# Patient Record
Sex: MALE | Race: WHITE | NOT HISPANIC OR LATINO | URBAN - METROPOLITAN AREA
[De-identification: names, ages, dates, MRNs, and addresses within clinical notes are randomized per-mention and may not be internally consistent; named-entity substitution may affect disease eponyms.]

---

## 2018-06-25 ENCOUNTER — INPATIENT (INPATIENT)
Facility: HOSPITAL | Age: 58
LOS: 3 days | Discharge: HOME HEALTH | End: 2018-06-29
Attending: HOSPITALIST | Admitting: HOSPITALIST
Payer: MEDICAID

## 2018-06-25 VITALS
HEART RATE: 110 BPM | TEMPERATURE: 99 F | RESPIRATION RATE: 18 BRPM | SYSTOLIC BLOOD PRESSURE: 144 MMHG | DIASTOLIC BLOOD PRESSURE: 70 MMHG | OXYGEN SATURATION: 97 %

## 2018-06-25 RX ORDER — SODIUM CHLORIDE 9 MG/ML
1000 INJECTION INTRAMUSCULAR; INTRAVENOUS; SUBCUTANEOUS ONCE
Qty: 0 | Refills: 0 | Status: COMPLETED | OUTPATIENT
Start: 2018-06-25 | End: 2018-06-25

## 2018-06-25 RX ORDER — SODIUM CHLORIDE 9 MG/ML
3 INJECTION INTRAMUSCULAR; INTRAVENOUS; SUBCUTANEOUS ONCE
Qty: 0 | Refills: 0 | Status: COMPLETED | OUTPATIENT
Start: 2018-06-25 | End: 2018-06-26

## 2018-06-25 NOTE — ED PROVIDER NOTE - MEDICAL DECISION MAKING DETAILS
labs, ekg, CT done. Pt with hyokalemia, hypocalcemia and likely ileus on CT. Will admit for pain and nausea with these other findings.

## 2018-06-25 NOTE — ED PROVIDER NOTE - CARE PLAN
Principal Discharge DX:	Abdominal pain, unspecified abdominal location  Secondary Diagnosis:	Hypokalemia  Secondary Diagnosis:	Hypocalcemia

## 2018-06-25 NOTE — ED PROVIDER NOTE - ATTENDING CONTRIBUTION TO CARE
58 yo male with pmh of obesity, asthma, dm, hernia repair, presents to ER for abdomen pain and diarrhea x 2 weeks. Pt also had vomiting x 2, and abdomen tenderness "all over". Pt has some SOB with pain. Pt denies fever, chills, cough. Pt however is disheveled, and has not taken care of his self recently (loss of a family member who used to care for him). Agree with PA management. Will check labs, urine, CT scan and reassess.

## 2018-06-25 NOTE — ED PROVIDER NOTE - NS ED ROS FT
Except as documented in HPI, all other ROS negative.   GENERAL: Denies fever/chills, loss of appetite/weight or fatigue.  SKIN: Denies rashes, abrasions, lacerations, ecchymosis, erythema, or edema.  HEAD: Denies headache, dizziness or trauma.  EYES: Denies blurry vision, diplopia, or photophobia.  ENT: Denies earaches, discharge or hearing loss. Denies nasal discharge or epistaxis. Denies sore throat.   CARDIAC: Denies chest pain, palpitations, or SOB.   RESPIRATORY: Denies SOB, cough, hemoptysis or wheezing.   GI: + abdominal pain, + nausea, + vomiting, +diarrhea.   : Denies hematuria, dysuria or frequency.   MSK: Denies myalgias, bony deformity or pain.   NEURO: Denies paresthesias, tingling, weakness.

## 2018-06-25 NOTE — ED PROVIDER NOTE - PHYSICAL EXAMINATION
VITAL SIGNS: I have reviewed the initial vital signs.   CONSTITUTIONAL: Awake, alert. Unkempt, malodorous, in no distress. Non-toxic appearing.   SKIN: No rash, vesicles/lesion, abrasions or lacerations. No ecchymosis or signs of trauma.   HEAD: Normocephalic; atraumatic.   EYES: Symmetrical, no discharge or signs of trauma. Conjunctiva and sclera clear.   ENT: Airway patent. MMM.   NECK: Supple; non-tender.   CARD: No chest wall deformity or tenderness. S1, S2 normal; no murmurs, gallops, or rubs. Regular rate and rhythm.  RESP: Good air movement. Lungs CTAB. No crackles, wheezes, rales or rhonchi.  ABD: Soft; Large, obese abdominal, diffuse tenderness.  No rebound/guarding/rigidity.   EXT: No bony deformity or tenderness. Normal ROM x 4 extremities.   NEURO: A&Ox3. GCS 15. Normal speech   PSYCH: Cooperative, appropriate.

## 2018-06-25 NOTE — ED PROVIDER NOTE - PROGRESS NOTE DETAILS
Results reviewed, will admit pt to medicine. page put out for surgery consult spoke to surgery consult dr pham for eval of patient while in ER

## 2018-06-25 NOTE — ED PROVIDER NOTE - OBJECTIVE STATEMENT
Pt is a 56 y/o Male, PMHX of DM, Asthma, presents to ED w/ abdominal pain Pt is a 56 y/o Male, PMHX of DM, Asthma, presents to ED w/ abdominal pain that began today. He also reports 2 episodes of NB/NB vomiting today and diarrhea for 2 weeks. pt has hx of hernia sx. Denies fever, chills, dysuria, hematuria, melena, bloody stools.

## 2018-06-26 DIAGNOSIS — Z98.890 OTHER SPECIFIED POSTPROCEDURAL STATES: Chronic | ICD-10-CM

## 2018-06-26 LAB
ACETONE SERPL-MCNC: NEGATIVE — SIGNIFICANT CHANGE UP
ALBUMIN SERPL ELPH-MCNC: 3.2 G/DL — LOW (ref 3.5–5.2)
ALBUMIN SERPL ELPH-MCNC: 3.3 G/DL — LOW (ref 3.5–5.2)
ALP SERPL-CCNC: 100 U/L — SIGNIFICANT CHANGE UP (ref 30–115)
ALP SERPL-CCNC: 104 U/L — SIGNIFICANT CHANGE UP (ref 30–115)
ALT FLD-CCNC: 18 U/L — SIGNIFICANT CHANGE UP (ref 0–41)
ALT FLD-CCNC: 20 U/L — SIGNIFICANT CHANGE UP (ref 0–41)
ANION GAP SERPL CALC-SCNC: 15 MMOL/L — HIGH (ref 7–14)
ANION GAP SERPL CALC-SCNC: 16 MMOL/L — HIGH (ref 7–14)
APPEARANCE UR: CLEAR — SIGNIFICANT CHANGE UP
AST SERPL-CCNC: 10 U/L — SIGNIFICANT CHANGE UP (ref 0–41)
AST SERPL-CCNC: 12 U/L — SIGNIFICANT CHANGE UP (ref 0–41)
BASE EXCESS BLDV CALC-SCNC: -1.5 MMOL/L — SIGNIFICANT CHANGE UP (ref -2–2)
BILIRUB SERPL-MCNC: 0.3 MG/DL — SIGNIFICANT CHANGE UP (ref 0.2–1.2)
BILIRUB SERPL-MCNC: 0.3 MG/DL — SIGNIFICANT CHANGE UP (ref 0.2–1.2)
BILIRUB UR-MCNC: NEGATIVE — SIGNIFICANT CHANGE UP
BUN SERPL-MCNC: 13 MG/DL — SIGNIFICANT CHANGE UP (ref 10–20)
BUN SERPL-MCNC: 15 MG/DL — SIGNIFICANT CHANGE UP (ref 10–20)
CA-I SERPL-SCNC: 1.05 MMOL/L — LOW (ref 1.12–1.3)
CALCIUM SERPL-MCNC: 7.5 MG/DL — LOW (ref 8.5–10.1)
CALCIUM SERPL-MCNC: 7.8 MG/DL — LOW (ref 8.5–10.1)
CHLORIDE SERPL-SCNC: 102 MMOL/L — SIGNIFICANT CHANGE UP (ref 98–110)
CHLORIDE SERPL-SCNC: 99 MMOL/L — SIGNIFICANT CHANGE UP (ref 98–110)
CO2 SERPL-SCNC: 25 MMOL/L — SIGNIFICANT CHANGE UP (ref 17–32)
CO2 SERPL-SCNC: 25 MMOL/L — SIGNIFICANT CHANGE UP (ref 17–32)
COLOR SPEC: YELLOW — SIGNIFICANT CHANGE UP
CREAT SERPL-MCNC: 0.7 MG/DL — SIGNIFICANT CHANGE UP (ref 0.7–1.5)
CREAT SERPL-MCNC: 0.8 MG/DL — SIGNIFICANT CHANGE UP (ref 0.7–1.5)
DIFF PNL FLD: NEGATIVE — SIGNIFICANT CHANGE UP
GAS PNL BLDV: 138 MMOL/L — SIGNIFICANT CHANGE UP (ref 136–145)
GAS PNL BLDV: SIGNIFICANT CHANGE UP
GLUCOSE SERPL-MCNC: 174 MG/DL — HIGH (ref 70–99)
GLUCOSE SERPL-MCNC: 185 MG/DL — HIGH (ref 70–99)
GLUCOSE UR QL: NEGATIVE MG/DL — SIGNIFICANT CHANGE UP
HCO3 BLDV-SCNC: 27 MMOL/L — SIGNIFICANT CHANGE UP (ref 22–29)
HCT VFR BLD CALC: 40.3 % — LOW (ref 42–52)
HCT VFR BLD CALC: 41.9 % — LOW (ref 42–52)
HCT VFR BLDA CALC: 47.5 % — HIGH (ref 34–44)
HGB BLD CALC-MCNC: 15.5 G/DL — SIGNIFICANT CHANGE UP (ref 14–18)
HGB BLD-MCNC: 12.5 G/DL — LOW (ref 14–18)
HGB BLD-MCNC: 12.7 G/DL — LOW (ref 14–18)
KETONES UR-MCNC: NEGATIVE — SIGNIFICANT CHANGE UP
LACTATE BLDV-MCNC: 1.6 MMOL/L — SIGNIFICANT CHANGE UP (ref 0.5–1.6)
LACTATE SERPL-SCNC: 1.2 MMOL/L — SIGNIFICANT CHANGE UP (ref 0.5–2.2)
LACTATE SERPL-SCNC: 1.7 MMOL/L — SIGNIFICANT CHANGE UP (ref 0.5–2.2)
LEUKOCYTE ESTERASE UR-ACNC: NEGATIVE — SIGNIFICANT CHANGE UP
LIDOCAIN IGE QN: 21 U/L — SIGNIFICANT CHANGE UP (ref 7–60)
MCHC RBC-ENTMCNC: 25.1 PG — LOW (ref 27–31)
MCHC RBC-ENTMCNC: 25.6 PG — LOW (ref 27–31)
MCHC RBC-ENTMCNC: 30.3 G/DL — LOW (ref 32–37)
MCHC RBC-ENTMCNC: 31 G/DL — LOW (ref 32–37)
MCV RBC AUTO: 82.6 FL — SIGNIFICANT CHANGE UP (ref 80–94)
MCV RBC AUTO: 83 FL — SIGNIFICANT CHANGE UP (ref 80–94)
NITRITE UR-MCNC: NEGATIVE — SIGNIFICANT CHANGE UP
NRBC # BLD: 0 /100 WBCS — SIGNIFICANT CHANGE UP (ref 0–0)
NRBC # BLD: 0 /100 WBCS — SIGNIFICANT CHANGE UP (ref 0–0)
PCO2 BLDV: 61 MMHG — HIGH (ref 41–51)
PH BLDV: 7.26 — SIGNIFICANT CHANGE UP (ref 7.26–7.43)
PH UR: 6.5 — SIGNIFICANT CHANGE UP (ref 5–8)
PLATELET # BLD AUTO: 280 K/UL — SIGNIFICANT CHANGE UP (ref 130–400)
PLATELET # BLD AUTO: 302 K/UL — SIGNIFICANT CHANGE UP (ref 130–400)
PO2 BLDV: 23 MMHG — SIGNIFICANT CHANGE UP (ref 20–40)
POTASSIUM BLDV-SCNC: 2.7 MMOL/L — LOW (ref 3.3–5.6)
POTASSIUM SERPL-MCNC: 2.9 MMOL/L — LOW (ref 3.5–5)
POTASSIUM SERPL-MCNC: 3.1 MMOL/L — LOW (ref 3.5–5)
POTASSIUM SERPL-SCNC: 2.9 MMOL/L — LOW (ref 3.5–5)
POTASSIUM SERPL-SCNC: 3.1 MMOL/L — LOW (ref 3.5–5)
PROT SERPL-MCNC: 5.8 G/DL — LOW (ref 6–8)
PROT SERPL-MCNC: 5.8 G/DL — LOW (ref 6–8)
PROT UR-MCNC: NEGATIVE MG/DL — SIGNIFICANT CHANGE UP
RBC # BLD: 4.88 M/UL — SIGNIFICANT CHANGE UP (ref 4.7–6.1)
RBC # BLD: 5.05 M/UL — SIGNIFICANT CHANGE UP (ref 4.7–6.1)
RBC # FLD: 14.6 % — HIGH (ref 11.5–14.5)
RBC # FLD: 14.8 % — HIGH (ref 11.5–14.5)
SAO2 % BLDV: 33 % — SIGNIFICANT CHANGE UP
SODIUM SERPL-SCNC: 140 MMOL/L — SIGNIFICANT CHANGE UP (ref 135–146)
SODIUM SERPL-SCNC: 142 MMOL/L — SIGNIFICANT CHANGE UP (ref 135–146)
SP GR SPEC: <=1.005 — SIGNIFICANT CHANGE UP (ref 1.01–1.03)
UROBILINOGEN FLD QL: 0.2 MG/DL — SIGNIFICANT CHANGE UP (ref 0.2–0.2)
WBC # BLD: 10.37 K/UL — SIGNIFICANT CHANGE UP (ref 4.8–10.8)
WBC # BLD: 7.82 K/UL — SIGNIFICANT CHANGE UP (ref 4.8–10.8)
WBC # FLD AUTO: 10.37 K/UL — SIGNIFICANT CHANGE UP (ref 4.8–10.8)
WBC # FLD AUTO: 7.82 K/UL — SIGNIFICANT CHANGE UP (ref 4.8–10.8)

## 2018-06-26 RX ORDER — AMPICILLIN SODIUM AND SULBACTAM SODIUM 250; 125 MG/ML; MG/ML
3 INJECTION, POWDER, FOR SUSPENSION INTRAMUSCULAR; INTRAVENOUS EVERY 6 HOURS
Qty: 0 | Refills: 0 | Status: DISCONTINUED | OUTPATIENT
Start: 2018-06-26 | End: 2018-06-27

## 2018-06-26 RX ORDER — HYDROMORPHONE HYDROCHLORIDE 2 MG/ML
2 INJECTION INTRAMUSCULAR; INTRAVENOUS; SUBCUTANEOUS
Qty: 0 | Refills: 0 | Status: DISCONTINUED | OUTPATIENT
Start: 2018-06-26 | End: 2018-06-29

## 2018-06-26 RX ORDER — INSULIN GLARGINE 100 [IU]/ML
12 INJECTION, SOLUTION SUBCUTANEOUS EVERY MORNING
Qty: 0 | Refills: 0 | Status: DISCONTINUED | OUTPATIENT
Start: 2018-06-26 | End: 2018-06-29

## 2018-06-26 RX ORDER — ONDANSETRON 8 MG/1
4 TABLET, FILM COATED ORAL EVERY 6 HOURS
Qty: 0 | Refills: 0 | Status: DISCONTINUED | OUTPATIENT
Start: 2018-06-26 | End: 2018-06-29

## 2018-06-26 RX ORDER — POTASSIUM CHLORIDE 20 MEQ
20 PACKET (EA) ORAL ONCE
Qty: 0 | Refills: 0 | Status: COMPLETED | OUTPATIENT
Start: 2018-06-26 | End: 2018-06-26

## 2018-06-26 RX ORDER — POTASSIUM CHLORIDE 20 MEQ
40 PACKET (EA) ORAL ONCE
Qty: 0 | Refills: 0 | Status: COMPLETED | OUTPATIENT
Start: 2018-06-26 | End: 2018-06-26

## 2018-06-26 RX ORDER — INSULIN LISPRO 100/ML
4 VIAL (ML) SUBCUTANEOUS
Qty: 0 | Refills: 0 | Status: DISCONTINUED | OUTPATIENT
Start: 2018-06-26 | End: 2018-06-29

## 2018-06-26 RX ORDER — IOHEXOL 300 MG/ML
30 INJECTION, SOLUTION INTRAVENOUS ONCE
Qty: 0 | Refills: 0 | Status: COMPLETED | OUTPATIENT
Start: 2018-06-26 | End: 2018-06-26

## 2018-06-26 RX ORDER — DEXTROSE 50 % IN WATER 50 %
12.5 SYRINGE (ML) INTRAVENOUS ONCE
Qty: 0 | Refills: 0 | Status: DISCONTINUED | OUTPATIENT
Start: 2018-06-26 | End: 2018-06-29

## 2018-06-26 RX ORDER — HEPARIN SODIUM 5000 [USP'U]/ML
5000 INJECTION INTRAVENOUS; SUBCUTANEOUS EVERY 8 HOURS
Qty: 0 | Refills: 0 | Status: DISCONTINUED | OUTPATIENT
Start: 2018-06-26 | End: 2018-06-29

## 2018-06-26 RX ORDER — GLUCAGON INJECTION, SOLUTION 0.5 MG/.1ML
1 INJECTION, SOLUTION SUBCUTANEOUS ONCE
Qty: 0 | Refills: 0 | Status: DISCONTINUED | OUTPATIENT
Start: 2018-06-26 | End: 2018-06-29

## 2018-06-26 RX ORDER — DEXTROSE 50 % IN WATER 50 %
15 SYRINGE (ML) INTRAVENOUS ONCE
Qty: 0 | Refills: 0 | Status: DISCONTINUED | OUTPATIENT
Start: 2018-06-26 | End: 2018-06-29

## 2018-06-26 RX ORDER — SODIUM CHLORIDE 9 MG/ML
1000 INJECTION, SOLUTION INTRAVENOUS
Qty: 0 | Refills: 0 | Status: DISCONTINUED | OUTPATIENT
Start: 2018-06-26 | End: 2018-06-29

## 2018-06-26 RX ORDER — DEXTROSE 50 % IN WATER 50 %
25 SYRINGE (ML) INTRAVENOUS ONCE
Qty: 0 | Refills: 0 | Status: DISCONTINUED | OUTPATIENT
Start: 2018-06-26 | End: 2018-06-29

## 2018-06-26 RX ADMIN — Medication 4 UNIT(S): at 17:23

## 2018-06-26 RX ADMIN — AMPICILLIN SODIUM AND SULBACTAM SODIUM 200 GRAM(S): 250; 125 INJECTION, POWDER, FOR SUSPENSION INTRAMUSCULAR; INTRAVENOUS at 17:57

## 2018-06-26 RX ADMIN — IOHEXOL 30 MILLILITER(S): 300 INJECTION, SOLUTION INTRAVENOUS at 20:00

## 2018-06-26 RX ADMIN — HEPARIN SODIUM 5000 UNIT(S): 5000 INJECTION INTRAVENOUS; SUBCUTANEOUS at 21:41

## 2018-06-26 RX ADMIN — SODIUM CHLORIDE 3 MILLILITER(S): 9 INJECTION INTRAMUSCULAR; INTRAVENOUS; SUBCUTANEOUS at 03:36

## 2018-06-26 RX ADMIN — SODIUM CHLORIDE 500 MILLILITER(S): 9 INJECTION INTRAMUSCULAR; INTRAVENOUS; SUBCUTANEOUS at 02:56

## 2018-06-26 RX ADMIN — IOHEXOL 30 MILLILITER(S): 300 INJECTION, SOLUTION INTRAVENOUS at 15:00

## 2018-06-26 RX ADMIN — Medication 40 MILLIEQUIVALENT(S): at 10:27

## 2018-06-26 RX ADMIN — Medication 50 MILLIEQUIVALENT(S): at 07:37

## 2018-06-26 RX ADMIN — Medication 40 MILLIEQUIVALENT(S): at 07:36

## 2018-06-26 NOTE — CONSULT NOTE ADULT - SUBJECTIVE AND OBJECTIVE BOX
SURGERY CONSULT  ==============================================================================================================  HPI: 57y Male   Pt is a 56 y/o Male, PMHX of DM, Asthma, presents to ED w/ abdominal pain that began today. He also reports 2 episodes of NB/NB vomiting today and diarrhea for 2 weeks. Pt state he has pain in left arm , axilla, abd , right leg pt has hx of hernia sx. Denies fever, chills, dysuria, hematuria, melena, bloody stools.	        PAST MEDICAL & SURGICAL HISTORY:  Diabetes  Asthma  No significant past surgical history    Home Meds: Home Medications:    Allergies: Allergies    No Known Allergies    Intolerances      Soc:   Advanced Directives: Presumed Full Code     CURRENT MEDICATIONS:   --------------------------------------------------------------------------------------  Neurologic Medications    Respiratory Medications    Cardiovascular Medications    Gastrointestinal Medications  potassium chloride    Tablet ER 40 milliEquivalent(s) Oral once    Genitourinary Medications    Hematologic/Oncologic Medications    Antimicrobial/Immunologic Medications    Endocrine/Metabolic Medications    Topical/Other Medications    --------------------------------------------------------------------------------------    VITAL SIGNS, INS/OUTS (last 24 hours):  --------------------------------------------------------------------------------------  ICU Vital Signs Last 24 Hrs  T(C): 36.3 (26 Jun 2018 09:10), Max: 37.2 (25 Jun 2018 20:50)  T(F): 97.3 (26 Jun 2018 09:10), Max: 99 (25 Jun 2018 20:50)  HR: 96 (26 Jun 2018 09:10) (96 - 110)  BP: 127/55 (26 Jun 2018 09:10) (122/56 - 144/70)  BP(mean): --  ABP: --  ABP(mean): --  RR: 19 (26 Jun 2018 09:10) (18 - 20)  SpO2: 100% (26 Jun 2018 07:46) (97% - 100%)    I&O's Summary    --------------------------------------------------------------------------------------    EXAM:  General/Neuro  GCS: 15  Exam: Normal, NAD, alert, oriented x 3, no focal deficits. PERRLA     Respiratory  Exam: Lungs clear to auscultation, Normal expansion/effort.      Cardiovascular  Exam: S1, S2.  Regular rate and rhythm.    Cardiac Rhythm: Normal Sinus Rhythm    GI  Exam: Abdomen soft, mild tender diffuse, Non-distended.        Extremities  Exam: Extremities warm, pink, well-perfused.          LABS  --------------------------------------------------------------------------------------  Labs:  CAPILLARY BLOOD GLUCOSE                              12.7   10.37 )-----------( 302      ( 25 Jun 2018 23:21 )             41.9         06-25    142  |  102  |  15  ----------------------------<  185<H>  2.9<L>   |  25  |  0.8      Calcium, Total Serum: 7.8 mg/dL (06-25-18 @ 23:21)      LFTs:             5.8  | 0.3  | 12       ------------------[100     ( 25 Jun 2018 23:21 )  3.2  | x    | 20          Lipase:21     Amylase:x         Blood Gas Venous - Lactate: 1.6 mmoL/L (06-26-18 @ 03:06)  Lactate, Blood: 1.7 mmol/L (06-25-18 @ 23:21)      Coags:        Urinalysis Basic - ( 25 Jun 2018 23:21 )    Color: Yellow / Appearance: Clear / SG: <=1.005 / pH: x  Gluc: x / Ketone: Negative  / Bili: Negative / Urobili: 0.2 mg/dL   Blood: x / Protein: Negative mg/dL / Nitrite: Negative   Leuk Esterase: Negative / RBC: x / WBC x   Sq Epi: x / Non Sq Epi: x / Bacteria: x          --------------------------------------------------------------------------------------    OTHER LABS    IMAGING RESULTS  < from: CT Abdomen and Pelvis w/ IV Cont (06.26.18 @ 04:44) >  MPRESSION:   Limited evaluation due to patient's body habitus and streak artifact.    1.  Dilated fluid, air filled small and large bowel loops without   evidence of obstruction, compatible with provided history of diarrhea.    2.  Incompletely characterized 4.4 cm right lower pole renal lesion,   likely a cyst. This can be followed up with an outpatient renal   ultrasound.    < end of copied text >        ASSESSMENT/ PLAN:  57y Male c/o of abd pain, left arm pain/ axilla  - pt appears to be disheveled and un kept   -pt is passing gas, having bm  - exam unremarkable except for mild diffuse abd pain  - ct scan shows dilated loops of small bowel and large no transition point or obvious pt of obstrution no free fluid  - oral contrast was not given therefore limited scan  - pt needs electrolyte replacement  - no surgical intervention at this time        Attending aware and agrees with plan

## 2018-06-26 NOTE — CONSULT NOTE ADULT - ASSESSMENT
venous stasis ulcers lower legs with left leg maggots--> removed    rec: soap and water qd, dakins solution  wet to dry dressing bid    no surgery needed
IMPRESSION:  Cellulitis LEs bilaterally   Maggots but see no necrotic tissue.  No abscess.    RECOMMENDATIONS:  Diuresis.  Unasyn 3 gm iv q6h  F/U with BURN

## 2018-06-26 NOTE — H&P ADULT - NSHPLABSRESULTS_GEN_ALL_CORE
12.7   10.37 )-----------( 302      ( 25 Jun 2018 23:21 )             41.9       06-25    142  |  102  |  15  ----------------------------<  185<H>  2.9<L>   |  25  |  0.8    Ca    7.8<L>      25 Jun 2018 23:21    TPro  5.8<L>  /  Alb  3.2<L>  /  TBili  0.3  /  DBili  x   /  AST  12  /  ALT  20  /  AlkPhos  100  06-25              Urinalysis Basic - ( 25 Jun 2018 23:21 )    Color: Yellow / Appearance: Clear / SG: <=1.005 / pH: x  Gluc: x / Ketone: Negative  / Bili: Negative / Urobili: 0.2 mg/dL   Blood: x / Protein: Negative mg/dL / Nitrite: Negative   Leuk Esterase: Negative / RBC: x / WBC x   Sq Epi: x / Non Sq Epi: x / Bacteria: x            Lactate Trend  06-25 @ 23:21 Lactate:1.7             CAPILLARY BLOOD GLUCOSE  132 (26 Jun 2018 11:25)

## 2018-06-26 NOTE — H&P ADULT - FAMILY HISTORY
Father  Still living? Unknown  Family history of obesity, Age at diagnosis: Age Unknown     Sibling  Still living? Unknown  Family history of obesity, Age at diagnosis: Age Unknown

## 2018-06-26 NOTE — H&P ADULT - ASSESSMENT
57 y o m with pmh of D.M not on any medications at home , asthma. Osteoarthritis and morbid obesity presented to ER with c/o nausea , vomiting , abdominal pain and diarrhea for 1 day.    # LEFT LOWER EXTREMITY WOUND WITH MAGGOTS / SURROUNDING ERYTHEMA / NO DISCHARGE     - admit to medicine.  - IV unasyn as recommended by ID.  - Burn recommendations to follow , no interventions as per now.   - pain control.  - iv diuresis for lower extremity edema ;will start on lasix 40 mg iv q day , check BNP and order 2 d echo if needed. .    #NAUSEA / VOMITING AND DIARRHEA / HYPOKALEMIA :    -likely viral ; no fever or white count.  - symptomatic management for now.  - replete electrolytes as needed.  - supportive care.      # DIABETES MELLITIS :    -check Hb A1c , monitor finger stick.  - insulin regimen.    # ASTHMA:    -not on any inhalers at home.    # OA:    -pain control.    # DISPO:    - from home ; lives by himself ambulates using a walker.  - full code.

## 2018-06-26 NOTE — H&P ADULT - ATTENDING COMMENTS
Patient seen and examined independently. Agree with resident note . Case discussed with housestaff, nursing and patient/pt decision maker.   # bilateral edema  with oozing feet-Burn consult and iv antibiotics for 1-2 days.  # Morbid obesity  # Hypokalemia- repleted  # Hypomagnesemia- repleted  Dc plan as per -- may go to group home directly from hospital as had maggots in his leg at home and is not a safe discharge

## 2018-06-26 NOTE — PROVIDER CONTACT NOTE (OTHER) - SITUATION
Pt dressing removed to the Left lower extremity wound. Upon removing dressing, visualized maggets coming from the wound.

## 2018-06-26 NOTE — CHART NOTE - NSCHARTNOTEFT_GEN_A_CORE
Called by nursing that while changing the bandage a slew of maggots poured out of the wound. Patient placed on contact isolation, room mate moved out, ID consult placed. Nurse will flush wound with NS.

## 2018-06-26 NOTE — H&P ADULT - NSHPPHYSICALEXAM_GEN_ALL_CORE
T(F): 97.2  HR: 93  BP: 129/58  RR: 20  SpO2: 100%      PHYSICAL EXAM:  GENERAL: disheveled , unkempt , obese male lying on bed   HEAD:  Atraumatic, Normocephalic  CHEST/LUNG: dec air entry b/l lung fields  HEART: Regular rate and rhythm; No murmurs, rubs, or gallops  ABDOMEN: soft ; obese , bs +  EXTREMITIES:  2+ edema b/l lower extremities ; left shin wound with surrounding erythema and multiple maggots attached , right shin small wound with no discharge or maggots.  PSYCH: AAOx3  NEUROLOGY: non-focal

## 2018-06-26 NOTE — H&P ADULT - HISTORY OF PRESENT ILLNESS
57 y o m with pmh of D.M not on any medications at home , asthma. Osteoarthritis and morbid obesity presented to ER with c/o nausea , vomiting , abdominal pain and diarrhea for 1 day. As per pt he was in his usual state of health until yesterday when he developed generalized abdominal pain , 8/10 , non radiating associated with nausea and 2 episodes of non bilious , non bloody vomiting , pt denies any fever chills but has diarrhea . Pt reports a few episodes when he had loose watery stools and this morning he saw some blood with stools , bright red not mixed with stools. Pt denies any black colored stools or hematemesis.    Pt went to [facility] ER last wk for evaluation of his lower extremities pain , was found to have b/l lower extremity wounds and was d/c home with  services for wound care. Pt was not sent home on any ABX .  Pt reports that he has chronic lower extremity pain as a result of his arthritis . Pt was diagnosed with D.M but was never started on any medications.    Pt was found to have maggots on his left lower extremity wound ; pt c/o pain and reports that the dressing was changed by home nurse and she did not report any maggots or discharge.    Pt lives by himself at home ; is moving to assisted living facility in near future.

## 2018-06-26 NOTE — CONSULT NOTE ADULT - SUBJECTIVE AND OBJECTIVE BOX
EVIN, FABIAN  57y, Male  Allergy: No Known Allergies      HPI:    FAMILY HISTORY:    PAST MEDICAL & SURGICAL HISTORY:  Diabetes  Asthma  No significant past surgical history        VITALS:  T(F): 97.2, Max: 99 (06-25-18 @ 20:50)  HR: 93  BP: 129/58  RR: 20Vital Signs Last 24 Hrs  T(C): 36.2 (26 Jun 2018 13:59), Max: 37.2 (25 Jun 2018 20:50)  T(F): 97.2 (26 Jun 2018 13:59), Max: 99 (25 Jun 2018 20:50)  HR: 93 (26 Jun 2018 13:59) (93 - 110)  BP: 129/58 (26 Jun 2018 13:59) (122/56 - 144/70)  BP(mean): --  RR: 20 (26 Jun 2018 13:59) (18 - 20)  SpO2: 100% (26 Jun 2018 07:46) (97% - 100%)    TESTS & MEASUREMENTS:                        12.7   10.37 )-----------( 302      ( 25 Jun 2018 23:21 )             41.9     06-25    142  |  102  |  15  ----------------------------<  185<H>  2.9<L>   |  25  |  0.8    Ca    7.8<L>      25 Jun 2018 23:21    TPro  5.8<L>  /  Alb  3.2<L>  /  TBili  0.3  /  DBili  x   /  AST  12  /  ALT  20  /  AlkPhos  100  06-25    LIVER FUNCTIONS - ( 25 Jun 2018 23:21 )  Alb: 3.2 g/dL / Pro: 5.8 g/dL / ALK PHOS: 100 U/L / ALT: 20 U/L / AST: 12 U/L / GGT: x             Urinalysis Basic - ( 25 Jun 2018 23:21 )    Color: Yellow / Appearance: Clear / SG: <=1.005 / pH: x  Gluc: x / Ketone: Negative  / Bili: Negative / Urobili: 0.2 mg/dL   Blood: x / Protein: Negative mg/dL / Nitrite: Negative   Leuk Esterase: Negative / RBC: x / WBC x   Sq Epi: x / Non Sq Epi: x / Bacteria: x          RADIOLOGY & ADDITIONAL TESTS:    ANTIBIOTICS:

## 2018-06-26 NOTE — CONSULT NOTE ADULT - SUBJECTIVE AND OBJECTIVE BOX
h/o venous stasis ulcers lower legs--> now with maggots left leg    PE: legs with granulating ulcers, no necrotic tissue, approx 10 live maggots left lower leg--> removed

## 2018-06-27 ENCOUNTER — TRANSCRIPTION ENCOUNTER (OUTPATIENT)
Age: 58
End: 2018-06-27

## 2018-06-27 LAB
ALBUMIN SERPL ELPH-MCNC: 3.3 G/DL — LOW (ref 3.5–5.2)
ALP SERPL-CCNC: 106 U/L — SIGNIFICANT CHANGE UP (ref 30–115)
ALT FLD-CCNC: 16 U/L — SIGNIFICANT CHANGE UP (ref 0–41)
ANION GAP SERPL CALC-SCNC: 16 MMOL/L — HIGH (ref 7–14)
ANION GAP SERPL CALC-SCNC: 16 MMOL/L — HIGH (ref 7–14)
ANION GAP SERPL CALC-SCNC: 17 MMOL/L — HIGH (ref 7–14)
AST SERPL-CCNC: 11 U/L — SIGNIFICANT CHANGE UP (ref 0–41)
BILIRUB SERPL-MCNC: 0.2 MG/DL — SIGNIFICANT CHANGE UP (ref 0.2–1.2)
BUN SERPL-MCNC: 12 MG/DL — SIGNIFICANT CHANGE UP (ref 10–20)
BUN SERPL-MCNC: 13 MG/DL — SIGNIFICANT CHANGE UP (ref 10–20)
BUN SERPL-MCNC: 14 MG/DL — SIGNIFICANT CHANGE UP (ref 10–20)
CALCIUM SERPL-MCNC: 7.5 MG/DL — LOW (ref 8.5–10.1)
CALCIUM SERPL-MCNC: 7.6 MG/DL — LOW (ref 8.5–10.1)
CALCIUM SERPL-MCNC: 7.6 MG/DL — LOW (ref 8.5–10.1)
CHLORIDE SERPL-SCNC: 100 MMOL/L — SIGNIFICANT CHANGE UP (ref 98–110)
CHLORIDE SERPL-SCNC: 100 MMOL/L — SIGNIFICANT CHANGE UP (ref 98–110)
CHLORIDE SERPL-SCNC: 102 MMOL/L — SIGNIFICANT CHANGE UP (ref 98–110)
CHOLEST SERPL-MCNC: 140 MG/DL — SIGNIFICANT CHANGE UP (ref 100–200)
CO2 SERPL-SCNC: 23 MMOL/L — SIGNIFICANT CHANGE UP (ref 17–32)
CO2 SERPL-SCNC: 25 MMOL/L — SIGNIFICANT CHANGE UP (ref 17–32)
CO2 SERPL-SCNC: 25 MMOL/L — SIGNIFICANT CHANGE UP (ref 17–32)
CREAT SERPL-MCNC: 0.8 MG/DL — SIGNIFICANT CHANGE UP (ref 0.7–1.5)
CREAT SERPL-MCNC: 0.9 MG/DL — SIGNIFICANT CHANGE UP (ref 0.7–1.5)
CREAT SERPL-MCNC: 1.1 MG/DL — SIGNIFICANT CHANGE UP (ref 0.7–1.5)
CULTURE RESULTS: SIGNIFICANT CHANGE UP
ESTIMATED AVERAGE GLUCOSE: 177 MG/DL — HIGH (ref 68–114)
GLUCOSE SERPL-MCNC: 129 MG/DL — HIGH (ref 70–99)
GLUCOSE SERPL-MCNC: 148 MG/DL — HIGH (ref 70–99)
GLUCOSE SERPL-MCNC: 170 MG/DL — HIGH (ref 70–99)
HBA1C BLD-MCNC: 7.8 % — HIGH (ref 4–5.6)
HCT VFR BLD CALC: 39.4 % — LOW (ref 42–52)
HDLC SERPL-MCNC: 32 MG/DL — LOW (ref 40–125)
HGB BLD-MCNC: 12.4 G/DL — LOW (ref 14–18)
LIPID PNL WITH DIRECT LDL SERPL: 83 MG/DL — SIGNIFICANT CHANGE UP (ref 4–129)
MAGNESIUM SERPL-MCNC: 1.4 MG/DL — LOW (ref 1.8–2.4)
MCHC RBC-ENTMCNC: 25.9 PG — LOW (ref 27–31)
MCHC RBC-ENTMCNC: 31.5 G/DL — LOW (ref 32–37)
MCV RBC AUTO: 82.3 FL — SIGNIFICANT CHANGE UP (ref 80–94)
NRBC # BLD: 0 /100 WBCS — SIGNIFICANT CHANGE UP (ref 0–0)
PLATELET # BLD AUTO: 289 K/UL — SIGNIFICANT CHANGE UP (ref 130–400)
POTASSIUM SERPL-MCNC: 3.3 MMOL/L — LOW (ref 3.5–5)
POTASSIUM SERPL-MCNC: 3.4 MMOL/L — LOW (ref 3.5–5)
POTASSIUM SERPL-MCNC: 3.7 MMOL/L — SIGNIFICANT CHANGE UP (ref 3.5–5)
POTASSIUM SERPL-SCNC: 3.3 MMOL/L — LOW (ref 3.5–5)
POTASSIUM SERPL-SCNC: 3.4 MMOL/L — LOW (ref 3.5–5)
POTASSIUM SERPL-SCNC: 3.7 MMOL/L — SIGNIFICANT CHANGE UP (ref 3.5–5)
PROT SERPL-MCNC: 6.2 G/DL — SIGNIFICANT CHANGE UP (ref 6–8)
RBC # BLD: 4.79 M/UL — SIGNIFICANT CHANGE UP (ref 4.7–6.1)
RBC # FLD: 14.6 % — HIGH (ref 11.5–14.5)
SODIUM SERPL-SCNC: 140 MMOL/L — SIGNIFICANT CHANGE UP (ref 135–146)
SODIUM SERPL-SCNC: 141 MMOL/L — SIGNIFICANT CHANGE UP (ref 135–146)
SODIUM SERPL-SCNC: 143 MMOL/L — SIGNIFICANT CHANGE UP (ref 135–146)
SPECIMEN SOURCE: SIGNIFICANT CHANGE UP
TOTAL CHOLESTEROL/HDL RATIO MEASUREMENT: 4.4 RATIO — SIGNIFICANT CHANGE UP (ref 4–5.5)
TRIGL SERPL-MCNC: 258 MG/DL — HIGH (ref 10–149)
WBC # BLD: 7.4 K/UL — SIGNIFICANT CHANGE UP (ref 4.8–10.8)
WBC # FLD AUTO: 7.4 K/UL — SIGNIFICANT CHANGE UP (ref 4.8–10.8)

## 2018-06-27 PROCEDURE — 99223 1ST HOSP IP/OBS HIGH 75: CPT

## 2018-06-27 RX ORDER — POTASSIUM CHLORIDE 20 MEQ
40 PACKET (EA) ORAL EVERY 4 HOURS
Qty: 0 | Refills: 0 | Status: COMPLETED | OUTPATIENT
Start: 2018-06-27 | End: 2018-06-27

## 2018-06-27 RX ORDER — POTASSIUM CHLORIDE 20 MEQ
40 PACKET (EA) ORAL EVERY 4 HOURS
Qty: 0 | Refills: 0 | Status: DISCONTINUED | OUTPATIENT
Start: 2018-06-27 | End: 2018-06-29

## 2018-06-27 RX ORDER — FUROSEMIDE 40 MG
40 TABLET ORAL DAILY
Qty: 0 | Refills: 0 | Status: DISCONTINUED | OUTPATIENT
Start: 2018-06-27 | End: 2018-06-29

## 2018-06-27 RX ORDER — MAGNESIUM SULFATE 500 MG/ML
2 VIAL (ML) INJECTION
Qty: 0 | Refills: 0 | Status: DISCONTINUED | OUTPATIENT
Start: 2018-06-27 | End: 2018-06-29

## 2018-06-27 RX ADMIN — AMPICILLIN SODIUM AND SULBACTAM SODIUM 200 GRAM(S): 250; 125 INJECTION, POWDER, FOR SUSPENSION INTRAMUSCULAR; INTRAVENOUS at 01:00

## 2018-06-27 RX ADMIN — Medication 40 MILLIEQUIVALENT(S): at 11:54

## 2018-06-27 RX ADMIN — AMPICILLIN SODIUM AND SULBACTAM SODIUM 200 GRAM(S): 250; 125 INJECTION, POWDER, FOR SUSPENSION INTRAMUSCULAR; INTRAVENOUS at 05:10

## 2018-06-27 RX ADMIN — HEPARIN SODIUM 5000 UNIT(S): 5000 INJECTION INTRAVENOUS; SUBCUTANEOUS at 05:07

## 2018-06-27 RX ADMIN — AMPICILLIN SODIUM AND SULBACTAM SODIUM 200 GRAM(S): 250; 125 INJECTION, POWDER, FOR SUSPENSION INTRAMUSCULAR; INTRAVENOUS at 11:52

## 2018-06-27 RX ADMIN — Medication 4 UNIT(S): at 11:56

## 2018-06-27 RX ADMIN — Medication 4 UNIT(S): at 08:13

## 2018-06-27 RX ADMIN — Medication 40 MILLIGRAM(S): at 11:52

## 2018-06-27 RX ADMIN — Medication 40 MILLIEQUIVALENT(S): at 21:18

## 2018-06-27 RX ADMIN — HEPARIN SODIUM 5000 UNIT(S): 5000 INJECTION INTRAVENOUS; SUBCUTANEOUS at 15:03

## 2018-06-27 RX ADMIN — INSULIN GLARGINE 12 UNIT(S): 100 INJECTION, SOLUTION SUBCUTANEOUS at 11:53

## 2018-06-27 RX ADMIN — HEPARIN SODIUM 5000 UNIT(S): 5000 INJECTION INTRAVENOUS; SUBCUTANEOUS at 21:18

## 2018-06-27 RX ADMIN — Medication 40 MILLIEQUIVALENT(S): at 05:07

## 2018-06-27 NOTE — PROGRESS NOTE ADULT - SUBJECTIVE AND OBJECTIVE BOX
FABIAN CLEARY  57y, Male      OVERNIGHT EVENTS:    none    VITALS:  T(F): 97, Max: 97.2 (06-26-18 @ 13:59)  HR: 82  BP: 118/56  RR: 20Vital Signs Last 24 Hrs  T(C): 36.1 (27 Jun 2018 06:19), Max: 36.2 (26 Jun 2018 13:59)  T(F): 97 (27 Jun 2018 06:19), Max: 97.2 (26 Jun 2018 13:59)  HR: 82 (27 Jun 2018 06:19) (82 - 93)  BP: 118/56 (27 Jun 2018 06:19) (118/56 - 129/58)  BP(mean): --  RR: 20 (27 Jun 2018 06:19) (20 - 20)  SpO2: --    TESTS & MEASUREMENTS:                        12.4   7.40  )-----------( 289      ( 27 Jun 2018 06:09 )             39.4     06-27    143  |  102  |  13  ----------------------------<  148<H>  3.7   |  25  |  0.9    Ca    7.5<L>      27 Jun 2018 06:09    TPro  6.2  /  Alb  3.3<L>  /  TBili  0.2  /  DBili  x   /  AST  11  /  ALT  16  /  AlkPhos  106  06-27    LIVER FUNCTIONS - ( 27 Jun 2018 06:09 )  Alb: 3.3 g/dL / Pro: 6.2 g/dL / ALK PHOS: 106 U/L / ALT: 16 U/L / AST: 11 U/L / GGT: x             Urinalysis Basic - ( 25 Jun 2018 23:21 )    Color: Yellow / Appearance: Clear / SG: <=1.005 / pH: x  Gluc: x / Ketone: Negative  / Bili: Negative / Urobili: 0.2 mg/dL   Blood: x / Protein: Negative mg/dL / Nitrite: Negative   Leuk Esterase: Negative / RBC: x / WBC x   Sq Epi: x / Non Sq Epi: x / Bacteria: x          RADIOLOGY & ADDITIONAL TESTS:    ANTIBIOTICS:  ampicillin/sulbactam  IVPB 3 Gram(s) IV Intermittent every 6 hours

## 2018-06-27 NOTE — PROGRESS NOTE ADULT - SUBJECTIVE AND OBJECTIVE BOX
GENERAL SURGERY PROGRESS NOTE    Patient: FABIAN CLEARY , 57y ([birth year])Male   MRN: [id number]  Location: [address]  Visit: 06-26-18 Inpatient  Date: 06-27-18 @ 05:15    Hospital Day #: 2    Procedure/Dx/Injuries: nausea , vomiting , abdominal pain and diarrhea for 1 day    Events of past 24 hours:    PAST MEDICAL & SURGICAL HISTORY:  Morbid obesity  Osteoarthritis  Diabetes  Asthma  H/O umbilical hernia repair    Vitals: T(F): 96.3 (06-26-18 @ 20:59), Max: 97.9 (06-26-18 @ 07:46)  HR: 87 (06-26-18 @ 20:59)  BP: 118/58 (06-26-18 @ 20:59)  RR: 20 (06-26-18 @ 20:59)  SpO2: 100% (06-26-18 @ 07:46)    Diet, Consistent Carbohydrate/No Snacks    Fluids: IVL    I & O's:    Bowel Movement: : [x] YES [] NO  Flatus: : [x] YES [] NO    PHYSICAL EXAM  GEN: NAD  CV/LUNG: CTAB  ABD: soft, non tender, non distended, no rebound, no guarding    MEDICATIONS  (STANDING):  ampicillin/sulbactam  IVPB 3 Gram(s) IV Intermittent every 6 hours  dextrose 5%. 1000 milliLiter(s) (50 mL/Hr) IV Continuous <Continuous>  dextrose 50% Injectable 12.5 Gram(s) IV Push once  dextrose 50% Injectable 25 Gram(s) IV Push once  dextrose 50% Injectable 25 Gram(s) IV Push once  heparin  Injectable 5000 Unit(s) SubCutaneous every 8 hours  insulin glargine Injectable (LANTUS) 12 Unit(s) SubCutaneous every morning  insulin lispro Injectable (HumaLOG) 4 Unit(s) SubCutaneous three times a day before meals  potassium chloride    Tablet ER 40 milliEquivalent(s) Oral every 4 hours    MEDICATIONS  (PRN):  aluminum hydroxide/magnesium hydroxide/simethicone Suspension 30 milliLiter(s) Oral every 4 hours PRN Dyspepsia  dextrose 40% Gel 15 Gram(s) Oral once PRN Blood Glucose LESS THAN 70 milliGRAM(s)/deciliter  glucagon  Injectable 1 milliGRAM(s) IntraMuscular once PRN Glucose LESS THAN 70 milligrams/deciliter  HYDROmorphone   Tablet 2 milliGRAM(s) Oral four times a day PRN Severe Pain (7 - 10)  ondansetron Injectable 4 milliGRAM(s) IV Push every 6 hours PRN Nausea    DVT PROPHYLAXIS: [x] YES [] NO   GI PROPHYLAXIS: [] YES [x] NO   ANTICOAGULATION: [] YES [x] NO   ANTIBIOTICS: [x] YES [] NO ampicillin/sulbactam  IVPB 3 Gram(s)    LAB/STUDIES:  CAPILLARY BLOOD GLUCOSE  152 (26 Jun 2018 21:36)  153 (26 Jun 2018 17:45)  132 (26 Jun 2018 11:25)               12.5   7.82  )-----------( 280      ( 26 Jun 2018 17:53 )             40.3     06-26    140  |  99  |  13  ----------------------------<  174<H>  3.1<L>   |  25  |  0.7    Ca    7.5<L>      26 Jun 2018 17:53    TPro  5.8<L>  /  Alb  3.3<L>  /  TBili  0.3  /  DBili  x   /  AST  10  /  ALT  18  /  AlkPhos  104  06-26               5.8  | 0.3  | 10       ------------------[104     ( 26 Jun 2018 17:53 )  3.3  | x    | 18            Urinalysis Basic - ( 25 Jun 2018 23:21 )    Color: Yellow / Appearance: Clear / SG: <=1.005 / pH: x  Gluc: x / Ketone: Negative  / Bili: Negative / Urobili: 0.2 mg/dL   Blood: x / Protein: Negative mg/dL / Nitrite: Negative   Leuk Esterase: Negative / RBC: x / WBC x   Sq Epi: x / Non Sq Epi: x / Bacteria: x    IMAGING:  < from: CT Abdomen and Pelvis w/ IV Cont (06.26.18 @ 04:44) >  IMPRESSION:   Limited evaluation due to patient's body habitus and streak artifact.    1.  Dilated fluid, air filled small and large bowel loops without   evidence of obstruction, compatible with provided history of diarrhea.    2.  Incompletely characterized 4.4 cm right lower pole renal lesion,   likely a cyst. This can be followed up with an outpatient renal   ultrasound. GENERAL SURGERY PROGRESS NOTE    Patient: FABIAN CLEARY , 57y ([birth year])Male   MRN: [id number]  Location: [address]  Visit: 06-26-18 Inpatient  Date: 06-27-18 @ 05:15    Hospital Day #: 2    Procedure/Dx/Injuries: nausea , vomiting , abdominal pain and diarrhea for 1 day    Events of past 24 hours:    PAST MEDICAL & SURGICAL HISTORY:  Morbid obesity  Osteoarthritis  Diabetes  Asthma  H/O umbilical hernia repair    Vitals: T(F): 96.3 (06-26-18 @ 20:59), Max: 97.9 (06-26-18 @ 07:46)  HR: 87 (06-26-18 @ 20:59)  BP: 118/58 (06-26-18 @ 20:59)  RR: 20 (06-26-18 @ 20:59)  SpO2: 100% (06-26-18 @ 07:46)    Diet, Consistent Carbohydrate/No Snacks    Fluids: IVL    I & O's:    Bowel Movement: : [x] YES [] NO  Flatus: : [x] YES [] NO    PHYSICAL EXAM  GEN: NAD  CV/LUNG: CTAB  ABD: protuberant , non tender,  distended (per pt normal), no rebound, no guarding, +bs    MEDICATIONS  (STANDING):  ampicillin/sulbactam  IVPB 3 Gram(s) IV Intermittent every 6 hours  dextrose 5%. 1000 milliLiter(s) (50 mL/Hr) IV Continuous <Continuous>  dextrose 50% Injectable 12.5 Gram(s) IV Push once  dextrose 50% Injectable 25 Gram(s) IV Push once  dextrose 50% Injectable 25 Gram(s) IV Push once  heparin  Injectable 5000 Unit(s) SubCutaneous every 8 hours  insulin glargine Injectable (LANTUS) 12 Unit(s) SubCutaneous every morning  insulin lispro Injectable (HumaLOG) 4 Unit(s) SubCutaneous three times a day before meals  potassium chloride    Tablet ER 40 milliEquivalent(s) Oral every 4 hours    MEDICATIONS  (PRN):  aluminum hydroxide/magnesium hydroxide/simethicone Suspension 30 milliLiter(s) Oral every 4 hours PRN Dyspepsia  dextrose 40% Gel 15 Gram(s) Oral once PRN Blood Glucose LESS THAN 70 milliGRAM(s)/deciliter  glucagon  Injectable 1 milliGRAM(s) IntraMuscular once PRN Glucose LESS THAN 70 milligrams/deciliter  HYDROmorphone   Tablet 2 milliGRAM(s) Oral four times a day PRN Severe Pain (7 - 10)  ondansetron Injectable 4 milliGRAM(s) IV Push every 6 hours PRN Nausea    DVT PROPHYLAXIS: [x] YES [] NO   GI PROPHYLAXIS: [] YES [x] NO   ANTICOAGULATION: [] YES [x] NO   ANTIBIOTICS: [x] YES [] NO ampicillin/sulbactam  IVPB 3 Gram(s)    LAB/STUDIES:  CAPILLARY BLOOD GLUCOSE  152 (26 Jun 2018 21:36)  153 (26 Jun 2018 17:45)  132 (26 Jun 2018 11:25)               12.5   7.82  )-----------( 280      ( 26 Jun 2018 17:53 )             40.3     06-26    140  |  99  |  13  ----------------------------<  174<H>  3.1<L>   |  25  |  0.7    Ca    7.5<L>      26 Jun 2018 17:53    TPro  5.8<L>  /  Alb  3.3<L>  /  TBili  0.3  /  DBili  x   /  AST  10  /  ALT  18  /  AlkPhos  104  06-26               5.8  | 0.3  | 10       ------------------[104     ( 26 Jun 2018 17:53 )  3.3  | x    | 18            Urinalysis Basic - ( 25 Jun 2018 23:21 )    Color: Yellow / Appearance: Clear / SG: <=1.005 / pH: x  Gluc: x / Ketone: Negative  / Bili: Negative / Urobili: 0.2 mg/dL   Blood: x / Protein: Negative mg/dL / Nitrite: Negative   Leuk Esterase: Negative / RBC: x / WBC x   Sq Epi: x / Non Sq Epi: x / Bacteria: x    IMAGING:  < from: CT Abdomen and Pelvis w/ IV Cont (06.26.18 @ 04:44) >  IMPRESSION:   Limited evaluation due to patient's body habitus and streak artifact.    1.  Dilated fluid, air filled small and large bowel loops without   evidence of obstruction, compatible with provided history of diarrhea.    2.  Incompletely characterized 4.4 cm right lower pole renal lesion,   likely a cyst. This can be followed up with an outpatient renal   ultrasound.

## 2018-06-27 NOTE — DISCHARGE NOTE ADULT - CARE PROVIDER_API CALL
Dilan Villagran (MD), Plastic Surgery  [address] [place] [address]  Phone: [phone]  Fax: [phone] Dilan Villagran), Plastic Surgery  [address] [place] [address]  Phone: [phone]  Fax: [phone]    Preet Burnett), Gastroenterology; Internal Medicine  [address] [place] [address]  Phone: [phone]  Fax: [phone]

## 2018-06-27 NOTE — PROGRESS NOTE ADULT - ASSESSMENT
57M admitted for nausea , vomiting , abdominal pain and diarrhea for 1 day, CT done demonstrating Dilated fluid, air filled small and large bowel loops without  evidence of obstruction, compatible with provided history of diarrhea. patient having BMs, passing gas.    PLAN:  - replete labs, IV hydration.   - no surgical intervention 57M admitted for nausea , vomiting , abdominal pain and diarrhea for 1 day, CT done demonstrating Dilated fluid, air filled small and large bowel loops without  evidence of obstruction, compatible with provided history of diarrhea. patient having BMs, passing gas.    PLAN:  - replete labs, IV hydration.   - f/u c.diff  - no surgical intervention

## 2018-06-27 NOTE — DISCHARGE NOTE ADULT - PLAN OF CARE
resolution advance diet as tolerated, return to ER if symptoms worsen wound care finish abx course, continue wound care, keep wound dry and clean follow up with PMD regarding this incidental finding follow up with PMD for chronic diarrhea, return to ER if symptoms worsen weight loss lifestyle changes, follow up with PMD regarding possibility of bariatric surgery follow up take medications as directed, avoid lactose containing food, follow up with PMD and gastroenterologist for chronic diarrhea finish abx course, continue wound care, keep wound dry and clean, follow up with Burn surgeon

## 2018-06-27 NOTE — DISCHARGE NOTE ADULT - PATIENT PORTAL LINK FT
You can access the [email or web address] Patient Portal, offered by [facility], by registering with the following website: [email or web address]

## 2018-06-27 NOTE — DISCHARGE NOTE ADULT - CARE PROVIDERS DIRECT ADDRESSES
[email or web address]

## 2018-06-27 NOTE — DISCHARGE NOTE ADULT - MEDICATION SUMMARY - MEDICATIONS TO TAKE
I will START or STAY ON the medications listed below when I get home from the hospital:    loperamide 2 mg oral tablet  -- 1 tab(s) by mouth 2 times a day x 14 days, As Needed   -- It is very important that you take or use this exactly as directed.  Do not skip doses or discontinue unless directed by your doctor.  May cause drowsiness.  Alcohol may intensify this effect.  Use care when operating dangerous machinery.  Obtain medical advice before taking any non-prescription drugs as some may affect the action of this medication.    -- Indication: For Diarrhea    amoxicillin-clavulanate 875 mg-125 mg oral tablet  -- 1 tab(s) by mouth every 12 hours  -- Indication: For Leg wound, left    Daily Multiple Vitamins oral tablet  -- 1 tab(s) by mouth once a day   -- Indication: For nutrition supplement I will START or STAY ON the medications listed below when I get home from the hospital:    loperamide 2 mg oral tablet  -- 1 tab(s) by mouth 2 times a day x 14 days, As Needed   -- It is very important that you take or use this exactly as directed.  Do not skip doses or discontinue unless directed by your doctor.  May cause drowsiness.  Alcohol may intensify this effect.  Use care when operating dangerous machinery.  Obtain medical advice before taking any non-prescription drugs as some may affect the action of this medication.    -- Indication: For Diarrhea    sodium hypochlorite 0.25% topical solution  -- 1 application on skin 2 times a day  -- Indication: For Leg wound, left    amoxicillin-clavulanate 875 mg-125 mg oral tablet  -- 1 tab(s) by mouth every 12 hours  -- Indication: For Leg wound, left    Daily Multiple Vitamins oral tablet  -- 1 tab(s) by mouth once a day   -- Indication: For nutrition supplement

## 2018-06-27 NOTE — PROGRESS NOTE ADULT - ASSESSMENT
IMPRESSION:  Cellulitis LEs bilaterally   No abscess.    RECOMMENDATIONS:  Diuresis.  Unasyn 3 gm iv q6h

## 2018-06-27 NOTE — DISCHARGE NOTE ADULT - ADDITIONAL INSTRUCTIONS
finish abx course, continue wound care, follow up with PMD and wound care finish abx course, continue wound care, avoid lactose containing food, follow up with PMD and Burn surgeon, as well as gastroenterologist

## 2018-06-27 NOTE — PROGRESS NOTE ADULT - ASSESSMENT
58 yo M with PMH of DM not on any medications at home, asthma, osteoarthritis and morbid obesity presented to ER with c/o nausea , vomiting , abdominal pain and diarrhea for 1 day.    #LLE ulcer, w/ maggots  - IV unasyn as recommended by ID.  - Burn following, would care  - pain control.  - Lasix 40 IV qDay to reduce edema  - will get 2D echo    #Viral gastroenteritis  -tolerating diet, 3 loose BM in past 24 hrs  -supportive care  -monitor lytes and replete if needed    #DM  -ISS + fingerstick monitoring  -a1c 7.8    #Asthma stable  -albuterol prn    #OA  -pain control prn    #DVT ppx - heparin subQ  #Code status - full code  #Dispo - from home, lives by himself ambulates using a walker, moving to assisted living in near future 58 yo M with PMH of DM not on any medications at home, asthma, osteoarthritis and morbid obesity presented to ER with c/o nausea , vomiting , abdominal pain and diarrhea for 1 day.    #LLE ulcer, w/ maggots  - IV unasyn as recommended by ID.  - Burn following, would care  - pain control.  - Lasix 40 IV qDay to reduce edema  - will get 2D echo    #Viral gastroenteritis  -tolerating diet, 3 loose BM in past 24 hrs  -supportive care  -monitor lytes and replete if needed    #R kidney lesion  -incidental finding on CT  -follow up with urology outpatient    #DM  -ISS + fingerstick monitoring  -a1c 7.8    #Asthma stable  -albuterol prn    #OA  -pain control prn    #DVT ppx - heparin subQ  #Code status - full code  #Dispo - from home, lives by himself ambulates using a walker, moving to assisted living in near future

## 2018-06-27 NOTE — DISCHARGE NOTE ADULT - CARE PLAN
Principal Discharge DX:	Abdominal pain, unspecified abdominal location  Goal:	resolution  Assessment and plan of treatment:	advance diet as tolerated, return to ER if symptoms worsen  Secondary Diagnosis:	Leg wound, left  Goal:	wound care  Assessment and plan of treatment:	finish abx course, continue wound care, keep wound dry and clean Principal Discharge DX:	Abdominal pain, unspecified abdominal location  Goal:	resolution  Assessment and plan of treatment:	follow up with PMD for chronic diarrhea, return to ER if symptoms worsen  Secondary Diagnosis:	Leg wound, left  Goal:	wound care  Assessment and plan of treatment:	finish abx course, continue wound care, keep wound dry and clean  Secondary Diagnosis:	Morbid obesity  Goal:	weight loss  Assessment and plan of treatment:	lifestyle changes, follow up with PMD regarding possibility of bariatric surgery  Secondary Diagnosis:	Renal cyst, right  Goal:	follow up  Assessment and plan of treatment:	follow up with PMD regarding this incidental finding Principal Discharge DX:	Abdominal pain, unspecified abdominal location  Goal:	resolution  Assessment and plan of treatment:	take medications as directed, avoid lactose containing food, follow up with PMD and gastroenterologist for chronic diarrhea  Secondary Diagnosis:	Leg wound, left  Goal:	wound care  Assessment and plan of treatment:	finish abx course, continue wound care, keep wound dry and clean, follow up with Burn surgeon  Secondary Diagnosis:	Morbid obesity  Goal:	weight loss  Assessment and plan of treatment:	lifestyle changes, follow up with PMD regarding possibility of bariatric surgery  Secondary Diagnosis:	Renal cyst, right  Goal:	follow up  Assessment and plan of treatment:	follow up with PMD regarding this incidental finding

## 2018-06-27 NOTE — PROGRESS NOTE ADULT - SUBJECTIVE AND OBJECTIVE BOX
PGY I NOTE    LENGTH OF HOSPITAL STAY:  1d    CHIEF COMPLAINT:Patient is a 57y old  Male who presents with a chief complaint of Nausea / vomiting abdominal pain and diarrhea for 1 day (26 Jun 2018 14:41)    HPI:HPI:  57 y o m with pmh of D.M not on any medications at home , asthma. Osteoarthritis and morbid obesity presented to ER with c/o nausea , vomiting , abdominal pain and diarrhea for 1 day. As per pt he was in his usual state of health until yesterday when he developed generalized abdominal pain , 8/10 , non radiating associated with nausea and 2 episodes of non bilious , non bloody vomiting , pt denies any fever chills but has diarrhea . Pt reports a few episodes when he had loose watery stools and this morning he saw some blood with stools , bright red not mixed with stools. Pt denies any black colored stools or hematemesis.    Pt went to [facility] ER last wk for evaluation of his lower extremities pain , was found to have b/l lower extremity wounds and was d/c home with  services for wound care. Pt was not sent home on any ABX .  Pt reports that he has chronic lower extremity pain as a result of his arthritis . Pt was diagnosed with D.M but was never started on any medications.    Pt was found to have maggots on his left lower extremity wound ; pt c/o pain and reports that the dressing was changed by home nurse and she did not report any maggots or discharge.    Pt lives by himself at home ; is moving to assisted living facility in near future. (26 Jun 2018 14:41)    OVERNIGHT EVENTS/UPDATES: no acute event overnight    PMH & PSH  PAST MEDICAL & SURGICAL HISTORY:  Morbid obesity  Osteoarthritis  Diabetes  Asthma  H/O umbilical hernia repair    SOCIAL HISTORY: Negative    ALLERGIES: No Known Allergies    HOME MEDICATIONS  Home Medications:    PHYSICAL EXAM:  T(F): 97 (06-27-18 @ 06:19), Max: 97.2 (06-26-18 @ 13:59)  HR: 82 (06-27-18 @ 06:19)  BP: 118/56 (06-27-18 @ 06:19)  RR: 20 (06-27-18 @ 06:19)  SpO2: --  CAPILLARY BLOOD GLUCOSE  133 (27 Jun 2018 06:19)  152 (26 Jun 2018 21:36)  153 (26 Jun 2018 17:45)  132 (26 Jun 2018 11:25)        I&O's Summary    General: NAD  HEENT: NCAT, no JVD  CV: RRR  RESP: CTAB  Abdominal: Soft, NTTP, non-distended  Extremity: no c/c/e  Neuro: A&O x3, non-focal    MEDICATIONS  STANDING MEDICATIONS  ampicillin/sulbactam  IVPB 3 Gram(s) IV Intermittent every 6 hours  dextrose 5%. 1000 milliLiter(s) IV Continuous <Continuous>  dextrose 50% Injectable 12.5 Gram(s) IV Push once  dextrose 50% Injectable 25 Gram(s) IV Push once  dextrose 50% Injectable 25 Gram(s) IV Push once  heparin  Injectable 5000 Unit(s) SubCutaneous every 8 hours  insulin glargine Injectable (LANTUS) 12 Unit(s) SubCutaneous every morning  insulin lispro Injectable (HumaLOG) 4 Unit(s) SubCutaneous three times a day before meals  potassium chloride    Tablet ER 40 milliEquivalent(s) Oral every 4 hours    PRN MEDICATIONS  aluminum hydroxide/magnesium hydroxide/simethicone Suspension 30 milliLiter(s) Oral every 4 hours PRN  dextrose 40% Gel 15 Gram(s) Oral once PRN  glucagon  Injectable 1 milliGRAM(s) IntraMuscular once PRN  HYDROmorphone   Tablet 2 milliGRAM(s) Oral four times a day PRN  ondansetron Injectable 4 milliGRAM(s) IV Push every 6 hours PRN    LABS:                        12.4   7.40  )-----------( 289      ( 27 Jun 2018 06:09 )             39.4              06-27    143  |  102  |  13  ----------------------------<  148<H>  3.7   |  25  |  0.9    Ca    7.5<L>      27 Jun 2018 06:09    TPro  6.2  /  Alb  3.3<L>  /  TBili  0.2  /  DBili  x   /  AST  11  /  ALT  16  /  AlkPhos  106  06-27    LIVER FUNCTIONS - ( 27 Jun 2018 06:09 )  Alb: 3.3 g/dL / Pro: 6.2 g/dL / ALK PHOS: 106 U/L / ALT: 16 U/L / AST: 11 U/L / GGT: x                                          Urinalysis Basic - ( 25 Jun 2018 23:21 )    Color: Yellow / Appearance: Clear / SG: <=1.005 / pH: x  Gluc: x / Ketone: Negative  / Bili: Negative / Urobili: 0.2 mg/dL   Blood: x / Protein: Negative mg/dL / Nitrite: Negative   Leuk Esterase: Negative / RBC: x / WBC x   Sq Epi: x / Non Sq Epi: x / Bacteria: x PGY I NOTE    LENGTH OF HOSPITAL STAY:  1d    CHIEF COMPLAINT:Patient is a 57y old  Male who presents with a chief complaint of Nausea / vomiting abdominal pain and diarrhea for 1 day (26 Jun 2018 14:41)    HPI:HPI:  57 y o m with pmh of D.M not on any medications at home , asthma. Osteoarthritis and morbid obesity presented to ER with c/o nausea , vomiting , abdominal pain and diarrhea for 1 day. As per pt he was in his usual state of health until yesterday when he developed generalized abdominal pain , 8/10 , non radiating associated with nausea and 2 episodes of non bilious , non bloody vomiting , pt denies any fever chills but has diarrhea . Pt reports a few episodes when he had loose watery stools and this morning he saw some blood with stools , bright red not mixed with stools. Pt denies any black colored stools or hematemesis.    Pt went to [facility] ER last wk for evaluation of his lower extremities pain , was found to have b/l lower extremity wounds and was d/c home with  services for wound care. Pt was not sent home on any ABX .  Pt reports that he has chronic lower extremity pain as a result of his arthritis . Pt was diagnosed with D.M but was never started on any medications.    Pt was found to have maggots on his left lower extremity wound ; pt c/o pain and reports that the dressing was changed by home nurse and she did not report any maggots or discharge.    Pt lives by himself at home ; is moving to assisted living facility in near future. (26 Jun 2018 14:41)    OVERNIGHT EVENTS/UPDATES: no acute event overnight    PMH & PSH  PAST MEDICAL & SURGICAL HISTORY:  Morbid obesity  Osteoarthritis  Diabetes  Asthma  H/O umbilical hernia repair    SOCIAL HISTORY: Negative    ALLERGIES: No Known Allergies    HOME MEDICATIONS  Home Medications:    PHYSICAL EXAM:  T(F): 97 (06-27-18 @ 06:19), Max: 97.2 (06-26-18 @ 13:59)  HR: 82 (06-27-18 @ 06:19)  BP: 118/56 (06-27-18 @ 06:19)  RR: 20 (06-27-18 @ 06:19)  SpO2: --  CAPILLARY BLOOD GLUCOSE  133 (27 Jun 2018 06:19)  152 (26 Jun 2018 21:36)  153 (26 Jun 2018 17:45)  132 (26 Jun 2018 11:25)        I&O's Summary    General: NAD  HEENT: NCAT, no JVD  CV: RRR  RESP: CTAB  Abdominal: Soft, NTTP, non-distended  Extremity: b/l LE edema, wrapped in ace bandage  Neuro: A&O x3, non-focal    MEDICATIONS  STANDING MEDICATIONS  ampicillin/sulbactam  IVPB 3 Gram(s) IV Intermittent every 6 hours  dextrose 5%. 1000 milliLiter(s) IV Continuous <Continuous>  dextrose 50% Injectable 12.5 Gram(s) IV Push once  dextrose 50% Injectable 25 Gram(s) IV Push once  dextrose 50% Injectable 25 Gram(s) IV Push once  heparin  Injectable 5000 Unit(s) SubCutaneous every 8 hours  insulin glargine Injectable (LANTUS) 12 Unit(s) SubCutaneous every morning  insulin lispro Injectable (HumaLOG) 4 Unit(s) SubCutaneous three times a day before meals  potassium chloride    Tablet ER 40 milliEquivalent(s) Oral every 4 hours    PRN MEDICATIONS  aluminum hydroxide/magnesium hydroxide/simethicone Suspension 30 milliLiter(s) Oral every 4 hours PRN  dextrose 40% Gel 15 Gram(s) Oral once PRN  glucagon  Injectable 1 milliGRAM(s) IntraMuscular once PRN  HYDROmorphone   Tablet 2 milliGRAM(s) Oral four times a day PRN  ondansetron Injectable 4 milliGRAM(s) IV Push every 6 hours PRN    LABS:                        12.4   7.40  )-----------( 289      ( 27 Jun 2018 06:09 )             39.4              06-27    143  |  102  |  13  ----------------------------<  148<H>  3.7   |  25  |  0.9    Ca    7.5<L>      27 Jun 2018 06:09    TPro  6.2  /  Alb  3.3<L>  /  TBili  0.2  /  DBili  x   /  AST  11  /  ALT  16  /  AlkPhos  106  06-27    LIVER FUNCTIONS - ( 27 Jun 2018 06:09 )  Alb: 3.3 g/dL / Pro: 6.2 g/dL / ALK PHOS: 106 U/L / ALT: 16 U/L / AST: 11 U/L / GGT: x                                          Urinalysis Basic - ( 25 Jun 2018 23:21 )    Color: Yellow / Appearance: Clear / SG: <=1.005 / pH: x  Gluc: x / Ketone: Negative  / Bili: Negative / Urobili: 0.2 mg/dL   Blood: x / Protein: Negative mg/dL / Nitrite: Negative   Leuk Esterase: Negative / RBC: x / WBC x   Sq Epi: x / Non Sq Epi: x / Bacteria: x

## 2018-06-27 NOTE — DISCHARGE NOTE ADULT - HOSPITAL COURSE
56 yo M with PMH of DM not on any medications at home, asthma, osteoarthritis and morbid obesity presented to ER with c/o nausea , vomiting , abdominal pain and diarrhea for 1 day. Patient is able to tolerate PO diet and frequency of loose BM was drastically reduced. Patient denies any n/v during admission. Patient was found to have live maggots in his leg ulcer, maggots were removed, wounds were cleaned and dressed and evaluated by Burn services. 58 yo M with PMH of DM not on any medications at home, asthma, osteoarthritis and morbid obesity presented to ER with c/o nausea , vomiting , abdominal pain and diarrhea for 1 day. Patient is able to tolerate PO diet and frequency of loose BM was drastically reduced and back to baseline. Patient denies any n/v during admission. Patient was found to have live maggots in his leg ulcer, maggots were removed, wounds were cleaned and dressed and evaluated by Burn services. Abx was started for b/l LE wounds, will complete course at home. 58 yo M with PMH of DM not on any medications at home, asthma, osteoarthritis and morbid obesity presented to ER with c/o nausea , vomiting , abdominal pain and diarrhea for 1 day. Patient is able to tolerate PO diet and frequency of loose BM was drastically reduced and back to baseline, patient has been directed to avoid lactose in food intake and will follow up with gastroenterology outpatient. Patient denies any n/v during admission. Patient was found to have live maggots in his leg ulcer, maggots were removed, wounds were cleaned and dressed and evaluated by Burn services. Abx was started for b/l LE wounds, will complete course at home.

## 2018-06-28 LAB
ANION GAP SERPL CALC-SCNC: 17 MMOL/L — HIGH (ref 7–14)
BUN SERPL-MCNC: 13 MG/DL — SIGNIFICANT CHANGE UP (ref 10–20)
C DIFF BY PCR RESULT: NEGATIVE — SIGNIFICANT CHANGE UP
C DIFF TOX GENS STL QL NAA+PROBE: SIGNIFICANT CHANGE UP
CALCIUM SERPL-MCNC: 7.1 MG/DL — LOW (ref 8.5–10.1)
CHLORIDE SERPL-SCNC: 105 MMOL/L — SIGNIFICANT CHANGE UP (ref 98–110)
CO2 SERPL-SCNC: 18 MMOL/L — SIGNIFICANT CHANGE UP (ref 17–32)
CREAT SERPL-MCNC: 0.8 MG/DL — SIGNIFICANT CHANGE UP (ref 0.7–1.5)
GLUCOSE SERPL-MCNC: 157 MG/DL — HIGH (ref 70–99)
HCT VFR BLD CALC: 37.8 % — LOW (ref 42–52)
HGB BLD-MCNC: 11.5 G/DL — LOW (ref 14–18)
MAGNESIUM SERPL-MCNC: 2 MG/DL — SIGNIFICANT CHANGE UP (ref 1.8–2.4)
MCHC RBC-ENTMCNC: 25.1 PG — LOW (ref 27–31)
MCHC RBC-ENTMCNC: 30.4 G/DL — LOW (ref 32–37)
MCV RBC AUTO: 82.5 FL — SIGNIFICANT CHANGE UP (ref 80–94)
NRBC # BLD: 0 /100 WBCS — SIGNIFICANT CHANGE UP (ref 0–0)
PLATELET # BLD AUTO: 202 K/UL — SIGNIFICANT CHANGE UP (ref 130–400)
POTASSIUM SERPL-MCNC: 3.6 MMOL/L — SIGNIFICANT CHANGE UP (ref 3.5–5)
POTASSIUM SERPL-SCNC: 3.6 MMOL/L — SIGNIFICANT CHANGE UP (ref 3.5–5)
RBC # BLD: 4.58 M/UL — LOW (ref 4.7–6.1)
RBC # FLD: 14.9 % — HIGH (ref 11.5–14.5)
SODIUM SERPL-SCNC: 140 MMOL/L — SIGNIFICANT CHANGE UP (ref 135–146)
WBC # BLD: 6.18 K/UL — SIGNIFICANT CHANGE UP (ref 4.8–10.8)
WBC # BLD: 6.2 K/UL — SIGNIFICANT CHANGE UP (ref 4.8–10.8)
WBC # FLD AUTO: 6.18 K/UL — SIGNIFICANT CHANGE UP (ref 4.8–10.8)
WBC # FLD AUTO: 6.2 K/UL — SIGNIFICANT CHANGE UP (ref 4.8–10.8)

## 2018-06-28 RX ADMIN — Medication 1 TABLET(S): at 18:05

## 2018-06-28 RX ADMIN — Medication 4 UNIT(S): at 11:56

## 2018-06-28 RX ADMIN — HEPARIN SODIUM 5000 UNIT(S): 5000 INJECTION INTRAVENOUS; SUBCUTANEOUS at 05:31

## 2018-06-28 RX ADMIN — Medication 4 UNIT(S): at 18:05

## 2018-06-28 RX ADMIN — INSULIN GLARGINE 12 UNIT(S): 100 INJECTION, SOLUTION SUBCUTANEOUS at 11:56

## 2018-06-28 RX ADMIN — HEPARIN SODIUM 5000 UNIT(S): 5000 INJECTION INTRAVENOUS; SUBCUTANEOUS at 21:34

## 2018-06-28 RX ADMIN — Medication 1 TABLET(S): at 05:31

## 2018-06-28 RX ADMIN — Medication 40 MILLIGRAM(S): at 08:31

## 2018-06-28 RX ADMIN — HEPARIN SODIUM 5000 UNIT(S): 5000 INJECTION INTRAVENOUS; SUBCUTANEOUS at 14:38

## 2018-06-28 RX ADMIN — Medication 4 UNIT(S): at 08:28

## 2018-06-28 NOTE — PROGRESS NOTE ADULT - ATTENDING COMMENTS
Patient was evaluated and examined by bedside, c/o loose bm's, tolerating diet well.  no abdominal pain  All labs, radiology studies, VS was reviewed  I agree with medical plan outlined by Medical resident as stated above.  1) Lower extremities wounds with acute infection and maggots, s/p debridement - continue daily wound care, complete abx. course  2) Diarrhea chronic- f/up stool studies, CT abd/pelvis- no SBO  3) Severe morbid obesity- outpatient weight loss tx., continue nocturnal BIPAP tx.  -for chronic medical conditions patient was resumed on home regimen tx.

## 2018-06-28 NOTE — PROGRESS NOTE ADULT - ASSESSMENT
IMPRESSION:  Cellulitis LEs bilaterally   No abscess.    RECOMMENDATIONS:  Po Augmentin 875 mg q12h for 8 more days.  Recall prn please.

## 2018-06-28 NOTE — PROGRESS NOTE ADULT - SUBJECTIVE AND OBJECTIVE BOX
PGY I NOTE    LENGTH OF HOSPITAL STAY:  2d    CHIEF COMPLAINT:Patient is a 57y old  Male who presents with a chief complaint of Nausea / vomiting abdominal pain and diarrhea for 1 day (27 Jun 2018 14:50)    HPI:HPI:  57 y o m with pmh of D.M not on any medications at home , asthma. Osteoarthritis and morbid obesity presented to ER with c/o nausea , vomiting , abdominal pain and diarrhea for 1 day. As per pt he was in his usual state of health until yesterday when he developed generalized abdominal pain , 8/10 , non radiating associated with nausea and 2 episodes of non bilious , non bloody vomiting , pt denies any fever chills but has diarrhea . Pt reports a few episodes when he had loose watery stools and this morning he saw some blood with stools , bright red not mixed with stools. Pt denies any black colored stools or hematemesis.    Pt went to [facility] ER last wk for evaluation of his lower extremities pain , was found to have b/l lower extremity wounds and was d/c home with  services for wound care. Pt was not sent home on any ABX .  Pt reports that he has chronic lower extremity pain as a result of his arthritis . Pt was diagnosed with D.M but was never started on any medications.    Pt was found to have maggots on his left lower extremity wound ; pt c/o pain and reports that the dressing was changed by home nurse and she did not report any maggots or discharge.    Pt lives by himself at home ; is moving to assisted living facility in near future. (26 Jun 2018 14:41)    OVERNIGHT EVENTS/UPDATES: reports 3x episodes of loose BM this AM    PMH & PSH  PAST MEDICAL & SURGICAL HISTORY:  Morbid obesity  Osteoarthritis  Diabetes  Asthma  H/O umbilical hernia repair    SOCIAL HISTORY: Negative    ALLERGIES: No Known Allergies    HOME MEDICATIONS  Home Medications:    PHYSICAL EXAM:  T(F): 98.2 (06-28-18 @ 05:05), Max: 98.6 (06-27-18 @ 13:57)  HR: 91 (06-28-18 @ 05:05)  BP: 109/54 (06-28-18 @ 05:05)  RR: 18 (06-28-18 @ 05:05)  SpO2: --  CAPILLARY BLOOD GLUCOSE  157 (28 Jun 2018 06:00)  151 (27 Jun 2018 21:20)  132 (27 Jun 2018 16:35)  146 (27 Jun 2018 11:16)        I&O's Summary    27 Jun 2018 07:01  -  28 Jun 2018 07:00  --------------------------------------------------------  IN: 360 mL / OUT: 0 mL / NET: 360 mL      General: NAD, obese  HEENT: NCAT, no JVD  CV: RRR  RESP: CTAB  Abdominal: Soft, NTTP, non-distended  Extremity: b/l LE edema  Neuro: A&O x3, non-focal    MEDICATIONS  STANDING MEDICATIONS  amoxicillin  875 milliGRAM(s)/clavulanate 1 Tablet(s) Oral every 12 hours  dextrose 5%. 1000 milliLiter(s) IV Continuous <Continuous>  dextrose 50% Injectable 12.5 Gram(s) IV Push once  dextrose 50% Injectable 25 Gram(s) IV Push once  dextrose 50% Injectable 25 Gram(s) IV Push once  furosemide   Injectable 40 milliGRAM(s) IV Push daily  heparin  Injectable 5000 Unit(s) SubCutaneous every 8 hours  insulin glargine Injectable (LANTUS) 12 Unit(s) SubCutaneous every morning  insulin lispro Injectable (HumaLOG) 4 Unit(s) SubCutaneous three times a day before meals  magnesium sulfate  IVPB 2 Gram(s) IV Intermittent every 1 hour  potassium chloride    Tablet ER 40 milliEquivalent(s) Oral every 4 hours    PRN MEDICATIONS  aluminum hydroxide/magnesium hydroxide/simethicone Suspension 30 milliLiter(s) Oral every 4 hours PRN  dextrose 40% Gel 15 Gram(s) Oral once PRN  glucagon  Injectable 1 milliGRAM(s) IntraMuscular once PRN  HYDROmorphone   Tablet 2 milliGRAM(s) Oral four times a day PRN  ondansetron Injectable 4 milliGRAM(s) IV Push every 6 hours PRN    LABS:                        11.5   6.18  )-----------( 202      ( 28 Jun 2018 05:28 )             37.8              06-28    140  |  105  |  13  ----------------------------<  157<H>  3.6   |  18  |  0.8    Ca    7.1<L>      28 Jun 2018 05:28  Mg     2.0     06-28    TPro  6.2  /  Alb  3.3<L>  /  TBili  0.2  /  DBili  x   /  AST  11  /  ALT  16  /  AlkPhos  106  06-27    LIVER FUNCTIONS - ( 27 Jun 2018 06:09 )  Alb: 3.3 g/dL / Pro: 6.2 g/dL / ALK PHOS: 106 U/L / ALT: 16 U/L / AST: 11 U/L / GGT: x           Culture - Urine (collected 25 Jun 2018 23:21)  Source: .Urine Clean Catch (Midstream)  Final Report (27 Jun 2018 12:48):    <10,000 CFU/ml    Normal Urogenital dashawn present

## 2018-06-28 NOTE — PROGRESS NOTE ADULT - SUBJECTIVE AND OBJECTIVE BOX
FABIAN CLEARY  57y, Male      OVERNIGHT EVENTS:    none    VITALS:  T(F): 98.2, Max: 98.6 (06-27-18 @ 13:57)  HR: 91  BP: 109/54  RR: 18Vital Signs Last 24 Hrs  T(C): 36.8 (28 Jun 2018 05:05), Max: 37 (27 Jun 2018 13:57)  T(F): 98.2 (28 Jun 2018 05:05), Max: 98.6 (27 Jun 2018 13:57)  HR: 91 (28 Jun 2018 05:05) (86 - 91)  BP: 109/54 (28 Jun 2018 05:05) (109/54 - 134/61)  BP(mean): --  RR: 18 (28 Jun 2018 05:05) (18 - 20)  SpO2: --    TESTS & MEASUREMENTS:                        11.5   6.18  )-----------( 202      ( 28 Jun 2018 05:28 )             37.8     06-28    140  |  105  |  13  ----------------------------<  157<H>  3.6   |  18  |  0.8    Ca    7.1<L>      28 Jun 2018 05:28  Mg     2.0     06-28    TPro  6.2  /  Alb  3.3<L>  /  TBili  0.2  /  DBili  x   /  AST  11  /  ALT  16  /  AlkPhos  106  06-27    LIVER FUNCTIONS - ( 27 Jun 2018 06:09 )  Alb: 3.3 g/dL / Pro: 6.2 g/dL / ALK PHOS: 106 U/L / ALT: 16 U/L / AST: 11 U/L / GGT: x             Culture - Urine (collected 06-25-18 @ 23:21)  Source: .Urine Clean Catch (Midstream)  Final Report (06-27-18 @ 12:48):    <10,000 CFU/ml    Normal Urogenital dashawn present            RADIOLOGY & ADDITIONAL TESTS:    ANTIBIOTICS:  amoxicillin  875 milliGRAM(s)/clavulanate 1 Tablet(s) Oral every 12 hours

## 2018-06-28 NOTE — PROGRESS NOTE ADULT - ASSESSMENT
58 yo M with PMH of DM not on any medications at home, asthma, osteoarthritis and morbid obesity presented to ER with c/o nausea , vomiting , abdominal pain and diarrhea for 1 day.    #LLE ulcer, w/ maggots  - Augmentin for 8 mor days  - Burn following, would care  - pain control  - Lasix 40 IV qDay to reduce edema  - pending 2D echo    #Chronic diarrhea  -tolerating diet  -will get stool ova&parasite, c. diff, stool cx, stool wbc  -supportive care  -monitor lytes and replete if needed    #R kidney lesion  -incidental finding on CT  -follow up with urology outpatient    #DM  -ISS + fingerstick monitoring  -a1c 7.8    #Asthma stable  -albuterol prn    #OA  -pain control prn    #DVT ppx - heparin subQ  #Code status - full code  #Dispo - from home, lives by himself ambulates using a walker, moving to assisted living in near future

## 2018-06-29 VITALS
RESPIRATION RATE: 20 BRPM | SYSTOLIC BLOOD PRESSURE: 114 MMHG | DIASTOLIC BLOOD PRESSURE: 55 MMHG | HEART RATE: 86 BPM | TEMPERATURE: 98 F

## 2018-06-29 LAB
ANION GAP SERPL CALC-SCNC: 16 MMOL/L — HIGH (ref 7–14)
BUN SERPL-MCNC: 10 MG/DL — SIGNIFICANT CHANGE UP (ref 10–20)
CALCIUM SERPL-MCNC: 7.9 MG/DL — LOW (ref 8.5–10.1)
CHLORIDE SERPL-SCNC: 102 MMOL/L — SIGNIFICANT CHANGE UP (ref 98–110)
CO2 SERPL-SCNC: 26 MMOL/L — SIGNIFICANT CHANGE UP (ref 17–32)
CREAT SERPL-MCNC: 0.9 MG/DL — SIGNIFICANT CHANGE UP (ref 0.7–1.5)
GLUCOSE SERPL-MCNC: 131 MG/DL — HIGH (ref 70–99)
HCT VFR BLD CALC: 41.6 % — LOW (ref 42–52)
HGB BLD-MCNC: 12.7 G/DL — LOW (ref 14–18)
MCHC RBC-ENTMCNC: 25.3 PG — LOW (ref 27–31)
MCHC RBC-ENTMCNC: 30.5 G/DL — LOW (ref 32–37)
MCV RBC AUTO: 83 FL — SIGNIFICANT CHANGE UP (ref 80–94)
NRBC # BLD: 0 /100 WBCS — SIGNIFICANT CHANGE UP (ref 0–0)
PLATELET # BLD AUTO: 325 K/UL — SIGNIFICANT CHANGE UP (ref 130–400)
POTASSIUM SERPL-MCNC: 3.9 MMOL/L — SIGNIFICANT CHANGE UP (ref 3.5–5)
POTASSIUM SERPL-SCNC: 3.9 MMOL/L — SIGNIFICANT CHANGE UP (ref 3.5–5)
RBC # BLD: 5.01 M/UL — SIGNIFICANT CHANGE UP (ref 4.7–6.1)
RBC # FLD: 15 % — HIGH (ref 11.5–14.5)
SODIUM SERPL-SCNC: 144 MMOL/L — SIGNIFICANT CHANGE UP (ref 135–146)
WBC # BLD: 5.81 K/UL — SIGNIFICANT CHANGE UP (ref 4.8–10.8)
WBC # FLD AUTO: 5.81 K/UL — SIGNIFICANT CHANGE UP (ref 4.8–10.8)

## 2018-06-29 RX ORDER — SODIUM HYPOCHLORITE 0.125 %
1 SOLUTION, NON-ORAL MISCELLANEOUS
Qty: 0 | Refills: 0 | Status: DISCONTINUED | OUTPATIENT
Start: 2018-06-29 | End: 2018-06-29

## 2018-06-29 RX ORDER — LOPERAMIDE HCL 2 MG
1 TABLET ORAL
Qty: 28 | Refills: 0 | OUTPATIENT
Start: 2018-06-29 | End: 2018-07-12

## 2018-06-29 RX ORDER — SODIUM HYPOCHLORITE 0.125 %
1 SOLUTION, NON-ORAL MISCELLANEOUS
Qty: 0 | Refills: 0 | COMMUNITY
Start: 2018-06-29

## 2018-06-29 RX ADMIN — Medication 4 UNIT(S): at 08:19

## 2018-06-29 RX ADMIN — Medication 40 MILLIGRAM(S): at 06:07

## 2018-06-29 RX ADMIN — Medication 4 UNIT(S): at 17:15

## 2018-06-29 RX ADMIN — Medication 4 UNIT(S): at 11:57

## 2018-06-29 RX ADMIN — INSULIN GLARGINE 12 UNIT(S): 100 INJECTION, SOLUTION SUBCUTANEOUS at 08:20

## 2018-06-29 RX ADMIN — Medication 1 TABLET(S): at 17:16

## 2018-06-29 RX ADMIN — HEPARIN SODIUM 5000 UNIT(S): 5000 INJECTION INTRAVENOUS; SUBCUTANEOUS at 06:09

## 2018-06-29 RX ADMIN — Medication 1 TABLET(S): at 06:07

## 2018-06-29 NOTE — PROGRESS NOTE ADULT - ATTENDING COMMENTS
Patient was evaluated and examined by bedside, c/o 2 loose bm today, tolerating diet well.    All labs, radiology studies, VS was reviewed  I agree with medical plan outlined by Medical resident as stated above.  a/p  1) Lower extremities wounds with acute infection and maggots, s/p debridement - continue daily wound care, complete abx. course  2) Diarrhea chronic-  stool studies- cdiff negative, CT abd/pelvis- no SBO, immodium prn  3) Severe morbid obesity- outpatient weight loss tx., continue nocturnal BIPAP tx.  4) Anemia Normocytic- with stable h/h , daily MVI supplement    d/c plan : patient anticipated for d/c home with home care today

## 2018-06-29 NOTE — PROGRESS NOTE ADULT - SUBJECTIVE AND OBJECTIVE BOX
PGY I NOTE    LENGTH OF HOSPITAL STAY:  3d    CHIEF COMPLAINT:Patient is a 57y old  Male who presents with a chief complaint of Nausea / vomiting abdominal pain and diarrhea for 1 day (27 Jun 2018 14:50)    HPI:HPI:  57 y o m with pmh of D.M not on any medications at home , asthma. Osteoarthritis and morbid obesity presented to ER with c/o nausea , vomiting , abdominal pain and diarrhea for 1 day. As per pt he was in his usual state of health until yesterday when he developed generalized abdominal pain , 8/10 , non radiating associated with nausea and 2 episodes of non bilious , non bloody vomiting , pt denies any fever chills but has diarrhea . Pt reports a few episodes when he had loose watery stools and this morning he saw some blood with stools , bright red not mixed with stools. Pt denies any black colored stools or hematemesis.    [facility] wk for evaluation of his lower extremities pain , was found to have b/l lower extremity wounds and was d/c home with  services for wound care. Pt was not sent home on any ABX .  Pt reports that he has chronic lower extremity pain as a result of his arthritis . Pt was diagnosed with D.M but was never started on any medications.    Pt was found to have maggots on his left lower extremity wound ; pt c/o pain and reports that the dressing was changed by home nurse and she did not report any maggots or discharge.    Pt lives by himself at home ; is moving to assisted living facility in near future. (26 Jun 2018 14:41)    OVERNIGHT EVENTS/UPDATES: no acute event overnight    PMH & PSH  PAST MEDICAL & SURGICAL HISTORY:  Morbid obesity  Osteoarthritis  Diabetes  Asthma  H/O umbilical hernia repair    SOCIAL HISTORY: Negative    ALLERGIES: No Known Allergies    HOME MEDICATIONS  Home Medications:    PHYSICAL EXAM:  T(F): 97.4 (06-29-18 @ 06:44), Max: 97.7 (06-28-18 @ 21:06)  HR: 85 (06-29-18 @ 06:44)  BP: 122/65 (06-29-18 @ 06:44)  RR: 18 (06-29-18 @ 06:44)  SpO2: 95% (06-28-18 @ 20:00)  CAPILLARY BLOOD GLUCOSE  138 (29 Jun 2018 06:44)  157 (28 Jun 2018 21:06)  126 (28 Jun 2018 16:34)  117 (28 Jun 2018 11:23)        I&O's Summary    28 Jun 2018 07:01  -  29 Jun 2018 07:00  --------------------------------------------------------  IN: 660 mL / OUT: 0 mL / NET: 660 mL      General: NAD  HEENT: NCAT, no JVD  CV: RRR  RESP: CTAB  Abdominal: Soft, NTTP, non-distended  Extremity: no c/c/e  Neuro: A&O x3, non-focal    MEDICATIONS  STANDING MEDICATIONS  amoxicillin  875 milliGRAM(s)/clavulanate 1 Tablet(s) Oral every 12 hours  dextrose 5%. 1000 milliLiter(s) IV Continuous <Continuous>  dextrose 50% Injectable 12.5 Gram(s) IV Push once  dextrose 50% Injectable 25 Gram(s) IV Push once  dextrose 50% Injectable 25 Gram(s) IV Push once  furosemide   Injectable 40 milliGRAM(s) IV Push daily  heparin  Injectable 5000 Unit(s) SubCutaneous every 8 hours  insulin glargine Injectable (LANTUS) 12 Unit(s) SubCutaneous every morning  insulin lispro Injectable (HumaLOG) 4 Unit(s) SubCutaneous three times a day before meals  magnesium sulfate  IVPB 2 Gram(s) IV Intermittent every 1 hour  potassium chloride    Tablet ER 40 milliEquivalent(s) Oral every 4 hours    PRN MEDICATIONS  aluminum hydroxide/magnesium hydroxide/simethicone Suspension 30 milliLiter(s) Oral every 4 hours PRN  dextrose 40% Gel 15 Gram(s) Oral once PRN  glucagon  Injectable 1 milliGRAM(s) IntraMuscular once PRN  HYDROmorphone   Tablet 2 milliGRAM(s) Oral four times a day PRN  ondansetron Injectable 4 milliGRAM(s) IV Push every 6 hours PRN    LABS:                        12.7   5.81  )-----------( 325      ( 29 Jun 2018 06:44 )             41.6              06-29    144  |  102  |  10  ----------------------------<  131<H>  3.9   |  26  |  0.9    Ca    7.9<L>      29 Jun 2018 06:44  Mg     2.0     06-28

## 2018-06-29 NOTE — PROGRESS NOTE ADULT - ASSESSMENT
58 yo M with PMH of DM not on any medications at home, asthma, osteoarthritis and morbid obesity presented to ER with c/o nausea , vomiting , abdominal pain and diarrhea for 1 day.    #LLE ulcer, w/ maggots  - Augmentin for 7 mor days  - Burn following, wound care  - pain control  - will provide home wound care upon d/c    #Chronic diarrhea, stable  -tolerating diet  -will get stool ova&parasite, stool cx, stool wbc  -c. diff negative  -supportive care  -monitor lytes and replete if needed    #R kidney lesion  -incidental finding on CT  -follow up with urology outpatient    #DM  -ISS + fingerstick monitoring  -a1c 7.8    #Asthma stable  -albuterol prn    #OA  -pain control prn    #Obesity  -BMI 64  -educated on weight loss, follow up with PMD for possibility of bariatric surgery    #DVT ppx - heparin subQ  #Code status - full code  #Dispo - from home, lives by himself ambulates using a walker, moving to assisted living in near future

## 2018-06-30 LAB
CULTURE RESULTS: SIGNIFICANT CHANGE UP
SPECIMEN SOURCE: SIGNIFICANT CHANGE UP

## 2018-07-01 LAB
CULTURE RESULTS: SIGNIFICANT CHANGE UP
SPECIMEN SOURCE: SIGNIFICANT CHANGE UP

## 2018-07-04 ENCOUNTER — INPATIENT (INPATIENT)
Facility: HOSPITAL | Age: 58
LOS: 15 days | Discharge: NURSING, REHAB OR LONG-TERM CARE | End: 2018-07-20
Attending: HOSPITALIST | Admitting: HOSPITALIST
Payer: MEDICAID

## 2018-07-04 VITALS
TEMPERATURE: 99 F | HEART RATE: 102 BPM | SYSTOLIC BLOOD PRESSURE: 116 MMHG | OXYGEN SATURATION: 98 % | DIASTOLIC BLOOD PRESSURE: 53 MMHG | RESPIRATION RATE: 20 BRPM

## 2018-07-04 DIAGNOSIS — Z98.890 OTHER SPECIFIED POSTPROCEDURAL STATES: Chronic | ICD-10-CM

## 2018-07-04 PROBLEM — J45.909 UNSPECIFIED ASTHMA, UNCOMPLICATED: Chronic | Status: ACTIVE | Noted: 2018-06-26

## 2018-07-04 PROBLEM — E11.9 TYPE 2 DIABETES MELLITUS WITHOUT COMPLICATIONS: Chronic | Status: ACTIVE | Noted: 2018-06-26

## 2018-07-04 LAB
ALBUMIN SERPL ELPH-MCNC: 3.2 G/DL — LOW (ref 3.5–5.2)
ALP SERPL-CCNC: 89 U/L — SIGNIFICANT CHANGE UP (ref 30–115)
ALT FLD-CCNC: 23 U/L — SIGNIFICANT CHANGE UP (ref 0–41)
ANION GAP SERPL CALC-SCNC: 15 MMOL/L — HIGH (ref 7–14)
AST SERPL-CCNC: 18 U/L — SIGNIFICANT CHANGE UP (ref 0–41)
BASOPHILS # BLD AUTO: 0.02 K/UL — SIGNIFICANT CHANGE UP (ref 0–0.2)
BASOPHILS NFR BLD AUTO: 0.3 % — SIGNIFICANT CHANGE UP (ref 0–1)
BILIRUB SERPL-MCNC: 0.2 MG/DL — SIGNIFICANT CHANGE UP (ref 0.2–1.2)
BUN SERPL-MCNC: 12 MG/DL — SIGNIFICANT CHANGE UP (ref 10–20)
CALCIUM SERPL-MCNC: 8.4 MG/DL — LOW (ref 8.5–10.1)
CHLORIDE SERPL-SCNC: 104 MMOL/L — SIGNIFICANT CHANGE UP (ref 98–110)
CO2 SERPL-SCNC: 22 MMOL/L — SIGNIFICANT CHANGE UP (ref 17–32)
CREAT SERPL-MCNC: 0.9 MG/DL — SIGNIFICANT CHANGE UP (ref 0.7–1.5)
EOSINOPHIL # BLD AUTO: 0.14 K/UL — SIGNIFICANT CHANGE UP (ref 0–0.7)
EOSINOPHIL NFR BLD AUTO: 1.8 % — SIGNIFICANT CHANGE UP (ref 0–8)
ESTIMATED AVERAGE GLUCOSE: 183 MG/DL — HIGH (ref 68–114)
GLUCOSE SERPL-MCNC: 157 MG/DL — HIGH (ref 70–99)
HBA1C BLD-MCNC: 8 % — HIGH (ref 4–5.6)
HCT VFR BLD CALC: 36.5 % — LOW (ref 42–52)
HGB BLD-MCNC: 11.4 G/DL — LOW (ref 14–18)
IMM GRANULOCYTES NFR BLD AUTO: 0.3 % — SIGNIFICANT CHANGE UP (ref 0.1–0.3)
LACTATE SERPL-SCNC: 1.8 MMOL/L — SIGNIFICANT CHANGE UP (ref 0.5–2.2)
LYMPHOCYTES # BLD AUTO: 1.49 K/UL — SIGNIFICANT CHANGE UP (ref 1.2–3.4)
LYMPHOCYTES # BLD AUTO: 19.3 % — LOW (ref 20.5–51.1)
MCHC RBC-ENTMCNC: 25.6 PG — LOW (ref 27–31)
MCHC RBC-ENTMCNC: 31.2 G/DL — LOW (ref 32–37)
MCV RBC AUTO: 82 FL — SIGNIFICANT CHANGE UP (ref 80–94)
MONOCYTES # BLD AUTO: 0.38 K/UL — SIGNIFICANT CHANGE UP (ref 0.1–0.6)
MONOCYTES NFR BLD AUTO: 4.9 % — SIGNIFICANT CHANGE UP (ref 1.7–9.3)
NEUTROPHILS # BLD AUTO: 5.67 K/UL — SIGNIFICANT CHANGE UP (ref 1.4–6.5)
NEUTROPHILS NFR BLD AUTO: 73.4 % — SIGNIFICANT CHANGE UP (ref 42.2–75.2)
NRBC # BLD: 0 /100 WBCS — SIGNIFICANT CHANGE UP (ref 0–0)
NT-PROBNP SERPL-SCNC: 351 PG/ML — HIGH (ref 0–300)
PLATELET # BLD AUTO: 264 K/UL — SIGNIFICANT CHANGE UP (ref 130–400)
POTASSIUM SERPL-MCNC: 3.7 MMOL/L — SIGNIFICANT CHANGE UP (ref 3.5–5)
POTASSIUM SERPL-SCNC: 3.7 MMOL/L — SIGNIFICANT CHANGE UP (ref 3.5–5)
PROT SERPL-MCNC: 5.8 G/DL — LOW (ref 6–8)
RBC # BLD: 4.45 M/UL — LOW (ref 4.7–6.1)
RBC # FLD: 15.1 % — HIGH (ref 11.5–14.5)
SODIUM SERPL-SCNC: 141 MMOL/L — SIGNIFICANT CHANGE UP (ref 135–146)
WBC # BLD: 7.72 K/UL — SIGNIFICANT CHANGE UP (ref 4.8–10.8)
WBC # FLD AUTO: 7.72 K/UL — SIGNIFICANT CHANGE UP (ref 4.8–10.8)

## 2018-07-04 RX ORDER — OXYCODONE AND ACETAMINOPHEN 5; 325 MG/1; MG/1
1 TABLET ORAL EVERY 6 HOURS
Qty: 0 | Refills: 0 | Status: DISCONTINUED | OUTPATIENT
Start: 2018-07-04 | End: 2018-07-04

## 2018-07-04 RX ORDER — DEXTROSE 50 % IN WATER 50 %
25 SYRINGE (ML) INTRAVENOUS ONCE
Qty: 0 | Refills: 0 | Status: DISCONTINUED | OUTPATIENT
Start: 2018-07-04 | End: 2018-07-20

## 2018-07-04 RX ORDER — DEXTROSE 50 % IN WATER 50 %
15 SYRINGE (ML) INTRAVENOUS ONCE
Qty: 0 | Refills: 0 | Status: DISCONTINUED | OUTPATIENT
Start: 2018-07-04 | End: 2018-07-20

## 2018-07-04 RX ORDER — AMPICILLIN SODIUM AND SULBACTAM SODIUM 250; 125 MG/ML; MG/ML
3 INJECTION, POWDER, FOR SUSPENSION INTRAMUSCULAR; INTRAVENOUS ONCE
Qty: 0 | Refills: 0 | Status: COMPLETED | OUTPATIENT
Start: 2018-07-04 | End: 2018-07-04

## 2018-07-04 RX ORDER — SODIUM CHLORIDE 9 MG/ML
1000 INJECTION, SOLUTION INTRAVENOUS
Qty: 0 | Refills: 0 | Status: DISCONTINUED | OUTPATIENT
Start: 2018-07-04 | End: 2018-07-20

## 2018-07-04 RX ORDER — INSULIN LISPRO 100/ML
10 VIAL (ML) SUBCUTANEOUS
Qty: 0 | Refills: 0 | Status: DISCONTINUED | OUTPATIENT
Start: 2018-07-04 | End: 2018-07-20

## 2018-07-04 RX ORDER — FUROSEMIDE 40 MG
40 TABLET ORAL DAILY
Qty: 0 | Refills: 0 | Status: DISCONTINUED | OUTPATIENT
Start: 2018-07-04 | End: 2018-07-20

## 2018-07-04 RX ORDER — AMPICILLIN SODIUM AND SULBACTAM SODIUM 250; 125 MG/ML; MG/ML
3 INJECTION, POWDER, FOR SUSPENSION INTRAMUSCULAR; INTRAVENOUS EVERY 6 HOURS
Qty: 0 | Refills: 0 | Status: DISCONTINUED | OUTPATIENT
Start: 2018-07-04 | End: 2018-07-05

## 2018-07-04 RX ORDER — SODIUM HYPOCHLORITE 0.125 %
1 SOLUTION, NON-ORAL MISCELLANEOUS
Qty: 0 | Refills: 0 | Status: DISCONTINUED | OUTPATIENT
Start: 2018-07-04 | End: 2018-07-06

## 2018-07-04 RX ORDER — INSULIN GLARGINE 100 [IU]/ML
20 INJECTION, SOLUTION SUBCUTANEOUS AT BEDTIME
Qty: 0 | Refills: 0 | Status: DISCONTINUED | OUTPATIENT
Start: 2018-07-04 | End: 2018-07-20

## 2018-07-04 RX ORDER — GLUCAGON INJECTION, SOLUTION 0.5 MG/.1ML
1 INJECTION, SOLUTION SUBCUTANEOUS ONCE
Qty: 0 | Refills: 0 | Status: DISCONTINUED | OUTPATIENT
Start: 2018-07-04 | End: 2018-07-20

## 2018-07-04 RX ORDER — ENOXAPARIN SODIUM 100 MG/ML
40 INJECTION SUBCUTANEOUS EVERY 24 HOURS
Qty: 0 | Refills: 0 | Status: DISCONTINUED | OUTPATIENT
Start: 2018-07-04 | End: 2018-07-20

## 2018-07-04 RX ORDER — DEXTROSE 50 % IN WATER 50 %
12.5 SYRINGE (ML) INTRAVENOUS ONCE
Qty: 0 | Refills: 0 | Status: DISCONTINUED | OUTPATIENT
Start: 2018-07-04 | End: 2018-07-20

## 2018-07-04 RX ADMIN — Medication 1 APPLICATION(S): at 18:25

## 2018-07-04 RX ADMIN — Medication 10 UNIT(S): at 18:01

## 2018-07-04 RX ADMIN — INSULIN GLARGINE 20 UNIT(S): 100 INJECTION, SOLUTION SUBCUTANEOUS at 21:55

## 2018-07-04 RX ADMIN — AMPICILLIN SODIUM AND SULBACTAM SODIUM 200 GRAM(S): 250; 125 INJECTION, POWDER, FOR SUSPENSION INTRAMUSCULAR; INTRAVENOUS at 23:40

## 2018-07-04 RX ADMIN — AMPICILLIN SODIUM AND SULBACTAM SODIUM 200 GRAM(S): 250; 125 INJECTION, POWDER, FOR SUSPENSION INTRAMUSCULAR; INTRAVENOUS at 15:38

## 2018-07-04 NOTE — H&P ADULT - ATTENDING COMMENTS
patient seen and examined independently on morning rounds, chart reviewed and discussed with medicine resident and agree with the above H/P and assessment and plan with the following addendum:     58 yo man with h/o morbid obesity, chronic Leg venous stasis ulcer with h/o poor wound care and prior infections (recently discharged from [facility] on 6/25 on oral augmentin to complete course which patient states he was not taking) who p/w weakness, worsening leg pain and inability to care for himself at home.  In the ED admitted to medicine service for further w/u, management and consider placement in STR (patient lives alone and says he cannot care for himself)--recently mother passed away.    pmhx- as per above    ros- as per above otherwise unremarkable ros    PE:  GEN-NAD, AAOx3  HEENT- NCAT, PERRLA, EOMI  NECK- supple no jvd, no lymphadenopathy  PULM- Clear to auscultation bilaterally, fair air entry  CVS- +s1/s2 RRR no murmurs  GI- soft obese, NT ND +bs  EXT- 2+ edema bilateral legbilateral foot dressings c/d/i  NEURO- nonfocal but unable to assess gait      labs/radiology reviewed--wbc 7.7, lactate 1.8      a/p:  #bilateral chronic venous stasis ulcers (h/o prior infection- noncompliant with ouatpeitn oral abx)  -local wound care--f/u with burn team  -started on iv unasyn in ED---transition to oral augmentin  -monitow wbc/fever curve  -PT eval---dispo planning---SW involvement for possible placement in [facility] vs [place] [facility]    #continue current mangemtn chronic med issues    DVT/GI ppx    full code

## 2018-07-04 NOTE — H&P ADULT - HISTORY OF PRESENT ILLNESS
Patient is a pleasant 56 yo M with h/o DM, Morbid Obesity, Chronic LE venous stasis ulcers, Asthma, who presents with worsening erythema and drainage of his B/L LE Venous stasis Ulcers.. Pt was  recently admitted to the [facility]  on 6/25/18 for similar presentation but he  was noted to have had maggots on his wounds in the LLE that was removed by burn surgery.  He was seen by ID and managed inpatient with Unasyn and was d/c'ed home with VNS services on 6/26/18 and Augmentin. Patient was not aware he was supposed to take Agmentin as an outpatient and initially felt better but two days ago he noticed that the erythema was getting worse on both legs and he noticed a watery discharge from his wounds which was not present on discharge. He also states that yesterday he was in the shower when he lost his balanced and fell landing on his bottom. He denied LOC and was able to get himself back up by using the wall for support. The LE watery drainage and erythema    worsened this morning to the point where he called an ambulance. He lives at home alone and states he is unable to care for himself but VNS does come daily for wound care. He was interviewed by [facility] for possible housing but they have not gotten back to him.   Vital Signs Last 24 Hrs  T(C): 36.3 (04 Jul 2018 15:53), Max: 37.3 (04 Jul 2018 12:54)  T(F): 97.3 (04 Jul 2018 15:53), Max: 99.2 (04 Jul 2018 12:54)  HR: 96 (04 Jul 2018 15:53) (96 - 102)  BP: 137/62 (04 Jul 2018 15:53) (116/53 - 137/62)  BP(mean): --  RR: 18 (04 Jul 2018 15:53) (18 - 20)  SpO2: 99% (04 Jul 2018 15:53) (98% - 99%)  In ED: Unasyn 3gm IV x 1

## 2018-07-04 NOTE — ED PROVIDER NOTE - NS ED ROS FT
· Constitutional [-]: no chills, no fever, no night sweats,   · CARDIOVASCULAR: normal rate and rhythm, no chest pain and no edema.  · RESPIRATORY: no chest pain, no cough, and no shortness of breath.  · GASTROINTESTINAL: no abdominal pain, no bloating, no constipation, no diarrhea, no nausea and no vomiting.  · GENITOURINARY: no dysuria, no frequency, and no hematuria.  Skin: +b/l LE swelling, erythema, pain, +watery drainage from blisters  · ROS STATEMENT: all other ROS negative except as per HPI

## 2018-07-04 NOTE — H&P ADULT - NSHPSOCIALHISTORY_GEN_ALL_CORE
Marital Status:  (   )    (  x ) Single    (   )    (  )   Occupation:   Lives with: (  x) alone  (  ) children   (  ) spouse   (  ) parents  (  ) other  Recent Travel: no    Substance Use (street drugs): ( x ) never used  (  ) other:  Tobacco Usage:  (  x ) never smoked   (   ) former smoker   (   ) current smoker  (     ) pack year  Alcohol Usage: no   Sexual History: no

## 2018-07-04 NOTE — ED PROVIDER NOTE - CARE PLAN
Principal Discharge DX:	Cellulitis of left lower extremity  Secondary Diagnosis:	Venous stasis dermatitis of both lower extremities

## 2018-07-04 NOTE — ED PROVIDER NOTE - OBJECTIVE STATEMENT
58 yo M with h/o DM, morbid obesity, chronic LE venous stasis ulcers, asthma, OA p/w b/l leg erythema. Pt was admitted to the hospital on 6/25/18 and was d/c'ed home with VNS services on 6/26/18. Pt initially admitted for n/v/d and b/l LE cellulitis. Pt was noted to have had maggots in wounds on LLE that was removed by burn surgery, . Pt was d/c'ed home with Rx for augmentin but states that he did not know he was was prescribed abx and has not been taking it. Pt was initially given unasyn in the hospital. Pt states that when he went home, the erythema and swelling had improved on b/l LE but again started two days ago. Pt states that he unwrapped his legs yesterday and noted increased redness, pain and swelling to b/l LE, L>R, with watery drainage. Pt ambulates with walker. Pt denies any fever/chills. Pt states that he is supposed to go into an assisted living facility but has not yet heard back from them. Pt currently lives alone and is having difficulty getting around.

## 2018-07-04 NOTE — H&P ADULT - NSHPLABSRESULTS_GEN_ALL_CORE
11.4   7.72  )-----------( 264      ( 04 Jul 2018 14:23 )             36.5       07-04    141  |  104  |  12  ----------------------------<  157<H>  3.7   |  22  |  0.9    Ca    8.4<L>      04 Jul 2018 14:23    TPro  5.8<L>  /  Alb  3.2<L>  /  TBili  0.2  /  DBili  x   /  AST  18  /  ALT  23  /  AlkPhos  89  07-04                    < end of copied text >          Lactate Trend  07-04 @ 14:23 Lactate:1.8             CAPILLARY BLOOD GLUCOSE  < from: Xray Chest 1 View AP/PA (07.04.18 @ 15:59) >    Impression:      Bilateral small pleural effusions/opacities.

## 2018-07-04 NOTE — H&P ADULT - ASSESSMENT
57 y o m with pmh of D.M not on any medications at home , asthma, Osteoarthritis and morbid obesity presented to ER worsening erythema and drainage from B/L LE Venous Stasis Ulcers. Pt recently admitted to [facility] on 6/25/18 for similar presentation but he  was noted to have had maggots on his wounds in the LLE that was removed by burn surgery.  He was seen by ID and managed inpatient with Unasyn and was d/c'ed home with VNS services on 6/26/18 and Augmentin. Patient was not aware he was supposed to take Agmentin and returns w/ worsening wounds.     1)Worsening B/L Venous Stasis Ulcers w/ no purulent discharge   - admit to medicine.  - IV unasyn as recommended by ID on previous admission   -ID consult   - Burn consult placed,will use previous recs for now:  soap and water qd, dakins solution  wet to dry dressing bid  - pain control w/ Percocet   - PO lasix       2) DIABETES MELLITIS :  -Last Hb A1c 7.8 , monitor finger stick.  - started on Basal Bolus Insulin   -CC diet       3)ASTHMA:  -not on any inhalers at home  -Albuterol PRN     4)OA:  -pain control.    5)DVT ppx  -Lovenox     6)DISPO:  - from home ; lives by himself ambulates using a walker, Unable to care for himself at home. He would like to go to [facility] on [address]. They already have seen him  - full code.

## 2018-07-04 NOTE — ED PROVIDER NOTE - PROGRESS NOTE DETAILS
Spoke with  who is aware of pt admission. Pt noted with otherwise stable labs. Will need admission for IV abx and SW consult re: possible SNF Spoke with Dr.Mandal ITALO, who is aware of admission

## 2018-07-04 NOTE — ED ADULT NURSE NOTE - OBJECTIVE STATEMENT
Pt presents to ED with b/l leg swelling/redness/weeping, as well as requesting assisted living placement. Lives home alone, has cane but does not use.

## 2018-07-04 NOTE — ED PROVIDER NOTE - PHYSICAL EXAMINATION
· CONSTITUTIONAL: Well appearing, well nourished, awake, alert, oriented to person, place, time/situation, morbidly obese, and in no apparent distress.  · CARDIAC: Normal rate, regular rhythm.  Heart sounds S1, S2.  No murmurs, rubs or gallops.  · RESPIRATORY: Breath sounds clear and equal bilaterally.  · GASTROINTESTINAL: Abdomen soft, non-tender, no guarding.  · SKIN: +B/L LE erythema and warmth with extension to b/l groins, no scrotal swelling or tenderness, +pitting edema b/l LE extending to mid-abdomen, +multiple blisters on LLE with watery drainage, +dirt crusting on b/l feet, no fluctuant masses noted

## 2018-07-04 NOTE — H&P ADULT - NSHPPHYSICALEXAM_GEN_ALL_CORE
PHYSICAL EXAM:  GENERAL: NAD, speaks in full sentences, no signs of respiratory distress  HEAD:  Atraumatic, Normocephalic  EYES: EOMI, PERRLA, conjunctiva and sclera clear  NECK: Supple, No JVD  CHEST/LUNG: Clear to auscultation bilaterally; No wheeze; No crackles; No accessory muscles used  HEART: Regular rate and rhythm; No murmurs;   ABDOMEN: Soft, Nontender, Nondistended; Bowel sounds present; No guarding  EXTREMITIES:  2+ edema b/l lower extremities ; left shin wound and large blister with surrounding erythema and non purulent discharge , no maggots  , right shin small wound with no discharge or maggots.  PSYCH: AAOx3  NEUROLOGY: non-focal  SKIN: No rashes or lesions

## 2018-07-04 NOTE — ED PROVIDER NOTE - MEDICAL DECISION MAKING DETAILS
58 yo M with h/o DM, morbid obesity, chronic LE venous stasis ulcers, asthma, OA p/w b/l leg erythema, L>R. Pt noted with otherwise stable labs. Will need admission for IV abx and SW consult re: possible SNF.

## 2018-07-05 LAB
-  COAGULASE NEGATIVE STAPHYLOCOCCUS: SIGNIFICANT CHANGE UP
ANION GAP SERPL CALC-SCNC: 14 MMOL/L — SIGNIFICANT CHANGE UP (ref 7–14)
ANION GAP SERPL CALC-SCNC: 14 MMOL/L — SIGNIFICANT CHANGE UP (ref 7–14)
BASOPHILS # BLD AUTO: 0.02 K/UL — SIGNIFICANT CHANGE UP (ref 0–0.2)
BASOPHILS NFR BLD AUTO: 0.3 % — SIGNIFICANT CHANGE UP (ref 0–1)
BUN SERPL-MCNC: 11 MG/DL — SIGNIFICANT CHANGE UP (ref 10–20)
BUN SERPL-MCNC: 11 MG/DL — SIGNIFICANT CHANGE UP (ref 10–20)
CALCIUM SERPL-MCNC: 8.3 MG/DL — LOW (ref 8.5–10.1)
CALCIUM SERPL-MCNC: 8.4 MG/DL — LOW (ref 8.5–10.1)
CHLORIDE SERPL-SCNC: 101 MMOL/L — SIGNIFICANT CHANGE UP (ref 98–110)
CHLORIDE SERPL-SCNC: 106 MMOL/L — SIGNIFICANT CHANGE UP (ref 98–110)
CO2 SERPL-SCNC: 22 MMOL/L — SIGNIFICANT CHANGE UP (ref 17–32)
CO2 SERPL-SCNC: 25 MMOL/L — SIGNIFICANT CHANGE UP (ref 17–32)
CREAT SERPL-MCNC: 0.7 MG/DL — SIGNIFICANT CHANGE UP (ref 0.7–1.5)
CREAT SERPL-MCNC: 0.7 MG/DL — SIGNIFICANT CHANGE UP (ref 0.7–1.5)
EOSINOPHIL # BLD AUTO: 0.13 K/UL — SIGNIFICANT CHANGE UP (ref 0–0.7)
EOSINOPHIL NFR BLD AUTO: 2 % — SIGNIFICANT CHANGE UP (ref 0–8)
GLUCOSE SERPL-MCNC: 171 MG/DL — HIGH (ref 70–99)
GLUCOSE SERPL-MCNC: 176 MG/DL — HIGH (ref 70–99)
GRAM STN FLD: SIGNIFICANT CHANGE UP
HCT VFR BLD CALC: 35.6 % — LOW (ref 42–52)
HGB BLD-MCNC: 11.2 G/DL — LOW (ref 14–18)
IMM GRANULOCYTES NFR BLD AUTO: 0.3 % — SIGNIFICANT CHANGE UP (ref 0.1–0.3)
LYMPHOCYTES # BLD AUTO: 1.3 K/UL — SIGNIFICANT CHANGE UP (ref 1.2–3.4)
LYMPHOCYTES # BLD AUTO: 20.4 % — LOW (ref 20.5–51.1)
MAGNESIUM SERPL-MCNC: 1.7 MG/DL — LOW (ref 1.8–2.4)
MAGNESIUM SERPL-MCNC: 1.8 MG/DL — SIGNIFICANT CHANGE UP (ref 1.8–2.4)
MCHC RBC-ENTMCNC: 25.6 PG — LOW (ref 27–31)
MCHC RBC-ENTMCNC: 31.5 G/DL — LOW (ref 32–37)
MCV RBC AUTO: 81.5 FL — SIGNIFICANT CHANGE UP (ref 80–94)
METHOD TYPE: SIGNIFICANT CHANGE UP
MONOCYTES # BLD AUTO: 0.35 K/UL — SIGNIFICANT CHANGE UP (ref 0.1–0.6)
MONOCYTES NFR BLD AUTO: 5.5 % — SIGNIFICANT CHANGE UP (ref 1.7–9.3)
NEUTROPHILS # BLD AUTO: 4.54 K/UL — SIGNIFICANT CHANGE UP (ref 1.4–6.5)
NEUTROPHILS NFR BLD AUTO: 71.5 % — SIGNIFICANT CHANGE UP (ref 42.2–75.2)
PLATELET # BLD AUTO: 276 K/UL — SIGNIFICANT CHANGE UP (ref 130–400)
POTASSIUM SERPL-MCNC: 3.1 MMOL/L — LOW (ref 3.5–5)
POTASSIUM SERPL-MCNC: 3.6 MMOL/L — SIGNIFICANT CHANGE UP (ref 3.5–5)
POTASSIUM SERPL-SCNC: 3.1 MMOL/L — LOW (ref 3.5–5)
POTASSIUM SERPL-SCNC: 3.6 MMOL/L — SIGNIFICANT CHANGE UP (ref 3.5–5)
RBC # BLD: 4.37 M/UL — LOW (ref 4.7–6.1)
RBC # FLD: 15.1 % — HIGH (ref 11.5–14.5)
SODIUM SERPL-SCNC: 140 MMOL/L — SIGNIFICANT CHANGE UP (ref 135–146)
SODIUM SERPL-SCNC: 142 MMOL/L — SIGNIFICANT CHANGE UP (ref 135–146)
SPECIMEN SOURCE: SIGNIFICANT CHANGE UP
WBC # BLD: 6.36 K/UL — SIGNIFICANT CHANGE UP (ref 4.8–10.8)
WBC # FLD AUTO: 6.36 K/UL — SIGNIFICANT CHANGE UP (ref 4.8–10.8)

## 2018-07-05 RX ORDER — MAGNESIUM OXIDE 400 MG ORAL TABLET 241.3 MG
400 TABLET ORAL ONCE
Qty: 0 | Refills: 0 | Status: COMPLETED | OUTPATIENT
Start: 2018-07-05 | End: 2018-07-05

## 2018-07-05 RX ORDER — POTASSIUM CHLORIDE 20 MEQ
40 PACKET (EA) ORAL EVERY 4 HOURS
Qty: 0 | Refills: 0 | Status: COMPLETED | OUTPATIENT
Start: 2018-07-05 | End: 2018-07-05

## 2018-07-05 RX ADMIN — Medication 10 UNIT(S): at 17:11

## 2018-07-05 RX ADMIN — MAGNESIUM OXIDE 400 MG ORAL TABLET 400 MILLIGRAM(S): 241.3 TABLET ORAL at 11:19

## 2018-07-05 RX ADMIN — Medication 40 MILLIEQUIVALENT(S): at 13:21

## 2018-07-05 RX ADMIN — AMPICILLIN SODIUM AND SULBACTAM SODIUM 200 GRAM(S): 250; 125 INJECTION, POWDER, FOR SUSPENSION INTRAMUSCULAR; INTRAVENOUS at 11:51

## 2018-07-05 RX ADMIN — ENOXAPARIN SODIUM 40 MILLIGRAM(S): 100 INJECTION SUBCUTANEOUS at 05:16

## 2018-07-05 RX ADMIN — Medication 10 UNIT(S): at 11:20

## 2018-07-05 RX ADMIN — Medication 10 UNIT(S): at 08:03

## 2018-07-05 RX ADMIN — Medication 1 TABLET(S): at 17:11

## 2018-07-05 RX ADMIN — Medication 40 MILLIEQUIVALENT(S): at 11:19

## 2018-07-05 RX ADMIN — Medication 40 MILLIGRAM(S): at 05:16

## 2018-07-05 RX ADMIN — AMPICILLIN SODIUM AND SULBACTAM SODIUM 200 GRAM(S): 250; 125 INJECTION, POWDER, FOR SUSPENSION INTRAMUSCULAR; INTRAVENOUS at 05:16

## 2018-07-05 RX ADMIN — Medication 1 APPLICATION(S): at 05:51

## 2018-07-05 NOTE — CONSULT NOTE ADULT - SUBJECTIVE AND OBJECTIVE BOX
SUBJECTIVE:    Patient is a pleasant 58 yo M with h/o DM, Morbid Obesity, Chronic LE venous stasis ulcers, Asthma, who presents with worsening erythema and drainage of his B/L LE Venous stasis Ulcers.. Pt was  recently admitted to the [facility]  on 6/25/18 for similar presentation but he  was noted to have had maggots on his wounds in the LLE that was removed by burn surgery.  He was seen by ID and managed inpatient with Unasyn and was d/c'ed home with VNS services on 6/26/18 and Augmentin. Patient was not aware he was supposed to take Agmentin as an outpatient and initially felt better but two days ago he noticed that the erythema was getting worse on both legs and he noticed a watery discharge from his wounds which was not present on discharge. He also states that yesterday he was in the shower when he lost his balanced and fell landing on his bottom. He denied LOC and was able to get himself back up by using the wall for support. The LE watery drainage and erythema    worsened this morning to the point where he called an ambulance. He lives at home alone and states he is unable to care for himself but VNS does come daily for wound care. He was interviewed by [facility] for possible housing but they have not gotten back to him. spital day 1d. This morning he is resting comfortably in bed and reports no new issues or overnight events.     ID was consulted for antibiotic recommendation for the cellulitis on his b/l LE. He was previously give IV unasyn during prior admission (06/25/18).     Today, he reports feeling drowsy 'as if he was given a medicine that made him feel sleepy' and describes one episode of diarrhea this morning. He denies any other complaints.       PAST MEDICAL & SURGICAL HISTORY  Morbid obesity  Osteoarthritis  Diabetes  Asthma  H/O umbilical hernia repair    SOCIAL HISTORY:  Negative for smoking/alcohol/drug use. Describes some difficulty caring for himself at home.     ALLERGIES:  No Known Allergies    MEDICATIONS:  STANDING MEDICATIONS  ampicillin/sulbactam  IVPB 3 Gram(s) IV Intermittent every 6 hours  Dakins Solution - 1/2 Strength 1 Application(s) Topical two times a day  dextrose 5%. 1000 milliLiter(s) IV Continuous <Continuous>  dextrose 50% Injectable 12.5 Gram(s) IV Push once  dextrose 50% Injectable 25 Gram(s) IV Push once  dextrose 50% Injectable 25 Gram(s) IV Push once  enoxaparin Injectable 40 milliGRAM(s) SubCutaneous every 24 hours  furosemide   Injectable 40 milliGRAM(s) IV Push daily  insulin glargine Injectable (LANTUS) 20 Unit(s) SubCutaneous at bedtime  insulin lispro Injectable (HumaLOG) 10 Unit(s) SubCutaneous three times a day before meals  magnesium oxide 400 milliGRAM(s) Oral once  potassium chloride    Tablet ER 40 milliEquivalent(s) Oral every 4 hours    PRN MEDICATIONS  dextrose 40% Gel 15 Gram(s) Oral once PRN  glucagon  Injectable 1 milliGRAM(s) IntraMuscular once PRN  oxyCODONE    5 mG/acetaminophen 325 mG 1 Tablet(s) Oral every 6 hours PRN    VITALS:   T(F): 96.7  HR: 88  BP: 135/63  RR: 20  SpO2: 100%    LABS:                        11.2   6.36  )-----------( 276      ( 05 Jul 2018 05:09 )             35.6     07-05    142  |  106  |  11  ----------------------------<  176<H>  3.1<L>   |  22  |  0.7    Ca    8.4<L>      05 Jul 2018 05:09  Mg     1.7     07-05    TPro  5.8<L>  /  Alb  3.2<L>  /  TBili  0.2  /  DBili  x   /  AST  18  /  ALT  23  /  AlkPhos  89  07-04      Lactate, Blood: 1.8 mmol/L (07-04-18 @ 14:23)      RADIOLOGY:    PHYSICAL EXAM:    GEN: No acute distress. Sleepy when first entered the room but became more alert with conversation,   LUNGS: Clear to auscultation bilaterally, no wheezing, rhonchi, rales.   HEART: Regular rate and rhythm. +S1, +S2.   ABD: Soft, non-distended. +bowel sounds in all quadrants. Some tenderness to palpation noted on his LLQ.   EXT: Erythema and edema on LE bilaterally. More significant erythema noted on the left LE. Erythema begins on the dorsum of his foot and extends nearly to the knee. Blistering lesions (more so on the left LE) that appear open but no obvious signs of drainage. No evidence of maggots.     NEURO: AAOX3    Intravenous access:   NG tube:   Lala Catheter: SUBJECTIVE:    Patient is a pleasant 56 yo M with h/o DM, Morbid Obesity, Chronic LE venous stasis ulcers, Asthma, who presents with worsening erythema and drainage of his B/L LE Venous stasis Ulcers. Pt was  recently admitted to the [facility] on 6/25/18 for similar presentation but he was noted to have had maggots on his wounds in the LLE that was removed by burn surgery.  He was seen by ID and managed inpatient with Unasyn and was d/c'd home with VNS services on 6/26/18 and Augmentin. Patient was not aware he was supposed to take Agmentin as an outpatient and initially felt better but two days ago he noticed that the erythema was getting worse on both legs and he noticed a watery discharge from his wounds which was not present on discharge. He also states that yesterday he was in the shower when he lost his balanced and fell landing on his bottom. He denied LOC and was able to get himself back up by using the wall for support. The LE watery drainage and erythema    worsened this morning to the point where he called an ambulance. He lives at home alone and states he is unable to care for himself but VNS does come daily for wound care. He was interviewed by [facility] for possible housing but they have not gotten back to him. spital day 1d. This morning he is resting comfortably in bed and reports no new issues or overnight events.     ID was consulted for antibiotic recommendation for the cellulitis on his left LE. He was previously give IV unasyn during prior admission (06/25/18).     Today, he reports feeling drowsy 'as if he was given a medicine that made him feel sleepy' and describes one episode of diarrhea this morning. He denies any other complaints.       PAST MEDICAL & SURGICAL HISTORY  Morbid obesity  Osteoarthritis  Diabetes  Asthma  H/O umbilical hernia repair    SOCIAL HISTORY:  Negative for smoking/alcohol/drug use. Describes some difficulty caring for himself at home.     ALLERGIES:  No Known Allergies    MEDICATIONS:  STANDING MEDICATIONS  ampicillin/sulbactam  IVPB 3 Gram(s) IV Intermittent every 6 hours  Dakins Solution - 1/2 Strength 1 Application(s) Topical two times a day  dextrose 5%. 1000 milliLiter(s) IV Continuous <Continuous>  dextrose 50% Injectable 12.5 Gram(s) IV Push once  dextrose 50% Injectable 25 Gram(s) IV Push once  dextrose 50% Injectable 25 Gram(s) IV Push once  enoxaparin Injectable 40 milliGRAM(s) SubCutaneous every 24 hours  furosemide   Injectable 40 milliGRAM(s) IV Push daily  insulin glargine Injectable (LANTUS) 20 Unit(s) SubCutaneous at bedtime  insulin lispro Injectable (HumaLOG) 10 Unit(s) SubCutaneous three times a day before meals  magnesium oxide 400 milliGRAM(s) Oral once  potassium chloride    Tablet ER 40 milliEquivalent(s) Oral every 4 hours    PRN MEDICATIONS  dextrose 40% Gel 15 Gram(s) Oral once PRN  glucagon  Injectable 1 milliGRAM(s) IntraMuscular once PRN  oxyCODONE    5 mG/acetaminophen 325 mG 1 Tablet(s) Oral every 6 hours PRN    VITALS:   T(F): 96.7  HR: 88  BP: 135/63  RR: 20  SpO2: 100%    LABS:                        11.2   6.36  )-----------( 276      ( 05 Jul 2018 05:09 )             35.6     07-05    142  |  106  |  11  ----------------------------<  176<H>  3.1<L>   |  22  |  0.7    Ca    8.4<L>      05 Jul 2018 05:09  Mg     1.7     07-05    TPro  5.8<L>  /  Alb  3.2<L>  /  TBili  0.2  /  DBili  x   /  AST  18  /  ALT  23  /  AlkPhos  89  07-04      Lactate, Blood: 1.8 mmol/L (07-04-18 @ 14:23)  HbA1c 8.0     RADIOLOGY:    PHYSICAL EXAM:    GEN: No acute distress.  LUNGS: Clear to auscultation bilaterally, no wheezing, rhonchi, rales.   HEART: Regular rate and rhythm. +S1, +S2.   ABD: Soft, non-distended. +BS. Some tenderness to palpation noted in his LLQ near the site of the mesh from the hernia repair.   EXT: Erythema and edema on LE bilaterally. More significant erythema and ulcerations noted on the LLE. Erythema begins on the dorsum of his foot and extends nearly to the knee. Blistering lesions (more so on the left LE) that appear open with some signs of purulent yellow drainage. No evidence of maggots.     NEURO: AAOX3    Intravenous access:   NG tube:   Lala Catheter:

## 2018-07-05 NOTE — PROGRESS NOTE ADULT - SUBJECTIVE AND OBJECTIVE BOX
SUBJECTIVE:    Patient is a 57y old Male who presents with a chief complaint of worsening erythema and draining for B/L LE Venous stasis (04 Jul 2018 16:50)    Currently admitted to medicine with the primary diagnosis of cellulitis, bilateral venous stasis      Today is hospital day 1d. The patient reports he was recently hospitalized on 6/25/18 for bilateral lower extremity venous stasis ulcers with maggot infestation (seen by burn, discharged with Augmentin and visiting nurse for wound care). Patient reports that his mother passed away in the last week and he is having a very time taking care of himself and his health conditions and thinks he would benefit living in [facility] at this time. It is difficult and painful for him to walk and he has had episodes of incontinence due to his inability to walk to the bathroom. He reported worsening of the erythema and drainage of his bilateral lower extremities and returned to the ED. The patient would like to speak to burn and ID about his venous stasis ulcers. At this time, he denies any headache, chest pain, dyspnea, abdominal pain, N/V/D/C.     PAST MEDICAL & SURGICAL HISTORY  Morbid obesity  Osteoarthritis  Diabetes  Asthma  H/O umbilical hernia repair    SOCIAL HISTORY:  Negative for smoking/alcohol/drug use.     ALLERGIES:  No Known Allergies    MEDICATIONS:  STANDING MEDICATIONS  amoxicillin  875 milliGRAM(s)/clavulanate 1 Tablet(s) Oral two times a day  Dakins Solution - 1/2 Strength 1 Application(s) Topical two times a day  dextrose 5%. 1000 milliLiter(s) IV Continuous <Continuous>  dextrose 50% Injectable 12.5 Gram(s) IV Push once  dextrose 50% Injectable 25 Gram(s) IV Push once  dextrose 50% Injectable 25 Gram(s) IV Push once  enoxaparin Injectable 40 milliGRAM(s) SubCutaneous every 24 hours  furosemide   Injectable 40 milliGRAM(s) IV Push daily  insulin glargine Injectable (LANTUS) 20 Unit(s) SubCutaneous at bedtime  insulin lispro Injectable (HumaLOG) 10 Unit(s) SubCutaneous three times a day before meals    PRN MEDICATIONS  dextrose 40% Gel 15 Gram(s) Oral once PRN  glucagon  Injectable 1 milliGRAM(s) IntraMuscular once PRN  oxyCODONE    5 mG/acetaminophen 325 mG 1 Tablet(s) Oral every 6 hours PRN    VITALS:   T(F): 96.7  HR: 88  BP: 135/63  RR: 20  SpO2: 100%    LABS:                        11.2   6.36  )-----------( 276      ( 05 Jul 2018 05:09 )             35.6     07-05    142  |  106  |  11  ----------------------------<  176<H>  3.1<L>   |  22  |  0.7    Ca    8.4<L>      05 Jul 2018 05:09  Mg     1.7     07-05    TPro  5.8<L>  /  Alb  3.2<L>  /  TBili  0.2  /  DBili  x   /  AST  18  /  ALT  23  /  AlkPhos  89  07-04    RADIOLOGY:  No new imaging studies     PHYSICAL EXAM:  GEN: No acute distress, sitting in chair next to bed   LUNGS: Clear to auscultation bilaterally   HEART: S1/S2 present. RRR.   ABD: Soft, non-tender, non-distended. Bowel sounds present  EXT: NC/NC/NE/2+PP/VILLEGAS/Skin Intact.   NEURO: AAOX3

## 2018-07-05 NOTE — CONSULT NOTE ADULT - ATTENDING COMMENTS
Briefly, 58 yo M with DM, morbid obesity, chronic venous stasis, abd hernia repair with mesh, recently admitted for LLE cellulitis found to have maggots, transitioned from unasyn to augmentin to complete the course, presenting with worsening LLE erythema and drainage in the setting on not taking augmentin at home. Pt reports initially did improve while on unasyn. Afebrile and no leukocytosis. Also reports a week of b/l lower quadrant abd pain and 1 episode of diarrhea today.    Exam with stable vitals. b/l LE edema LLE>RLE, LLE with increased warmth, erythema, and open wounds with yellowish purulence on the dressings. Abd exam obese, soft, mildly tender on the epigastric area, no rebound or guarding.    Problems:  LLE purulent cellulitis  IDDM  Morbid Obesity  Hypokalemia    Plan:   - Continue unasyn 3g q6h and monitor LLE  - Leg elevation and local wound care  - CHG 4% washes standing and PRN    ID to follow

## 2018-07-05 NOTE — CONSULT NOTE ADULT - SUBJECTIVE AND OBJECTIVE BOX
pt with venous stasis ulcers legs--> recent admit for maggots to legs--> now readmitted with cellulitis    PE:  bilateral leg --> no maggots--> edema and erythema and clear drainage

## 2018-07-05 NOTE — PROGRESS NOTE ADULT - ASSESSMENT
57M with PMH of DM, morbid obesity, OA, and asthma who presented to ER with worsening erythema and drainage from B/L LE Venous Stasis Ulcers.      1) Worsening B/L Venous Stasis Ulcers  -Erythematous, clear drainage, no purulent discharge   -IV Unasyn switched to Augmentin 875mg PO q12h x 14 days   -ID consulted, f/u recs   -Burn consulted, f/u recs   -Pain control w/ Percocet   -PO Lasix     2) DIABETES MELLITIS  -A1C 8.0, monitor finger sticks   -Lantus 20 qhs, Lispro 10 units qac  -CC diet     3)ASTHMA:  -Albuterol PRN     4)OA  -pain control     5)DVT ppx  -Lovenox     6)DISPO: From home, ambulates using walker sometimes, unable to care for himself alone, would like placement in [facility]   - full code.

## 2018-07-06 DIAGNOSIS — M19.90 UNSPECIFIED OSTEOARTHRITIS, UNSPECIFIED SITE: ICD-10-CM

## 2018-07-06 DIAGNOSIS — E83.42 HYPOMAGNESEMIA: ICD-10-CM

## 2018-07-06 DIAGNOSIS — E83.51 HYPOCALCEMIA: ICD-10-CM

## 2018-07-06 DIAGNOSIS — E66.01 MORBID (SEVERE) OBESITY DUE TO EXCESS CALORIES: ICD-10-CM

## 2018-07-06 DIAGNOSIS — R60.0 LOCALIZED EDEMA: ICD-10-CM

## 2018-07-06 DIAGNOSIS — L03.116 CELLULITIS OF LEFT LOWER LIMB: ICD-10-CM

## 2018-07-06 DIAGNOSIS — D64.9 ANEMIA, UNSPECIFIED: ICD-10-CM

## 2018-07-06 DIAGNOSIS — A08.4 VIRAL INTESTINAL INFECTION, UNSPECIFIED: ICD-10-CM

## 2018-07-06 DIAGNOSIS — L03.115 CELLULITIS OF RIGHT LOWER LIMB: ICD-10-CM

## 2018-07-06 DIAGNOSIS — E87.6 HYPOKALEMIA: ICD-10-CM

## 2018-07-06 DIAGNOSIS — M79.622 PAIN IN LEFT UPPER ARM: ICD-10-CM

## 2018-07-06 DIAGNOSIS — L97.929 NON-PRESSURE CHRONIC ULCER OF UNSPECIFIED PART OF LEFT LOWER LEG WITH UNSPECIFIED SEVERITY: ICD-10-CM

## 2018-07-06 DIAGNOSIS — R10.9 UNSPECIFIED ABDOMINAL PAIN: ICD-10-CM

## 2018-07-06 DIAGNOSIS — I87.2 VENOUS INSUFFICIENCY (CHRONIC) (PERIPHERAL): ICD-10-CM

## 2018-07-06 DIAGNOSIS — E11.9 TYPE 2 DIABETES MELLITUS WITHOUT COMPLICATIONS: ICD-10-CM

## 2018-07-06 DIAGNOSIS — Z87.891 PERSONAL HISTORY OF NICOTINE DEPENDENCE: ICD-10-CM

## 2018-07-06 DIAGNOSIS — N28.1 CYST OF KIDNEY, ACQUIRED: ICD-10-CM

## 2018-07-06 DIAGNOSIS — J45.909 UNSPECIFIED ASTHMA, UNCOMPLICATED: ICD-10-CM

## 2018-07-06 LAB
ANION GAP SERPL CALC-SCNC: 14 MMOL/L — SIGNIFICANT CHANGE UP (ref 7–14)
BASOPHILS # BLD AUTO: 0.02 K/UL — SIGNIFICANT CHANGE UP (ref 0–0.2)
BASOPHILS NFR BLD AUTO: 0.3 % — SIGNIFICANT CHANGE UP (ref 0–1)
BUN SERPL-MCNC: 11 MG/DL — SIGNIFICANT CHANGE UP (ref 10–20)
CALCIUM SERPL-MCNC: 8.6 MG/DL — SIGNIFICANT CHANGE UP (ref 8.5–10.1)
CHLORIDE SERPL-SCNC: 101 MMOL/L — SIGNIFICANT CHANGE UP (ref 98–110)
CO2 SERPL-SCNC: 27 MMOL/L — SIGNIFICANT CHANGE UP (ref 17–32)
CREAT SERPL-MCNC: 0.7 MG/DL — SIGNIFICANT CHANGE UP (ref 0.7–1.5)
CULTURE RESULTS: SIGNIFICANT CHANGE UP
EOSINOPHIL # BLD AUTO: 0.17 K/UL — SIGNIFICANT CHANGE UP (ref 0–0.7)
EOSINOPHIL NFR BLD AUTO: 2.4 % — SIGNIFICANT CHANGE UP (ref 0–8)
GLUCOSE SERPL-MCNC: 141 MG/DL — HIGH (ref 70–99)
HCT VFR BLD CALC: 35.9 % — LOW (ref 42–52)
HGB BLD-MCNC: 11.2 G/DL — LOW (ref 14–18)
IMM GRANULOCYTES NFR BLD AUTO: 0.4 % — HIGH (ref 0.1–0.3)
LYMPHOCYTES # BLD AUTO: 1.56 K/UL — SIGNIFICANT CHANGE UP (ref 1.2–3.4)
LYMPHOCYTES # BLD AUTO: 22.5 % — SIGNIFICANT CHANGE UP (ref 20.5–51.1)
MAGNESIUM SERPL-MCNC: 1.9 MG/DL — SIGNIFICANT CHANGE UP (ref 1.8–2.4)
MCHC RBC-ENTMCNC: 25.6 PG — LOW (ref 27–31)
MCHC RBC-ENTMCNC: 31.2 G/DL — LOW (ref 32–37)
MCV RBC AUTO: 82.2 FL — SIGNIFICANT CHANGE UP (ref 80–94)
MONOCYTES # BLD AUTO: 0.35 K/UL — SIGNIFICANT CHANGE UP (ref 0.1–0.6)
MONOCYTES NFR BLD AUTO: 5 % — SIGNIFICANT CHANGE UP (ref 1.7–9.3)
NEUTROPHILS # BLD AUTO: 4.81 K/UL — SIGNIFICANT CHANGE UP (ref 1.4–6.5)
NEUTROPHILS NFR BLD AUTO: 69.4 % — SIGNIFICANT CHANGE UP (ref 42.2–75.2)
NRBC # BLD: 0 /100 WBCS — SIGNIFICANT CHANGE UP (ref 0–0)
ORGANISM # SPEC MICROSCOPIC CNT: SIGNIFICANT CHANGE UP
ORGANISM # SPEC MICROSCOPIC CNT: SIGNIFICANT CHANGE UP
PLATELET # BLD AUTO: 305 K/UL — SIGNIFICANT CHANGE UP (ref 130–400)
POTASSIUM SERPL-MCNC: 3.7 MMOL/L — SIGNIFICANT CHANGE UP (ref 3.5–5)
POTASSIUM SERPL-SCNC: 3.7 MMOL/L — SIGNIFICANT CHANGE UP (ref 3.5–5)
RBC # BLD: 4.37 M/UL — LOW (ref 4.7–6.1)
RBC # FLD: 15.1 % — HIGH (ref 11.5–14.5)
SODIUM SERPL-SCNC: 142 MMOL/L — SIGNIFICANT CHANGE UP (ref 135–146)
SPECIMEN SOURCE: SIGNIFICANT CHANGE UP
WBC # BLD: 6.94 K/UL — SIGNIFICANT CHANGE UP (ref 4.8–10.8)
WBC # FLD AUTO: 6.94 K/UL — SIGNIFICANT CHANGE UP (ref 4.8–10.8)

## 2018-07-06 RX ORDER — NYSTATIN CREAM 100000 [USP'U]/G
1 CREAM TOPICAL
Qty: 0 | Refills: 0 | Status: DISCONTINUED | OUTPATIENT
Start: 2018-07-06 | End: 2018-07-20

## 2018-07-06 RX ADMIN — Medication 1 TABLET(S): at 06:03

## 2018-07-06 RX ADMIN — ENOXAPARIN SODIUM 40 MILLIGRAM(S): 100 INJECTION SUBCUTANEOUS at 06:04

## 2018-07-06 RX ADMIN — Medication 40 MILLIGRAM(S): at 06:04

## 2018-07-06 RX ADMIN — INSULIN GLARGINE 20 UNIT(S): 100 INJECTION, SOLUTION SUBCUTANEOUS at 21:22

## 2018-07-06 RX ADMIN — Medication 10 UNIT(S): at 11:13

## 2018-07-06 RX ADMIN — Medication 10 UNIT(S): at 08:11

## 2018-07-06 RX ADMIN — Medication 10 UNIT(S): at 17:28

## 2018-07-06 RX ADMIN — Medication 1 TABLET(S): at 17:19

## 2018-07-06 RX ADMIN — Medication 1 APPLICATION(S): at 06:33

## 2018-07-06 RX ADMIN — NYSTATIN CREAM 1 APPLICATION(S): 100000 CREAM TOPICAL at 17:19

## 2018-07-06 NOTE — CONSULT NOTE ADULT - SUBJECTIVE AND OBJECTIVE BOX
HPI:  Patient is a pleasant 58 yo M with h/o DM, Morbid Obesity, Chronic LE venous stasis ulcers, Asthma, who presents with worsening erythema and drainage of his B/L LE Venous stasis Ulcers.. Pt was  recently admitted to the [facility]  on 6/25/18 for similar presentation but he  was noted to have had maggots on his wounds in the LLE that was removed by burn surgery.  He was seen by ID and managed inpatient with Unasyn and was d/c'ed home with VNS services on 6/26/18 and Augmentin. Patient was not aware he was supposed to take Agmentin as an outpatient and initially felt better but two days ago he noticed that the erythema was getting worse on both legs and he noticed a watery discharge from his wounds which was not present on discharge. He also states that yesterday he was in the shower when he lost his balanced and fell landing on his bottom. He denied LOC and was able to get himself back up by using the wall for support. The LE watery drainage and erythema    worsened this morning to the point where he called an ambulance. He lives at home alone and states he is unable to care for himself but VNS does come daily for wound care. He was interviewed by [facility] for possible housing but they have not gotten back to him.   Vital Signs Last 24 Hrs  T(C): 36.3 (04 Jul 2018 15:53), Max: 37.3 (04 Jul 2018 12:54)  T(F): 97.3 (04 Jul 2018 15:53), Max: 99.2 (04 Jul 2018 12:54)  HR: 96 (04 Jul 2018 15:53) (96 - 102)  BP: 137/62 (04 Jul 2018 15:53) (116/53 - 137/62)  BP(mean): --  RR: 18 (04 Jul 2018 15:53) (18 - 20)  SpO2: 99% (04 Jul 2018 15:53) (98% - 99%)  In ED: Unasyn 3gm IV x 1 (04 Jul 2018 16:50)      PAST MEDICAL & SURGICAL HISTORY:  Morbid obesity  Osteoarthritis  Diabetes  Asthma  H/O umbilical hernia repair      Hospital Course:    TODAY'S SUBJECTIVE & REVIEW OF SYMPTOMS:     Constitutional WNL   Cardio WNL   Resp WNL   GI WNL  Heme WNL  Endo WNL  Skin WNL  MSK Weakness  Neuro WNL  Cognitive WNL  Psych WNL      MEDICATIONS  (STANDING):  amoxicillin  875 milliGRAM(s)/clavulanate 1 Tablet(s) Oral two times a day  dextrose 5%. 1000 milliLiter(s) (50 mL/Hr) IV Continuous <Continuous>  dextrose 50% Injectable 12.5 Gram(s) IV Push once  dextrose 50% Injectable 25 Gram(s) IV Push once  dextrose 50% Injectable 25 Gram(s) IV Push once  enoxaparin Injectable 40 milliGRAM(s) SubCutaneous every 24 hours  furosemide   Injectable 40 milliGRAM(s) IV Push daily  insulin glargine Injectable (LANTUS) 20 Unit(s) SubCutaneous at bedtime  insulin lispro Injectable (HumaLOG) 10 Unit(s) SubCutaneous three times a day before meals  nystatin Cream 1 Application(s) Topical two times a day    MEDICATIONS  (PRN):  dextrose 40% Gel 15 Gram(s) Oral once PRN Blood Glucose LESS THAN 70 milliGRAM(s)/deciliter  glucagon  Injectable 1 milliGRAM(s) IntraMuscular once PRN Glucose LESS THAN 70 milligrams/deciliter  oxyCODONE    5 mG/acetaminophen 325 mG 1 Tablet(s) Oral every 6 hours PRN Moderate Pain (4 - 6)      FAMILY HISTORY:  Family history of obesity (Father, Sibling)      Allergies    No Known Allergies    Intolerances        SOCIAL HISTORY:    [  ] Etoh  [  ] Smoking  [  ] Substance abuse     Home Environment:  [x  ] Home Alone  [  ] Lives with Family  [  ] Home Health Aid    Dwelling:  [  ] Apartment  [ x ] Private House  [  ] Adult Home  [  ] Skilled Nursing Facility      [  ] Short Term  [  ] Long Term  [  ] Stairs       Elevator [  ]    FUNCTIONAL STATUS PTA: (Check all that apply)  Ambulation: [ x  ]Independent    [  ] Dependent     [  ] Non-Ambulatory  Assistive Device: [  ] SA Cane  [  ]  Q Cane  [x  ] Walker  [  ]  Wheelchair  ADL : [ x ] Independent  [  ]  Dependent       Vital Signs Last 24 Hrs  T(C): 36.2 (06 Jul 2018 08:00), Max: 36.4 (05 Jul 2018 16:00)  T(F): 97.2 (06 Jul 2018 08:00), Max: 97.6 (05 Jul 2018 16:00)  HR: 95 (06 Jul 2018 08:00) (87 - 95)  BP: 139/63 (06 Jul 2018 08:00) (97/54 - 139/63)  BP(mean): --  RR: 20 (06 Jul 2018 08:00) (18 - 20)  SpO2: --      PHYSICAL EXAM: Alert & Oriented X3  GENERAL: NAD, obese  HEAD:  Atraumatic, Normocephalic  CHEST/LUNG: Clear   HEART: S1S2+  ABDOMEN: Soft, Nontender  EXTREMITIES:  no calf tenderness    NERVOUS SYSTEM:  Cranial Nerves 2-12 intact [  ] Abnormal  [  ]  ROM: WFL all extremities [ x ]  Abnormal [  ]  Motor Strength: WFL all extremities  [  ]  Abnormal [ x ]4-5/5 all ext  Sensation: intact to light touch [x  ] Abnormal [  ]  Reflexes: Symmetric [  ]  Abnormal [  ]    FUNCTIONAL STATUS:  Bed Mobility: Independent [  ]  Supervision [  ]  Needs Assistance [ x ]  N/A [  ]  Transfers: Independent [  ]  Supervision [  ]  Needs Assistance [x  ]  N/A [  ]   Ambulation: Independent [  ]  Supervision [  ]  Needs Assistance [x  ]  N/A [  ]  ADL: Independent [  ] Requires Assistance [  ] N/A [  ]      LABS:                        11.2   6.94  )-----------( 305      ( 06 Jul 2018 05:36 )             35.9     07-06    142  |  101  |  11  ----------------------------<  141<H>  3.7   |  27  |  0.7    Ca    8.6      06 Jul 2018 05:36  Mg     1.9     07-06    TPro  5.8<L>  /  Alb  3.2<L>  /  TBili  0.2  /  DBili  x   /  AST  18  /  ALT  23  /  AlkPhos  89  07-04          RADIOLOGY & ADDITIONAL STUDIES:    Assesment:

## 2018-07-06 NOTE — PROGRESS NOTE ADULT - SUBJECTIVE AND OBJECTIVE BOX
Patient is a 57y old  Male who presents with a chief complaint of Worsening drainage for B/L LE Venous stasis (04 Jul 2018 16:50)    HPI:  Patient is a pleasant 56 yo M with h/o DM, Morbid Obesity, Chronic LE venous stasis ulcers, Asthma, who presents with worsening erythema and drainage of his B/L LE Venous stasis Ulcers.. Pt was  recently admitted to the [facility]  on 6/25/18 for similar presentation but he  was noted to have had maggots on his wounds in the LLE that was removed by burn surgery.  He was seen by ID and managed inpatient with Unasyn and was d/c'ed home with VNS services on 6/26/18 and Augmentin. Patient was not aware he was supposed to take Agmentin as an outpatient and initially felt better but two days ago he noticed that the erythema was getting worse on both legs and he noticed a watery discharge from his wounds which was not present on discharge. He also states that yesterday he was in the shower when he lost his balanced and fell landing on his bottom. He denied LOC and was able to get himself back up by using the wall for support. The LE watery drainage and erythema    worsened this morning to the point where he called an ambulance. He lives at home alone and states he is unable to care for himself but VNS does come daily for wound care. He was interviewed by [facility] for possible housing but they have not gotten back to him.       PAST MEDICAL & SURGICAL HISTORY:  Morbid obesity  Osteoarthritis  Diabetes  Asthma  H/O umbilical hernia repair    patient seen and examined independently on morning rounds, chart reviewed and discussed with the medicine resident and on interdisciplinary rounds    no overnight events    Vital Signs Last 24 Hrs  T(C): 37.6 (06 Jul 2018 15:25), Max: 37.6 (06 Jul 2018 15:25)  T(F): 99.7 (06 Jul 2018 15:25), Max: 99.7 (06 Jul 2018 15:25)  HR: 85 (06 Jul 2018 15:25) (85 - 95)  BP: 119/65 (06 Jul 2018 15:25) (97/54 - 139/63)  BP(mean): --  RR: 18 (06 Jul 2018 15:25) (18 - 20)  SpO2: --             GEN-NAD, AAOx3- OOB to chair  HEENT- NCAT, PERRLA, EOMI  NECK- supple no jvd, no lymphadenopathy  PULM- Clear to auscultation bilaterally, fair air entry  CVS- +s1/s2 RRR no murmurs  GI- soft obese, NT ND +bs  EXT- 2+ edema bilateral leg bilateral foot dressings c/d/i  NEURO- nonfocal but unable to assess gait               11.2   6.94  )-----------( 305      ( 06 Jul 2018 05:36 )             35.9     07-06    142  |  101  |  11  ----------------------------<  141<H>  3.7   |  27  |  0.7    Ca    8.6      06 Jul 2018 05:36  Mg     1.9     07-06              Culture - Blood (collected 04 Jul 2018 21:29)  Source: .Blood None  Gram Stain (05 Jul 2018 19:38):    Growth in aerobic bottle: Gram Positive Cocci in Clusters  Preliminary Report (05 Jul 2018 19:38):    Growth in aerobic bottle: Gram Positive Cocci in Clusters    "Due to technical problems, Proteus sp. will Not be reported as part of    the BCID panel until further notice"    ***Blood Panel PCR results on this specimen are available    approximately 3 hours after the Gram stain result.***    Gram stain, PCR, and/or culture results may not always    correspond due to difference in methodologies.    ************************************************************    This PCR assay was performed using [employer].    The following targets are tested for: Enterococcus,    vancomycin resistant enterococci, Listeria monocytogenes,    coagulase negative staphylococci, S. aureus,    methicillin resistant S. aureus, Streptococcus agalactiae    (Group B), S. pneumoniae, S.pyogenes (Group A),    Acinetobacter baumannii, Enterobacter cloacae, E. coli,    Klebsiella oxytoca, K. pneumoniae, Proteus sp.,    Serratia marcescens, Haemophilus influenzae,    Neisseria meningitidis, Pseudomonas aeruginosa, Candida    albicans, C. glabrata, C krusei, C parapsilosis,    C. tropicalis and the KPC resistance gene.  Organism: Blood Culture PCR (05 Jul 2018 22:24)  Organism: Blood Culture PCR (05 Jul 2018 22:24)    Culture - Blood (collected 04 Jul 2018 13:55)  Source: .Blood Blood  Preliminary Report (05 Jul 2018 23:01):    No growth to date.        MEDICATIONS  (STANDING):  amoxicillin  875 milliGRAM(s)/clavulanate 1 Tablet(s) Oral two times a day  dextrose 5%. 1000 milliLiter(s) (50 mL/Hr) IV Continuous <Continuous>  dextrose 50% Injectable 12.5 Gram(s) IV Push once  dextrose 50% Injectable 25 Gram(s) IV Push once  dextrose 50% Injectable 25 Gram(s) IV Push once  enoxaparin Injectable 40 milliGRAM(s) SubCutaneous every 24 hours  furosemide   Injectable 40 milliGRAM(s) IV Push daily  insulin glargine Injectable (LANTUS) 20 Unit(s) SubCutaneous at bedtime  insulin lispro Injectable (HumaLOG) 10 Unit(s) SubCutaneous three times a day before meals  nystatin Cream 1 Application(s) Topical two times a day        labs/radiology reviewed--wbc 7.7, lactate 1.8

## 2018-07-06 NOTE — PROGRESS NOTE ADULT - ASSESSMENT
58 yo M with DM, morbid obesity, chronic venous stasis, abd hernia repair with mesh, recently admitted for LLE cellulitis found to have maggots, transitioned from unasyn to augmentin to complete the course, presenting with worsening LLE erythema and drainage in the setting on not taking augmentin at home    Problems  Bcx coNS, likely contaminant  Chronic venous stasis ulcers with superficial cellulitis  IDDM  Morbid Obesity    - Repeat bcx given coNS   - Continue augmentin PO x 7 days  - Local wound care and leg elevated  - CHD 4% wash daily and PRN 56 yo M with DM, morbid obesity, chronic venous stasis, abd hernia repair with mesh, recently admitted for LLE cellulitis found to have maggots, transitioned from unasyn to augmentin to complete the course, presenting with worsening LLE erythema and drainage in the setting on not taking augmentin at home    Problems  Bcx coNS, likely contaminant  Chronic venous stasis ulcers with superficial cellulitis  IDDM  Morbid Obesity    - Repeat bcx given coNS   - Continue augmentin PO x 7 days, f/u with PCP and if continued purulent drainage, can complete a 14d course  - Local wound care and leg elevated  - CHD 4% wash daily and PRN

## 2018-07-06 NOTE — PROGRESS NOTE ADULT - ASSESSMENT
a/p:  #bilateral chronic venous stasis ulcers (h/o prior infection- noncompliant with ouatpeitn oral abx)  -local wound care--f/u with burn team  -started on iv unasyn in ED---now on oral augmentin  -f/u bcx (prelim gram + cocci)  -monitow wbc/fever curve  -PT eval---dispo planning---SW involvement for possible placement in [facility] vs [facility]  -also spoke with brother Edmond #[phone] who informed me at end of july house patient has been living in is being sold and he will need alternative living arrangements  -pain control    #DM- hba1c 8.0  continue insulin and monitor fs    DVT/GI ppx    full code

## 2018-07-06 NOTE — PROGRESS NOTE ADULT - SUBJECTIVE AND OBJECTIVE BOX
SUBJECTIVE:    Patient is a 57y old Male who presents with a chief complaint of worsening erythema and drainage of B/L LE Venous stasis (04 Jul 2018 16:50)    Currently admitted to medicine with the primary diagnosis of chronic bilateral venous stasis ulcers      Today is hospital day 2d. The patient repeatedly states that he does not wish to return home and would prefer to reside at [facility]. His legs are not painful at this time and he is walking to/from the bathroom without assistance. Dressing changes are being performed per burn and ID recommendations twice a day. He reports a few episodes of loose watery stools (on Augmentin, recently tested for C. difficile: negative, will continue to monitor).     PAST MEDICAL & SURGICAL HISTORY  Morbid obesity  Osteoarthritis  Diabetes  Asthma  H/O umbilical hernia repair    SOCIAL HISTORY:  Negative for smoking/alcohol/drug use.     ALLERGIES:  No Known Allergies    MEDICATIONS:  STANDING MEDICATIONS  amoxicillin  875 milliGRAM(s)/clavulanate 1 Tablet(s) Oral two times a day  dextrose 5%. 1000 milliLiter(s) IV Continuous <Continuous>  dextrose 50% Injectable 12.5 Gram(s) IV Push once  dextrose 50% Injectable 25 Gram(s) IV Push once  dextrose 50% Injectable 25 Gram(s) IV Push once  enoxaparin Injectable 40 milliGRAM(s) SubCutaneous every 24 hours  furosemide   Injectable 40 milliGRAM(s) IV Push daily  insulin glargine Injectable (LANTUS) 20 Unit(s) SubCutaneous at bedtime  insulin lispro Injectable (HumaLOG) 10 Unit(s) SubCutaneous three times a day before meals    PRN MEDICATIONS  dextrose 40% Gel 15 Gram(s) Oral once PRN  glucagon  Injectable 1 milliGRAM(s) IntraMuscular once PRN  oxyCODONE    5 mG/acetaminophen 325 mG 1 Tablet(s) Oral every 6 hours PRN    VITALS:   T(F): 97.2  HR: 95  BP: 139/63  RR: 20  SpO2: --    LABS:                        11.2   6.94  )-----------( 305      ( 06 Jul 2018 05:36 )             35.9     07-06    142  |  101  |  11  ----------------------------<  141<H>  3.7   |  27  |  0.7    Ca    8.6      06 Jul 2018 05:36  Mg     1.9     07-06    TPro  5.8<L>  /  Alb  3.2<L>  /  TBili  0.2  /  DBili  x   /  AST  18  /  ALT  23  /  AlkPhos  89  07-04    Culture - Blood (collected 04 Jul 2018 21:29)  Source: .Blood None  Gram Stain (05 Jul 2018 19:38):    Growth in aerobic bottle: Gram Positive Cocci in Clusters  Preliminary Report (05 Jul 2018 19:38):    Growth in aerobic bottle: Gram Positive Cocci in Clusters    "Due to technical problems, Proteus sp. will Not be reported as part of    the BCID panel until further notice"    ***Blood Panel PCR results on this specimen are available    approximately 3 hours after the Gram stain result.***    Gram stain, PCR, and/or culture results may not always    correspond due to difference in methodologies.    ************************************************************    This PCR assay was performed using [employer].    The following targets are tested for: Enterococcus,    vancomycin resistant enterococci, Listeria monocytogenes,    coagulase negative staphylococci, S. aureus,    methicillin resistant S. aureus, Streptococcus agalactiae    (Group B), S. pneumoniae, S.pyogenes (Group A),    Acinetobacter baumannii, Enterobacter cloacae, E. coli,    Klebsiella oxytoca, K. pneumoniae, Proteus sp.,    Serratia marcescens, Haemophilus influenzae,    Neisseria meningitidis, Pseudomonas aeruginosa, Candida    albicans, C. glabrata, C krusei, C parapsilosis,    C. tropicalis and the KPC resistance gene.  Organism: Blood Culture PCR (05 Jul 2018 22:24)  Organism: Blood Culture PCR (05 Jul 2018 22:24)    Culture - Blood (collected 04 Jul 2018 13:55)  Source: .Blood Blood  Preliminary Report (05 Jul 2018 23:01):    No growth to date.    RADIOLOGY:  No new imaging studies today     PHYSICAL EXAM:  GEN: No acute distress, obesity   LUNGS: Clear to auscultation bilaterally   HEART: S1/S2 present. RRR. Distant heart sounds.   ABD: Soft, non-tender, non-distended. Bowel sounds present  EXT: Wound dressings on bilateral lower extremities for chronic venous stasis/ulcers, edema at baseline per patient   NEURO: AAOX3

## 2018-07-06 NOTE — PROVIDER CONTACT NOTE (OTHER) - SITUATION
pt incontinent at times. incontinent care provided. pt seen to have large area rash to buttock sacrum and back and posterior thigh

## 2018-07-06 NOTE — PROGRESS NOTE ADULT - SUBJECTIVE AND OBJECTIVE BOX
EVIN, FABIAN  57y, Male      OVERNIGHT EVENTS:  afebrile  switched to PO augmentin  seen by wound consult  7/4 bcx x1 with CoNS    VITALS:  T(F): 95.9, Max: 97.6 (07-05-18 @ 16:00)  HR: 87  BP: 97/54  RR: 18Vital Signs Last 24 Hrs  T(C): 35.5 (05 Jul 2018 23:54), Max: 36.4 (05 Jul 2018 16:00)  T(F): 95.9 (05 Jul 2018 23:54), Max: 97.6 (05 Jul 2018 16:00)  HR: 87 (05 Jul 2018 23:54) (87 - 94)  BP: 97/54 (05 Jul 2018 23:54) (97/54 - 130/67)  BP(mean): --  RR: 18 (05 Jul 2018 23:54) (18 - 19)  SpO2: --    PHYSICAL EXAM  General: NAD, resting in the bed  HEENT: NCAT. EOMI. MMM  Neck: Supple, no cervical LAD  CV: RRR, no murmurs  Resp: CTAB, no w/r/r  Abd: Soft. NTND  Extr: wwp. no edema  Neuro: No focal deficits  Skin: no rash  Lines: no erythema  b/l LE edema LLE>RLE, LLE with increased warmth, erythema, and open wounds with yellowish purulence on the dressings. Abd exam obese, soft, mildly tender on the epigastric area, no rebound or guarding    TESTS & MEASUREMENTS:                        11.2   6.36  )-----------( 276      ( 05 Jul 2018 05:09 )             35.6     07-05    140  |  101  |  11  ----------------------------<  171<H>  3.6   |  25  |  0.7    Ca    8.3<L>      05 Jul 2018 17:40  Mg     1.8     07-05    TPro  5.8<L>  /  Alb  3.2<L>  /  TBili  0.2  /  DBili  x   /  AST  18  /  ALT  23  /  AlkPhos  89  07-04    LIVER FUNCTIONS - ( 04 Jul 2018 14:23 )  Alb: 3.2 g/dL / Pro: 5.8 g/dL / ALK PHOS: 89 U/L / ALT: 23 U/L / AST: 18 U/L / GGT: x             Culture - Blood (collected 07-04-18 @ 21:29)  Source: .Blood None  Gram Stain (07-05-18 @ 19:38):    Growth in aerobic bottle: Gram Positive Cocci in Clusters  Preliminary Report (07-05-18 @ 19:38):    Growth in aerobic bottle: Gram Positive Cocci in Clusters    "Due to technical problems, Proteus sp. will Not be reported as part of    the BCID panel until further notice"    ***Blood Panel PCR results on this specimen are available    approximately 3 hours after the Gram stain result.***    Gram stain, PCR, and/or culture results may not always    correspond due to difference in methodologies.    ************************************************************    This PCR assay was performed using [employer].    The following targets are tested for: Enterococcus,    vancomycin resistant enterococci, Listeria monocytogenes,    coagulase negative staphylococci, S. aureus,    methicillin resistant S. aureus, Streptococcus agalactiae    (Group B), S. pneumoniae, S.pyogenes (Group A),    Acinetobacter baumannii, Enterobacter cloacae, E. coli,    Klebsiella oxytoca, K. pneumoniae, Proteus sp.,    Serratia marcescens, Haemophilus influenzae,    Neisseria meningitidis, Pseudomonas aeruginosa, Candida    albicans, C. glabrata, C krusei, C parapsilosis,    C. tropicalis and the KPC resistance gene.  Organism: Blood Culture PCR (07-05-18 @ 22:24)  Organism: Blood Culture PCR (07-05-18 @ 22:24)      -  Coagulase negative Staphylococcus: Detec      Method Type: PCR    Culture - Blood (collected 07-04-18 @ 13:55)  Source: .Blood Blood  Preliminary Report (07-05-18 @ 23:01):    No growth to date.          RADIOLOGY & ADDITIONAL TESTS:    ANTIBIOTICS:  amoxicillin  875 milliGRAM(s)/clavulanate 1 Tablet(s) Oral two times a day

## 2018-07-07 LAB
ANION GAP SERPL CALC-SCNC: 15 MMOL/L — HIGH (ref 7–14)
BASOPHILS # BLD AUTO: 0.02 K/UL — SIGNIFICANT CHANGE UP (ref 0–0.2)
BASOPHILS NFR BLD AUTO: 0.3 % — SIGNIFICANT CHANGE UP (ref 0–1)
BUN SERPL-MCNC: 13 MG/DL — SIGNIFICANT CHANGE UP (ref 10–20)
CALCIUM SERPL-MCNC: 8.5 MG/DL — SIGNIFICANT CHANGE UP (ref 8.5–10.1)
CHLORIDE SERPL-SCNC: 96 MMOL/L — LOW (ref 98–110)
CO2 SERPL-SCNC: 29 MMOL/L — SIGNIFICANT CHANGE UP (ref 17–32)
CREAT SERPL-MCNC: 0.9 MG/DL — SIGNIFICANT CHANGE UP (ref 0.7–1.5)
CULTURE RESULTS: SIGNIFICANT CHANGE UP
EOSINOPHIL # BLD AUTO: 0.17 K/UL — SIGNIFICANT CHANGE UP (ref 0–0.7)
EOSINOPHIL NFR BLD AUTO: 2.4 % — SIGNIFICANT CHANGE UP (ref 0–8)
GLUCOSE SERPL-MCNC: 151 MG/DL — HIGH (ref 70–99)
GRAM STN FLD: SIGNIFICANT CHANGE UP
HCT VFR BLD CALC: 39.6 % — LOW (ref 42–52)
HGB BLD-MCNC: 12.3 G/DL — LOW (ref 14–18)
IMM GRANULOCYTES NFR BLD AUTO: 0.3 % — SIGNIFICANT CHANGE UP (ref 0.1–0.3)
LYMPHOCYTES # BLD AUTO: 1.39 K/UL — SIGNIFICANT CHANGE UP (ref 1.2–3.4)
LYMPHOCYTES # BLD AUTO: 20 % — LOW (ref 20.5–51.1)
MAGNESIUM SERPL-MCNC: 1.9 MG/DL — SIGNIFICANT CHANGE UP (ref 1.8–2.4)
MCHC RBC-ENTMCNC: 25.4 PG — LOW (ref 27–31)
MCHC RBC-ENTMCNC: 31.1 G/DL — LOW (ref 32–37)
MCV RBC AUTO: 81.8 FL — SIGNIFICANT CHANGE UP (ref 80–94)
MONOCYTES # BLD AUTO: 0.32 K/UL — SIGNIFICANT CHANGE UP (ref 0.1–0.6)
MONOCYTES NFR BLD AUTO: 4.6 % — SIGNIFICANT CHANGE UP (ref 1.7–9.3)
NEUTROPHILS # BLD AUTO: 5.02 K/UL — SIGNIFICANT CHANGE UP (ref 1.4–6.5)
NEUTROPHILS NFR BLD AUTO: 72.4 % — SIGNIFICANT CHANGE UP (ref 42.2–75.2)
NRBC # BLD: 0 /100 WBCS — SIGNIFICANT CHANGE UP (ref 0–0)
PLATELET # BLD AUTO: 371 K/UL — SIGNIFICANT CHANGE UP (ref 130–400)
POTASSIUM SERPL-MCNC: 3.9 MMOL/L — SIGNIFICANT CHANGE UP (ref 3.5–5)
POTASSIUM SERPL-SCNC: 3.9 MMOL/L — SIGNIFICANT CHANGE UP (ref 3.5–5)
RBC # BLD: 4.84 M/UL — SIGNIFICANT CHANGE UP (ref 4.7–6.1)
RBC # FLD: 15 % — HIGH (ref 11.5–14.5)
SODIUM SERPL-SCNC: 140 MMOL/L — SIGNIFICANT CHANGE UP (ref 135–146)
WBC # BLD: 6.94 K/UL — SIGNIFICANT CHANGE UP (ref 4.8–10.8)
WBC # FLD AUTO: 6.94 K/UL — SIGNIFICANT CHANGE UP (ref 4.8–10.8)

## 2018-07-07 RX ADMIN — NYSTATIN CREAM 1 APPLICATION(S): 100000 CREAM TOPICAL at 05:44

## 2018-07-07 RX ADMIN — INSULIN GLARGINE 20 UNIT(S): 100 INJECTION, SOLUTION SUBCUTANEOUS at 21:21

## 2018-07-07 RX ADMIN — Medication 10 UNIT(S): at 08:36

## 2018-07-07 RX ADMIN — Medication 40 MILLIGRAM(S): at 05:46

## 2018-07-07 RX ADMIN — Medication 10 UNIT(S): at 12:27

## 2018-07-07 RX ADMIN — Medication 10 UNIT(S): at 17:25

## 2018-07-07 RX ADMIN — ENOXAPARIN SODIUM 40 MILLIGRAM(S): 100 INJECTION SUBCUTANEOUS at 05:46

## 2018-07-07 RX ADMIN — Medication 1 TABLET(S): at 17:25

## 2018-07-07 RX ADMIN — NYSTATIN CREAM 1 APPLICATION(S): 100000 CREAM TOPICAL at 17:26

## 2018-07-07 RX ADMIN — Medication 1 TABLET(S): at 05:46

## 2018-07-07 NOTE — PROGRESS NOTE ADULT - SUBJECTIVE AND OBJECTIVE BOX
Patient is a 57y old  Male who presents with a chief complaint of Worsening drainage for B/L LE Venous stasis (04 Jul 2018 16:50)    HPI:  Patient is a pleasant 58 yo M with h/o DM, Morbid Obesity, Chronic LE venous stasis ulcers, Asthma, who presents with worsening erythema and drainage of his B/L LE Venous stasis Ulcers.. Pt was  recently admitted to the [facility]  on 6/25/18 for similar presentation but he  was noted to have had maggots on his wounds in the LLE that was removed by burn surgery.  He was seen by ID and managed inpatient with Unasyn and was d/c'ed home with VNS services on 6/26/18 and Augmentin. Patient was not aware he was supposed to take Agmentin as an outpatient and initially felt better but two days ago he noticed that the erythema was getting worse on both legs and he noticed a watery discharge from his wounds which was not present on discharge. He also states that yesterday he was in the shower when he lost his balanced and fell landing on his bottom. He denied LOC and was able to get himself back up by using the wall for support. The LE watery drainage and erythema    worsened this morning to the point where he called an ambulance. He lives at home alone and states he is unable to care for himself but VNS does come daily for wound care. He was interviewed by [facility] for possible housing but they have not gotten back to him.       PAST MEDICAL & SURGICAL HISTORY:  Morbid obesity  Osteoarthritis  Diabetes  Asthma  H/O umbilical hernia repair    patient seen and examined independently on morning rounds, chart reviewed and discussed with the medicine resident and on interdisciplinary rounds    no overnight events               GEN-NAD, AAOx3- OOB to chair  HEENT- NCAT, PERRLA, EOMI  NECK- supple no jvd, no lymphadenopathy  PULM- Clear to auscultation bilaterally, fair air entry  CVS- +s1/s2 RRR no murmurs  GI- soft obese, NT ND +bs  EXT- 2+ edema bilateral leg bilateral foot dressings c/d/i  NEURO- nonfocal           MEDICATIONS  (STANDING):  amoxicillin  875 milliGRAM(s)/clavulanate 1 Tablet(s) Oral two times a day  dextrose 5%. 1000 milliLiter(s) (50 mL/Hr) IV Continuous <Continuous>  dextrose 50% Injectable 12.5 Gram(s) IV Push once  dextrose 50% Injectable 25 Gram(s) IV Push once  dextrose 50% Injectable 25 Gram(s) IV Push once  enoxaparin Injectable 40 milliGRAM(s) SubCutaneous every 24 hours  furosemide   Injectable 40 milliGRAM(s) IV Push daily  insulin glargine Injectable (LANTUS) 20 Unit(s) SubCutaneous at bedtime  insulin lispro Injectable (HumaLOG) 10 Unit(s) SubCutaneous three times a day before meals  nystatin Cream 1 Application(s) Topical two times a day        labs/radiology reviewed--wbc 7.7, lactate 1.8 Patient is a 57y old  Male who presents with a chief complaint of Worsening drainage for B/L LE Venous stasis (04 Jul 2018 16:50)    HPI:  Patient is a pleasant 56 yo M with h/o DM, Morbid Obesity, Chronic LE venous stasis ulcers, Asthma, who presents with worsening erythema and drainage of his B/L LE Venous stasis Ulcers.. Pt was  recently admitted to the [facility]  on 6/25/18 for similar presentation but he  was noted to have had maggots on his wounds in the LLE that was removed by burn surgery.  He was seen by ID and managed inpatient with Unasyn and was d/c'ed home with VNS services on 6/26/18 and Augmentin. Patient was not aware he was supposed to take Agmentin as an outpatient and initially felt better but two days ago he noticed that the erythema was getting worse on both legs and he noticed a watery discharge from his wounds which was not present on discharge. He also states that yesterday he was in the shower when he lost his balanced and fell landing on his bottom. He denied LOC and was able to get himself back up by using the wall for support. The LE watery drainage and erythema    worsened this morning to the point where he called an ambulance. He lives at home alone and states he is unable to care for himself but VNS does come daily for wound care. He was interviewed by [facility] for possible housing but they have not gotten back to him.       PAST MEDICAL & SURGICAL HISTORY:  Morbid obesity  Osteoarthritis  Diabetes  Asthma  H/O umbilical hernia repair    patient seen and examined independently on morning rounds, chart reviewed and discussed with the medicine resident and on interdisciplinary rounds    no overnight events               GEN-NAD, AAOx3- OOB to chair  HEENT- NCAT, PERRLA, EOMI  NECK- supple no jvd, no lymphadenopathy  PULM- Clear to auscultation bilaterally, fair air entry  CVS- +s1/s2 RRR no murmurs  GI- soft obese, NT ND +bs  EXT- 2+ edema bilateral leg bilateral foot dressings c/d/i  NEURO- nonfocal       Labs:                        12.3   6.94  )-----------( 371      ( 07 Jul 2018 06:18 )             39.6     CBC Full  -  ( 07 Jul 2018 06:18 )  WBC Count : 6.94 K/uL  Hemoglobin : 12.3 g/dL  Hematocrit : 39.6 %  Platelet Count - Automated : 371 K/uL  Mean Cell Volume : 81.8 fL  Mean Cell Hemoglobin : 25.4 pg  Mean Cell Hemoglobin Concentration : 31.1 g/dL  Auto Neutrophil # : 5.02 K/uL  Auto Lymphocyte # : 1.39 K/uL  Auto Monocyte # : 0.32 K/uL  Auto Eosinophil # : 0.17 K/uL  Auto Basophil # : 0.02 K/uL  Auto Neutrophil % : 72.4 %  Auto Lymphocyte % : 20.0 %  Auto Monocyte % : 4.6 %  Auto Eosinophil % : 2.4 %  Auto Basophil % : 0.3 %      07-07    140  |  96<L>  |  13  ----------------------------<  151<H>  3.9   |  29  |  0.9    Ca    8.5      07 Jul 2018 06:18  Mg     1.9     07-07        Microbiology:    Culture - Blood (collected 04 Jul 2018 21:29)  Source: .Blood None  Gram Stain (05 Jul 2018 19:38):    Growth in aerobic bottle: Gram Positive Cocci in Clusters  Final Report (06 Jul 2018 17:57):    Growth in aerobic bottle: Coag Negative Staphylococcus    Single set isolate, possible contaminant. Contact    Microbiology if susceptibility testing clinically    indicated.    "Due to technical problems, Proteus sp. will Not be reported as part of    the BCID panel until further notice"    ***Blood Panel PCR results on this specimen are available    approximately 3 hours after the Gram stain result.***    Gram stain, PCR, and/or culture results may not always    correspond due to difference in methodologies.    ************************************************************    This PCR assay was performed using [employer]    The following targets are tested for: Enterococcus,    vancomycin resistant enterococci, Listeria monocytogenes,    coagulase negative staphylococci, S. aureus,    methicillin resistant S. aureus, Streptococcus agalactiae    (Group B), S. pneumoniae, S. pyogenes (Group A),    Acinetobacter baumannii, Enterobacter cloacae, E. coli,    Klebsiella oxytoca, K. pneumoniae, Proteus sp.,    Serratia marcescens, Haemophilus influenzae,    Neisseria meningitidis, Pseudomonas aeruginosa, Candida    albicans, C. glabrata, C krusei, C parapsilosis,    C. tropicalis and the KPC resistance gene.  Organism: Blood Culture PCR (06 Jul 2018 17:57)  Organism: Blood Culture PCR (06 Jul 2018 17:57)        Vital Signs Last 24 Hrs  T(C): 36.7 (07 Jul 2018 07:40), Max: 37.6 (06 Jul 2018 15:25)  T(F): 98.1 (07 Jul 2018 07:40), Max: 99.7 (06 Jul 2018 15:25)  HR: 87 (07 Jul 2018 07:40) (85 - 91)  BP: 178/74 (07 Jul 2018 07:40) (102/59 - 178/74)  BP(mean): --  RR: 18 (07 Jul 2018 07:40) (18 - 20)  SpO2: --    I&O's Summary    06 Jul 2018 07:01  -  07 Jul 2018 07:00  --------------------------------------------------------  IN: 480 mL / OUT: 5 mL / NET: 475 mL    07 Jul 2018 07:01  -  07 Jul 2018 14:27  --------------------------------------------------------  IN: 960 mL / OUT: 700 mL / NET: 260 mL            MEDICATIONS  (STANDING):  amoxicillin  875 milliGRAM(s)/clavulanate 1 Tablet(s) Oral two times a day  dextrose 5%. 1000 milliLiter(s) (50 mL/Hr) IV Continuous <Continuous>  dextrose 50% Injectable 12.5 Gram(s) IV Push once  dextrose 50% Injectable 25 Gram(s) IV Push once  dextrose 50% Injectable 25 Gram(s) IV Push once  enoxaparin Injectable 40 milliGRAM(s) SubCutaneous every 24 hours  furosemide   Injectable 40 milliGRAM(s) IV Push daily  insulin glargine Injectable (LANTUS) 20 Unit(s) SubCutaneous at bedtime  insulin lispro Injectable (HumaLOG) 10 Unit(s) SubCutaneous three times a day before meals  nystatin Cream 1 Application(s) Topical two times a day        labs/radiology reviewed--wbc 7.7, lactate 1.8

## 2018-07-07 NOTE — PROGRESS NOTE ADULT - ASSESSMENT
a/p:  #bilateral chronic venous stasis ulcers (h/o prior infection- noncompliant with ouatpeitn oral abx)  -local wound care--f/u with burn team  -started on iv unasyn in ED---now on oral augmentin  -f/u bcx (initial bcx neg and repeat on 7/5 coag neg staph ? contaminate)---repeat bcx today  -monitow wbc/fever curve  -PT eval---dispo planning---SW involvement for possible placement in [facility] vs [facility]  -spoke with brother Edmond #[phone] who informed me at end of july house patient has been living in is being sold and he will need alternative living arrangements  -pain control    #DM- hba1c 8.0  continue insulin and monitor fs    DVT/GI ppx    full code    dispo planning---needs placement ([facility] vs SNF as he is unable to care for self alone)

## 2018-07-08 LAB — CULTURE RESULTS: SIGNIFICANT CHANGE UP

## 2018-07-08 RX ADMIN — Medication 1 TABLET(S): at 06:04

## 2018-07-08 RX ADMIN — Medication 40 MILLIGRAM(S): at 06:04

## 2018-07-08 RX ADMIN — Medication 1 TABLET(S): at 17:18

## 2018-07-08 RX ADMIN — Medication 10 UNIT(S): at 12:11

## 2018-07-08 RX ADMIN — NYSTATIN CREAM 1 APPLICATION(S): 100000 CREAM TOPICAL at 17:18

## 2018-07-08 RX ADMIN — ENOXAPARIN SODIUM 40 MILLIGRAM(S): 100 INJECTION SUBCUTANEOUS at 06:03

## 2018-07-08 RX ADMIN — Medication 10 UNIT(S): at 17:18

## 2018-07-08 RX ADMIN — NYSTATIN CREAM 1 APPLICATION(S): 100000 CREAM TOPICAL at 06:04

## 2018-07-08 RX ADMIN — INSULIN GLARGINE 20 UNIT(S): 100 INJECTION, SOLUTION SUBCUTANEOUS at 21:10

## 2018-07-08 RX ADMIN — Medication 10 UNIT(S): at 08:13

## 2018-07-08 NOTE — PROGRESS NOTE ADULT - SUBJECTIVE AND OBJECTIVE BOX
Patient is a 57y old  Male who presents with a chief complaint of Worsening drainage for B/L LE Venous stasis (04 Jul 2018 16:50)    HPI:  Patient is a pleasant 56 yo M with h/o DM, Morbid Obesity, Chronic LE venous stasis ulcers, Asthma, who presents with worsening erythema and drainage of his B/L LE Venous stasis Ulcers.. Pt was  recently admitted to the [facility]  on 6/25/18 for similar presentation but he  was noted to have had maggots on his wounds in the LLE that was removed by burn surgery.  He was seen by ID and managed inpatient with Unasyn and was d/c'ed home with VNS services on 6/26/18 and Augmentin. Patient was not aware he was supposed to take Agmentin as an outpatient and initially felt better but two days ago he noticed that the erythema was getting worse on both legs and he noticed a watery discharge from his wounds which was not present on discharge. He also states that yesterday he was in the shower when he lost his balanced and fell landing on his bottom. He denied LOC and was able to get himself back up by using the wall for support. The LE watery drainage and erythema    worsened this morning to the point where he called an ambulance. He lives at home alone and states he is unable to care for himself but VNS does come daily for wound care. He was interviewed by [facility] for possible housing but they have not gotten back to him.       PAST MEDICAL & SURGICAL HISTORY:  Morbid obesity  Osteoarthritis  Diabetes  Asthma  H/O umbilical hernia repair    patient seen and examined independently on morning rounds, chart reviewed and discussed with the medicine resident and on interdisciplinary rounds    no overnight events               GEN-NAD, AAOx3- OOB to chair  HEENT- NCAT, PERRLA, EOMI  NECK- supple no jvd, no lymphadenopathy  PULM- Clear to auscultation bilaterally, fair air entry  CVS- +s1/s2 RRR no murmurs  GI- soft obese, NT ND +bs  EXT- 2+ edema bilateral leg bilateral foot dressings c/d/i  NEURO- nonfocal       Labs:                        12.3   6.94  )-----------( 371      ( 07 Jul 2018 06:18 )             39.6     CBC Full  -  ( 07 Jul 2018 06:18 )  WBC Count : 6.94 K/uL  Hemoglobin : 12.3 g/dL  Hematocrit : 39.6 %  Platelet Count - Automated : 371 K/uL  Mean Cell Volume : 81.8 fL  Mean Cell Hemoglobin : 25.4 pg  Mean Cell Hemoglobin Concentration : 31.1 g/dL  Auto Neutrophil # : 5.02 K/uL  Auto Lymphocyte # : 1.39 K/uL  Auto Monocyte # : 0.32 K/uL  Auto Eosinophil # : 0.17 K/uL  Auto Basophil # : 0.02 K/uL  Auto Neutrophil % : 72.4 %  Auto Lymphocyte % : 20.0 %  Auto Monocyte % : 4.6 %  Auto Eosinophil % : 2.4 %  Auto Basophil % : 0.3 %      07-07    140  |  96<L>  |  13  ----------------------------<  151<H>  3.9   |  29  |  0.9    Ca    8.5      07 Jul 2018 06:18  Mg     1.9     07-07        Microbiology:    Culture - Blood (collected 04 Jul 2018 21:29)  Source: .Blood None  Gram Stain (05 Jul 2018 19:38):    Growth in aerobic bottle: Gram Positive Cocci in Clusters  Final Report (06 Jul 2018 17:57):    Growth in aerobic bottle: Coag Negative Staphylococcus    Single set isolate, possible contaminant. Contact    Microbiology if susceptibility testing clinically    indicated.    "Due to technical problems, Proteus sp. will Not be reported as part of    the BCID panel until further notice"    ***Blood Panel PCR results on this specimen are available    approximately 3 hours after the Gram stain result.***    Gram stain, PCR, and/or culture results may not always    correspond due to difference in methodologies.    ************************************************************    This PCR assay was performed using [employer].    The following targets are tested for: Enterococcus,    vancomycin resistant enterococci, Listeria monocytogenes,    coagulase negative staphylococci, S. aureus,    methicillin resistant S. aureus, Streptococcus agalactiae    (Group B), S. pneumoniae, S. pyogenes (Group A),    Acinetobacter baumannii, Enterobacter cloacae, E. coli,    Klebsiella oxytoca, K. pneumoniae, Proteus sp.,    Serratia marcescens, Haemophilus influenzae,    Neisseria meningitidis, Pseudomonas aeruginosa, Candida    albicans, C. glabrata, C krusei, C parapsilosis,    C. tropicalis and the KPC resistance gene.  Organism: Blood Culture PCR (06 Jul 2018 17:57)  Organism: Blood Culture PCR (06 Jul 2018 17:57)      Vital Signs Last 24 Hrs  T(C): 36.7 (08 Jul 2018 07:40), Max: 36.8 (08 Jul 2018 00:00)  T(F): 98 (08 Jul 2018 07:40), Max: 98.2 (08 Jul 2018 00:00)  HR: 83 (08 Jul 2018 07:40) (83 - 91)  BP: 134/61 (08 Jul 2018 07:40) (131/61 - 134/61)    RR: 18 (08 Jul 2018 07:40) (18 - 18)

## 2018-07-08 NOTE — PROGRESS NOTE ADULT - SUBJECTIVE AND OBJECTIVE BOX
SUBJECTIVE:    Patient is a 57y old Male who presents with a chief complaint of worsening erythema and drainage for B/L LE Venous stasis (04 Jul 2018 16:50)    Currently admitted to medicine with the primary diagnosis of bilateral venous stasis ulcers      Today is hospital day 4d. The patient reports he needs a place to live because the house he currently lives in has been sold and he will need to vacate the premises within 2 weeks. He does not feel comfortably living alone and managing his medical conditions alone. He reports his venous stasis has been improving and the burn team/nurses are helping greatly with wound care/dressing changes. He still reports loose watery stools (recently tested for C. difficile and was negative, likely antibiotic-related). He denies any headache, chest pain, dyspnea, abdominal pain, N/V/C or dysuria at this time.     PAST MEDICAL & SURGICAL HISTORY  Morbid obesity  Osteoarthritis  Diabetes  Asthma  H/O umbilical hernia repair    SOCIAL HISTORY:  Negative for smoking/alcohol/drug use.     ALLERGIES:  No Known Allergies    MEDICATIONS:  STANDING MEDICATIONS  amoxicillin  875 milliGRAM(s)/clavulanate 1 Tablet(s) Oral two times a day  dextrose 5%. 1000 milliLiter(s) IV Continuous <Continuous>  dextrose 50% Injectable 12.5 Gram(s) IV Push once  dextrose 50% Injectable 25 Gram(s) IV Push once  dextrose 50% Injectable 25 Gram(s) IV Push once  enoxaparin Injectable 40 milliGRAM(s) SubCutaneous every 24 hours  furosemide   Injectable 40 milliGRAM(s) IV Push daily  insulin glargine Injectable (LANTUS) 20 Unit(s) SubCutaneous at bedtime  insulin lispro Injectable (HumaLOG) 10 Unit(s) SubCutaneous three times a day before meals  nystatin Cream 1 Application(s) Topical two times a day    PRN MEDICATIONS  dextrose 40% Gel 15 Gram(s) Oral once PRN  glucagon  Injectable 1 milliGRAM(s) IntraMuscular once PRN  oxyCODONE    5 mG/acetaminophen 325 mG 1 Tablet(s) Oral every 6 hours PRN    VITALS:   T(F): 97  HR: 80  BP: 126/59  RR: 18  SpO2: --    LABS:                        12.3   6.94  )-----------( 371      ( 07 Jul 2018 06:18 )             39.6     07-07    140  |  96<L>  |  13  ----------------------------<  151<H>  3.9   |  29  |  0.9    Ca    8.5      07 Jul 2018 06:18  Mg     1.9     07-07    Culture - Blood (collected 07 Jul 2018 06:18)  Source: .Blood None  Preliminary Report (08 Jul 2018 17:01):    No growth to date.    RADIOLOGY:  No new imaging studies today     PHYSICAL EXAM:  GEN: No acute distress, sitting comfortably in bedside chair, morbidly obese   LUNGS: Clear to auscultation bilaterally   HEART: S1/S2 present. RRR. distant heart sounds   ABD: Soft, non-tender, non-distended. Bowel sounds present  EXT: NC/2+PP. Bilateral wound dressings for chronic venous stasis   NEURO: AAOX3

## 2018-07-08 NOTE — PROGRESS NOTE ADULT - ASSESSMENT
a/p:  #bilateral chronic venous stasis ulcers (h/o prior infection- noncompliant with ouatpeitn oral abx)  -local wound care--f/u with burn team  -started on iv unasyn in ED---now on oral augmentin  -f/u bcx (initial bcx neg and repeat on 7/5 coag neg staph ? contaminate)---repeat bcx today  -monitow wbc/fever curve  -PT eval---dispo planning---SW involvement for possible placement in [facility] vs [facility]  -spoke with brother Edmond #[phone] who informed me at end of july house patient has been living in is being sold and he will need alternative living arrangements  -pain control    #DM- hba1c 8.0  continue insulin and monitor fs    DVT/GI ppx    full code    dispo planning---needs placement ([facility] vs SNF as he is unable to care for self alone)

## 2018-07-08 NOTE — PROGRESS NOTE ADULT - ASSESSMENT
Dilan James is a 57M with PMH of DM, morbid obesity, OA, and asthma who presented to ER with worsening erythema and drainage from B/L LE Venous Stasis Ulcers.      1) Bilateral Venous Stasis Ulcers  -Erythematous, clear drainage, no purulent discharge   -Augmentin 875mg PO q12h x 7 days, PO Lasix, pain control with Percocet   -ID recs: repeat blood cx, Augmentin PO x 7 days, wound care, leg elevation, CHD 4% wash daily and PRN   -Burn recs: no maggots, soap and water BID, D/C Dakin's solution, ABD pads and ace wrap dressing changes BID, no surgery needed   -f/u repeat blood cx results   -Physiatry recs: STR in SNF vs. home with VNS    2) DIABETES MELLITIS  -A1C 8.0, monitor finger sticks   -Lantus 20 qhs, Lispro 10 units qac  -CC diet     3)ASTHMA:  -Albuterol PRN     DVT ppx: Lovenox   DISPO: Patient from home - says he needs to find a residence to live in to assist him with his medical care (also the house he lives in was sold and he needs to vacate within 2 weeks)   FULL CODE

## 2018-07-09 LAB
ANION GAP SERPL CALC-SCNC: 20 MMOL/L — HIGH (ref 7–14)
BASOPHILS # BLD AUTO: 0.02 K/UL — SIGNIFICANT CHANGE UP (ref 0–0.2)
BASOPHILS NFR BLD AUTO: 0.3 % — SIGNIFICANT CHANGE UP (ref 0–1)
BUN SERPL-MCNC: 9 MG/DL — LOW (ref 10–20)
CALCIUM SERPL-MCNC: 7.9 MG/DL — LOW (ref 8.5–10.1)
CHLORIDE SERPL-SCNC: 97 MMOL/L — LOW (ref 98–110)
CO2 SERPL-SCNC: 23 MMOL/L — SIGNIFICANT CHANGE UP (ref 17–32)
CREAT SERPL-MCNC: 0.7 MG/DL — SIGNIFICANT CHANGE UP (ref 0.7–1.5)
CULTURE RESULTS: SIGNIFICANT CHANGE UP
EOSINOPHIL # BLD AUTO: 0.15 K/UL — SIGNIFICANT CHANGE UP (ref 0–0.7)
EOSINOPHIL NFR BLD AUTO: 2.3 % — SIGNIFICANT CHANGE UP (ref 0–8)
GLUCOSE SERPL-MCNC: 111 MG/DL — HIGH (ref 70–99)
HCT VFR BLD CALC: 36.4 % — LOW (ref 42–52)
HGB BLD-MCNC: 11.4 G/DL — LOW (ref 14–18)
IMM GRANULOCYTES NFR BLD AUTO: 0.5 % — HIGH (ref 0.1–0.3)
LYMPHOCYTES # BLD AUTO: 1.42 K/UL — SIGNIFICANT CHANGE UP (ref 1.2–3.4)
LYMPHOCYTES # BLD AUTO: 21.8 % — SIGNIFICANT CHANGE UP (ref 20.5–51.1)
MAGNESIUM SERPL-MCNC: 2 MG/DL — SIGNIFICANT CHANGE UP (ref 1.8–2.4)
MCHC RBC-ENTMCNC: 25.6 PG — LOW (ref 27–31)
MCHC RBC-ENTMCNC: 31.3 G/DL — LOW (ref 32–37)
MCV RBC AUTO: 81.8 FL — SIGNIFICANT CHANGE UP (ref 80–94)
MONOCYTES # BLD AUTO: 0.42 K/UL — SIGNIFICANT CHANGE UP (ref 0.1–0.6)
MONOCYTES NFR BLD AUTO: 6.4 % — SIGNIFICANT CHANGE UP (ref 1.7–9.3)
NEUTROPHILS # BLD AUTO: 4.48 K/UL — SIGNIFICANT CHANGE UP (ref 1.4–6.5)
NEUTROPHILS NFR BLD AUTO: 68.7 % — SIGNIFICANT CHANGE UP (ref 42.2–75.2)
NRBC # BLD: 0 /100 WBCS — SIGNIFICANT CHANGE UP (ref 0–0)
PLATELET # BLD AUTO: 339 K/UL — SIGNIFICANT CHANGE UP (ref 130–400)
POTASSIUM SERPL-MCNC: 4.8 MMOL/L — SIGNIFICANT CHANGE UP (ref 3.5–5)
POTASSIUM SERPL-SCNC: 4.8 MMOL/L — SIGNIFICANT CHANGE UP (ref 3.5–5)
RBC # BLD: 4.45 M/UL — LOW (ref 4.7–6.1)
RBC # FLD: 15 % — HIGH (ref 11.5–14.5)
SODIUM SERPL-SCNC: 140 MMOL/L — SIGNIFICANT CHANGE UP (ref 135–146)
SPECIMEN SOURCE: SIGNIFICANT CHANGE UP
WBC # BLD: 6.52 K/UL — SIGNIFICANT CHANGE UP (ref 4.8–10.8)
WBC # FLD AUTO: 6.52 K/UL — SIGNIFICANT CHANGE UP (ref 4.8–10.8)

## 2018-07-09 RX ADMIN — NYSTATIN CREAM 1 APPLICATION(S): 100000 CREAM TOPICAL at 05:22

## 2018-07-09 RX ADMIN — Medication 10 UNIT(S): at 12:19

## 2018-07-09 RX ADMIN — Medication 1 TABLET(S): at 17:26

## 2018-07-09 RX ADMIN — ENOXAPARIN SODIUM 40 MILLIGRAM(S): 100 INJECTION SUBCUTANEOUS at 05:22

## 2018-07-09 RX ADMIN — Medication 1 TABLET(S): at 05:22

## 2018-07-09 RX ADMIN — Medication 10 UNIT(S): at 17:25

## 2018-07-09 RX ADMIN — Medication 10 UNIT(S): at 08:15

## 2018-07-09 RX ADMIN — NYSTATIN CREAM 1 APPLICATION(S): 100000 CREAM TOPICAL at 19:25

## 2018-07-09 RX ADMIN — INSULIN GLARGINE 20 UNIT(S): 100 INJECTION, SOLUTION SUBCUTANEOUS at 21:51

## 2018-07-09 NOTE — PROGRESS NOTE ADULT - ASSESSMENT
#bilateral chronic venous stasis ulcers (h/o prior infection- noncompliant with ouatpeitn oral abx)  -local wound care  -started on iv unasyn in ED---now on oral augmentin  repeat blood culture negative  -monitow wbc/fever curve  -PT eval---dispo planning---SW involvement for possible placement in [facility] vs [facility], adult home   - brother Edmond #[phone] who informed me at end of july house patient has been living in is being sold and he will need alternative living arrangements  -pain control    #DM- hba1c 8.0  continue insulin and monitor fs    DVT/GI ppx    full code    Dispo planning---needs placement ([facility] vs [facility] as he is unable to care for self alone)

## 2018-07-09 NOTE — PROGRESS NOTE ADULT - SUBJECTIVE AND OBJECTIVE BOX
SUBJECTIVE:    Patient is a 57y old Male who presents with a chief complaint of worsening erythema and drainage for B/L LE Venous stasis (04 Jul 2018 16:50)    Currently admitted to medicine with the primary diagnosis of bilateral lower extremity venous stasis      Today is hospital day 5d. The patient is receiving wound dressing changes 2x/day. He denies any headache, chest pain, dyspnea, abdominal pain, N/V/D/C or dysuria at this time. D/C planning: adult home vs. STR in SNF     PAST MEDICAL & SURGICAL HISTORY  Morbid obesity  Osteoarthritis  Diabetes  Asthma  H/O umbilical hernia repair    SOCIAL HISTORY:  Negative for smoking/alcohol/drug use.     ALLERGIES:  No Known Allergies    MEDICATIONS:  STANDING MEDICATIONS  amoxicillin  875 milliGRAM(s)/clavulanate 1 Tablet(s) Oral two times a day  dextrose 5%. 1000 milliLiter(s) IV Continuous <Continuous>  dextrose 50% Injectable 12.5 Gram(s) IV Push once  dextrose 50% Injectable 25 Gram(s) IV Push once  dextrose 50% Injectable 25 Gram(s) IV Push once  enoxaparin Injectable 40 milliGRAM(s) SubCutaneous every 24 hours  furosemide   Injectable 40 milliGRAM(s) IV Push daily  insulin glargine Injectable (LANTUS) 20 Unit(s) SubCutaneous at bedtime  insulin lispro Injectable (HumaLOG) 10 Unit(s) SubCutaneous three times a day before meals  nystatin Cream 1 Application(s) Topical two times a day    PRN MEDICATIONS  dextrose 40% Gel 15 Gram(s) Oral once PRN  glucagon  Injectable 1 milliGRAM(s) IntraMuscular once PRN  oxyCODONE    5 mG/acetaminophen 325 mG 1 Tablet(s) Oral every 6 hours PRN    VITALS:   T(F): 97.7  HR: 88  BP: 115/56  RR: 17  SpO2: --    LABS:                        11.4   6.52  )-----------( 339      ( 09 Jul 2018 07:37 )             36.4     07-09    140  |  97<L>  |  9<L>  ----------------------------<  111<H>  4.8   |  23  |  0.7    Ca    7.9<L>      09 Jul 2018 07:37  Mg     2.0     07-09    Culture - Blood (collected 07 Jul 2018 06:18)  Source: .Blood None  Preliminary Report (08 Jul 2018 17:01):    No growth to date.    RADIOLOGY:  No new imaging studies today    PHYSICAL EXAM:  GEN: No acute distress, sitting comfortably in bedside chair, morbid obesity   LUNGS: Clear to auscultation bilaterally   HEART: S1/S2 present. RRR. Distant heart sounds   ABD: Soft, non-tender, non-distended. Bowel sounds present  EXT: NC/NE/2+PP. Bilateral lower extremity wound dressings   NEURO: AAOX3

## 2018-07-09 NOTE — PROGRESS NOTE ADULT - ASSESSMENT
Dilan James is a 57M with PMH of DM, morbid obesity, OA, and asthma who presented to ER with worsening erythema and drainage from B/L LE Venous Stasis Ulcers     #Bilateral Venous Stasis Ulcers  -Augmentin 875mg PO q12h x 7 days, PO Lasix, pain control with Percocet   -Wound care, leg elevation, CHD 4% wash daily and PRN, soap and water BID, ABD pads and ace wrap dressing changes BID   -ID and burn following   -Repeat blood cx: no growth   -Physiatry recs: STR in SNF vs. home with VNS  -Will f/u with PT to see if patient can walk 150 ft    #Diabetes Mellitus   -A1C 8.0, monitor finger sticks   -Lantus 20 qhs, Lispro 10 units qac  -CC diet     #Asthma   -Albuterol PRN     #Normocytic anemia  -Hg 11.4, continue to monitor with CBC     DVT ppx: Lovenox 40   DISPO: Patient from home - says he needs to find a residence to live in to assist him with his medical care (also the house he lives in was sold and he needs to vacate within 2 weeks)   FULL CODE

## 2018-07-09 NOTE — PROGRESS NOTE ADULT - SUBJECTIVE AND OBJECTIVE BOX
Patient is a 57y old  Male who presents with a chief complaint of Worsening drainage for B/L LE Venous stasis (04 Jul 2018 16:50)    HPI:  Patient is a pleasant 58 yo M with h/o DM, Morbid Obesity, Chronic LE venous stasis ulcers, Asthma, who presents with worsening erythema and drainage of his B/L LE Venous stasis Ulcers.. Pt was  recently admitted to the [facility]  on 6/25/18 for similar presentation but he  was noted to have had maggots on his wounds in the LLE that was removed by burn surgery.  He was seen by ID and managed inpatient with Unasyn and was d/c'ed home with VNS services on 6/26/18 and Augmentin. Patient was not aware he was supposed to take Agmentin as an outpatient and initially felt better but two days ago he noticed that the erythema was getting worse on both legs and he noticed a watery discharge from his wounds which was not present on discharge. He also states that yesterday he was in the shower when he lost his balanced and fell landing on his bottom. He denied LOC and was able to get himself back up by using the wall for support. The LE watery drainage and erythema    worsened this morning to the point where he called an ambulance. He lives at home alone and states he is unable to care for himself but VNS does come daily for wound care. He was interviewed by [facility] for possible housing but they have not gotten back to him.       PAST MEDICAL & SURGICAL HISTORY:  Morbid obesity  Osteoarthritis  Diabetes  Asthma  H/O umbilical hernia repair    patient seen and examined independently on morning rounds, chart reviewed and discussed with the medicine resident and on interdisciplinary rounds    no overnight events               GEN-NAD, AAOx3- OOB to chair  HEENT- NCAT, PERRLA, EOMI  NECK- supple no jvd, no lymphadenopathy  PULM- Clear to auscultation bilaterally, fair air entry  CVS- +s1/s2 RRR no murmurs  GI- soft obese, NT ND +bs  EXT- 2+ edema bilateral leg bilateral foot dressings c/d/i  NEURO- nonfocal       Labs:                        12.3   6.94  )-----------( 371      ( 07 Jul 2018 06:18 )             39.6     CBC Full  -  ( 07 Jul 2018 06:18 )  WBC Count : 6.94 K/uL  Hemoglobin : 12.3 g/dL  Hematocrit : 39.6 %  Platelet Count - Automated : 371 K/uL  Mean Cell Volume : 81.8 fL  Mean Cell Hemoglobin : 25.4 pg  Mean Cell Hemoglobin Concentration : 31.1 g/dL  Auto Neutrophil # : 5.02 K/uL  Auto Lymphocyte # : 1.39 K/uL  Auto Monocyte # : 0.32 K/uL  Auto Eosinophil # : 0.17 K/uL  Auto Basophil # : 0.02 K/uL  Auto Neutrophil % : 72.4 %  Auto Lymphocyte % : 20.0 %  Auto Monocyte % : 4.6 %  Auto Eosinophil % : 2.4 %  Auto Basophil % : 0.3 %      07-07    140  |  96<L>  |  13  ----------------------------<  151<H>  3.9   |  29  |  0.9    Ca    8.5      07 Jul 2018 06:18  Mg     1.9     07-07        Microbiology:    Culture - Blood (collected 04 Jul 2018 21:29)  Source: .Blood None  Gram Stain (05 Jul 2018 19:38):    Growth in aerobic bottle: Gram Positive Cocci in Clusters  Final Report (06 Jul 2018 17:57):    Growth in aerobic bottle: Coag Negative Staphylococcus    Single set isolate, possible contaminant. Contact    Microbiology if susceptibility testing clinically    indicated.    "Due to technical problems, Proteus sp. will Not be reported as part of    the BCID panel until further notice"    ***Blood Panel PCR results on this specimen are available    approximately 3 hours after the Gram stain result.***    Gram stain, PCR, and/or culture results may not always    correspond due to difference in methodologies.    ************************************************************    This PCR assay was performed using [employer].    The following targets are tested for: Enterococcus,    vancomycin resistant enterococci, Listeria monocytogenes,    coagulase negative staphylococci, S. aureus,    methicillin resistant S. aureus, Streptococcus agalactiae    (Group B), S. pneumoniae, S. pyogenes (Group A),    Acinetobacter baumannii, Enterobacter cloacae, E. coli,    Klebsiella oxytoca, K. pneumoniae, Proteus sp.,    Serratia marcescens, Haemophilus influenzae,    Neisseria meningitidis, Pseudomonas aeruginosa, Candida    albicans, C. glabrata, C krusei, C parapsilosis,    C. tropicalis and the KPC resistance gene.  Organism: Blood Culture PCR (06 Jul 2018 17:57)  Organism: Blood Culture PCR (06 Jul 2018 17:57)      Vital Signs Last 24 Hrs  Vital Signs Last 24 Hrs  T(C): 36.5 (09 Jul 2018 08:04), Max: 36.5 (09 Jul 2018 08:04)  T(F): 97.7 (09 Jul 2018 08:04), Max: 97.7 (09 Jul 2018 08:04)  HR: 88 (09 Jul 2018 08:04) (80 - 94)  BP: 115/56 (09 Jul 2018 08:04) (115/56 - 126/59)  RR: 17 (09 Jul 2018 08:04) (17 - 18)

## 2018-07-10 ENCOUNTER — TRANSCRIPTION ENCOUNTER (OUTPATIENT)
Age: 58
End: 2018-07-10

## 2018-07-10 LAB
ANION GAP SERPL CALC-SCNC: 15 MMOL/L — HIGH (ref 7–14)
BASOPHILS # BLD AUTO: 0.02 K/UL — SIGNIFICANT CHANGE UP (ref 0–0.2)
BASOPHILS NFR BLD AUTO: 0.3 % — SIGNIFICANT CHANGE UP (ref 0–1)
BUN SERPL-MCNC: 9 MG/DL — LOW (ref 10–20)
CALCIUM SERPL-MCNC: 8.2 MG/DL — LOW (ref 8.5–10.1)
CHLORIDE SERPL-SCNC: 96 MMOL/L — LOW (ref 98–110)
CO2 SERPL-SCNC: 29 MMOL/L — SIGNIFICANT CHANGE UP (ref 17–32)
CREAT SERPL-MCNC: 0.7 MG/DL — SIGNIFICANT CHANGE UP (ref 0.7–1.5)
EOSINOPHIL # BLD AUTO: 0.19 K/UL — SIGNIFICANT CHANGE UP (ref 0–0.7)
EOSINOPHIL NFR BLD AUTO: 3.2 % — SIGNIFICANT CHANGE UP (ref 0–8)
GLUCOSE SERPL-MCNC: 240 MG/DL — HIGH (ref 70–99)
HCT VFR BLD CALC: 38.9 % — LOW (ref 42–52)
HGB BLD-MCNC: 12.1 G/DL — LOW (ref 14–18)
IMM GRANULOCYTES NFR BLD AUTO: 0.3 % — SIGNIFICANT CHANGE UP (ref 0.1–0.3)
LYMPHOCYTES # BLD AUTO: 1.19 K/UL — LOW (ref 1.2–3.4)
LYMPHOCYTES # BLD AUTO: 20.3 % — LOW (ref 20.5–51.1)
MAGNESIUM SERPL-MCNC: 1.8 MG/DL — SIGNIFICANT CHANGE UP (ref 1.8–2.4)
MCHC RBC-ENTMCNC: 25.7 PG — LOW (ref 27–31)
MCHC RBC-ENTMCNC: 31.1 G/DL — LOW (ref 32–37)
MCV RBC AUTO: 82.6 FL — SIGNIFICANT CHANGE UP (ref 80–94)
MONOCYTES # BLD AUTO: 0.35 K/UL — SIGNIFICANT CHANGE UP (ref 0.1–0.6)
MONOCYTES NFR BLD AUTO: 6 % — SIGNIFICANT CHANGE UP (ref 1.7–9.3)
NEUTROPHILS # BLD AUTO: 4.09 K/UL — SIGNIFICANT CHANGE UP (ref 1.4–6.5)
NEUTROPHILS NFR BLD AUTO: 69.9 % — SIGNIFICANT CHANGE UP (ref 42.2–75.2)
NRBC # BLD: 0 /100 WBCS — SIGNIFICANT CHANGE UP (ref 0–0)
PLATELET # BLD AUTO: 310 K/UL — SIGNIFICANT CHANGE UP (ref 130–400)
POTASSIUM SERPL-MCNC: 3.9 MMOL/L — SIGNIFICANT CHANGE UP (ref 3.5–5)
POTASSIUM SERPL-SCNC: 3.9 MMOL/L — SIGNIFICANT CHANGE UP (ref 3.5–5)
RBC # BLD: 4.71 M/UL — SIGNIFICANT CHANGE UP (ref 4.7–6.1)
RBC # FLD: 15.2 % — HIGH (ref 11.5–14.5)
SODIUM SERPL-SCNC: 140 MMOL/L — SIGNIFICANT CHANGE UP (ref 135–146)
WBC # BLD: 5.86 K/UL — SIGNIFICANT CHANGE UP (ref 4.8–10.8)
WBC # FLD AUTO: 5.86 K/UL — SIGNIFICANT CHANGE UP (ref 4.8–10.8)

## 2018-07-10 RX ORDER — NYSTATIN CREAM 100000 [USP'U]/G
1 CREAM TOPICAL
Qty: 0 | Refills: 0 | COMMUNITY
Start: 2018-07-10

## 2018-07-10 RX ADMIN — NYSTATIN CREAM 1 APPLICATION(S): 100000 CREAM TOPICAL at 06:17

## 2018-07-10 RX ADMIN — Medication 40 MILLIGRAM(S): at 06:17

## 2018-07-10 RX ADMIN — Medication 1 TABLET(S): at 17:02

## 2018-07-10 RX ADMIN — Medication 1 TABLET(S): at 06:17

## 2018-07-10 RX ADMIN — NYSTATIN CREAM 1 APPLICATION(S): 100000 CREAM TOPICAL at 17:02

## 2018-07-10 RX ADMIN — INSULIN GLARGINE 20 UNIT(S): 100 INJECTION, SOLUTION SUBCUTANEOUS at 21:38

## 2018-07-10 RX ADMIN — ENOXAPARIN SODIUM 40 MILLIGRAM(S): 100 INJECTION SUBCUTANEOUS at 06:17

## 2018-07-10 NOTE — DISCHARGE NOTE ADULT - HOSPITAL COURSE
Dilan James is a 57M with PMH of DM, asthma, OA, and morbid obesity who presented to [facility] on 7/4/18 with worsening erythema and drainage from bilateral lower extremities (chronic venous stasis with superimposed cellulitis). The patient was recently admitted on 6/25/18 for a similar presentation with maggot infestation (removed by burn), discharged on 6/26 with PO Augmentin, which he did not continue as an outpatient. The patient states that he lives alone and cannot care for himself.    During this admission, the patient was seen by the burn team and infectious disease. He received wound care (soap and water BID, abd pads and ace wrap dressing changes BID), leg elevation, PO Lasix, and Augmentin PO 875mg q12h. The patient is being discharged with Augmentin and should follow up outpatient with his primary care physician to assess resolution of the cellulitis and improvement of venous stasis. Dilan James is a 57M with PMH of DM, asthma, OA, and morbid obesity who presented to [facility] on 7/4/18 with worsening erythema and drainage from bilateral lower extremities (chronic venous stasis with superimposed cellulitis). The patient was recently admitted on 6/25/18 for a similar presentation with maggot infestation (removed by burn), discharged on 6/26 with PO Augmentin, which he did not continue as an outpatient. The patient states that he lives alone and cannot care for himself.    During this admission, the patient was seen by the burn team and infectious disease. He received wound care (soap and water BID, abd pads and ace wrap dressing changes BID), leg elevation, PO Lasix, and Augmentin PO 875mg q12h. He finished a 14 day course of Augmentin and should follow up outpatient with his primary care physician to assess resolution of the cellulitis and improvement of venous stasis. On July 16th the patient experienced an episode of painless visual loss in the left eye. Ophthalmology and neurology were consulted Neck duplex and head CT were negative. The vision came back without any deficit. Dilan James is a 57M with PMH of DM, asthma, OA, and morbid obesity who presented to [facility] on 7/4/18 with worsening erythema and drainage from bilateral lower extremities (chronic venous stasis with superimposed cellulitis). The patient was recently admitted on 6/25/18 for a similar presentation with maggot infestation (removed by burn), discharged on 6/26 with PO Augmentin, which he did not continue as an outpatient. The patient states that he lives alone and cannot care for himself.    During this admission, the patient was seen by the burn team and infectious disease. He received wound care (soap and water BID, abd pads and ace wrap dressing changes BID), leg elevation, PO Lasix, and Augmentin PO 875mg q12h. He finished a 14 day course of Augmentin and should follow up outpatient with his primary care physician to assess resolution of the cellulitis and improvement of venous stasis. On July 16th the patient experienced an episode of painless visual loss in the left eye. Ophthalmology and neurology were consulted Neck duplex and head CT were negative. The vision came back without any deficit.  Patient is now stable for DC.

## 2018-07-10 NOTE — DISCHARGE NOTE ADULT - MEDICATION SUMMARY - MEDICATIONS TO STOP TAKING
I will STOP taking the medications listed below when I get home from the hospital:  None I will STOP taking the medications listed below when I get home from the hospital:    amoxicillin-clavulanate 875 mg-125 mg oral tablet  -- 1 tab(s) by mouth every 12 hours

## 2018-07-10 NOTE — DISCHARGE NOTE ADULT - CARE PROVIDER_API CALL
Chantel Mata), Geriatric Medicine; Internal Medicine  [address] [place], [place] [id number]  Phone: [phone]  Fax: [phone]    Juan Ray), Ophthalmology  [address] [address]  Phone: [phone]  Fax: [phone]

## 2018-07-10 NOTE — DISCHARGE NOTE ADULT - OTHER SIGNIFICANT FINDINGS
< from: CT Head No Cont (07.14.18 @ 23:27) >    No acute intracranial pathology.    Mild chronic microvascular ischemic changes.    < from: [facility] Duplex Carotid, Bilat (07.15.18 @ 09:15) >  40-59% stenosis of the right internal carotid artery.    20-39% stenosis of the left internal carotid artery.

## 2018-07-10 NOTE — DISCHARGE NOTE ADULT - PLAN OF CARE
Manage and prevent complications Continue Augmentin PO, with local wound care and leg elevation. Burn team recommends soap and water BID, ABD pads and ace wrap changes BID. Follow up with primary care physician as an outpatient. Take all home medications as directed. Follow up with primary care physician. A1C 8.0 Follow up with primary care physician. Encourage weight loss and exercise as tolerated. Augmentin oral course was finished. Continue with local wound care and leg elevation. Burn team recommends soap and water BID, ABD pads and ace wrap changes BID. Follow up with primary care physician as an outpatient.

## 2018-07-10 NOTE — PROGRESS NOTE ADULT - ASSESSMENT
56 yo M with DM, morbid obesity, chronic venous stasis, abd hernia repair with mesh, recently admitted for LLE cellulitis found to have maggots, transitioned from unasyn to augmentin to complete the course, presenting with worsening LLE erythema and drainage in the setting on not taking augmentin at home    Problems  Bcx coNS, likely contaminant  Chronic venous stasis ulcers with superficial cellulitis  IDDM  Morbid Obesity    - Continue augmentin PO , plan for 7- 14d course pending clinical improvement  - Local wound care and leg elevated  - CHD 4% wash daily and PRN 56 yo M with DM, morbid obesity, chronic venous stasis, abd hernia repair with mesh, recently admitted for LLE cellulitis found to have maggots, transitioned from unasyn to augmentin to complete the course, presenting with worsening LLE erythema and drainage in the setting on not taking augmentin at home    Problems  Bcx coNS, likely contaminant  Chronic venous stasis ulcers with superficial cellulitis  IDDM  Morbid Obesity    - Continue augmentin PO , plan for 7- 14d course pending clinical improvement  - Local wound care and leg elevated  - CHD 4% wash daily and PRN    Spectra [id number]

## 2018-07-10 NOTE — DISCHARGE NOTE ADULT - CARE PLAN
Principal Discharge DX:	Venous stasis dermatitis of both lower extremities  Goal:	Manage and prevent complications  Assessment and plan of treatment:	Continue Augmentin PO, with local wound care and leg elevation. Burn team recommends soap and water BID, ABD pads and ace wrap changes BID. Follow up with primary care physician as an outpatient.  Secondary Diagnosis:	Diabetes  Goal:	Manage and prevent complications  Assessment and plan of treatment:	Take all home medications as directed. Follow up with primary care physician. A1C 8.0  Secondary Diagnosis:	Morbid obesity  Goal:	Manage and prevent complications  Assessment and plan of treatment:	Follow up with primary care physician. Encourage weight loss and exercise as tolerated. Principal Discharge DX:	Venous stasis dermatitis of both lower extremities  Goal:	Manage and prevent complications  Assessment and plan of treatment:	Augmentin oral course was finished. Continue with local wound care and leg elevation. Burn team recommends soap and water BID, ABD pads and ace wrap changes BID. Follow up with primary care physician as an outpatient.  Secondary Diagnosis:	Diabetes  Goal:	Manage and prevent complications  Assessment and plan of treatment:	Take all home medications as directed. Follow up with primary care physician. A1C 8.0  Secondary Diagnosis:	Morbid obesity  Goal:	Manage and prevent complications  Assessment and plan of treatment:	Follow up with primary care physician. Encourage weight loss and exercise as tolerated.

## 2018-07-10 NOTE — PROGRESS NOTE ADULT - SUBJECTIVE AND OBJECTIVE BOX
EVIN, FABIAN  57y, Male      OVERNIGHT EVENTS:  afebrile  stable on PO abx  pending dispo    VITALS:  T(F): 95.9, Max: 97.6 (07-05-18 @ 16:00)  HR: 87  BP: 97/54  RR: 18Vital Signs Last 24 Hrs  T(C): 35.5 (05 Jul 2018 23:54), Max: 36.4 (05 Jul 2018 16:00)  T(F): 95.9 (05 Jul 2018 23:54), Max: 97.6 (05 Jul 2018 16:00)  HR: 87 (05 Jul 2018 23:54) (87 - 94)  BP: 97/54 (05 Jul 2018 23:54) (97/54 - 130/67)  BP(mean): --  RR: 18 (05 Jul 2018 23:54) (18 - 19)  SpO2: --    PHYSICAL EXAM  General: NAD, resting in the bed  Extr: wwp. no b/l LE edema LLE>RLE, LLE with increased warmth, erythema, and open wounds with decreased yellowish purulence on the dressings.   Neuro: No focal deficits  Skin: no rash  Lines: no erythema      TESTS & MEASUREMENTS:                        11.2   6.36  )-----------( 276      ( 05 Jul 2018 05:09 )             35.6     07-05    140  |  101  |  11  ----------------------------<  171<H>  3.6   |  25  |  0.7    Ca    8.3<L>      05 Jul 2018 17:40  Mg     1.8     07-05    TPro  5.8<L>  /  Alb  3.2<L>  /  TBili  0.2  /  DBili  x   /  AST  18  /  ALT  23  /  AlkPhos  89  07-04    LIVER FUNCTIONS - ( 04 Jul 2018 14:23 )  Alb: 3.2 g/dL / Pro: 5.8 g/dL / ALK PHOS: 89 U/L / ALT: 23 U/L / AST: 18 U/L / GGT: x             Culture - Blood (collected 07-04-18 @ 21:29)  Source: .Blood None  Gram Stain (07-05-18 @ 19:38):    Growth in aerobic bottle: Gram Positive Cocci in Clusters  Preliminary Report (07-05-18 @ 19:38):    Growth in aerobic bottle: Gram Positive Cocci in Clusters    "Due to technical problems, Proteus sp. will Not be reported as part of    the BCID panel until further notice"    ***Blood Panel PCR results on this specimen are available    approximately 3 hours after the Gram stain result.***    Gram stain, PCR, and/or culture results may not always    correspond due to difference in methodologies.    ************************************************************    This PCR assay was performed using [employer].    The following targets are tested for: Enterococcus,    vancomycin resistant enterococci, Listeria monocytogenes,    coagulase negative staphylococci, S. aureus,    methicillin resistant S. aureus, Streptococcus agalactiae    (Group B), S. pneumoniae, S.pyogenes (Group A),    Acinetobacter baumannii, Enterobacter cloacae, E. coli,    Klebsiella oxytoca, K. pneumoniae, Proteus sp.,    Serratia marcescens, Haemophilus influenzae,    Neisseria meningitidis, Pseudomonas aeruginosa, Candida    albicans, C. glabrata, C krusei, C parapsilosis,    C. tropicalis and the KPC resistance gene.  Organism: Blood Culture PCR (07-05-18 @ 22:24)  Organism: Blood Culture PCR (07-05-18 @ 22:24)      -  Coagulase negative Staphylococcus: Detec      Method Type: PCR    Culture - Blood (collected 07-04-18 @ 13:55)  Source: .Blood Blood  Preliminary Report (07-05-18 @ 23:01):    No growth to date.          RADIOLOGY & ADDITIONAL TESTS:    ANTIBIOTICS:  amoxicillin  875 milliGRAM(s)/clavulanate 1 Tablet(s) Oral two times a day EVIN, FABIAN  57y, Male      OVERNIGHT EVENTS:  afebrile  stable on PO abx  pending dispo    VITALS:  T(F): 95.9, Max: 97.6 (07-05-18 @ 16:00)  HR: 87  BP: 97/54  RR: 18Vital Signs Last 24 Hrs  T(C): 35.5 (05 Jul 2018 23:54), Max: 36.4 (05 Jul 2018 16:00)  T(F): 95.9 (05 Jul 2018 23:54), Max: 97.6 (05 Jul 2018 16:00)  HR: 87 (05 Jul 2018 23:54) (87 - 94)  BP: 97/54 (05 Jul 2018 23:54) (97/54 - 130/67)  BP(mean): --  RR: 18 (05 Jul 2018 23:54) (18 - 19)  SpO2: --    PHYSICAL EXAM  General: NAD, resting in the bed  Extr: wwp. b/l LE edema, decreased warmth, erythema, and drainage  Neuro: No focal deficits  Skin: no rash  Lines: no erythema      TESTS & MEASUREMENTS:                        11.2   6.36  )-----------( 276      ( 05 Jul 2018 05:09 )             35.6     07-05    140  |  101  |  11  ----------------------------<  171<H>  3.6   |  25  |  0.7    Ca    8.3<L>      05 Jul 2018 17:40  Mg     1.8     07-05    TPro  5.8<L>  /  Alb  3.2<L>  /  TBili  0.2  /  DBili  x   /  AST  18  /  ALT  23  /  AlkPhos  89  07-04    LIVER FUNCTIONS - ( 04 Jul 2018 14:23 )  Alb: 3.2 g/dL / Pro: 5.8 g/dL / ALK PHOS: 89 U/L / ALT: 23 U/L / AST: 18 U/L / GGT: x             Culture - Blood (collected 07-04-18 @ 21:29)  Source: .Blood None  Gram Stain (07-05-18 @ 19:38):    Growth in aerobic bottle: Gram Positive Cocci in Clusters  Preliminary Report (07-05-18 @ 19:38):    Growth in aerobic bottle: Gram Positive Cocci in Clusters    "Due to technical problems, Proteus sp. will Not be reported as part of    the BCID panel until further notice"    ***Blood Panel PCR results on this specimen are available    approximately 3 hours after the Gram stain result.***    Gram stain, PCR, and/or culture results may not always    correspond due to difference in methodologies.    ************************************************************    This PCR assay was performed using [employer].    The following targets are tested for: Enterococcus,    vancomycin resistant enterococci, Listeria monocytogenes,    coagulase negative staphylococci, S. aureus,    methicillin resistant S. aureus, Streptococcus agalactiae    (Group B), S. pneumoniae, S.pyogenes (Group A),    Acinetobacter baumannii, Enterobacter cloacae, E. coli,    Klebsiella oxytoca, K. pneumoniae, Proteus sp.,    Serratia marcescens, Haemophilus influenzae,    Neisseria meningitidis, Pseudomonas aeruginosa, Candida    albicans, C. glabrata, C krusei, C parapsilosis,    C. tropicalis and the KPC resistance gene.  Organism: Blood Culture PCR (07-05-18 @ 22:24)  Organism: Blood Culture PCR (07-05-18 @ 22:24)      -  Coagulase negative Staphylococcus: Detec      Method Type: PCR    Culture - Blood (collected 07-04-18 @ 13:55)  Source: .Blood Blood  Preliminary Report (07-05-18 @ 23:01):    No growth to date.          RADIOLOGY & ADDITIONAL TESTS:    ANTIBIOTICS:  amoxicillin  875 milliGRAM(s)/clavulanate 1 Tablet(s) Oral two times a day

## 2018-07-10 NOTE — DISCHARGE NOTE ADULT - PATIENT PORTAL LINK FT
You can access the [facility] Patient Portal, offered by [facility], by registering with the following website: [email or web address]

## 2018-07-10 NOTE — PROGRESS NOTE ADULT - SUBJECTIVE AND OBJECTIVE BOX
SUBJECTIVE:    Patient is a 57y old Male who presents with a chief complaint of Worsening erythema and drainage of B/L LE Venous stasis (10 Jul 2018 11:49)    Currently admitted to medicine with the primary diagnosis of Venous stasis dermatitis of both lower extremities     Today is hospital day 6d. Patient currently waiting for bed at [facility]. PT needs to document that he was able to walk 150 feet. Patient denies any headache, chest pain, dyspnea, abdominal pain, N/V/D/C at this time.     Pharmacy: [facility] on [facility] and [place]. PCP: Dena at [address] [place] [address]. To go home on PO Lasix, home diabetes mediations and follow up with endocrinology/PCP as an outpatient for diabetes management.     PAST MEDICAL & SURGICAL HISTORY  Morbid obesity  Osteoarthritis  Diabetes  Asthma  H/O umbilical hernia repair    SOCIAL HISTORY:  Negative for smoking/alcohol/drug use.     ALLERGIES:  No Known Allergies    MEDICATIONS:  STANDING MEDICATIONS  amoxicillin  875 milliGRAM(s)/clavulanate 1 Tablet(s) Oral two times a day  dextrose 5%. 1000 milliLiter(s) IV Continuous <Continuous>  dextrose 50% Injectable 12.5 Gram(s) IV Push once  dextrose 50% Injectable 25 Gram(s) IV Push once  dextrose 50% Injectable 25 Gram(s) IV Push once  enoxaparin Injectable 40 milliGRAM(s) SubCutaneous every 24 hours  furosemide   Injectable 40 milliGRAM(s) IV Push daily  insulin glargine Injectable (LANTUS) 20 Unit(s) SubCutaneous at bedtime  insulin lispro Injectable (HumaLOG) 10 Unit(s) SubCutaneous three times a day before meals  nystatin Cream 1 Application(s) Topical two times a day    PRN MEDICATIONS  dextrose 40% Gel 15 Gram(s) Oral once PRN  glucagon  Injectable 1 milliGRAM(s) IntraMuscular once PRN  oxyCODONE    5 mG/acetaminophen 325 mG 1 Tablet(s) Oral every 6 hours PRN    VITALS:   T(F): 96.5  HR: 63  BP: 133/84  RR: 18  SpO2: --    LABS:                        12.1   5.86  )-----------( 310      ( 10 Jul 2018 09:35 )             38.9     07-10    140  |  96<L>  |  9<L>  ----------------------------<  240<H>  3.9   |  29  |  0.7    Ca    8.2<L>      10 Jul 2018 09:35  Mg     1.8     07-10    RADIOLOGY:  No new imaging studies today     PHYSICAL EXAM:  GEN: No acute distress, sitting in bedside chair comfortably, morbid obesity   LUNGS: Clear to auscultation bilaterally   HEART: S1/S2 present. RRR. Distant heart sounds.   ABD: Soft, non-tender, non-distended. Bowel sounds present  EXT: NC/NE/2+PP/Skin Intact. Wound dressings on bilateral lower extremities   NEURO: AAOX3

## 2018-07-10 NOTE — DISCHARGE NOTE ADULT - MEDICATION SUMMARY - MEDICATIONS TO TAKE
I will START or STAY ON the medications listed below when I get home from the hospital:    loperamide 2 mg oral tablet  -- 1 tab(s) by mouth 2 times a day x 14 days, As Needed   -- It is very important that you take or use this exactly as directed.  Do not skip doses or discontinue unless directed by your doctor.  May cause drowsiness.  Alcohol may intensify this effect.  Use care when operating dangerous machinery.  Obtain medical advice before taking any non-prescription drugs as some may affect the action of this medication.    -- Indication: For Diarrhea    sodium hypochlorite 0.25% topical solution  -- 1 application on skin 2 times a day  -- Indication: For antiseptic    nystatin 100,000 units/g topical cream  -- 1 application on skin 2 times a day  -- Indication: For antifungal    amoxicillin-clavulanate 875 mg-125 mg oral tablet  -- 1 tab(s) by mouth 2 times a day  -- Indication: For CELLULITIS OF bilateral lower extremities     Daily Multiple Vitamins oral tablet  -- 1 tab(s) by mouth once a day   -- Indication: For supplement I will START or STAY ON the medications listed below when I get home from the hospital:    aspirin 81 mg oral delayed release tablet  -- 1 tab(s) by mouth once a day  -- Indication: For Heart    loperamide 2 mg oral tablet  -- 1 tab(s) by mouth 2 times a day x 14 days, As Needed   -- It is very important that you take or use this exactly as directed.  Do not skip doses or discontinue unless directed by your doctor.  May cause drowsiness.  Alcohol may intensify this effect.  Use care when operating dangerous machinery.  Obtain medical advice before taking any non-prescription drugs as some may affect the action of this medication.    -- Indication: For Diarrhea    atorvastatin 40 mg oral tablet  -- 1 tab(s) by mouth every 24 hours  -- Indication: For Chlosterol    sodium hypochlorite 0.25% topical solution  -- 1 application on skin 2 times a day  -- Indication: For antiseptic    nystatin 100,000 units/g topical cream  -- 1 application on skin 2 times a day  -- Indication: For antifungal    amoxicillin-clavulanate 875 mg-125 mg oral tablet  -- 1 tab(s) by mouth 2 times a day  -- Indication: For CELLULITIS OF bilateral lower extremities     Daily Multiple Vitamins oral tablet  -- 1 tab(s) by mouth once a day   -- Indication: For supplement

## 2018-07-11 RX ADMIN — ENOXAPARIN SODIUM 40 MILLIGRAM(S): 100 INJECTION SUBCUTANEOUS at 05:38

## 2018-07-11 RX ADMIN — Medication 10 UNIT(S): at 17:21

## 2018-07-11 RX ADMIN — Medication 10 UNIT(S): at 11:59

## 2018-07-11 RX ADMIN — NYSTATIN CREAM 1 APPLICATION(S): 100000 CREAM TOPICAL at 17:20

## 2018-07-11 RX ADMIN — Medication 10 UNIT(S): at 08:41

## 2018-07-11 RX ADMIN — Medication 1 TABLET(S): at 05:38

## 2018-07-11 RX ADMIN — NYSTATIN CREAM 1 APPLICATION(S): 100000 CREAM TOPICAL at 05:39

## 2018-07-11 RX ADMIN — INSULIN GLARGINE 20 UNIT(S): 100 INJECTION, SOLUTION SUBCUTANEOUS at 22:14

## 2018-07-11 RX ADMIN — Medication 1 TABLET(S): at 17:20

## 2018-07-11 NOTE — DIETITIAN INITIAL EVALUATION ADULT. - ORAL INTAKE PTA
PTA pt watches for sugar but cheat sometimes. NKFA. No vitamin/supp. Asked for a bagel with cream cheese after 100% breakfast this morning but was explained and refused. Pt understood. CA spoken to./good

## 2018-07-11 NOTE — DIETITIAN INITIAL EVALUATION ADULT. - DIET TYPE
100% PO and appetite. Asking for more carb items. but denied./consistent carbohydrate (evening snack)

## 2018-07-11 NOTE — PROGRESS NOTE ADULT - ASSESSMENT
Dilan James is a 57M with PMH of DM, morbid obesity, OA, and asthma who presented to the ER with worsening erythema and drainage from B/L LE Venous Stasis Ulcers     #Bilateral Venous Stasis Dermatitis  -Augmentin 875mg PO q12h x 7 days, PO Lasix, pain control with Percocet   -Wound care, leg elevation, CHD 4% wash daily and PRN, soap and water BID, ABD pads and ace wrap dressing changes BID   -ID and burn following   -Repeat blood cx: no growth   -Physiatry recs: STR in SNF vs. home with VNS  -Pt able to walk 150ft with PT, pending placement in adult living facility (required quantiferon - sent out today)     #Diabetes Mellitus   -A1C 8.0, monitor finger sticks   -Lantus 20 qhs, Lispro 10 units qac  -CC diet     #Asthma   -Albuterol PRN     #Normocytic anemia  -Hg 12.1, continue to monitor with CBC     #Hypomagnesemia, resolved  -continue to monitor, replete as needed     DVT ppx: Lovenox 40   DISPO: Patient from home - says he needs to find a residence to live in to assist him with his medical care (also the house he lives in was sold and he needs to vacate within 2 weeks)   FULL CODE

## 2018-07-11 NOTE — PROGRESS NOTE ADULT - SUBJECTIVE AND OBJECTIVE BOX
SUBJECTIVE:    Patient is a 57y old Male who presents with a chief complaint of Worsening erythema and drainage of B/L LE Venous stasis (10 Jul 2018 11:49)    Currently admitted to medicine with the primary diagnosis of Venous stasis dermatitis of both lower extremities         PAST MEDICAL & SURGICAL HISTORY  Morbid obesity  Osteoarthritis  Diabetes  Asthma  H/O umbilical hernia repair    SOCIAL HISTORY:  Negative for smoking/alcohol/drug use.     ALLERGIES:  No Known Allergies    VITALS:   Vital Signs Last 24 Hrs  T(C): 36.6 (11 Jul 2018 07:49), Max: 37.1 (10 Jul 2018 15:07)  T(F): 97.8 (11 Jul 2018 07:49), Max: 98.7 (10 Jul 2018 15:07)  HR: 87 (11 Jul 2018 07:49) (87 - 94)  BP: 117/58 (11 Jul 2018 07:49) (114/55 - 123/58)  RR: 20 (11 Jul 2018 07:49) (18 - 20)      LABS:                        12.1   5.86  )-----------( 310      ( 10 Jul 2018 09:35 )             38.9     07-10    140  |  96<L>  |  9<L>  ----------------------------<  240<H>  3.9   |  29  |  0.7    Ca    8.2<L>      10 Jul 2018 09:35  Mg     1.8     07-10    RADIOLOGY:  No new imaging studies today     PHYSICAL EXAM:  GEN: No acute distress, sitting in bedside chair comfortably, morbid obesity   LUNGS: Clear to auscultation bilaterally   HEART: S1/S2 present. RRR. Distant heart sounds.   ABD: Soft, non-tender, non-distended. Bowel sounds present  EXT: NC/NE/2+PP/Skin Intact. Wound dressings on bilateral lower extremities     NEURO: AAOX3

## 2018-07-11 NOTE — PROGRESS NOTE ADULT - SUBJECTIVE AND OBJECTIVE BOX
SUBJECTIVE:    Patient is a 57y old Male who presents with a chief complaint of worsening erythema and drainage of B/L LE Venous stasis (10 Jul 2018 11:49)    Currently admitted to medicine with the primary diagnosis of venous stasis dermatitis of both lower extremities     Today is hospital day 7d. [facility]. He was successfully able to walk 150ft with physical therapy yesterday. The patient denies any headache, chest pain, dyspnea, abdominal pain, N/V/D/C at this time.     PAST MEDICAL & SURGICAL HISTORY  Morbid obesity  Osteoarthritis  Diabetes  Asthma  H/O umbilical hernia repair    SOCIAL HISTORY:  Negative for smoking/alcohol/drug use.     ALLERGIES:  No Known Allergies    MEDICATIONS:  STANDING MEDICATIONS  amoxicillin  875 milliGRAM(s)/clavulanate 1 Tablet(s) Oral two times a day  dextrose 5%. 1000 milliLiter(s) IV Continuous <Continuous>  dextrose 50% Injectable 12.5 Gram(s) IV Push once  dextrose 50% Injectable 25 Gram(s) IV Push once  dextrose 50% Injectable 25 Gram(s) IV Push once  enoxaparin Injectable 40 milliGRAM(s) SubCutaneous every 24 hours  furosemide   Injectable 40 milliGRAM(s) IV Push daily  insulin glargine Injectable (LANTUS) 20 Unit(s) SubCutaneous at bedtime  insulin lispro Injectable (HumaLOG) 10 Unit(s) SubCutaneous three times a day before meals  nystatin Cream 1 Application(s) Topical two times a day    PRN MEDICATIONS  dextrose 40% Gel 15 Gram(s) Oral once PRN  glucagon  Injectable 1 milliGRAM(s) IntraMuscular once PRN  oxyCODONE    5 mG/acetaminophen 325 mG 1 Tablet(s) Oral every 6 hours PRN    VITALS:   T(F): 97.8  HR: 87  BP: 117/58  RR: 20  SpO2: --    LABS:                        12.1   5.86  )-----------( 310      ( 10 Jul 2018 09:35 )             38.9     07-10    140  |  96<L>  |  9<L>  ----------------------------<  240<H>  3.9   |  29  |  0.7    Ca    8.2<L>      10 Jul 2018 09:35  Mg     1.8     07-10    RADIOLOGY:  No new imaging studies today     PHYSICAL EXAM:  GEN: No acute distress, sitting comfortably in bedside chair, obese   LUNGS: Clear to auscultation bilaterally   HEART: S1/S2 present. RRR. Distant heart sounds.   ABD: Soft, non-tender, non-distended. Bowel sounds present  EXT: NC/2+PP. Wound dressings on bilateral lower extremities for venous stasis dermatitis   NEURO: AAOX3

## 2018-07-11 NOTE — DIETITIAN INITIAL EVALUATION ADULT. - PT NOT SOURCE
LOS- pt is alert and oriented. Currently with 3+ b/l edema. LBM 7/11 diarrhea per pt, likely previously IVF and abx. has wound. No oral/GI problem otherwise./other (specify)

## 2018-07-11 NOTE — PROGRESS NOTE ADULT - ASSESSMENT
Dilan James is a 57M with PMH of DM, morbid obesity, OA, and asthma who presented to ER with worsening erythema and drainage from B/L LE Venous Stasis Ulcers     #Bilateral Venous Stasis Ulcers  -Augmentin 875mg PO q12h x 7 days, PO Lasix, pain control with Percocet   -Wound care, leg elevation, CHD 4% wash daily and PRN, soap and water BID, ABD pads and ace wrap dressing changes BID   -ID and burn following   -Repeat blood cx: no growth   -Physiatry recs: STR in SNF vs. home with VNS  -Will f/u with PT to see if patient can walk 150 ft    #Diabetes Mellitus   -A1C 8.0, monitor finger sticks   -Lantus 20 qhs, Lispro 10 units qac  -CC diet     #Asthma   -Albuterol PRN     #Normocytic anemia  -Hg 11.4, continue to monitor with CBC     DVT ppx: Lovenox 40   D/C TO ADULT HOME

## 2018-07-12 LAB
ANION GAP SERPL CALC-SCNC: 22 MMOL/L — HIGH (ref 7–14)
BASOPHILS # BLD AUTO: 0.02 K/UL — SIGNIFICANT CHANGE UP (ref 0–0.2)
BASOPHILS NFR BLD AUTO: 0.3 % — SIGNIFICANT CHANGE UP (ref 0–1)
BUN SERPL-MCNC: 11 MG/DL — SIGNIFICANT CHANGE UP (ref 10–20)
CALCIUM SERPL-MCNC: 8.5 MG/DL — SIGNIFICANT CHANGE UP (ref 8.5–10.1)
CHLORIDE SERPL-SCNC: 89 MMOL/L — LOW (ref 98–110)
CO2 SERPL-SCNC: 29 MMOL/L — SIGNIFICANT CHANGE UP (ref 17–32)
CREAT SERPL-MCNC: 0.7 MG/DL — SIGNIFICANT CHANGE UP (ref 0.7–1.5)
CULTURE RESULTS: SIGNIFICANT CHANGE UP
EOSINOPHIL # BLD AUTO: 0.22 K/UL — SIGNIFICANT CHANGE UP (ref 0–0.7)
EOSINOPHIL NFR BLD AUTO: 2.8 % — SIGNIFICANT CHANGE UP (ref 0–8)
GLUCOSE SERPL-MCNC: 133 MG/DL — HIGH (ref 70–99)
HCT VFR BLD CALC: 38.6 % — LOW (ref 42–52)
HGB BLD-MCNC: 11.8 G/DL — LOW (ref 14–18)
IMM GRANULOCYTES NFR BLD AUTO: 0.3 % — SIGNIFICANT CHANGE UP (ref 0.1–0.3)
LYMPHOCYTES # BLD AUTO: 1.24 K/UL — SIGNIFICANT CHANGE UP (ref 1.2–3.4)
LYMPHOCYTES # BLD AUTO: 16 % — LOW (ref 20.5–51.1)
MAGNESIUM SERPL-MCNC: 1.9 MG/DL — SIGNIFICANT CHANGE UP (ref 1.8–2.4)
MCHC RBC-ENTMCNC: 25.4 PG — LOW (ref 27–31)
MCHC RBC-ENTMCNC: 30.6 G/DL — LOW (ref 32–37)
MCV RBC AUTO: 83 FL — SIGNIFICANT CHANGE UP (ref 80–94)
MONOCYTES # BLD AUTO: 0.46 K/UL — SIGNIFICANT CHANGE UP (ref 0.1–0.6)
MONOCYTES NFR BLD AUTO: 5.9 % — SIGNIFICANT CHANGE UP (ref 1.7–9.3)
NEUTROPHILS # BLD AUTO: 5.79 K/UL — SIGNIFICANT CHANGE UP (ref 1.4–6.5)
NEUTROPHILS NFR BLD AUTO: 74.7 % — SIGNIFICANT CHANGE UP (ref 42.2–75.2)
NRBC # BLD: 0 /100 WBCS — SIGNIFICANT CHANGE UP (ref 0–0)
PLATELET # BLD AUTO: 333 K/UL — SIGNIFICANT CHANGE UP (ref 130–400)
POTASSIUM SERPL-MCNC: 3.8 MMOL/L — SIGNIFICANT CHANGE UP (ref 3.5–5)
POTASSIUM SERPL-SCNC: 3.8 MMOL/L — SIGNIFICANT CHANGE UP (ref 3.5–5)
RBC # BLD: 4.65 M/UL — LOW (ref 4.7–6.1)
RBC # FLD: 15.3 % — HIGH (ref 11.5–14.5)
SODIUM SERPL-SCNC: 140 MMOL/L — SIGNIFICANT CHANGE UP (ref 135–146)
SPECIMEN SOURCE: SIGNIFICANT CHANGE UP
WBC # BLD: 7.75 K/UL — SIGNIFICANT CHANGE UP (ref 4.8–10.8)
WBC # FLD AUTO: 7.75 K/UL — SIGNIFICANT CHANGE UP (ref 4.8–10.8)

## 2018-07-12 RX ORDER — OXYCODONE AND ACETAMINOPHEN 5; 325 MG/1; MG/1
1 TABLET ORAL ONCE
Qty: 0 | Refills: 0 | Status: DISCONTINUED | OUTPATIENT
Start: 2018-07-12 | End: 2018-07-19

## 2018-07-12 RX ADMIN — Medication 10 UNIT(S): at 12:17

## 2018-07-12 RX ADMIN — Medication 10 UNIT(S): at 08:06

## 2018-07-12 RX ADMIN — NYSTATIN CREAM 1 APPLICATION(S): 100000 CREAM TOPICAL at 17:39

## 2018-07-12 RX ADMIN — NYSTATIN CREAM 1 APPLICATION(S): 100000 CREAM TOPICAL at 05:13

## 2018-07-12 RX ADMIN — Medication 10 UNIT(S): at 17:40

## 2018-07-12 RX ADMIN — Medication 1 TABLET(S): at 05:13

## 2018-07-12 RX ADMIN — INSULIN GLARGINE 20 UNIT(S): 100 INJECTION, SOLUTION SUBCUTANEOUS at 21:28

## 2018-07-12 RX ADMIN — ENOXAPARIN SODIUM 40 MILLIGRAM(S): 100 INJECTION SUBCUTANEOUS at 05:13

## 2018-07-12 RX ADMIN — Medication 1 TABLET(S): at 17:39

## 2018-07-12 NOTE — PROGRESS NOTE ADULT - SUBJECTIVE AND OBJECTIVE BOX
SUBJECTIVE:    Patient is a 57y old Male who presents with a chief complaint of worsening erythema and drainage of B/L LE Venous stasis (10 Jul 2018 11:49)    Currently admitted to medicine with the primary diagnosis of venous stasis dermatitis of both lower extremities     Today is hospital day 8d. Patient has no complaints at this time. He denies headache, chest pain, dyspnea, abdominal pain, N/V/D/C. We are waiting on his placement in [facility] (pending quantiferon).     Home meds ([facility] and [place]): simvastatin 20 qhs, Levothyroxine 75 mcg daily, metformin 500mg BID, and aspirin 81mg.     PAST MEDICAL & SURGICAL HISTORY  Morbid obesity  Osteoarthritis  Diabetes  Asthma  H/O umbilical hernia repair    SOCIAL HISTORY:  Negative for smoking/alcohol/drug use.     ALLERGIES:  No Known Allergies    MEDICATIONS:  STANDING MEDICATIONS  amoxicillin  875 milliGRAM(s)/clavulanate 1 Tablet(s) Oral two times a day  dextrose 5%. 1000 milliLiter(s) IV Continuous <Continuous>  dextrose 50% Injectable 12.5 Gram(s) IV Push once  dextrose 50% Injectable 25 Gram(s) IV Push once  dextrose 50% Injectable 25 Gram(s) IV Push once  enoxaparin Injectable 40 milliGRAM(s) SubCutaneous every 24 hours  furosemide   Injectable 40 milliGRAM(s) IV Push daily  insulin glargine Injectable (LANTUS) 20 Unit(s) SubCutaneous at bedtime  insulin lispro Injectable (HumaLOG) 10 Unit(s) SubCutaneous three times a day before meals  nystatin Cream 1 Application(s) Topical two times a day  oxyCODONE    5 mG/acetaminophen 325 mG 1 Tablet(s) Oral once    PRN MEDICATIONS  dextrose 40% Gel 15 Gram(s) Oral once PRN  glucagon  Injectable 1 milliGRAM(s) IntraMuscular once PRN    VITALS:   T(F): 96.8  HR: 94  BP: 110/51  RR: 18  SpO2: --    LABS:                        11.8   7.75  )-----------( 333      ( 12 Jul 2018 06:17 )             38.6     07-12    140  |  89<L>  |  11  ----------------------------<  133<H>  3.8   |  29  |  0.7    Ca    8.5      12 Jul 2018 06:17  Mg     1.9     07-12    RADIOLOGY:  No new imaging studies today    PHYSICAL EXAM:  GEN: No acute distress, obese   LUNGS: Clear to auscultation bilaterally   HEART: S1/S2 present. RRR. Distant heart sounds.   ABD: Soft, non-tender, non-distended. Bowel sounds present  EXT: NC/2+PP/Skin Intact. Wound dressings on bilateral lower extremities    NEURO: AAOX3

## 2018-07-12 NOTE — PROGRESS NOTE ADULT - ASSESSMENT
Dilan James is a 57M with PMH of DM, morbid obesity, OA, and asthma who presented to ER with worsening erythema and drainage from B/L LE Venous Stasis Ulcers     #Bilateral Venous Stasis Ulcers  -Augmentin 875mg PO q12h x 7-14 days, PO Lasix, pain control with Percocet   -Wound care, leg elevation, CHD 4% wash daily and PRN, soap and water BID, ABD pads and ace wrap dressing changes BID   -ID and burn following   -Repeat blood cx: no growth   -Physiatry recs: STR in SNF vs. home with VNS  -Pt able to walk 150ft with PT     #Diabetes Mellitus   -A1C 8.0, monitor finger sticks   -Lantus 20 qhs, Lispro 10 units qac  -CC diet     #Asthma   -Albuterol PRN     #Normocytic anemia  -Hg 11.8, continue to monitor with CBC     DVT ppx: Lovenox 40   DISPO: Patient from home - says he needs to find a residence to live in to assist him with his medical care (also the house he lives in was sold and he needs to vacate within 2 weeks)   FULL CODE

## 2018-07-12 NOTE — PROGRESS NOTE ADULT - SUBJECTIVE AND OBJECTIVE BOX
SUBJECTIVE:    Patient is a 57y old Male who presents with a chief complaint of Worsening erythema and drainage of B/L LE Venous stasis (10 Jul 2018 11:49)    Currently admitted to medicine with the primary diagnosis of Venous stasis dermatitis of both lower extremities         PAST MEDICAL & SURGICAL HISTORY  Morbid obesity  Osteoarthritis  Diabetes  Asthma  H/O umbilical hernia repair    SOCIAL HISTORY:  Negative for smoking/alcohol/drug use.     ALLERGIES:  No Known Allergies    VITALS:   Vital Signs Last 24 Hrs  T(C): 36 (12 Jul 2018 08:01), Max: 37 (11 Jul 2018 15:48)  T(F): 96.8 (12 Jul 2018 08:01), Max: 98.6 (11 Jul 2018 15:48)  HR: 94 (12 Jul 2018 08:01) (88 - 94)  BP: 110/51 (12 Jul 2018 08:01) (110/51 - 116/57)  RR: 18 (12 Jul 2018 08:01) (18 - 18)      LABS:                        12.1   5.86  )-----------( 310      ( 10 Jul 2018 09:35 )             38.9     07-10    140  |  96<L>  |  9<L>  ----------------------------<  240<H>  3.9   |  29  |  0.7    Ca    8.2<L>      10 Jul 2018 09:35  Mg     1.8     07-10    RADIOLOGY:  No new imaging studies today     PHYSICAL EXAM:  GEN: No acute distress, sitting in bedside chair comfortably, morbid obesity   LUNGS: Clear to auscultation bilaterally   HEART: S1/S2 present. RRR. Distant heart sounds.   ABD: Soft, non-tender, non-distended. Bowel sounds present  EXT: NC/NE/2+PP/Skin Intact. Wound dressings on bilateral lower extremities     NEURO: AAOX3

## 2018-07-13 RX ADMIN — Medication 10 UNIT(S): at 12:32

## 2018-07-13 RX ADMIN — Medication 1 TABLET(S): at 05:08

## 2018-07-13 RX ADMIN — Medication 10 UNIT(S): at 08:25

## 2018-07-13 RX ADMIN — Medication 10 UNIT(S): at 17:50

## 2018-07-13 RX ADMIN — ENOXAPARIN SODIUM 40 MILLIGRAM(S): 100 INJECTION SUBCUTANEOUS at 05:08

## 2018-07-13 RX ADMIN — NYSTATIN CREAM 1 APPLICATION(S): 100000 CREAM TOPICAL at 17:51

## 2018-07-13 RX ADMIN — INSULIN GLARGINE 20 UNIT(S): 100 INJECTION, SOLUTION SUBCUTANEOUS at 21:02

## 2018-07-13 RX ADMIN — NYSTATIN CREAM 1 APPLICATION(S): 100000 CREAM TOPICAL at 05:08

## 2018-07-13 RX ADMIN — Medication 1 TABLET(S): at 17:51

## 2018-07-13 NOTE — PROGRESS NOTE ADULT - SUBJECTIVE AND OBJECTIVE BOX
SUBJECTIVE:    Patient is a 57y old Male who presents with a chief complaint of worsening erythema and drainage of B/L LE Venous stasis (10 Jul 2018 11:49)    Currently admitted to medicine with the primary diagnosis of venous stasis dermatitis of both lower extremities     Patient has no complaints at this time. He denies headache, chest pain, dyspnea, abdominal pain, N/V/D/C. We are waiting on his placement in [facility] (pending quantiferon).     Home meds ([facility] and [facility]): simvastatin 20 qhs, Levothyroxine 75 mcg daily, metformin 500mg BID, and aspirin 81mg.     PAST MEDICAL & SURGICAL HISTORY  Morbid obesity  Osteoarthritis  Diabetes  Asthma  H/O umbilical hernia repair    SOCIAL HISTORY:  Negative for smoking/alcohol/drug use.     ALLERGIES:  No Known Allergies    MEDICATIONS:  STANDING MEDICATIONS  amoxicillin  875 milliGRAM(s)/clavulanate 1 Tablet(s) Oral two times a day  dextrose 5%. 1000 milliLiter(s) IV Continuous <Continuous>  dextrose 50% Injectable 12.5 Gram(s) IV Push once  dextrose 50% Injectable 25 Gram(s) IV Push once  dextrose 50% Injectable 25 Gram(s) IV Push once  enoxaparin Injectable 40 milliGRAM(s) SubCutaneous every 24 hours  furosemide   Injectable 40 milliGRAM(s) IV Push daily  insulin glargine Injectable (LANTUS) 20 Unit(s) SubCutaneous at bedtime  insulin lispro Injectable (HumaLOG) 10 Unit(s) SubCutaneous three times a day before meals  nystatin Cream 1 Application(s) Topical two times a day  oxyCODONE    5 mG/acetaminophen 325 mG 1 Tablet(s) Oral once    PRN MEDICATIONS  dextrose 40% Gel 15 Gram(s) Oral once PRN  glucagon  Injectable 1 milliGRAM(s) IntraMuscular once PRN    VITALS:   Vital Signs Last 24 Hrs  T(C): 36.1 (13 Jul 2018 08:00), Max: 36.7 (13 Jul 2018 00:00)  T(F): 96.9 (13 Jul 2018 08:00), Max: 98 (13 Jul 2018 00:00)  HR: 97 (13 Jul 2018 08:00) (83 - 97)  BP: 111/53 (13 Jul 2018 08:00) (111/53 - 124/62  RR: 16 (13 Jul 2018 00:00) (16 - 16)    LABS:                        11.8   7.75  )-----------( 333      ( 12 Jul 2018 06:17 )             38.6     07-12    140  |  89<L>  |  11  ----------------------------<  133<H>  3.8   |  29  |  0.7    Ca    8.5      12 Jul 2018 06:17  Mg     1.9     07-12    RADIOLOGY:  No new imaging studies today    PHYSICAL EXAM:  GEN: No acute distress, obese   LUNGS: Clear to auscultation bilaterally   HEART: S1/S2 present. RRR. Distant heart sounds.   ABD: Soft, non-tender, non-distended. Bowel sounds present  EXT: NC/2+PP/Skin Intact. Wound dressings on bilateral lower extremities    NEURO: AAOX3 WDL

## 2018-07-13 NOTE — PROGRESS NOTE ADULT - ASSESSMENT
Dilan James is a 57M with PMH of DM, morbid obesity, OA, and asthma who presented to ER with worsening erythema and drainage from B/L LE Venous Stasis Ulcers     #Bilateral Venous Stasis Ulcers  -Augmentin 875mg PO q12h x 7-14 days, PO Lasix, pain control with Percocet   -Wound care, leg elevation, CHD 4% wash daily and PRN, soap and water BID, ABD pads and ace wrap dressing changes BID   -ID and burn following   -Repeat blood cx: no growth   -Physiatry recs: STR in SNF vs. home with VNS  -Pt able to walk 150ft with PT     #Diabetes Mellitus   -A1C 8.0, monitor finger sticks   -Lantus 20 qhs, Lispro 10 units qac  -CC diet     #Asthma   -Albuterol PRN     #Normocytic anemia  -Hg 11.8, continue to monitor with CBC     DVT ppx: Lovenox 40   DISPO: Patient from home - says he needs to find a residence to live in to assist him with his medical care (also the house he lives in was sold and he needs to vacate within 2 weeks)   FULL CODE     Await financial clearance before d/c to [facility] and also await quantiferon testing result for the adult home he is going to

## 2018-07-13 NOTE — PROGRESS NOTE ADULT - SUBJECTIVE AND OBJECTIVE BOX
SUBJECTIVE:    Patient is a 57y old Male who presents with a chief complaint of Worsening erythema and drainage of B/L LE Venous stasis (10 Jul 2018 11:49)    Currently admitted to medicine with the primary diagnosis of Venous stasis dermatitis of both lower extremities     Today is hospital day 9d. We are waiting for financial clearance before discharging the patient to [facility]. Call and spoke to patient's brother today to update him on the patient's hospital course and discharge plan. The patient denies any headache, chest pain, palpitations, dyspnea, abdominal pain, N/V/D/C or dysuria at this time.     PAST MEDICAL & SURGICAL HISTORY  Morbid obesity  Osteoarthritis  Diabetes  Asthma  H/O umbilical hernia repair    SOCIAL HISTORY:  Negative for smoking/alcohol/drug use.     ALLERGIES:  No Known Allergies    MEDICATIONS:  STANDING MEDICATIONS  amoxicillin  875 milliGRAM(s)/clavulanate 1 Tablet(s) Oral two times a day  dextrose 5%. 1000 milliLiter(s) IV Continuous <Continuous>  dextrose 50% Injectable 12.5 Gram(s) IV Push once  dextrose 50% Injectable 25 Gram(s) IV Push once  dextrose 50% Injectable 25 Gram(s) IV Push once  enoxaparin Injectable 40 milliGRAM(s) SubCutaneous every 24 hours  furosemide   Injectable 40 milliGRAM(s) IV Push daily  insulin glargine Injectable (LANTUS) 20 Unit(s) SubCutaneous at bedtime  insulin lispro Injectable (HumaLOG) 10 Unit(s) SubCutaneous three times a day before meals  nystatin Cream 1 Application(s) Topical two times a day  oxyCODONE    5 mG/acetaminophen 325 mG 1 Tablet(s) Oral once    PRN MEDICATIONS  dextrose 40% Gel 15 Gram(s) Oral once PRN  glucagon  Injectable 1 milliGRAM(s) IntraMuscular once PRN    VITALS:   T(F): 98.5  HR: 92  BP: 132/69  RR: 18  SpO2: --    LABS:                        11.8   7.75  )-----------( 333      ( 12 Jul 2018 06:17 )             38.6     07-12    140  |  89<L>  |  11  ----------------------------<  133<H>  3.8   |  29  |  0.7    Ca    8.5      12 Jul 2018 06:17  Mg     1.9     07-12                    RADIOLOGY:    PHYSICAL EXAM:  GEN: No acute distress  LUNGS: Clear to auscultation bilaterally   HEART: S1/S2 present. RRR.   ABD: Soft, non-tender, non-distended. Bowel sounds present  EXT: NC/NC/NE/2+PP/VILLEGAS/Skin Intact.   NEURO: AAOX3    Intravenous access:   NG tube:   Lala cathter: SUBJECTIVE:    Patient is a 57y old Male who presents with a chief complaint of Worsening erythema and drainage of B/L LE Venous stasis (10 Jul 2018 11:49)    Currently admitted to medicine with the primary diagnosis of Venous stasis dermatitis of both lower extremities     Today is hospital day 9d. We are waiting for financial clearance before discharging the patient to [facility]. Call and spoke to patient's brother today to update him on the patient's hospital course and discharge plan. The patient denies any headache, chest pain, palpitations, dyspnea, abdominal pain, N/V/D/C or dysuria at this time.     PAST MEDICAL & SURGICAL HISTORY  Morbid obesity  Osteoarthritis  Diabetes  Asthma  H/O umbilical hernia repair    SOCIAL HISTORY:  Negative for smoking/alcohol/drug use.     ALLERGIES:  No Known Allergies    MEDICATIONS:  STANDING MEDICATIONS  amoxicillin  875 milliGRAM(s)/clavulanate 1 Tablet(s) Oral two times a day  dextrose 5%. 1000 milliLiter(s) IV Continuous <Continuous>  dextrose 50% Injectable 12.5 Gram(s) IV Push once  dextrose 50% Injectable 25 Gram(s) IV Push once  dextrose 50% Injectable 25 Gram(s) IV Push once  enoxaparin Injectable 40 milliGRAM(s) SubCutaneous every 24 hours  furosemide   Injectable 40 milliGRAM(s) IV Push daily  insulin glargine Injectable (LANTUS) 20 Unit(s) SubCutaneous at bedtime  insulin lispro Injectable (HumaLOG) 10 Unit(s) SubCutaneous three times a day before meals  nystatin Cream 1 Application(s) Topical two times a day  oxyCODONE    5 mG/acetaminophen 325 mG 1 Tablet(s) Oral once    PRN MEDICATIONS  dextrose 40% Gel 15 Gram(s) Oral once PRN  glucagon  Injectable 1 milliGRAM(s) IntraMuscular once PRN    VITALS:   T(F): 98.5  HR: 92  BP: 132/69  RR: 18  SpO2: --    LABS:                        11.8   7.75  )-----------( 333      ( 12 Jul 2018 06:17 )             38.6     07-12    140  |  89<L>  |  11  ----------------------------<  133<H>  3.8   |  29  |  0.7    Ca    8.5      12 Jul 2018 06:17  Mg     1.9     07-12    RADIOLOGY:  No new imaging studies today     PHYSICAL EXAM:  GEN: No acute distress, obese, sitting comfortably in bedside chair   LUNGS: Clear to auscultation bilaterally   HEART: S1/S2 present. RRR. Distant heart sounds.   ABD: Soft, non-tender, non-distended. Bowel sounds present  EXT: NC/2+PP. Wound dressings on bilateral lower extremities.   NEURO: AAOX3

## 2018-07-14 LAB
ANION GAP SERPL CALC-SCNC: 13 MMOL/L — SIGNIFICANT CHANGE UP (ref 7–14)
BASOPHILS # BLD AUTO: 0.02 K/UL — SIGNIFICANT CHANGE UP (ref 0–0.2)
BASOPHILS NFR BLD AUTO: 0.3 % — SIGNIFICANT CHANGE UP (ref 0–1)
BUN SERPL-MCNC: 12 MG/DL — SIGNIFICANT CHANGE UP (ref 10–20)
CALCIUM SERPL-MCNC: 8.9 MG/DL — SIGNIFICANT CHANGE UP (ref 8.5–10.1)
CHLORIDE SERPL-SCNC: 100 MMOL/L — SIGNIFICANT CHANGE UP (ref 98–110)
CO2 SERPL-SCNC: 29 MMOL/L — SIGNIFICANT CHANGE UP (ref 17–32)
CREAT SERPL-MCNC: 0.7 MG/DL — SIGNIFICANT CHANGE UP (ref 0.7–1.5)
EOSINOPHIL # BLD AUTO: 0.3 K/UL — SIGNIFICANT CHANGE UP (ref 0–0.7)
EOSINOPHIL NFR BLD AUTO: 4.1 % — SIGNIFICANT CHANGE UP (ref 0–8)
GLUCOSE SERPL-MCNC: 130 MG/DL — HIGH (ref 70–99)
HCT VFR BLD CALC: 38.6 % — LOW (ref 42–52)
HGB BLD-MCNC: 12 G/DL — LOW (ref 14–18)
IMM GRANULOCYTES NFR BLD AUTO: 0.4 % — HIGH (ref 0.1–0.3)
LYMPHOCYTES # BLD AUTO: 1.62 K/UL — SIGNIFICANT CHANGE UP (ref 1.2–3.4)
LYMPHOCYTES # BLD AUTO: 22.3 % — SIGNIFICANT CHANGE UP (ref 20.5–51.1)
M TB TUBERC IFN-G BLD QL: 0 IU/ML — SIGNIFICANT CHANGE UP
M TB TUBERC IFN-G BLD QL: 0.04 IU/ML — SIGNIFICANT CHANGE UP
M TB TUBERC IFN-G BLD QL: NEGATIVE — SIGNIFICANT CHANGE UP
MAGNESIUM SERPL-MCNC: 2.1 MG/DL — SIGNIFICANT CHANGE UP (ref 1.8–2.4)
MCHC RBC-ENTMCNC: 25.6 PG — LOW (ref 27–31)
MCHC RBC-ENTMCNC: 31.1 G/DL — LOW (ref 32–37)
MCV RBC AUTO: 82.3 FL — SIGNIFICANT CHANGE UP (ref 80–94)
MITOGEN IGNF BCKGRD COR BLD-ACNC: 6.15 IU/ML — SIGNIFICANT CHANGE UP
MONOCYTES # BLD AUTO: 0.41 K/UL — SIGNIFICANT CHANGE UP (ref 0.1–0.6)
MONOCYTES NFR BLD AUTO: 5.6 % — SIGNIFICANT CHANGE UP (ref 1.7–9.3)
NEUTROPHILS # BLD AUTO: 4.88 K/UL — SIGNIFICANT CHANGE UP (ref 1.4–6.5)
NEUTROPHILS NFR BLD AUTO: 67.3 % — SIGNIFICANT CHANGE UP (ref 42.2–75.2)
NRBC # BLD: 0 /100 WBCS — SIGNIFICANT CHANGE UP (ref 0–0)
PLATELET # BLD AUTO: 302 K/UL — SIGNIFICANT CHANGE UP (ref 130–400)
POTASSIUM SERPL-MCNC: 4.6 MMOL/L — SIGNIFICANT CHANGE UP (ref 3.5–5)
POTASSIUM SERPL-SCNC: 4.6 MMOL/L — SIGNIFICANT CHANGE UP (ref 3.5–5)
RBC # BLD: 4.69 M/UL — LOW (ref 4.7–6.1)
RBC # FLD: 15.4 % — HIGH (ref 11.5–14.5)
SODIUM SERPL-SCNC: 142 MMOL/L — SIGNIFICANT CHANGE UP (ref 135–146)
WBC # BLD: 7.26 K/UL — SIGNIFICANT CHANGE UP (ref 4.8–10.8)
WBC # FLD AUTO: 7.26 K/UL — SIGNIFICANT CHANGE UP (ref 4.8–10.8)

## 2018-07-14 RX ADMIN — Medication 10 UNIT(S): at 17:06

## 2018-07-14 RX ADMIN — ENOXAPARIN SODIUM 40 MILLIGRAM(S): 100 INJECTION SUBCUTANEOUS at 05:38

## 2018-07-14 RX ADMIN — Medication 1 TABLET(S): at 05:38

## 2018-07-14 RX ADMIN — NYSTATIN CREAM 1 APPLICATION(S): 100000 CREAM TOPICAL at 17:06

## 2018-07-14 RX ADMIN — Medication 1 TABLET(S): at 17:06

## 2018-07-14 RX ADMIN — Medication 10 UNIT(S): at 08:07

## 2018-07-14 RX ADMIN — NYSTATIN CREAM 1 APPLICATION(S): 100000 CREAM TOPICAL at 05:38

## 2018-07-14 RX ADMIN — Medication 10 UNIT(S): at 11:25

## 2018-07-14 RX ADMIN — INSULIN GLARGINE 20 UNIT(S): 100 INJECTION, SOLUTION SUBCUTANEOUS at 21:14

## 2018-07-14 NOTE — CHART NOTE - NSCHARTNOTEFT_GEN_A_CORE
60 year old male known to have Morbid obesity, OA, DM, asthma admitted for venous statis dermatitis.  patient suddenly around 9.15 pm started having loss of vision in his left eye , no symptoms before, denies any eye pain,  when examined patient eyes had no redness, no erythema, he denies any previous episodes that are similar   no pain  on PE: CN2: normal pupillary reflex  patient has 0 vision in left eye, visual field in R eye normal  eom normal   other cranial nerves intact  motor power 5/5   sensory 5/5   senior and stroke code was called  CT scan urgent was ordered   ophtalmo consul  venous carotid duplex ordered    will follow up closely on results

## 2018-07-14 NOTE — PROGRESS NOTE ADULT - SUBJECTIVE AND OBJECTIVE BOX
SUBJECTIVE:    Patient is a 57y old Male who presents with a chief complaint of worsening erythema and drainage of B/L LE Venous stasis (10 Jul 2018 11:49)    Currently admitted to medicine with the primary diagnosis of venous stasis dermatitis of both lower extremities     Today is hospital day 10d. The patient is doing well this morning. He is looking forward to moving into [facility]. Quantiferon was negative.     PAST MEDICAL & SURGICAL HISTORY  Morbid obesity  Osteoarthritis  Diabetes  Asthma  H/O umbilical hernia repair    SOCIAL HISTORY:  Negative for smoking/alcohol/drug use.     ALLERGIES:  No Known Allergies    MEDICATIONS:  STANDING MEDICATIONS  amoxicillin  875 milliGRAM(s)/clavulanate 1 Tablet(s) Oral two times a day  dextrose 5%. 1000 milliLiter(s) IV Continuous <Continuous>  dextrose 50% Injectable 12.5 Gram(s) IV Push once  dextrose 50% Injectable 25 Gram(s) IV Push once  dextrose 50% Injectable 25 Gram(s) IV Push once  enoxaparin Injectable 40 milliGRAM(s) SubCutaneous every 24 hours  furosemide   Injectable 40 milliGRAM(s) IV Push daily  insulin glargine Injectable (LANTUS) 20 Unit(s) SubCutaneous at bedtime  insulin lispro Injectable (HumaLOG) 10 Unit(s) SubCutaneous three times a day before meals  nystatin Cream 1 Application(s) Topical two times a day  oxyCODONE    5 mG/acetaminophen 325 mG 1 Tablet(s) Oral once    PRN MEDICATIONS  dextrose 40% Gel 15 Gram(s) Oral once PRN  glucagon  Injectable 1 milliGRAM(s) IntraMuscular once PRN    VITALS:   T(F): 97.3  HR: 84  BP: 129/74  RR: 18  SpO2: --    LABS:                        12.0   7.26  )-----------( 302      ( 14 Jul 2018 04:16 )             38.6     07-14    142  |  100  |  12  ----------------------------<  130<H>  4.6   |  29  |  0.7    Ca    8.9      14 Jul 2018 04:16  Mg     2.1     07-14    RADIOLOGY:  No new imaging studies today     PHYSICAL EXAM:  GEN: No acute distress, sitting comfortably in bedside chair, obesity   LUNGS: Clear to auscultation bilaterally   HEART: S1/S2 present. RRR. Distant heart sounds.   ABD: Soft, non-tender, non-distended. Bowel sounds present  EXT: NC/2+PP. Wound dressings on bilateral lower extremities.   NEURO: AAOX3

## 2018-07-14 NOTE — PROVIDER CONTACT NOTE (OTHER) - ASSESSMENT
alert and verbally responsive, no s/s of distress, no c/o pain. neuro check performed, no drooping noted, pupils equally responsive to light, legs and arm equally responsive, follows direction correctly

## 2018-07-14 NOTE — PROGRESS NOTE ADULT - SUBJECTIVE AND OBJECTIVE BOX
[place]  [facility] ([facility])            Patient was evaluated and examined  by bedside, c/o occasional right hip pain                REVIEW OF SYSTEMS:  please see pertinent positives mentioned above, all other 12 ROS negative      T(C): , Max: 36.9 (07-13-18 @ 15:37)  HR: 84 (07-14-18 @ 08:00)  BP: 129/74 (07-14-18 @ 08:00)  RR: 18 (07-14-18 @ 08:00)  SpO2: --  CAPILLARY BLOOD GLUCOSE  131 (14 Jul 2018 11:02)  130 (14 Jul 2018 08:00)  144 (13 Jul 2018 20:41)  167 (13 Jul 2018 16:07)          PHYSICAL EXAM:  General: NAD, AAOX3, patient is laying comfortably in bed, morbidly obese  HEENT: AT, NC, Supple, NO JVD, NO CB  Lungs: CTA B/L, no wheezing, no rhonchi  CVS: normal S1, S2, RRR, NO M/G/R  Abdomen: soft, bowel sounds present, non-tender, non-distended  Extremities: no edema, no clubbing, no cyanosis, positive peripheral pulses b/l  Neuro: no acute focal neurological deficits, unsteady gait  Skin: b/l lower extremities wounds, no rush, no ecchymosis      LAB  CBC  Date: 07-14-18 @ 04:16  Mean cell Yyvaooesyh98.6  Mean cell Hemoglobin Conc31.1  Mean cell Volum 82.3  Platelet count-Automate 302  RBC Count 4.69  Red Cell Distrib Width15.4  WBC Count7.26  % Albumin, Urine--  Hematocrit 38.6  Hemoglobin 12.0  CBC  Date: 07-12-18 @ 06:17  Mean cell Crgeuicfjn53.4  Mean cell Hemoglobin Conc30.6  Mean cell Volum 83.0  Platelet count-Automate 333  RBC Count 4.65  Red Cell Distrib Width15.3  WBC Count7.75  % Albumin, Urine--  Hematocrit 38.6  Hemoglobin 11.8  CBC  Date: 07-10-18 @ 09:35  Mean cell Tofgfpyvgj55.7  Mean cell Hemoglobin Conc31.1  Mean cell Volum 82.6  Platelet count-Automate 310  RBC Count 4.71  Red Cell Distrib Width15.2  WBC Count5.86  % Albumin, Urine--  Hematocrit 38.9  Hemoglobin 12.1  CBC  Date: 07-09-18 @ 07:37  Mean cell Uzajwzrtqz12.6  Mean cell Hemoglobin Conc31.3  Mean cell Volum 81.8  Platelet count-Automate 339  RBC Count 4.45  Red Cell Distrib Width15.0  WBC Count6.52  % Albumin, Urine--  Hematocrit 36.4  Hemoglobin 11.4    [facility]  07-14-18 @ 04:16  Blood Gas Arterial-Calcium,Ionized--  Blood Urea Nitrogen, Serum 12 mg/dL [10 - 20]  Carbon Dioxide, Serum29 mmol/L [17 - 32]  Chloride, Lvirw903 mmol/L [98 - 110]  Creatinie, Serum0.7 mg/dL [0.7 - 1.5]  Glucose, Hyurk649 mg/dL<H> [70 - 99]  Potassium, Serum4.6 mmol/L [3.5 - 5.0]  Sodium, Serum 142 mmol/L [135 - 146]  [facility]  07-12-18 @ 06:17  Blood Gas Arterial-Calcium,Ionized--  Blood Urea Nitrogen, Serum 11 mg/dL [10 - 20]  Carbon Dioxide, Serum29 mmol/L [17 - 32]  Chloride, Serum89 mmol/L<L> [98 - 110]  Creatinie, Serum0.7 mg/dL [0.7 - 1.5]  Glucose, Ktyuh066 mg/dL<H> [70 - 99]  Potassium, Serum3.8 mmol/L [3.5 - 5.0]  Sodium, Serum 140 mmol/L [135 - 146]  [facility]  07-10-18 @ 09:35  Blood Gas Arterial-Calcium,Ionized--  Blood Urea Nitrogen, Serum 9 mg/dL<L> [10 - 20]  Carbon Dioxide, Serum29 mmol/L [17 - 32]  Chloride, Serum96 mmol/L<L> [98 - 110]  Creatinie, Serum0.7 mg/dL [0.7 - 1.5]  Glucose, Ulzro996 mg/dL<H> [70 - 99]  Potassium, Serum3.9 mmol/L [3.5 - 5.0]  Sodium, Serum 140 mmol/L [135 - 146]              Microbiology:    Culture - Blood (collected 07-07-18 @ 06:18)  Source: .Blood None  Final Report (07-12-18 @ 17:01):    No growth at 5 days.    Culture - Blood (collected 07-04-18 @ 21:29)  Source: .Blood None  Gram Stain (07-07-18 @ 17:38):    Growth in aerobic bottle: Gram Positive Cocci in Clusters    Growth in anaerobic bottle: Gram Positive Cocci in Clusters  Final Report (07-08-18 @ 18:39):    Growth in aerobic and anaerobic bottles: Coag Negative Staphylococcus    Single set isolate, possible contaminant. Contact    Microbiology if susceptibility testing clinically    indicated.    "Due to technical problems, Proteus sp. will Not be reported aspart of    the BCID panel until further notice"    ***Blood Panel PCR results on this specimen are available    approximately 3 hours after the Gram stain result.***    Gram stain, PCR, and/or culture results may not always    correspond due to difference in methodologies.    ************************************************************    This PCR assay was performed using [employer].    The following targets are tested for: Enterococcus,    vancomycin resistant enterococci, Listeria monocytogenes,    coagulase negative staphylococci, S. aureus,    methicillin resistant S. aureus, Streptococcus agalactiae    (Group B), S. pneumoniae, S. pyogenes (Group A),    Acinetobacter baumannii, Enterobacter cloacae, E. coli,    Klebsiella oxytoca, K. pneumoniae, Proteus sp.,    Serratia marcescens, Haemophilus influenzae,    Neisseria meningitidis, Pseudomonas aeruginosa, Candida    albicans, C. glabrata, C krusei, C parapsilosis,    C. tropicalis and the KPC resistance gene.  Organism: Blood Culture PCR (07-06-18 @ 17:57)  Organism: Blood Culture PCR (07-06-18 @ 17:57)      -  Coagulase negative Staphylococcus: Detec      Method Type: PCR    Culture - Blood (collected 07-04-18 @ 13:55)  Source: .Blood Blood  Final Report (07-09-18 @ 23:00):    No growth at 5 days.    Culture - Stool (collected 06-28-18 @ 09:32)  Source: .Stool Feces  Final Report (07-01-18 @ 15:47):    No enteric pathogens isolated.    (Stool culture examined for Salmonella,    Shigella, Campylobacter, Aeromonas, Plesiomonas,    Vibrio, E.coli O157 and Yersinia)    Culture - Urine (collected 06-25-18 @ 23:21)  Source: .Urine Clean Catch (Midstream)  Final Report (06-27-18 @ 12:48):    <10,000 CFU/ml    Normal Urogenital dashawn present            Medications:  amoxicillin  875 milliGRAM(s)/clavulanate 1 Tablet(s) Oral two times a day  dextrose 40% Gel 15 Gram(s) Oral once PRN  dextrose 5%. 1000 milliLiter(s) IV Continuous <Continuous>  dextrose 50% Injectable 12.5 Gram(s) IV Push once  dextrose 50% Injectable 25 Gram(s) IV Push once  dextrose 50% Injectable 25 Gram(s) IV Push once  enoxaparin Injectable 40 milliGRAM(s) SubCutaneous every 24 hours  furosemide   Injectable 40 milliGRAM(s) IV Push daily  glucagon  Injectable 1 milliGRAM(s) IntraMuscular once PRN  insulin glargine Injectable (LANTUS) 20 Unit(s) SubCutaneous at bedtime  insulin lispro Injectable (HumaLOG) 10 Unit(s) SubCutaneous three times a day before meals  nystatin Cream 1 Application(s) Topical two times a day  oxyCODONE    5 mG/acetaminophen 325 mG 1 Tablet(s) Oral once        Assessment and Plan:  #Bilateral Venous Stasis Ulcers  -Augmentin 875mg PO q12h x 7-14 days, PO Lasix, pain control with Percocet   -Wound care, leg elevation, CHD 4% wash daily and PRN, soap and water BID, ABD pads and ace wrap dressing changes BID   -ID and burn following   -Repeat blood cx: no growth   -Physiatry recs: STR in SNF vs. home with VNS      #Diabetes Mellitus   -A1C 8.0, monitor finger sticks   -Lantus 20 qhs, Lispro 10 units qac  -CC diet     #Asthma   -Albuterol PRN     #Normocytic anemia  -Hg 11.8, continue to monitor with CBC     DVT ppx: Lovenox 40     FULL CODE     d/c to STR vs. adult home when bed available

## 2018-07-14 NOTE — PROGRESS NOTE ADULT - ASSESSMENT
Dilan James is a 57M with PMH of DM, morbid obesity, OA, and asthma who presented to ER with worsening erythema and drainage from B/L LE Venous Stasis Ulcers     #Bilateral Venous Stasis Ulcers  -Augmentin 875mg PO q12h x 14 days (day 10), PO Lasix  -Wound care, leg elevation, CHD 4% wash daily and PRN, soap and water BID, ABD pads and ace wrap dressing changes BID   -ID and burn following   -Repeat blood cx: no growth   -Physiatry recs: STR in SNF vs. home with VNS  -Pt able to walk 150ft with PT     #Diabetes Mellitus   -A1C 8.0, monitor finger sticks   -Lantus 20 qhs, Lispro 10 units qac  -CC diet     #Asthma   -Albuterol PRN     #Normocytic anemia  -Hg 12, continue to monitor with CBC     DVT ppx: Lovenox 40   DISPO: Discharge to [facility] pending financial approval   FULL CODE

## 2018-07-15 LAB
ANION GAP SERPL CALC-SCNC: 14 MMOL/L — SIGNIFICANT CHANGE UP (ref 7–14)
BUN SERPL-MCNC: 12 MG/DL — SIGNIFICANT CHANGE UP (ref 10–20)
CALCIUM SERPL-MCNC: 8.3 MG/DL — LOW (ref 8.5–10.1)
CHLORIDE SERPL-SCNC: 98 MMOL/L — SIGNIFICANT CHANGE UP (ref 98–110)
CO2 SERPL-SCNC: 26 MMOL/L — SIGNIFICANT CHANGE UP (ref 17–32)
CREAT SERPL-MCNC: 0.6 MG/DL — LOW (ref 0.7–1.5)
ERYTHROCYTE [SEDIMENTATION RATE] IN BLOOD: 63 MM/HR — HIGH (ref 0–10)
ERYTHROCYTE [SEDIMENTATION RATE] IN BLOOD: 70 MM/HR — HIGH (ref 0–10)
GLUCOSE SERPL-MCNC: 115 MG/DL — HIGH (ref 70–99)
HCT VFR BLD CALC: 37 % — LOW (ref 42–52)
HGB BLD-MCNC: 11.3 G/DL — LOW (ref 14–18)
MCHC RBC-ENTMCNC: 25.3 PG — LOW (ref 27–31)
MCHC RBC-ENTMCNC: 30.5 G/DL — LOW (ref 32–37)
MCV RBC AUTO: 83 FL — SIGNIFICANT CHANGE UP (ref 80–94)
NRBC # BLD: 0 /100 WBCS — SIGNIFICANT CHANGE UP (ref 0–0)
PLATELET # BLD AUTO: 278 K/UL — SIGNIFICANT CHANGE UP (ref 130–400)
POTASSIUM SERPL-MCNC: 4.7 MMOL/L — SIGNIFICANT CHANGE UP (ref 3.5–5)
POTASSIUM SERPL-SCNC: 4.7 MMOL/L — SIGNIFICANT CHANGE UP (ref 3.5–5)
RBC # BLD: 4.46 M/UL — LOW (ref 4.7–6.1)
RBC # FLD: 15.4 % — HIGH (ref 11.5–14.5)
SODIUM SERPL-SCNC: 138 MMOL/L — SIGNIFICANT CHANGE UP (ref 135–146)
WBC # BLD: 7.26 K/UL — SIGNIFICANT CHANGE UP (ref 4.8–10.8)
WBC # FLD AUTO: 7.26 K/UL — SIGNIFICANT CHANGE UP (ref 4.8–10.8)

## 2018-07-15 PROCEDURE — 93880 EXTRACRANIAL BILAT STUDY: CPT | Mod: 26

## 2018-07-15 RX ORDER — ASPIRIN/CALCIUM CARB/MAGNESIUM 324 MG
81 TABLET ORAL EVERY 24 HOURS
Qty: 0 | Refills: 0 | Status: DISCONTINUED | OUTPATIENT
Start: 2018-07-15 | End: 2018-07-15

## 2018-07-15 RX ORDER — ASPIRIN/CALCIUM CARB/MAGNESIUM 324 MG
81 TABLET ORAL DAILY
Qty: 0 | Refills: 0 | Status: DISCONTINUED | OUTPATIENT
Start: 2018-07-15 | End: 2018-07-20

## 2018-07-15 RX ORDER — ASPIRIN/CALCIUM CARB/MAGNESIUM 324 MG
325 TABLET ORAL ONCE
Qty: 0 | Refills: 0 | Status: DISCONTINUED | OUTPATIENT
Start: 2018-07-15 | End: 2018-07-15

## 2018-07-15 RX ORDER — ATORVASTATIN CALCIUM 80 MG/1
40 TABLET, FILM COATED ORAL EVERY 24 HOURS
Qty: 0 | Refills: 0 | Status: DISCONTINUED | OUTPATIENT
Start: 2018-07-15 | End: 2018-07-20

## 2018-07-15 RX ORDER — ATORVASTATIN CALCIUM 80 MG/1
40 TABLET, FILM COATED ORAL ONCE
Qty: 0 | Refills: 0 | Status: COMPLETED | OUTPATIENT
Start: 2018-07-15 | End: 2018-07-15

## 2018-07-15 RX ORDER — ASPIRIN/CALCIUM CARB/MAGNESIUM 324 MG
81 TABLET ORAL ONCE
Qty: 0 | Refills: 0 | Status: DISCONTINUED | OUTPATIENT
Start: 2018-07-15 | End: 2018-07-15

## 2018-07-15 RX ORDER — ASPIRIN/CALCIUM CARB/MAGNESIUM 324 MG
81 TABLET ORAL ONCE
Qty: 0 | Refills: 0 | Status: COMPLETED | OUTPATIENT
Start: 2018-07-15 | End: 2018-07-15

## 2018-07-15 RX ADMIN — Medication 81 MILLIGRAM(S): at 11:03

## 2018-07-15 RX ADMIN — ATORVASTATIN CALCIUM 40 MILLIGRAM(S): 80 TABLET, FILM COATED ORAL at 01:44

## 2018-07-15 RX ADMIN — Medication 10 UNIT(S): at 17:00

## 2018-07-15 RX ADMIN — Medication 81 MILLIGRAM(S): at 01:44

## 2018-07-15 RX ADMIN — Medication 10 UNIT(S): at 11:03

## 2018-07-15 RX ADMIN — Medication 1 TABLET(S): at 17:00

## 2018-07-15 RX ADMIN — INSULIN GLARGINE 20 UNIT(S): 100 INJECTION, SOLUTION SUBCUTANEOUS at 22:18

## 2018-07-15 RX ADMIN — Medication 1 TABLET(S): at 05:12

## 2018-07-15 RX ADMIN — NYSTATIN CREAM 1 APPLICATION(S): 100000 CREAM TOPICAL at 17:00

## 2018-07-15 RX ADMIN — Medication 10 UNIT(S): at 07:53

## 2018-07-15 RX ADMIN — ENOXAPARIN SODIUM 40 MILLIGRAM(S): 100 INJECTION SUBCUTANEOUS at 05:11

## 2018-07-15 RX ADMIN — NYSTATIN CREAM 1 APPLICATION(S): 100000 CREAM TOPICAL at 05:12

## 2018-07-15 NOTE — CHART NOTE - NSCHARTNOTEFT_GEN_A_CORE
ophtalmology were contacted stat and they contacted me back, and upon reviewing chart and history they stated that no need to come in as an emergent consult and they will follow up with the team tomorrow they were updated on that the patient has no motion detection, or finger counting in left eye that is new and is not getting better

## 2018-07-15 NOTE — PROGRESS NOTE ADULT - SUBJECTIVE AND OBJECTIVE BOX
EVIN FABIAN  [facility] F3-4B 004 B ([facility] F3[facility])            Patient was evaluated and examined  by bedside, reported loss of vision in left eye yesterday, and today his vision improved and he is seeing blurred with left eye. no fever, no pain in eyes. no headaches, ct head was completed - no acute changes, awaiting for carotid dopplers and ophthalmology evaluation                REVIEW OF SYSTEMS:  please see pertinent positives mentioned above, all other 12 ROS negative      T(C): , Max: 36.6 (07-14-18 @ 22:45)  HR: 97 (07-15-18 @ 10:51)  BP: 131/67 (07-15-18 @ 10:51)  RR: 18 (07-15-18 @ 10:51)  SpO2: 99% (07-14-18 @ 21:16)  CAPILLARY BLOOD GLUCOSE  123 (15 Jul 2018 11:27)  122 (15 Jul 2018 07:24)  202 (14 Jul 2018 21:12)  129 (14 Jul 2018 17:24)          PHYSICAL EXAM:  General: NAD, AAOX3, patient is laying comfortably in bed, morbidly obese  HEENT: AT, NC, Supple, NO JVD, NO CB, blurred vision in left eye  Lungs: CTA B/L, no wheezing, no rhonchi  CVS: normal S1, S2, RRR, NO M/G/R  Abdomen: soft, bowel sounds present, non-tender, non-distended  Extremities: no edema, no clubbing, no cyanosis, positive peripheral pulses b/l  Neuro: no acute focal neurological deficits, unsteady gait  Skin: b/l lower extremities wounds, no rush, no ecchymosis      LAB  CBC  Date: 07-15-18 @ 06:06  Mean cell Rxmzrpuabr92.3  Mean cell Hemoglobin Conc30.5  Mean cell Volum 83.0  Platelet count-Automate 278  RBC Count 4.46  Red Cell Distrib Width15.4  WBC Count7.26  % Albumin, Urine--  Hematocrit 37.0  Hemoglobin 11.3  CBC  Date: 07-14-18 @ 04:16  Mean cell Dzrhkthddg65.6  Mean cell Hemoglobin Conc31.1  Mean cell Volum 82.3  Platelet count-Automate 302  RBC Count 4.69  Red Cell Distrib Width15.4  WBC Count7.26  % Albumin, Urine--  Hematocrit 38.6  Hemoglobin 12.0  CBC  Date: 07-12-18 @ 06:17  Mean cell Djviwgqwya73.4  Mean cell Hemoglobin Conc30.6  Mean cell Volum 83.0  Platelet count-Automate 333  RBC Count 4.65  Red Cell Distrib Width15.3  WBC Count7.75  % Albumin, Urine--  Hematocrit 38.6  Hemoglobin 11.8  CBC  Date: 07-10-18 @ 09:35  Mean cell Wrpfkfpkgx97.7  Mean cell Hemoglobin Conc31.1  Mean cell Volum 82.6  Platelet count-Automate 310  RBC Count 4.71  Red Cell Distrib Width15.2  WBC Count5.86  % Albumin, Urine--  Hematocrit 38.9  Hemoglobin 12.1  CBC  Date: 07-09-18 @ 07:37  Mean cell Qjyjdympzj30.6  Mean cell Hemoglobin Conc31.3  Mean cell Volum 81.8  Platelet count-Automate 339  RBC Count 4.45  Red Cell Distrib Width15.0  WBC Count6.52  % Albumin, Urine--  Hematocrit 36.4  Hemoglobin 11.4    [facility]  07-15-18 @ 06:06  Blood Gas Arterial-Calcium,Ionized--  Blood Urea Nitrogen, Serum 12 mg/dL [10 - 20]  Carbon Dioxide, Serum26 mmol/L [17 - 32]  Chloride, Serum98 mmol/L [98 - 110]  Creatinie, Serum0.6 mg/dL<L> [0.7 - 1.5]  Glucose, Idoif782 mg/dL<H> [70 - 99]  Potassium, Serum4.7 mmol/L [3.5 - 5.0]  Sodium, Serum 138 mmol/L [135 - 146]  [facility]  07-14-18 @ 04:16  Blood Gas Arterial-Calcium,Ionized--  Blood Urea Nitrogen, Serum 12 mg/dL [10 - 20]  Carbon Dioxide, Serum29 mmol/L [17 - 32]  Chloride, Ixqdb892 mmol/L [98 - 110]  Creatinie, Serum0.7 mg/dL [0.7 - 1.5]  Glucose, Zgybw151 mg/dL<H> [70 - 99]  Potassium, Serum4.6 mmol/L [3.5 - 5.0]  Sodium, Serum 142 mmol/L [135 - 146]  [facility]  07-12-18 @ 06:17  Blood Gas Arterial-Calcium,Ionized--  Blood Urea Nitrogen, Serum 11 mg/dL [10 - 20]  Carbon Dioxide, Serum29 mmol/L [17 - 32]  Chloride, Serum89 mmol/L<L> [98 - 110]  Creatinie, Serum0.7 mg/dL [0.7 - 1.5]  Glucose, Azbvn167 mg/dL<H> [70 - 99]  Potassium, Serum3.8 mmol/L [3.5 - 5.0]  Sodium, Serum 140 mmol/L [135 - 146]              Microbiology:    Culture - Blood (collected 07-07-18 @ 06:18)  Source: .Blood None  Final Report (07-12-18 @ 17:01):    No growth at 5 days.    Culture - Blood (collected 07-04-18 @ 21:29)  Source: .Blood None  Gram Stain (07-07-18 @ 17:38):    Growth in aerobic bottle: Gram Positive Cocci in Clusters    Growth in anaerobic bottle: Gram Positive Cocci in Clusters  Final Report (07-08-18 @ 18:39):    Growth in aerobic and anaerobic bottles: Coag Negative Staphylococcus    Single set isolate, possible contaminant. Contact    Microbiology if susceptibility testing clinically    indicated.    "Due to technical problems, Proteus sp. will Not be reported aspart of    the BCID panel until further notice"    ***Blood Panel PCR results on this specimen are available    approximately 3 hours after the Gram stain result.***    Gram stain, PCR, and/or culture results may not always    correspond due to difference in methodologies.    ************************************************************    This PCR assay was performed using [employer].    The following targets are tested for: Enterococcus,    vancomycin resistant enterococci, Listeria monocytogenes,    coagulase negative staphylococci, S. aureus,    methicillin resistant S. aureus, Streptococcus agalactiae    (Group B), S. pneumoniae, S. pyogenes (Group A),    Acinetobacter baumannii, Enterobacter cloacae, E. coli,    Klebsiella oxytoca, K. pneumoniae, Proteus sp.,    Serratia marcescens, Haemophilus influenzae,    Neisseria meningitidis, Pseudomonas aeruginosa, Candida    albicans, C. glabrata, C krusei, C parapsilosis,    C. tropicalis and the KPC resistance gene.  Organism: Blood Culture PCR (07-06-18 @ 17:57)  Organism: Blood Culture PCR (07-06-18 @ 17:57)      -  Coagulase negative Staphylococcus: Detec      Method Type: PCR    Culture - Blood (collected 07-04-18 @ 13:55)  Source: .Blood Blood  Final Report (07-09-18 @ 23:00):    No growth at 5 days.    Culture - Stool (collected 06-28-18 @ 09:32)  Source: .Stool Feces  Final Report (07-01-18 @ 15:47):    No enteric pathogens isolated.    (Stool culture examined for Salmonella,    Shigella, Campylobacter, Aeromonas, Plesiomonas,    Vibrio, E.coli O157 and Yersinia)    Culture - Urine (collected 06-25-18 @ 23:21)  Source: .Urine Clean Catch (Midstream)  Final Report (06-27-18 @ 12:48):    <10,000 CFU/ml    Normal Urogenital dashawn present        Medications:  amoxicillin  875 milliGRAM(s)/clavulanate 1 Tablet(s) Oral two times a day  aspirin enteric coated 81 milliGRAM(s) Oral daily  atorvastatin 40 milliGRAM(s) Oral every 24 hours  dextrose 40% Gel 15 Gram(s) Oral once PRN  dextrose 5%. 1000 milliLiter(s) IV Continuous <Continuous>  dextrose 50% Injectable 12.5 Gram(s) IV Push once  dextrose 50% Injectable 25 Gram(s) IV Push once  dextrose 50% Injectable 25 Gram(s) IV Push once  enoxaparin Injectable 40 milliGRAM(s) SubCutaneous every 24 hours  furosemide   Injectable 40 milliGRAM(s) IV Push daily  glucagon  Injectable 1 milliGRAM(s) IntraMuscular once PRN  insulin glargine Injectable (LANTUS) 20 Unit(s) SubCutaneous at bedtime  insulin lispro Injectable (HumaLOG) 10 Unit(s) SubCutaneous three times a day before meals  nystatin Cream 1 Application(s) Topical two times a day  oxyCODONE    5 mG/acetaminophen 325 mG 1 Tablet(s) Oral once        Assessment and Plan:  #Bilateral Venous Stasis Ulcers  -Augmentin 875mg PO q12h x 7-14 days, PO Lasix, pain control with Percocet   -Wound care, leg elevation, CHD 4% wash daily and PRN, soap and water BID, ABD pads and ace wrap dressing changes BID   -ID and burn following   -Repeat blood cx: no growth   -Physiatry recs: STR in SNF vs. home with VNS    #Left eye visual impairment  -ct head - no changes  -f/up carotid doppler  -f/up ophthalmology evaluation       #Diabetes Mellitus   -A1C 8.0, monitor finger sticks   -Lantus 20 qhs, Lispro 10 units qac  -CC diet     #Asthma   -Albuterol PRN     #Normocytic anemia  -Hg 11.8, continue to monitor with CBC     DVT ppx: Lovenox 40     FULL CODE     d/c to STR vs. adult home

## 2018-07-15 NOTE — CONSULT NOTE ADULT - SUBJECTIVE AND OBJECTIVE BOX
Neurology Consult    Patient is a 57y old  Male who presents with a chief complaint of Worsening erythema and drainage of B/L LE Venous stasis (10 Jul 2018 11:49)    HPI:  58 yo M with h/o DM, Morbid Obesity, Chronic LE venous stasis ulcers, Asthma, who presents with worsening erythema and drainage of his B/L LE Venous stasis ulcers recently admitted to the [facility]  on 6/25/18 for similar presentation but he was noted to have had maggots on his wounds in the LLE that was removed by burn surgery while getting therapy developed acute monocular vision loss last night affecting the left eye.  Pt was rubbing his R eye when he noted he couldn't see out of the left eye.  Had complete loss of vision with decreased Va, motion and light perception.  No pain in the eye.  No problems affecting the right eye.  No HA or jaw claudication.  STroke code called but unlikely cerebral ischemia and was suspected of having a CRAO.  Ophthalmology contacted but no emergent treatments recommended.  Since last night symptoms are improving.  Has had significant return in vision but still has decreased Va with color loss in periphery otherwise stable.  no weakness, numbness, vertigo, ataxia.      Vital Signs Last 24 Hrs  T(C): 36.3 (04 Jul 2018 15:53), Max: 37.3 (04 Jul 2018 12:54)  T(F): 97.3 (04 Jul 2018 15:53), Max: 99.2 (04 Jul 2018 12:54)  HR: 96 (04 Jul 2018 15:53) (96 - 102)  BP: 137/62 (04 Jul 2018 15:53) (116/53 - 137/62)  BP(mean): --  RR: 18 (04 Jul 2018 15:53) (18 - 20)  SpO2: 99% (04 Jul 2018 15:53) (98% - 99%)  In ED: Unasyn 3gm IV x 1 (04 Jul 2018 16:50)    PAST MEDICAL & SURGICAL HISTORY:  Morbid obesity  Osteoarthritis  Diabetes  Asthma  H/O umbilical hernia repair    FAMILY HISTORY:  Family history of obesity (Father, Sibling)    Social History: (-) x 3    Allergies    No Known Allergies    Intolerances    MEDICATIONS  (STANDING):  amoxicillin  875 milliGRAM(s)/clavulanate 1 Tablet(s) Oral two times a day  aspirin enteric coated 81 milliGRAM(s) Oral daily  atorvastatin 40 milliGRAM(s) Oral every 24 hours  dextrose 5%. 1000 milliLiter(s) (50 mL/Hr) IV Continuous <Continuous>  dextrose 50% Injectable 12.5 Gram(s) IV Push once  dextrose 50% Injectable 25 Gram(s) IV Push once  dextrose 50% Injectable 25 Gram(s) IV Push once  enoxaparin Injectable 40 milliGRAM(s) SubCutaneous every 24 hours  furosemide   Injectable 40 milliGRAM(s) IV Push daily  insulin glargine Injectable (LANTUS) 20 Unit(s) SubCutaneous at bedtime  insulin lispro Injectable (HumaLOG) 10 Unit(s) SubCutaneous three times a day before meals  nystatin Cream 1 Application(s) Topical two times a day  oxyCODONE    5 mG/acetaminophen 325 mG 1 Tablet(s) Oral once    MEDICATIONS  (PRN):  dextrose 40% Gel 15 Gram(s) Oral once PRN Blood Glucose LESS THAN 70 milliGRAM(s)/deciliter  glucagon  Injectable 1 milliGRAM(s) IntraMuscular once PRN Glucose LESS THAN 70 milligrams/deciliter      Review of systems:    Constitutional: No fever, weight loss or fatigue    Eyes: No eye pain or discharge  ENMT:  No difficulty hearing; No sinus or throat pain  Neck: No pain or stiffness  Respiratory: No cough, wheezing, chills or hemoptysis  Cardiovascular: No chest pain, palpitations, shortness of breath, dyspnea on exertion  Gastrointestinal: No abdominal pain, nausea, vomiting or hematemesis; No diarrhea or constipation.   Genitourinary: No dysuria, frequency, hematuria or incontinence  Neurological: As per HPI  Skin: No rashes or lesions   Endocrine: No heat or cold intolerance; No hair loss  Musculoskeletal: No joint pain or swelling  Psychiatric: No depression, anxiety, mood swings  Heme/Lymph: No easy bruising or bleeding gums    Vital Signs Last 24 Hrs  T(C): 36.3 (15 Jul 2018 10:51), Max: 36.6 (14 Jul 2018 22:45)  T(F): 97.3 (15 Jul 2018 10:51), Max: 97.9 (14 Jul 2018 22:45)  HR: 97 (15 Jul 2018 10:51) (84 - 97)  BP: 131/67 (15 Jul 2018 10:51) (120/58 - 140/59)  BP(mean): --  RR: 18 (15 Jul 2018 10:51) (18 - 18)  SpO2: 99% (14 Jul 2018 21:16) (99% - 99%)    Neurologic Examination:  General:  Appearance is consistent with chronologic age.  morbid obesity.    General: The patient is oriented to person, place, time and date.  Recent and remote memory intact.  Fund of knowledge is intact and normal.  Language with normal repetition, comprehension and naming.  Nondysarthric.    Cranial nerves: Va decreased OS with decreased color periphery with intact LP, movement throughout.  EOMI w/o nystagmus, skew or reported double vision.  PERRL.  No ptosis/weakness of eyelid closure.  Facial sensation is normal with normal bite.  No facial asymmetry.  Hearing grossly intact b/l.  Palate elevates midline.  Tongue midline.  Motor examination:   Normal tone, bulk and range of motion.  No tenderness, twitching, tremors or involuntary movements.  Formal Muscle Strength Testing: (MRC grade R/L) 5/5 UE; 4/5 b/l LE with extensive swelling/wounds.  Reflexes:   1+ b/l pectoralis, biceps, triceps, brachioradialis, 0+ b/l patella and Achilles.  Plantar response downgoing b/l.  Jaw jerk, Luc, clonus absent.  Sensory examination:   Intact to light touch and pinprick, pain, temperature and proprioception and vibration in all extremities.  Cerebellum:   FTN/HKS intact with normal KIN in all limbs.  No dysmetria or dysdiadokinesia.  Gait N/A    Labs:   CBC Full  -  ( 15 Jul 2018 06:06 )  WBC Count : 7.26 K/uL  Hemoglobin : 11.3 g/dL  Hematocrit : 37.0 %  Platelet Count - Automated : 278 K/uL  Mean Cell Volume : 83.0 fL  Mean Cell Hemoglobin : 25.3 pg  Mean Cell Hemoglobin Concentration : 30.5 g/dL    07-15    138  |  98  |  12  ----------------------------<  115<H>  4.7   |  26  |  0.6<L>    Ca    8.3<L>      15 Jul 2018 06:06  Mg     2.1     07-14    EXAM:  CT BRAIN          PROCEDURE DATE:  07/14/2018      INTERPRETATION:  Clinical History / Reason for exam: Left-sided vision   loss.    Technique: Noncontrast head CT.  Contiguous unenhanced CT axial images of   the head from the base to the vertex.    Comparison:  None    Findings:    The ventricles and cortical sulci are normal in size and configuration.       Gray-white matter differentiation is maintained. Foci of low attenuation   in the periventricular and subcortical white matter are nonspecific but   typically attributed to chronic small vessel ischemic change.    There is no acute intracranial hemorrhage, extra-axial fluid collection   or midline shift.      No acute osseous abnormality/depressed skull fracture is identified.     The visualized paranasal sinuses and mastoids are well-aerated.    IMPRESSION:     No acute intracranial pathology.    Mild chronic microvascular ischemic changes.    LULY BUTCHER M.D., ATTENDING RADIOLOGIST  This document hasbeen electronically signed. Jul 14 2018 11:37PM             (07-14-18 @ 23:27)    -15-18 @ 14:31

## 2018-07-16 DIAGNOSIS — I87.2 VENOUS INSUFFICIENCY (CHRONIC) (PERIPHERAL): ICD-10-CM

## 2018-07-16 DIAGNOSIS — E11.9 TYPE 2 DIABETES MELLITUS WITHOUT COMPLICATIONS: ICD-10-CM

## 2018-07-16 DIAGNOSIS — H54.7 UNSPECIFIED VISUAL LOSS: ICD-10-CM

## 2018-07-16 DIAGNOSIS — Z29.9 ENCOUNTER FOR PROPHYLACTIC MEASURES, UNSPECIFIED: ICD-10-CM

## 2018-07-16 DIAGNOSIS — E66.01 MORBID (SEVERE) OBESITY DUE TO EXCESS CALORIES: ICD-10-CM

## 2018-07-16 LAB
CRP SERPL-MCNC: 4.94 MG/DL — HIGH (ref 0–0.4)
CRP SERPL-MCNC: 5.57 MG/DL — HIGH (ref 0–0.4)

## 2018-07-16 RX ADMIN — Medication 1 TABLET(S): at 05:43

## 2018-07-16 RX ADMIN — NYSTATIN CREAM 1 APPLICATION(S): 100000 CREAM TOPICAL at 17:22

## 2018-07-16 RX ADMIN — Medication 10 UNIT(S): at 09:13

## 2018-07-16 RX ADMIN — ENOXAPARIN SODIUM 40 MILLIGRAM(S): 100 INJECTION SUBCUTANEOUS at 05:43

## 2018-07-16 RX ADMIN — Medication 81 MILLIGRAM(S): at 13:42

## 2018-07-16 RX ADMIN — NYSTATIN CREAM 1 APPLICATION(S): 100000 CREAM TOPICAL at 05:43

## 2018-07-16 RX ADMIN — Medication 10 UNIT(S): at 17:22

## 2018-07-16 RX ADMIN — Medication 1 TABLET(S): at 17:22

## 2018-07-16 RX ADMIN — INSULIN GLARGINE 20 UNIT(S): 100 INJECTION, SOLUTION SUBCUTANEOUS at 21:37

## 2018-07-16 RX ADMIN — ATORVASTATIN CALCIUM 40 MILLIGRAM(S): 80 TABLET, FILM COATED ORAL at 05:42

## 2018-07-16 NOTE — PROGRESS NOTE ADULT - SUBJECTIVE AND OBJECTIVE BOX
24H events:    Patient is a 57y old Male who presents with a chief complaint of Worsening erythema and drainage of B/L LE Venous stasis (10 Jul 2018 11:49)    Primary diagnosis of Venous stasis dermatitis of both lower extremities     Today is hospital day 12d. Pt complained of acute L eye loss of vision which is now resolved. Carotic US showed 20-39% L IC and 40-59% R IC stenosis. Pt is on 81mg aspirin daily.     PAST MEDICAL & SURGICAL HISTORY  Morbid obesity  Osteoarthritis  Diabetes  Asthma  H/O umbilical hernia repair    SOCIAL HISTORY:  Negative for smoking/alcohol/drug use.     ALLERGIES:  No Known Allergies    MEDICATIONS:  STANDING MEDICATIONS  amoxicillin  875 milliGRAM(s)/clavulanate 1 Tablet(s) Oral two times a day  aspirin enteric coated 81 milliGRAM(s) Oral daily  atorvastatin 40 milliGRAM(s) Oral every 24 hours  dextrose 5%. 1000 milliLiter(s) IV Continuous <Continuous>  dextrose 50% Injectable 12.5 Gram(s) IV Push once  dextrose 50% Injectable 25 Gram(s) IV Push once  dextrose 50% Injectable 25 Gram(s) IV Push once  enoxaparin Injectable 40 milliGRAM(s) SubCutaneous every 24 hours  furosemide   Injectable 40 milliGRAM(s) IV Push daily  insulin glargine Injectable (LANTUS) 20 Unit(s) SubCutaneous at bedtime  insulin lispro Injectable (HumaLOG) 10 Unit(s) SubCutaneous three times a day before meals  nystatin Cream 1 Application(s) Topical two times a day  oxyCODONE    5 mG/acetaminophen 325 mG 1 Tablet(s) Oral once    PRN MEDICATIONS  dextrose 40% Gel 15 Gram(s) Oral once PRN  glucagon  Injectable 1 milliGRAM(s) IntraMuscular once PRN    VITALS:   T(F): 99.1  HR: 87  BP: 118/57  RR: 19  SpO2: --    LABS:                        11.3   7.26  )-----------( 278      ( 15 Jul 2018 06:06 )             37.0     07-15    138  |  98  |  12  ----------------------------<  115<H>  4.7   |  26  |  0.6<L>    Ca    8.3<L>      15 Jul 2018 06:06            Sedimentation Rate, Erythrocyte: 63 mm/Hr <H> (07-15-18 @ 21:34)          RADIOLOGY:    PHYSICAL EXAM:  General: NAD, AAOX3, patient is laying comfortably in chair, morbidly obese  HEENT: AT, NC, Supple, NO JVD, NO CB, blurred vision in left eye  Lungs: CTA B/L, no wheezing, no rhonchi  CVS: normal S1, S2, RRR, NO M/G/R  Abdomen: soft, bowel sounds present, non-tender, non-distended  Extremities: no edema, no clubbing, no cyanosis, positive peripheral pulses b/l  Neuro: no acute focal neurological deficits  Skin: b/l lower extremities wounds, no rush, no ecchymosis    Assessment:     #Bilateral Venous Stasis Ulcers  -Augmentin 875mg PO q12h x 7-14 days, PO Lasix, pain control with Percocet   -Wound care, leg elevation, CHD 4% wash daily and PRN, soap and water BID, ABD pads and ace wrap dressing changes BID   -ID and burn following   -Repeat blood cx: no growth   -Physiatry recs: STR in SNF vs. home with VNS    #Left eye visual impairment, improving   -ct head - no changes  -Dopplers done, 20-39% L IC and 40-59% R IC stenosis.  -Ophthalmology recommended no treatment at this point      #Diabetes Mellitus   -A1C 8.0, monitor finger sticks   -Lantus 20 qhs, Lispro 10 units qac  -CC diet     #Asthma   -Albuterol PRN     #Normocytic anemia  -Hg 11.8, continue to monitor with CBC     DVT ppx: Lovenox 40     FULL CODE     Disposition: awaiting long term care placement

## 2018-07-16 NOTE — PROGRESS NOTE ADULT - PROBLEM SELECTOR PLAN 2
c/b bilateral superficial cellulitis  continue augmentin day 12/14  daily wound care  lasix 40 iv daily  dispo planning

## 2018-07-16 NOTE — PROGRESS NOTE ADULT - PROBLEM SELECTOR PLAN 1
unclear etiology, awaiting opthal eval  seen by stroke team, low suspicion for stroke, CTH neg, carotid dopplers neg, outpt neuro follow up  asa, lipitor per neuro

## 2018-07-16 NOTE — PROGRESS NOTE ADULT - ASSESSMENT
57M PMHx DM2, morbid obesity BMI 49.9, asthma here with bilateral venous stasis dermatitis with superficial cellulitis on augmentin. Left eye blurry vision/ vision loss.

## 2018-07-16 NOTE — PROGRESS NOTE ADULT - SUBJECTIVE AND OBJECTIVE BOX
FABIAN CLEARY  57y  Male      Patient is a 57y old  Male who presents with a chief complaint of Worsening erythema and drainage of B/L LE Venous stasis (10 Jul 2018 11:49)      INTERVAL HPI/OVERNIGHT EVENTS:  Has left eye blurred vision when he covers his right eye. Improved from vision loss yesterday. Denies floaters, flashes of light, no black curtain. Otherwise denies fever, cp, sob, abd pain.    Vital Signs Last 24 Hrs  T(C): 37.3 (16 Jul 2018 08:12), Max: 37.3 (16 Jul 2018 08:12)  T(F): 99.1 (16 Jul 2018 08:12), Max: 99.1 (16 Jul 2018 08:12)  HR: 87 (16 Jul 2018 08:12) (87 - 97)  BP: 118/57 (16 Jul 2018 08:12) (115/57 - 133/68)  BP(mean): --  RR: 19 (16 Jul 2018 08:12) (18 - 19)  SpO2: --    PHYSICAL EXAM:  GENERAL: NAD, well-groomed, well-developed  HEAD:  Atraumatic, Normocephalic    CHEST/LUNG: Clear to auscultation bilaterally; No rales, rhonchi, wheezing, or rubs  HEART: Regular rate and rhythm; No murmurs, rubs, or gallops  ABDOMEN: Soft, Nontender, Nondistended; Bowel sounds present  EXTREMITIES:  2+ Pit edema bl  SKIN: bl LE erythema, superficial ulcerations noted, serousanginous drainage no purulence   NEURO: no focal deficits    Consultant(s) Notes Reviewed:  [x ] YES  [ ] NO  Care Discussed with Consultants/Other Providers [ x] YES  [ ] NO    LABS:                        11.3   7.26  )-----------( 278      ( 15 Jul 2018 06:06 )             37.0     07-15    138  |  98  |  12  ----------------------------<  115<H>  4.7   |  26  |  0.6<L>    Ca    8.3<L>      15 Jul 2018 06:06            CAPILLARY BLOOD GLUCOSE  164 (16 Jul 2018 08:12)  193 (15 Jul 2018 22:00)  144 (15 Jul 2018 16:00)  123 (15 Jul 2018 11:27)          RADIOLOGY & ADDITIONAL TESTS:    Imaging Personally Reviewed:  [ ] YES  [ ] NO

## 2018-07-16 NOTE — PROGRESS NOTE ADULT - ATTENDING COMMENTS
-I fully agree withe history/physical exam/assessment after my independent exam/assessment.    dispo pending; plan outlined above

## 2018-07-16 NOTE — CONSULT NOTE ADULT - SUBJECTIVE AND OBJECTIVE BOX
FABIAN CLEARY  MRN-[id number]  57y Male        Patient is a 57y old  Male who presents with a chief complaint of Worsening erythema and drainage of B/L LE Venous stasis (10 Jul 2018 11:49)    HEALTH ISSUES - R/O PROBLEM Dx:    HPI:  Patient is a pleasant 56 yo M with h/o DM, Morbid Obesity, Chronic LE venous stasis ulcers, Asthma, who presents with worsening erythema and drainage of his B/L LE Venous stasis Ulcers.. Pt was  recently admitted to the [facility]  on 6/25/18 for similar presentation but he  was noted to have had maggots on his wounds in the LLE that was removed by burn surgery.  He was seen by ID and managed inpatient with Unasyn and was d/c'ed home with VNS services on 6/26/18 and Augmentin. Patient was not aware he was supposed to take Agmentin as an outpatient and initially felt better but two days ago he noticed that the erythema was getting worse on both legs and he noticed a watery discharge from his wounds which was not present on discharge. He also states that yesterday he was in the shower when he lost his balanced and fell landing on his bottom. He denied LOC and was able to get himself back up by using the wall for support. The LE watery drainage and erythema    worsened this morning to the point where he called an ambulance. He lives at home alone and states he is unable to care for himself but VNS does come daily for wound care. He was interviewed by [facility] for possible housing but they have not gotten back to him.   Vital Signs Last 24 Hrs  T(C): 36.3 (04 Jul 2018 15:53), Max: 37.3 (04 Jul 2018 12:54)  T(F): 97.3 (04 Jul 2018 15:53), Max: 99.2 (04 Jul 2018 12:54)  HR: 96 (04 Jul 2018 15:53) (96 - 102)  BP: 137/62 (04 Jul 2018 15:53) (116/53 - 137/62)  BP(mean): --  RR: 18 (04 Jul 2018 15:53) (18 - 20)  SpO2: 99% (04 Jul 2018 15:53) (98% - 99%)  In ED: Unasyn 3gm IV x 1 (04 Jul 2018 16:50)    PAST MEDICAL & SURGICAL HISTORY:  Morbid obesity  Osteoarthritis  Diabetes  Asthma  H/O umbilical hernia repair    MEDICATIONS  (STANDING):  amoxicillin  875 milliGRAM(s)/clavulanate 1 Tablet(s) Oral two times a day  aspirin enteric coated 81 milliGRAM(s) Oral daily  atorvastatin 40 milliGRAM(s) Oral every 24 hours  dextrose 5%. 1000 milliLiter(s) (50 mL/Hr) IV Continuous <Continuous>  dextrose 50% Injectable 12.5 Gram(s) IV Push once  dextrose 50% Injectable 25 Gram(s) IV Push once  dextrose 50% Injectable 25 Gram(s) IV Push once  enoxaparin Injectable 40 milliGRAM(s) SubCutaneous every 24 hours  furosemide   Injectable 40 milliGRAM(s) IV Push daily  insulin glargine Injectable (LANTUS) 20 Unit(s) SubCutaneous at bedtime  insulin lispro Injectable (HumaLOG) 10 Unit(s) SubCutaneous three times a day before meals  nystatin Cream 1 Application(s) Topical two times a day  oxyCODONE    5 mG/acetaminophen 325 mG 1 Tablet(s) Oral once    MEDICATIONS  (PRN):  dextrose 40% Gel 15 Gram(s) Oral once PRN Blood Glucose LESS THAN 70 milliGRAM(s)/deciliter  glucagon  Injectable 1 milliGRAM(s) IntraMuscular once PRN Glucose LESS THAN 70 milligrams/deciliter    Allergies    No Known Allergies    Intolerances      HEALTH ISSUES - PROBLEM Dx:  Need for prophylactic measure: Need for prophylactic measure  Morbid obesity: Morbid obesity  Diabetes: Diabetes  Venous stasis dermatitis of both lower extremities: Venous stasis dermatitis of both lower extremities  Vision loss: Vision loss          NANCY: > 30yrs  POHx: negative  FOHx: Non-contributory    Extended HPI:  h/o Type 2 DM x 5-6 years and chronic LE venous status ulcers.    On rubbing his right eye 2 days ago, noted blurry vision OS. Pt denies any flashes OU/floaters OU/diplopia  or eye pain. Patient states that his vison seems to have gotten slightly better but still c/o of some blur.     Ocular Medications: none        EXTERNAL:    VISUAL ACUITY:       RIGHT EYE: sc - 20/200                           LEFT EYE: sc - FC @ 6ft    MANIFEST:    RIGHT EYE: +3.00 + 20/25-                LEFT EYE:  + 4.00-0.50x130 = 20/40-    NEURO TESTING  EXTRAOCULAR MOVEMENTS: full OU  PUPILS: pupils reactive OU; NO APD noted OD/OS  VFc: OD: FTFC; : OS noted constriction superior FOV and inferior temporal    ANTERIOR SEGMENT EVALUATION: :    LIDS/ADENEXA: clear OU  CONJUNCTIVA/SCLERA:clear OU  CORNEA: clear OU  ANTERIOR CHAMBER: deep and quiet OU  IRIS/PUPIL: flat OU; no NVI OU  LENS/VITREOUS: trace NS OU; no vitreal cells OU    INTRAOCULAR PRESSURE: (Tonopen)  OD: 11 mmHg  OS: 12 mmHg  @ 11:00am    POSTERIOR SEGMENT EVALUATION  DFE: 1% Tropicamide, 2.5 % Phenylephrine OU    OPTIC NERVE:  c/d 0.35 OU  MACULA: flat OU;  OS: Curvilinear area of atrophy paramacular extending from nasal macula to superior macula.  pigmentation superior fovea  A/V: 1/3 OU;  + vascular tortuosity with crossing changes OU  FUNDUS/PERIPHERY: flat    SUPPLEMENTAL TESTING:    OCT: macula: flat OU;  OS: disruption of ellipsoid nasal  Optic Nerve: decreased papillomaculo bundle OS > OD

## 2018-07-17 RX ADMIN — ENOXAPARIN SODIUM 40 MILLIGRAM(S): 100 INJECTION SUBCUTANEOUS at 05:00

## 2018-07-17 RX ADMIN — ATORVASTATIN CALCIUM 40 MILLIGRAM(S): 80 TABLET, FILM COATED ORAL at 05:00

## 2018-07-17 RX ADMIN — INSULIN GLARGINE 20 UNIT(S): 100 INJECTION, SOLUTION SUBCUTANEOUS at 21:48

## 2018-07-17 RX ADMIN — Medication 10 UNIT(S): at 11:30

## 2018-07-17 RX ADMIN — Medication 10 UNIT(S): at 08:16

## 2018-07-17 RX ADMIN — Medication 1 TABLET(S): at 17:45

## 2018-07-17 RX ADMIN — Medication 81 MILLIGRAM(S): at 11:30

## 2018-07-17 RX ADMIN — NYSTATIN CREAM 1 APPLICATION(S): 100000 CREAM TOPICAL at 17:45

## 2018-07-17 RX ADMIN — Medication 1 TABLET(S): at 05:00

## 2018-07-17 RX ADMIN — NYSTATIN CREAM 1 APPLICATION(S): 100000 CREAM TOPICAL at 05:01

## 2018-07-17 RX ADMIN — Medication 10 UNIT(S): at 17:46

## 2018-07-17 NOTE — PROGRESS NOTE ADULT - SUBJECTIVE AND OBJECTIVE BOX
24H events:    Patient is a 57y old Male who presents with a chief complaint of Worsening erythema and drainage of B/L LE Venous stasis now on augmentin. Today is hospital day 13d. No issues over night. Just waiting for dispo.     PAST MEDICAL & SURGICAL HISTORY  Morbid obesity  Osteoarthritis  Diabetes  Asthma  H/O umbilical hernia repair    SOCIAL HISTORY:  Negative for smoking/alcohol/drug use.     ALLERGIES:  No Known Allergies    MEDICATIONS:  STANDING MEDICATIONS  amoxicillin  875 milliGRAM(s)/clavulanate 1 Tablet(s) Oral two times a day  aspirin enteric coated 81 milliGRAM(s) Oral daily  atorvastatin 40 milliGRAM(s) Oral every 24 hours  dextrose 5%. 1000 milliLiter(s) IV Continuous <Continuous>  dextrose 50% Injectable 12.5 Gram(s) IV Push once  dextrose 50% Injectable 25 Gram(s) IV Push once  dextrose 50% Injectable 25 Gram(s) IV Push once  enoxaparin Injectable 40 milliGRAM(s) SubCutaneous every 24 hours  furosemide   Injectable 40 milliGRAM(s) IV Push daily  insulin glargine Injectable (LANTUS) 20 Unit(s) SubCutaneous at bedtime  insulin lispro Injectable (HumaLOG) 10 Unit(s) SubCutaneous three times a day before meals  nystatin Cream 1 Application(s) Topical two times a day  oxyCODONE    5 mG/acetaminophen 325 mG 1 Tablet(s) Oral once    PRN MEDICATIONS  dextrose 40% Gel 15 Gram(s) Oral once PRN  glucagon  Injectable 1 milliGRAM(s) IntraMuscular once PRN    VITALS:   T(F): 97.7  HR: 94  BP: 123/61  RR: 18  SpO2: --    PHYSICAL EXAM:  General: NAD, AAOX3, patient is laying comfortably in chair, morbidly obese  HEENT: AT, NC, Supple, NO JVD, NO CB, blurred vision in left eye  Lungs: CTA B/L, no wheezing, no rhonchi  CVS: normal S1, S2, RRR, NO M/G/R  Abdomen: soft, bowel sounds present, non-tender, non-distended  Extremities: no edema, no clubbing, no cyanosis, positive peripheral pulses b/l  Neuro: no acute focal neurological deficits  Skin: b/l lower extremities wounds, no rush, no ecchymosis    Assessment:     #Bilateral Venous Stasis Ulcers  -Augmentin 875mg PO q12h x 7-14 days, PO Lasix, pain control with Percocet   -Wound care, leg elevation, CHD 4% wash daily and PRN, soap and water BID, ABD pads and ace wrap dressing changes BID   -ID and burn following   -Repeat blood cx: no growth     #Left eye visual impairment  -ct head - no changes  -Dopplers done, 20-39% L IC and 40-59% R IC stenosis.  -Ophthalmology recommended no treatment at this point  - Resolved now    #Diabetes Mellitus   -A1C 8.0, monitor finger sticks   -Lantus 20 qhs, Lispro 10 units qac  -CC diet     #Asthma   -Albuterol PRN     #Normocytic anemia  -Hg 11.8, continue to monitor with CBC     DVT ppx: Lovenox 40     FULL CODE     Disposition: awaiting long term care placement , financial clearance. no active medical management now. alternative level of care now.

## 2018-07-17 NOTE — PROGRESS NOTE ADULT - SUBJECTIVE AND OBJECTIVE BOX
24H events:    Patient is a 57y old Male who presents with a chief complaint of Worsening erythema and drainage of B/L LE Venous stasis now on augmentin. Today is hospital day 13d. No issues over night. Just waiting for dispo.     PAST MEDICAL & SURGICAL HISTORY  Morbid obesity  Osteoarthritis  Diabetes  Asthma  H/O umbilical hernia repair    SOCIAL HISTORY:  Negative for smoking/alcohol/drug use.     ALLERGIES:  No Known Allergies    MEDICATIONS:  STANDING MEDICATIONS  amoxicillin  875 milliGRAM(s)/clavulanate 1 Tablet(s) Oral two times a day  aspirin enteric coated 81 milliGRAM(s) Oral daily  atorvastatin 40 milliGRAM(s) Oral every 24 hours  dextrose 5%. 1000 milliLiter(s) IV Continuous <Continuous>  dextrose 50% Injectable 12.5 Gram(s) IV Push once  dextrose 50% Injectable 25 Gram(s) IV Push once  dextrose 50% Injectable 25 Gram(s) IV Push once  enoxaparin Injectable 40 milliGRAM(s) SubCutaneous every 24 hours  furosemide   Injectable 40 milliGRAM(s) IV Push daily  insulin glargine Injectable (LANTUS) 20 Unit(s) SubCutaneous at bedtime  insulin lispro Injectable (HumaLOG) 10 Unit(s) SubCutaneous three times a day before meals  nystatin Cream 1 Application(s) Topical two times a day  oxyCODONE    5 mG/acetaminophen 325 mG 1 Tablet(s) Oral once    PRN MEDICATIONS  dextrose 40% Gel 15 Gram(s) Oral once PRN  glucagon  Injectable 1 milliGRAM(s) IntraMuscular once PRN    VITALS:   T(F): 97.7  HR: 94  BP: 123/61  RR: 18  SpO2: --    LABS:                        RADIOLOGY:    PHYSICAL EXAM:  General: NAD, AAOX3, patient is laying comfortably in chair, morbidly obese  HEENT: AT, NC, Supple, NO JVD, NO CB, blurred vision in left eye  Lungs: CTA B/L, no wheezing, no rhonchi  CVS: normal S1, S2, RRR, NO M/G/R  Abdomen: soft, bowel sounds present, non-tender, non-distended  Extremities: no edema, no clubbing, no cyanosis, positive peripheral pulses b/l  Neuro: no acute focal neurological deficits  Skin: b/l lower extremities wounds, no rush, no ecchymosis    Assessment:     #Bilateral Venous Stasis Ulcers  -Augmentin 875mg PO q12h x 7-14 days, PO Lasix, pain control with Percocet   -Wound care, leg elevation, CHD 4% wash daily and PRN, soap and water BID, ABD pads and ace wrap dressing changes BID   -ID and burn following   -Repeat blood cx: no growth     #Left eye visual impairment  -ct head - no changes  -Dopplers done, 20-39% L IC and 40-59% R IC stenosis.  -Ophthalmology recommended no treatment at this point  - Resolved now    #Diabetes Mellitus   -A1C 8.0, monitor finger sticks   -Lantus 20 qhs, Lispro 10 units qac  -CC diet     #Asthma   -Albuterol PRN     #Normocytic anemia  -Hg 11.8, continue to monitor with CBC     DVT ppx: Lovenox 40     FULL CODE     Disposition: awaiting long term care placement

## 2018-07-17 NOTE — CHART NOTE - NSCHARTNOTEFT_GEN_A_CORE
Limited follow-up by AVI Carrillo. Pt is seen and spoken with.     (1) ID + BURN f/u for b/l venous stasis ulcers. L eye visual impairment. Dm2 on CC diet. Monitoring CBC. awaiting long term care placement.  (2) pt admits to eating well, no problem orally and GI. LBM 7/18 x3. Edema 3+ to b/l foot. Obese. multiple wounds/cellulitis.  (3) labs and meds reviewed. No new weight.    Pt remains not at risk

## 2018-07-18 RX ADMIN — Medication 81 MILLIGRAM(S): at 11:24

## 2018-07-18 RX ADMIN — Medication 1 TABLET(S): at 05:01

## 2018-07-18 RX ADMIN — ATORVASTATIN CALCIUM 40 MILLIGRAM(S): 80 TABLET, FILM COATED ORAL at 05:01

## 2018-07-18 RX ADMIN — NYSTATIN CREAM 1 APPLICATION(S): 100000 CREAM TOPICAL at 05:01

## 2018-07-18 RX ADMIN — Medication 10 UNIT(S): at 08:15

## 2018-07-18 RX ADMIN — ENOXAPARIN SODIUM 40 MILLIGRAM(S): 100 INJECTION SUBCUTANEOUS at 05:01

## 2018-07-18 RX ADMIN — Medication 10 UNIT(S): at 11:47

## 2018-07-18 RX ADMIN — INSULIN GLARGINE 20 UNIT(S): 100 INJECTION, SOLUTION SUBCUTANEOUS at 21:22

## 2018-07-18 RX ADMIN — NYSTATIN CREAM 1 APPLICATION(S): 100000 CREAM TOPICAL at 17:06

## 2018-07-18 RX ADMIN — Medication 1 TABLET(S): at 17:06

## 2018-07-18 RX ADMIN — Medication 10 UNIT(S): at 17:06

## 2018-07-18 NOTE — PROGRESS NOTE ADULT - SUBJECTIVE AND OBJECTIVE BOX
24H events:    Patient is a 57y old Male who presents with a chief complaint of Worsening erythema and drainage of B/L LE Venous stasis now on augmentin. Today is hospital day 13d. No issues over night. Just waiting for dispo.     PAST MEDICAL & SURGICAL HISTORY  Morbid obesity  Osteoarthritis  Diabetes  Asthma  H/O umbilical hernia repair    SOCIAL HISTORY:  Negative for smoking/alcohol/drug use.     ALLERGIES:  No Known Allergies    MEDICATIONS:  STANDING MEDICATIONS  amoxicillin  875 milliGRAM(s)/clavulanate 1 Tablet(s) Oral two times a day  aspirin enteric coated 81 milliGRAM(s) Oral daily  atorvastatin 40 milliGRAM(s) Oral every 24 hours  dextrose 5%. 1000 milliLiter(s) IV Continuous <Continuous>  dextrose 50% Injectable 12.5 Gram(s) IV Push once  dextrose 50% Injectable 25 Gram(s) IV Push once  dextrose 50% Injectable 25 Gram(s) IV Push once  enoxaparin Injectable 40 milliGRAM(s) SubCutaneous every 24 hours  furosemide   Injectable 40 milliGRAM(s) IV Push daily  insulin glargine Injectable (LANTUS) 20 Unit(s) SubCutaneous at bedtime  insulin lispro Injectable (HumaLOG) 10 Unit(s) SubCutaneous three times a day before meals  nystatin Cream 1 Application(s) Topical two times a day  oxyCODONE    5 mG/acetaminophen 325 mG 1 Tablet(s) Oral once    PRN MEDICATIONS  dextrose 40% Gel 15 Gram(s) Oral once PRN  glucagon  Injectable 1 milliGRAM(s) IntraMuscular once PRN    VITALS:   Vital Signs Last 24 Hrs  T(C): 36.4 (18 Jul 2018 07:08), Max: 36.8 (17 Jul 2018 16:00)  T(F): 97.6 (18 Jul 2018 07:08), Max: 98.3 (17 Jul 2018 16:00)  HR: 94 (18 Jul 2018 07:08) (87 - 95)  BP: 129/91 (18 Jul 2018 07:08) (109/53 - 132/60)  RR: 18 (18 Jul 2018 07:08) (18 - 18)    PHYSICAL EXAM:  General: NAD, AAOX3, patient is laying comfortably in chair, morbidly obese  HEENT: AT, NC, Supple, NO JVD, NO CB, blurred vision in left eye  Lungs: CTA B/L, no wheezing, no rhonchi  CVS: normal S1, S2, RRR, NO M/G/R  Abdomen: soft, bowel sounds present, non-tender, non-distended  Extremities: no edema, no clubbing, no cyanosis, positive peripheral pulses b/l  Neuro: no acute focal neurological deficits  Skin: b/l lower extremities wounds, no rash, no ecchymosis    Assessment:     #Bilateral Venous Stasis Ulcers  -Augmentin 875mg PO q12h x 7-14 days, PO Lasix, pain control with Percocet   -Wound care, leg elevation, CHD 4% wash daily and PRN, soap and water BID, ABD pads and ace wrap dressing changes BID   -ID and burn following   -Repeat blood cx: no growth     #Left eye visual impairment  -ct head - no changes  -Dopplers done, 20-39% L IC and 40-59% R IC stenosis.  -Ophthalmology recommended no treatment at this point  - Resolved now    #Diabetes Mellitus   -A1C 8.0, monitor finger sticks   -Lantus 20 qhs, Lispro 10 units qac  -CC diet     #Asthma   -Albuterol PRN     #Normocytic anemia  -Hg 11.8, continue to monitor with CBC     DVT ppx: Lovenox 40     FULL CODE     Disposition: awaiting long term care placement , financial clearance. no active medical management now. alternative level of care now.

## 2018-07-19 RX ORDER — ATORVASTATIN CALCIUM 80 MG/1
1 TABLET, FILM COATED ORAL
Qty: 30 | Refills: 0 | OUTPATIENT
Start: 2018-07-19

## 2018-07-19 RX ORDER — ASPIRIN/CALCIUM CARB/MAGNESIUM 324 MG
1 TABLET ORAL
Qty: 30 | Refills: 0 | OUTPATIENT
Start: 2018-07-19

## 2018-07-19 RX ORDER — NYSTATIN CREAM 100000 [USP'U]/G
1 CREAM TOPICAL
Qty: 1 | Refills: 0 | OUTPATIENT
Start: 2018-07-19

## 2018-07-19 RX ORDER — ASPIRIN/CALCIUM CARB/MAGNESIUM 324 MG
1 TABLET ORAL
Qty: 0 | Refills: 0 | COMMUNITY
Start: 2018-07-19

## 2018-07-19 RX ORDER — ATORVASTATIN CALCIUM 80 MG/1
1 TABLET, FILM COATED ORAL
Qty: 0 | Refills: 0 | COMMUNITY
Start: 2018-07-19

## 2018-07-19 RX ORDER — LOPERAMIDE HCL 2 MG
1 TABLET ORAL
Qty: 28 | Refills: 0 | OUTPATIENT
Start: 2018-07-19 | End: 2018-08-01

## 2018-07-19 RX ADMIN — Medication 1 TABLET(S): at 05:11

## 2018-07-19 RX ADMIN — Medication 81 MILLIGRAM(S): at 11:26

## 2018-07-19 RX ADMIN — NYSTATIN CREAM 1 APPLICATION(S): 100000 CREAM TOPICAL at 05:12

## 2018-07-19 RX ADMIN — Medication 10 UNIT(S): at 17:06

## 2018-07-19 RX ADMIN — Medication 10 UNIT(S): at 13:39

## 2018-07-19 RX ADMIN — ENOXAPARIN SODIUM 40 MILLIGRAM(S): 100 INJECTION SUBCUTANEOUS at 05:11

## 2018-07-19 RX ADMIN — INSULIN GLARGINE 20 UNIT(S): 100 INJECTION, SOLUTION SUBCUTANEOUS at 21:25

## 2018-07-19 RX ADMIN — Medication 10 UNIT(S): at 08:37

## 2018-07-19 RX ADMIN — ATORVASTATIN CALCIUM 40 MILLIGRAM(S): 80 TABLET, FILM COATED ORAL at 05:11

## 2018-07-19 NOTE — PROGRESS NOTE ADULT - PROVIDER SPECIALTY LIST ADULT
Hospitalist
Infectious Disease
Infectious Disease
Internal Medicine
Hospitalist
Internal Medicine

## 2018-07-19 NOTE — PROGRESS NOTE ADULT - ASSESSMENT
#Bilateral Venous Stasis Ulcers  -Augmentin 875mg PO q12h x 7-14 days, PO Lasix, pain control with Percocet   -Wound care, leg elevation, CHD 4% wash daily and PRN, soap and water BID, ABD pads and ace wrap dressing changes BID   -ID and burn following   -Repeat blood cx: no growth     #Left eye visual impairment  -ct head - no changes  -Dopplers done, 20-39% L IC and 40-59% R IC stenosis.  -Ophthalmology recommended no treatment at this point  - Resolved now    #Diabetes Mellitus   -A1C 8.0, monitor finger sticks   -Lantus 20 qhs, Lispro 10 units qac  -CC diet     #Asthma   -Albuterol PRN     #Normocytic anemia  -Hg 11.8, continue to monitor with CBC     DVT ppx: Lovenox 40     FULL CODE     Disposition: anticipate to [facility] residence in AM - send rx's to specialty rx

## 2018-07-19 NOTE — PROGRESS NOTE ADULT - SUBJECTIVE AND OBJECTIVE BOX
HOSPITALIST ATTENDING NOTE    FABIAN CLEARY  57y Male  [id number]    INTERVAL HPI/OVERNIGHT EVENTS: no complaints    T(C): 36.4 (07-19-18 @ 07:43), Max: 36.8 (07-19-18 @ 01:09)  HR: 96 (07-19-18 @ 07:43) (96 - 96)  BP: 115/55 (07-19-18 @ 07:43) (115/55 - 129/62)  RR: 18 (07-19-18 @ 07:43) (18 - 18)  SpO2: --  Wt(kg): --    07-18-18 @ 07:01  -  07-19-18 @ 07:00  --------------------------------------------------------  IN: 1360 mL / OUT: 0 mL / NET: 1360 mL    07-19-18 @ 07:01  -  07-19-18 @ 15:18  --------------------------------------------------------  IN: 1320 mL / OUT: 0 mL / NET: 1320 mL        PHYSICAL EXAM:  GENERAL: NAD morbidly obese  HEAD:  Atraumatic, Normocephalic  EYES: EOMI, PERRLA, conjunctiva and sclera clear  ENMT: No tonsillar erythema, exudates, or enlargement  NECK: Supple, No JVD, Normal thyroid  NERVOUS SYSTEM:  Alert & Oriented X3, Good concentration; Non-focal- Motor Strength 5/5 B/L upper and lower extremities;  CHEST/LUNG: Clear to ascultation bilaterally; No rales, rhonchi, wheezing,   HEART: Regular rate and rhythm; No murmurs, rubs, or gallops  ABDOMEN: Soft, Nontender, Nondistended; Bowel sounds present  EXTREMITIES: chronic lymphedema , stasis  LYMPH: No lymphadenopathy noted  SKIN: No rashes or lesions  PSYCH: No suicidal/homicial ideation/hallucinations    Consultant(s) Notes Reviewed:  [x ] YES  [ ] NO  Care Discussed with Consultants/Other Providers/ Housestaff [ x] YES  [ ] NO    LABS:                RADIOLOGY & ADDITIONAL TESTS:    Imaging or report Personally Reviewed:  [ ] YES  [ ] NO    Case discussed with resident    Care discussed with pt/family      HEALTH ISSUES - PROBLEM Dx:  Need for prophylactic measure: Need for prophylactic measure  Morbid obesity: Morbid obesity  Diabetes: Diabetes  Venous stasis dermatitis of both lower extremities: Venous stasis dermatitis of both lower extremities  Vision loss: Vision loss

## 2018-07-20 VITALS
SYSTOLIC BLOOD PRESSURE: 132 MMHG | RESPIRATION RATE: 18 BRPM | TEMPERATURE: 96 F | HEART RATE: 88 BPM | DIASTOLIC BLOOD PRESSURE: 86 MMHG

## 2018-07-20 RX ORDER — ASPIRIN/CALCIUM CARB/MAGNESIUM 324 MG
1 TABLET ORAL
Qty: 30 | Refills: 0 | OUTPATIENT
Start: 2018-07-20

## 2018-07-20 RX ORDER — ATORVASTATIN CALCIUM 80 MG/1
1 TABLET, FILM COATED ORAL
Qty: 30 | Refills: 0 | OUTPATIENT
Start: 2018-07-20

## 2018-07-20 RX ORDER — NYSTATIN CREAM 100000 [USP'U]/G
1 CREAM TOPICAL
Qty: 1 | Refills: 0 | OUTPATIENT
Start: 2018-07-20

## 2018-07-20 RX ORDER — LOPERAMIDE HCL 2 MG
1 TABLET ORAL
Qty: 28 | Refills: 0 | OUTPATIENT
Start: 2018-07-20 | End: 2018-08-02

## 2018-07-20 RX ADMIN — Medication 10 UNIT(S): at 08:14

## 2018-07-20 RX ADMIN — ATORVASTATIN CALCIUM 40 MILLIGRAM(S): 80 TABLET, FILM COATED ORAL at 05:23

## 2018-07-20 RX ADMIN — ENOXAPARIN SODIUM 40 MILLIGRAM(S): 100 INJECTION SUBCUTANEOUS at 05:23

## 2018-07-20 RX ADMIN — NYSTATIN CREAM 1 APPLICATION(S): 100000 CREAM TOPICAL at 05:23

## 2018-07-23 ENCOUNTER — INPATIENT (INPATIENT)
Facility: HOSPITAL | Age: 58
LOS: 7 days | Discharge: NURSING, REHAB OR LONG-TERM CARE | End: 2018-07-31
Attending: INTERNAL MEDICINE | Admitting: INTERNAL MEDICINE
Payer: MEDICAID

## 2018-07-23 VITALS
HEART RATE: 100 BPM | SYSTOLIC BLOOD PRESSURE: 103 MMHG | HEIGHT: 65 IN | OXYGEN SATURATION: 99 % | RESPIRATION RATE: 20 BRPM | DIASTOLIC BLOOD PRESSURE: 54 MMHG | TEMPERATURE: 98 F | WEIGHT: 315 LBS

## 2018-07-23 DIAGNOSIS — Z98.890 OTHER SPECIFIED POSTPROCEDURAL STATES: Chronic | ICD-10-CM

## 2018-07-23 PROBLEM — E66.01 MORBID (SEVERE) OBESITY DUE TO EXCESS CALORIES: Chronic | Status: ACTIVE | Noted: 2018-06-26

## 2018-07-23 PROBLEM — M19.90 UNSPECIFIED OSTEOARTHRITIS, UNSPECIFIED SITE: Chronic | Status: ACTIVE | Noted: 2018-06-26

## 2018-07-23 LAB
ALBUMIN SERPL ELPH-MCNC: 3 G/DL — LOW (ref 3.5–5.2)
ALP SERPL-CCNC: 97 U/L — SIGNIFICANT CHANGE UP (ref 30–115)
ALT FLD-CCNC: 12 U/L — SIGNIFICANT CHANGE UP (ref 0–41)
ANION GAP SERPL CALC-SCNC: 15 MMOL/L — HIGH (ref 7–14)
APTT BLD: 24.8 SEC — LOW (ref 27–39.2)
AST SERPL-CCNC: 22 U/L — SIGNIFICANT CHANGE UP (ref 0–41)
BASOPHILS # BLD AUTO: 0.01 K/UL — SIGNIFICANT CHANGE UP (ref 0–0.2)
BASOPHILS NFR BLD AUTO: 0.1 % — SIGNIFICANT CHANGE UP (ref 0–1)
BILIRUB SERPL-MCNC: 0.2 MG/DL — SIGNIFICANT CHANGE UP (ref 0.2–1.2)
BUN SERPL-MCNC: 9 MG/DL — LOW (ref 10–20)
CALCIUM SERPL-MCNC: 7.8 MG/DL — LOW (ref 8.5–10.1)
CHLORIDE SERPL-SCNC: 100 MMOL/L — SIGNIFICANT CHANGE UP (ref 98–110)
CO2 SERPL-SCNC: 24 MMOL/L — SIGNIFICANT CHANGE UP (ref 17–32)
CREAT SERPL-MCNC: 0.6 MG/DL — LOW (ref 0.7–1.5)
EOSINOPHIL # BLD AUTO: 0.38 K/UL — SIGNIFICANT CHANGE UP (ref 0–0.7)
EOSINOPHIL NFR BLD AUTO: 3.6 % — SIGNIFICANT CHANGE UP (ref 0–8)
GLUCOSE SERPL-MCNC: 191 MG/DL — HIGH (ref 70–99)
HCT VFR BLD CALC: 34.5 % — LOW (ref 42–52)
HGB BLD-MCNC: 10.8 G/DL — LOW (ref 14–18)
IMM GRANULOCYTES NFR BLD AUTO: 0.5 % — HIGH (ref 0.1–0.3)
INR BLD: 1.17 RATIO — SIGNIFICANT CHANGE UP (ref 0.65–1.3)
LACTATE SERPL-SCNC: 1.8 MMOL/L — SIGNIFICANT CHANGE UP (ref 0.5–2.2)
LACTATE SERPL-SCNC: 2.3 MMOL/L — HIGH (ref 0.5–2.2)
LYMPHOCYTES # BLD AUTO: 1.68 K/UL — SIGNIFICANT CHANGE UP (ref 1.2–3.4)
LYMPHOCYTES # BLD AUTO: 15.9 % — LOW (ref 20.5–51.1)
MCHC RBC-ENTMCNC: 26.1 PG — LOW (ref 27–31)
MCHC RBC-ENTMCNC: 31.3 G/DL — LOW (ref 32–37)
MCV RBC AUTO: 83.3 FL — SIGNIFICANT CHANGE UP (ref 80–94)
MONOCYTES # BLD AUTO: 0.52 K/UL — SIGNIFICANT CHANGE UP (ref 0.1–0.6)
MONOCYTES NFR BLD AUTO: 4.9 % — SIGNIFICANT CHANGE UP (ref 1.7–9.3)
NEUTROPHILS # BLD AUTO: 7.94 K/UL — HIGH (ref 1.4–6.5)
NEUTROPHILS NFR BLD AUTO: 75 % — SIGNIFICANT CHANGE UP (ref 42.2–75.2)
NRBC # BLD: 0 /100 WBCS — SIGNIFICANT CHANGE UP (ref 0–0)
PLATELET # BLD AUTO: 306 K/UL — SIGNIFICANT CHANGE UP (ref 130–400)
POTASSIUM SERPL-MCNC: 5.2 MMOL/L — HIGH (ref 3.5–5)
POTASSIUM SERPL-SCNC: 5.2 MMOL/L — HIGH (ref 3.5–5)
PROT SERPL-MCNC: 5.8 G/DL — LOW (ref 6–8)
PROTHROM AB SERPL-ACNC: 12.7 SEC — SIGNIFICANT CHANGE UP (ref 9.95–12.87)
RBC # BLD: 4.14 M/UL — LOW (ref 4.7–6.1)
RBC # FLD: 15.3 % — HIGH (ref 11.5–14.5)
SODIUM SERPL-SCNC: 139 MMOL/L — SIGNIFICANT CHANGE UP (ref 135–146)
WBC # BLD: 10.58 K/UL — SIGNIFICANT CHANGE UP (ref 4.8–10.8)
WBC # FLD AUTO: 10.58 K/UL — SIGNIFICANT CHANGE UP (ref 4.8–10.8)

## 2018-07-23 PROCEDURE — 93970 EXTREMITY STUDY: CPT | Mod: 26

## 2018-07-23 PROCEDURE — 99221 1ST HOSP IP/OBS SF/LOW 40: CPT

## 2018-07-23 RX ORDER — DEXTROSE 50 % IN WATER 50 %
25 SYRINGE (ML) INTRAVENOUS ONCE
Qty: 0 | Refills: 0 | Status: DISCONTINUED | OUTPATIENT
Start: 2018-07-23 | End: 2018-07-26

## 2018-07-23 RX ORDER — INSULIN GLARGINE 100 [IU]/ML
10 INJECTION, SOLUTION SUBCUTANEOUS AT BEDTIME
Qty: 0 | Refills: 0 | Status: DISCONTINUED | OUTPATIENT
Start: 2018-07-23 | End: 2018-07-26

## 2018-07-23 RX ORDER — AMPICILLIN SODIUM AND SULBACTAM SODIUM 250; 125 MG/ML; MG/ML
3 INJECTION, POWDER, FOR SUSPENSION INTRAMUSCULAR; INTRAVENOUS EVERY 6 HOURS
Qty: 0 | Refills: 0 | Status: DISCONTINUED | OUTPATIENT
Start: 2018-07-23 | End: 2018-07-24

## 2018-07-23 RX ORDER — DEXTROSE 50 % IN WATER 50 %
12.5 SYRINGE (ML) INTRAVENOUS ONCE
Qty: 0 | Refills: 0 | Status: DISCONTINUED | OUTPATIENT
Start: 2018-07-23 | End: 2018-07-26

## 2018-07-23 RX ORDER — ENOXAPARIN SODIUM 100 MG/ML
40 INJECTION SUBCUTANEOUS EVERY 24 HOURS
Qty: 0 | Refills: 0 | Status: DISCONTINUED | OUTPATIENT
Start: 2018-07-23 | End: 2018-07-26

## 2018-07-23 RX ORDER — ATORVASTATIN CALCIUM 80 MG/1
40 TABLET, FILM COATED ORAL AT BEDTIME
Qty: 0 | Refills: 0 | Status: DISCONTINUED | OUTPATIENT
Start: 2018-07-23 | End: 2018-07-26

## 2018-07-23 RX ORDER — CIPROFLOXACIN LACTATE 400MG/40ML
400 VIAL (ML) INTRAVENOUS ONCE
Qty: 0 | Refills: 0 | Status: COMPLETED | OUTPATIENT
Start: 2018-07-23 | End: 2018-07-23

## 2018-07-23 RX ORDER — SODIUM CHLORIDE 9 MG/ML
1000 INJECTION, SOLUTION INTRAVENOUS
Qty: 0 | Refills: 0 | Status: DISCONTINUED | OUTPATIENT
Start: 2018-07-23 | End: 2018-07-26

## 2018-07-23 RX ORDER — DEXTROSE 50 % IN WATER 50 %
15 SYRINGE (ML) INTRAVENOUS ONCE
Qty: 0 | Refills: 0 | Status: DISCONTINUED | OUTPATIENT
Start: 2018-07-23 | End: 2018-07-26

## 2018-07-23 RX ORDER — VANCOMYCIN HCL 1 G
1000 VIAL (EA) INTRAVENOUS ONCE
Qty: 0 | Refills: 0 | Status: COMPLETED | OUTPATIENT
Start: 2018-07-23 | End: 2018-07-23

## 2018-07-23 RX ORDER — GLUCAGON INJECTION, SOLUTION 0.5 MG/.1ML
1 INJECTION, SOLUTION SUBCUTANEOUS ONCE
Qty: 0 | Refills: 0 | Status: DISCONTINUED | OUTPATIENT
Start: 2018-07-23 | End: 2018-07-26

## 2018-07-23 RX ORDER — INSULIN LISPRO 100/ML
VIAL (ML) SUBCUTANEOUS
Qty: 0 | Refills: 0 | Status: DISCONTINUED | OUTPATIENT
Start: 2018-07-23 | End: 2018-07-26

## 2018-07-23 RX ORDER — ASPIRIN/CALCIUM CARB/MAGNESIUM 324 MG
81 TABLET ORAL DAILY
Qty: 0 | Refills: 0 | Status: DISCONTINUED | OUTPATIENT
Start: 2018-07-23 | End: 2018-07-26

## 2018-07-23 RX ADMIN — ATORVASTATIN CALCIUM 40 MILLIGRAM(S): 80 TABLET, FILM COATED ORAL at 22:00

## 2018-07-23 RX ADMIN — Medication 1: at 17:11

## 2018-07-23 RX ADMIN — AMPICILLIN SODIUM AND SULBACTAM SODIUM 200 GRAM(S): 250; 125 INJECTION, POWDER, FOR SUSPENSION INTRAMUSCULAR; INTRAVENOUS at 18:54

## 2018-07-23 RX ADMIN — INSULIN GLARGINE 10 UNIT(S): 100 INJECTION, SOLUTION SUBCUTANEOUS at 22:01

## 2018-07-23 RX ADMIN — ENOXAPARIN SODIUM 40 MILLIGRAM(S): 100 INJECTION SUBCUTANEOUS at 18:54

## 2018-07-23 RX ADMIN — Medication 250 MILLIGRAM(S): at 12:53

## 2018-07-23 RX ADMIN — Medication 200 MILLIGRAM(S): at 12:10

## 2018-07-23 NOTE — ED PROVIDER NOTE - PROGRESS NOTE DETAILS
called [facility] . they confirm that his antibotics were finished in hospital. they sent him today because wound care nurse noticed green drainage on dressing . spoke to dr adeline mcneal

## 2018-07-23 NOTE — H&P ADULT - NSHPPHYSICALEXAM_GEN_ALL_CORE
VITALS:  T(F): 98.1 (07-23-18 @ 11:28), Max: 98.1 (07-23-18 @ 11:28)  HR: 100 (07-23-18 @ 11:28) (100 - 100)  BP: 103/54 (07-23-18 @ 11:28) (103/54 - 103/54)  RR: 20 (07-23-18 @ 11:28) (20 - 20)  SpO2: 99% (07-23-18 @ 11:28) (99% - 99%)    PHYSICAL EXAM:  GENERAL: NAD, well-groomed, well-developed  HEAD:  Atraumatic, Normocephalic  EYES: EOMI, PERRLA, conjunctiva and sclera clear  ENMT: No tonsillar erythema, exudates, or enlargement; Moist mucous membranes, Good dentition, No lesions  NECK: Supple, No JVD, Normal thyroid  NERVOUS SYSTEM:  Alert & Oriented X3, Good concentration; Motor Strength 5/5 B/L upper and lower extremities; DTRs 2+ intact and symmetric  CHEST/LUNG: Clear to percussion bilaterally; No rales, rhonchi, wheezing, or rubs  HEART: Regular rate and rhythm; No murmurs, rubs, or gallops  ABDOMEN: Soft, Nontender, Nondistended; Bowel sounds present  EXTREMITIES:  2+ Peripheral Pulses, No clubbing, cyanosis, or edema  LYMPH: No lymphadenopathy noted  SKIN: No rashes or lesions VITALS:  T(F): 98.1 (07-23-18 @ 11:28), Max: 98.1 (07-23-18 @ 11:28)  HR: 100 (07-23-18 @ 11:28) (100 - 100)  BP: 103/54 (07-23-18 @ 11:28) (103/54 - 103/54)  RR: 20 (07-23-18 @ 11:28) (20 - 20)  SpO2: 99% (07-23-18 @ 11:28) (99% - 99%)    PHYSICAL EXAM:  GENERAL: NAD, well-groomed, Morbidly obese  HEAD:  Atraumatic, Normocephalic  EYES: conjunctiva and sclera clear  ENMT: Moist mucous membranes  NECK: Supple, Normal thyroid  NERVOUS SYSTEM:  Alert & Oriented X 3, Good concentration; Motor Strength 5/5 B/L upper and lower extremities  CHEST/LUNG: Clear to auscultation bilaterally; No rales, rhonchi, wheezing, or rubs  HEART: Regular rate and rhythm; No murmurs, rubs, or gallops  ABDOMEN: Soft, Nontender, Nondistended; Bowel sounds present  EXTREMITIES:  No clubbing, cyanosis, + Chronic bilateral edema  SKIN: venous Stasis ulcers with oozing, greenish mild erythema and tenderness.

## 2018-07-23 NOTE — ED PROVIDER NOTE - PHYSICAL EXAMINATION
Vital Signs: I have reviewed the initial vital signs.  Constitutional: well-nourished, no acute distress, normocephalic  Eyes: PERRLA, EOMI, no nystagmus, clear conjunctiva  ENT: MMM, TM b/l clear , no nasal congestion  Cardiovascular: regular rate, regular rhythm, no murmur appreciated  Respiratory: unlabored respiratory effort, clear to auscultation bilaterally  Gastrointestinal: soft, non-tender, non-distended  abdomen, no pulsatile mass  Musculoskeletal: supple neck, no bony tenderness  Integumentary: (+) weeping, erythematous, (+) swelling (+) excoriations (+) warmth  Neurologic: awake, alert, cranial nerves II-XII grossly intact, extremities’ motor and sensory functions grossly intact, no focal deficits, GCS 15  Psychiatric: appropriate mood, appropriate affect

## 2018-07-23 NOTE — H&P ADULT - HISTORY OF PRESENT ILLNESS
pt with a h/o DM and asthma comes in from [facility] for cellulitus to b/l lower legs. pt states he was recently admitted on 7/18 and d/c on 7/20 for same. pt is on no meds for his diabetes and is not on ABX at this time. pt admits to SOB but denies CP fever, chills, nausea, vomitting.    Patient is a pleasant 56 yo M with h/o DM, Morbid Obesity, Chronic LE venous stasis ulcers, Asthma, who presents with worsening erythema and drainage of his B/L LE Venous stasis Ulcers.. Pt was  recently admitted to the [facility]  on 6/25/18 for similar presentation but he  was noted to have had maggots on his wounds in the LLE that was removed by burn surgery.  He was seen by ID and managed inpatient with Unasyn and was d/c'ed home with VNS services on 6/26/18 and Augmentin. Patient was not aware he was supposed to take Agmentin as an outpatient and initially felt better but two days ago he noticed that the erythema was getting worse on both legs and he noticed a watery discharge from his wounds which was not present on discharge. He also states that yesterday he was in the shower when he lost his balanced and fell landing on his bottom. He denied LOC and was able to get himself back up by using the wall for support. The LE watery drainage and erythema    worsened this morning to the point where he called an ambulance. He lives at home alone and states he is unable to care for himself but VNS does come daily for wound care. He was interviewed by [facility] for possible housing but they have not gotten back to him. Patient is a 58 yo M with h/o DM (off medications), Morbid Obesity, Chronic LE venous stasis ulcers, Asthma comes in from [facility] with complaints of bilateral leg swelling with persistent discharge. Patient was recently discharged from [facility], on 7/20/18 after being admitted with similar complaints. According to carlo he completed his Antibiotic course whilst inpatient thus was not discharged home on any ABx but the wound care nurse that was supposed to have his dressings changed did not show up does prompting this current ED visit. In ED, work up done was unremarkable but per ED physician there was copious greenish oozing with erythema  thus patient had to be admitted for cellulitis and home  care set up. Patient denies any pain, fever, chills, nausea or vomiting.

## 2018-07-23 NOTE — ED PROVIDER NOTE - ATTENDING CONTRIBUTION TO CARE
56 yo M PMHx noted presents from residence with c/o b/l LE swelling and weeping.  Pt with recent admisison states finished abx.  Was seen by Nurse today who sent him here for further evaluation, no Fever, + SOB, no n/v.  On exam pt in NAD AAO x 3, Lungs CAT B/L, + b/l LE swelling with skin breakdown, + erythema, foul smelling, + yellow to green drainage on dressings

## 2018-07-23 NOTE — ED PROVIDER NOTE - NS ED ROS FT
Review of Systems    Constitutional: (-) fever/ chills (-) weight loss  Eyes/ENT: (-) blurry vision, (-) epistaxis (-) sore throat (-) ear pain  Cardiovascular: (-) chest pain, (-) syncope (-) palpitations  Respiratory: (-) cough, (-) shortness of breath  Gastrointestinal: (-) vomiting, (-) diarrhea (-) abdominal pain  Musculoskeletal: (-) neck pain, (-) back pain, (-) joint pain (+) pedal edema   Integumentary: cellulitus to b/l LE   Neurological: (-) headache, (-) altered mental status  Psychiatric: (-) hallucinations or depression   Allergic/Immunologic: (-) pruritus

## 2018-07-23 NOTE — H&P ADULT - NSHPREVIEWOFSYSTEMS_GEN_ALL_CORE
CONSTITUTIONAL: No fever, weight loss, or fatigue  EYES: No eye pain, visual disturbances, or discharge  ENMT:  No difficulty hearing, tinnitus, vertigo; No sinus or throat pain  NECK: No pain or stiffness  BREASTS: No pain, masses, or nipple discharge  RESPIRATORY: No cough, wheezing, chills or hemoptysis; No shortness of breath  CARDIOVASCULAR: No chest pain, palpitations, dizziness, or leg swelling  GASTROINTESTINAL: No abdominal or epigastric pain. No nausea, vomiting, or hematemesis; No diarrhea or constipation. No melena or hematochezia.  GENITOURINARY: No dysuria, frequency, hematuria, or incontinence  NEUROLOGICAL: No headaches, memory loss, loss of strength, numbness, or tremors  SKIN: No itching, burning, rashes, or lesions   LYMPH NODES: No enlarged glands  ENDOCRINE: No heat or cold intolerance; No hair loss  MUSCULOSKELETAL: No joint pain or swelling; No muscle, back, or extremity pain  PSYCHIATRIC: No depression, anxiety, mood swings, or difficulty sleeping  HEME/LYMPH: No easy bruising, or bleeding gums  ALLERGY AND IMMUNOLOGIC: No hives or eczema CONSTITUTIONAL: No fever, weight loss, or fatigue  EYES: No eye pain, visual disturbances, or discharge  ENMT:  No difficulty hearing, tinnitus, vertigo; No sinus or throat pain  NECK: No pain or stiffness  BREASTS: No pain, masses, or nipple discharge  RESPIRATORY: No cough, wheezing, chills or hemoptysis; No shortness of breath  CARDIOVASCULAR: No chest pain, palpitations, dizziness, or leg swelling  GASTROINTESTINAL: No abdominal or epigastric pain. No nausea, vomiting, or hematemesis; No diarrhea or constipation. No melena or hematochezia.  GENITOURINARY: No dysuria, frequency, hematuria, or incontinence  NEUROLOGICAL: No headaches, memory loss, loss of strength, numbness, or tremors  SKIN: as above.  LYMPH NODES: No enlarged glands  ENDOCRINE: No heat or cold intolerance; No hair loss  MUSCULOSKELETAL: No joint pain or swelling; No muscle, back, or extremity pain  PSYCHIATRIC: No depression, anxiety, mood swings, or difficulty sleeping  HEME/LYMPH: No easy bruising, or bleeding gums  ALLERGY AND IMMUNOLOGIC: No hives or eczema

## 2018-07-23 NOTE — H&P ADULT - ASSESSMENT
Assessment and Plan:      -Augmentin 875mg PO q12h x 7-14 days, PO Lasix, pain control with Percocet   -Wound care, leg elevation, CHD 4% wash daily and PRN, soap and water BID, ABD pads and ace wrap dressing changes BID   -ID and burn following   -Repeat blood cx: no growth     #Left eye visual impairment  -ct head - no changes  -Dopplers done, 20-39% L IC and 40-59% R IC stenosis.  -Ophthalmology recommended no treatment at this point  - Resolved now    #Diabetes Mellitus   -A1C 8.0, monitor finger sticks   -Lantus 20 qhs, Lispro 10 units qac  -CC diet     #Asthma   -Albuterol PRN     #Normocytic anemia  -Hg 11.8, continue to monitor with CBC     DVT ppx: Lovenox 40     FULL CODE     Disposition: anticipate to [facility] in AM - send rx's to specialty rx Patient is a 56 yo M with h/o DM (off medications), Morbid Obesity, Chronic LE venous stasis ulcers, Asthma comes in from [facility] with complaints of bilateral leg swelling with persistent discharge. Patient was recently discharged from [facility] on 7/20/18 after being admitted with similar complaints. According to carlo he completed his Antibiotic course whilst inpatient thus was not discharged home on any ABx but the wound care nurse that was supposed to have his dressings changed did not show up does prompting this current ED visit. In ED, work up done was unremarkable but per ED physician there was copious greenish oozing with erythema  thus patient had to be admitted for cellulitis and home  care set up. Patient denies any pain, fever, chills, nausea or vomiting.      Assessment and Plan:    1. Bilateral Chronic venous Stasis ulcers:  Continue Unasyn until seen by ID.   Continue PO Lasix. Wound care, leg elevation. Podiatry consult.   CHD 4% wash daily and PRN, soap and water BID, ABD pads and ace wrap dressing changes. Podiatry consulted.       2. Left eye visual impairment:  Seen by Ophthalmologist on previous admission and recommended a retinal exam with a fluorescein angiogram outpatient.   Follow up outpatient.         3. Diabetes Mellitus:  Hemoglobin A1C, Whole Blood: 8.0 % (07.04.18 @ 21:29)  Monitor finger sticks continue Lantus 20 units at bedtime.        4. Asthma:   Albuterol PRN           DVT prophylaxis: Lovenox  Disposition: Anticipate for discharge to [facility] in AM.

## 2018-07-23 NOTE — ED PROVIDER NOTE - OBJECTIVE STATEMENT
pt with a h/o DM and asthma comes in from [facility] for cellulitus to b/l lower legs. pt states he was recently admitted on 7/18 and d/c on 7/20 for same. pt is on no meds for his diabetes and is not on ABX at this time. pt admits to SOB but denies CP fever, chills, nausea, vomitting.

## 2018-07-24 ENCOUNTER — TRANSCRIPTION ENCOUNTER (OUTPATIENT)
Age: 58
End: 2018-07-24

## 2018-07-24 DIAGNOSIS — I83.019 VARICOSE VEINS OF RIGHT LOWER EXTREMITY WITH ULCER OF UNSPECIFIED SITE: ICD-10-CM

## 2018-07-24 LAB
ALBUMIN SERPL ELPH-MCNC: 3.3 G/DL — LOW (ref 3.5–5.2)
ALP SERPL-CCNC: 92 U/L — SIGNIFICANT CHANGE UP (ref 30–115)
ALT FLD-CCNC: 11 U/L — SIGNIFICANT CHANGE UP (ref 0–41)
ANION GAP SERPL CALC-SCNC: 15 MMOL/L — HIGH (ref 7–14)
AST SERPL-CCNC: 9 U/L — SIGNIFICANT CHANGE UP (ref 0–41)
BILIRUB SERPL-MCNC: 0.3 MG/DL — SIGNIFICANT CHANGE UP (ref 0.2–1.2)
BUN SERPL-MCNC: 9 MG/DL — LOW (ref 10–20)
CALCIUM SERPL-MCNC: 7.9 MG/DL — LOW (ref 8.5–10.1)
CHLORIDE SERPL-SCNC: 100 MMOL/L — SIGNIFICANT CHANGE UP (ref 98–110)
CO2 SERPL-SCNC: 27 MMOL/L — SIGNIFICANT CHANGE UP (ref 17–32)
CREAT SERPL-MCNC: 0.6 MG/DL — LOW (ref 0.7–1.5)
ESTIMATED AVERAGE GLUCOSE: 166 MG/DL — HIGH (ref 68–114)
GLUCOSE SERPL-MCNC: 146 MG/DL — HIGH (ref 70–99)
HBA1C BLD-MCNC: 7.4 % — HIGH (ref 4–5.6)
HCT VFR BLD CALC: 36.3 % — LOW (ref 42–52)
HGB BLD-MCNC: 11 G/DL — LOW (ref 14–18)
MAGNESIUM SERPL-MCNC: 2 MG/DL — SIGNIFICANT CHANGE UP (ref 1.8–2.4)
MCHC RBC-ENTMCNC: 25.6 PG — LOW (ref 27–31)
MCHC RBC-ENTMCNC: 30.3 G/DL — LOW (ref 32–37)
MCV RBC AUTO: 84.6 FL — SIGNIFICANT CHANGE UP (ref 80–94)
NRBC # BLD: 0 /100 WBCS — SIGNIFICANT CHANGE UP (ref 0–0)
PHOSPHATE SERPL-MCNC: 3.2 MG/DL — SIGNIFICANT CHANGE UP (ref 2.1–4.9)
PLATELET # BLD AUTO: 319 K/UL — SIGNIFICANT CHANGE UP (ref 130–400)
POTASSIUM SERPL-MCNC: 4.1 MMOL/L — SIGNIFICANT CHANGE UP (ref 3.5–5)
POTASSIUM SERPL-SCNC: 4.1 MMOL/L — SIGNIFICANT CHANGE UP (ref 3.5–5)
PROT SERPL-MCNC: 6 G/DL — SIGNIFICANT CHANGE UP (ref 6–8)
RBC # BLD: 4.29 M/UL — LOW (ref 4.7–6.1)
RBC # FLD: 15.4 % — HIGH (ref 11.5–14.5)
SODIUM SERPL-SCNC: 142 MMOL/L — SIGNIFICANT CHANGE UP (ref 135–146)
WBC # BLD: 8.23 K/UL — SIGNIFICANT CHANGE UP (ref 4.8–10.8)
WBC # FLD AUTO: 8.23 K/UL — SIGNIFICANT CHANGE UP (ref 4.8–10.8)

## 2018-07-24 PROCEDURE — 93925 LOWER EXTREMITY STUDY: CPT | Mod: 26

## 2018-07-24 RX ORDER — FUROSEMIDE 40 MG
1 TABLET ORAL
Qty: 30 | Refills: 0 | OUTPATIENT
Start: 2018-07-24 | End: 2018-08-22

## 2018-07-24 RX ORDER — METFORMIN HYDROCHLORIDE 850 MG/1
500 TABLET ORAL
Qty: 0 | Refills: 0 | Status: DISCONTINUED | OUTPATIENT
Start: 2018-07-24 | End: 2018-07-26

## 2018-07-24 RX ORDER — IPRATROPIUM/ALBUTEROL SULFATE 18-103MCG
3 AEROSOL WITH ADAPTER (GRAM) INHALATION EVERY 6 HOURS
Qty: 0 | Refills: 0 | Status: DISCONTINUED | OUTPATIENT
Start: 2018-07-24 | End: 2018-07-26

## 2018-07-24 RX ORDER — METFORMIN HYDROCHLORIDE 850 MG/1
1 TABLET ORAL
Qty: 60 | Refills: 0 | OUTPATIENT
Start: 2018-07-24 | End: 2018-08-22

## 2018-07-24 RX ORDER — FUROSEMIDE 40 MG
40 TABLET ORAL DAILY
Qty: 0 | Refills: 0 | Status: DISCONTINUED | OUTPATIENT
Start: 2018-07-24 | End: 2018-07-26

## 2018-07-24 RX ADMIN — Medication 1 TABLET(S): at 12:00

## 2018-07-24 RX ADMIN — AMPICILLIN SODIUM AND SULBACTAM SODIUM 200 GRAM(S): 250; 125 INJECTION, POWDER, FOR SUSPENSION INTRAMUSCULAR; INTRAVENOUS at 00:56

## 2018-07-24 RX ADMIN — ATORVASTATIN CALCIUM 40 MILLIGRAM(S): 80 TABLET, FILM COATED ORAL at 21:42

## 2018-07-24 RX ADMIN — AMPICILLIN SODIUM AND SULBACTAM SODIUM 200 GRAM(S): 250; 125 INJECTION, POWDER, FOR SUSPENSION INTRAMUSCULAR; INTRAVENOUS at 05:28

## 2018-07-24 RX ADMIN — Medication 1: at 17:24

## 2018-07-24 RX ADMIN — ENOXAPARIN SODIUM 40 MILLIGRAM(S): 100 INJECTION SUBCUTANEOUS at 17:40

## 2018-07-24 RX ADMIN — INSULIN GLARGINE 10 UNIT(S): 100 INJECTION, SOLUTION SUBCUTANEOUS at 21:42

## 2018-07-24 RX ADMIN — Medication 81 MILLIGRAM(S): at 12:00

## 2018-07-24 RX ADMIN — METFORMIN HYDROCHLORIDE 500 MILLIGRAM(S): 850 TABLET ORAL at 17:25

## 2018-07-24 NOTE — DISCHARGE NOTE ADULT - ADDITIONAL INSTRUCTIONS
Follow up at [facility] in 1-2 weeks after discharge. Follow up with Vascular Surgeon Dr. Herrera in 2 weeks after discharge. Take medications as prescribed.

## 2018-07-24 NOTE — CONSULT NOTE ADULT - SUBJECTIVE AND OBJECTIVE BOX
PROGRESS NOTE   Patient is a 57y old  Male who presents with a chief complaint of Bilateral Lower extremity swelling (23 Jul 2018 15:47)      HPI:  Patient is a 58 yo M with h/o DM (off medications), Morbid Obesity, Chronic LE venous stasis ulcers, Asthma comes in from [facility] with complaints of bilateral leg swelling with persistent discharge. Patient was recently discharged from [facility] on 7/20/18 after being admitted with similar complaints. According to carlo he completed his Antibiotic course whilst inpatient thus was not discharged home on any ABx but the wound care nurse that was supposed to have his dressings changed did not show up does prompting this current ED visit. In ED, work up done was unremarkable but per ED physician there was copious greenish oozing with erythema  thus patient had to be admitted for cellulitis and home  care set up. Patient denies any pain, fever, chills, nausea or vomiting. (23 Jul 2018 15:47)      CELLULITIS  ^CELLULITIS/LEGS  Family history of obesity (Father, Sibling)  Handoff  MEWS Score  Morbid obesity  Osteoarthritis  Diabetes  Asthma  Cellulitis  Venous stasis ulcers of both lower extremities  H/O umbilical hernia repair  No significant past surgical history  CELLULITIS/LEGS  2      VITALS:  Vital Signs Last 24 Hrs  T(C): 36.9 (24 Jul 2018 05:32), Max: 37.8 (23 Jul 2018 18:20)  T(F): 98.5 (24 Jul 2018 05:32), Max: 100.1 (23 Jul 2018 18:20)  HR: 97 (24 Jul 2018 05:32) (96 - 112)  BP: 130/60 (24 Jul 2018 05:32) (103/54 - 130/60)  BP(mean): --  RR: 16 (24 Jul 2018 05:32) (16 - 20)  SpO2: 99% (23 Jul 2018 11:28) (99% - 99%)    LABS:                        10.8   10.58 )-----------( 306      ( 23 Jul 2018 13:14 )             34.5     07-23    139  |  100  |  9<L>  ----------------------------<  191<H>  5.2<H>   |  24  |  0.6<L>    Ca    7.8<L>      23 Jul 2018 13:14    TPro  5.8<L>  /  Alb  3.0<L>  /  TBili  0.2  /  DBili  x   /  AST  22  /  ALT  12  /  AlkPhos  97  07-23    PT/INR - ( 23 Jul 2018 13:14 )   PT: 12.70 sec;   INR: 1.17 ratio         PTT - ( 23 Jul 2018 13:14 )  PTT:24.8 sec  Hemoglobin A1C     PHYSICAL EXAM  GEN: FABIAN CLEARY is a pleasant well-nourished, well developed 57y Male in no acute distress, alert awake, and oriented to person, place and time.     Medication(s):   aspirin enteric coated 81 milliGRAM(s) Oral daily  atorvastatin 40 milliGRAM(s) Oral at bedtime  dextrose 40% Gel 15 Gram(s) Oral once PRN  dextrose 5%. 1000 milliLiter(s) IV Continuous <Continuous>  dextrose 50% Injectable 12.5 Gram(s) IV Push once  dextrose 50% Injectable 25 Gram(s) IV Push once  dextrose 50% Injectable 25 Gram(s) IV Push once  enoxaparin Injectable 40 milliGRAM(s) SubCutaneous every 24 hours  glucagon  Injectable 1 milliGRAM(s) IntraMuscular once PRN  insulin glargine Injectable (LANTUS) 10 Unit(s) SubCutaneous at bedtime  insulin lispro (HumaLOG) corrective regimen sliding scale   SubCutaneous three times a day before meals  multivitamin 1 Tablet(s) Oral daily      LE Focused Exam:      Vasc:    - DP/PT pulses 1/4 B/L   - Skin temp warm to warm, proximal to distal B/L  - CFT < 3 sec B/L  - non-pitting edema B/L LE    Neuro:   - Gross sensation in diminished to the level of the digits B/L     Derm:   - No interdigital macerations B/L   - ulcerations to B/L LE, secondary to chronic venous stasis     MSK:   - Muscle strength 3/5 in all quadrants w/ no defects B/L

## 2018-07-24 NOTE — CONSULT NOTE ADULT - ASSESSMENT
Assessment:   - wounds to B/L LE 2/2 chronic venous stasis   - serous drainage appreciated upon physical exam, but wounds do not appear to be acutely infected @ this time    Plan:   - pt seen/evaluated @ bedside  - dressed w/ xeroform/dsd/ACE  - order arterial/venous duplex  - c/w IV abx per ID recs  - findings will be discussed w/ attending   - podiatry will f/u w/ additional recommendations PRN

## 2018-07-24 NOTE — DISCHARGE NOTE ADULT - CARE PLAN
Principal Discharge DX:	Venous stasis ulcers of both lower extremities  Goal:	Prevent worsening edema  Assessment and plan of treatment:	Follow up with Vascular surgery outpatient. You will need an Unna boot ordered.  There is no need for Antibiotics as there is no infection.  Keep legs elevated and continue Lasix as prescribed.  Continue wound care: Xeroform/DSD/ACE.  Secondary Diagnosis:	Diabetes  Goal:	Good glycemic control.  Assessment and plan of treatment:	Take Metformin twice a day with meals.   Monitor Finger sticks at home. Follow up with PMD.

## 2018-07-24 NOTE — DISCHARGE NOTE ADULT - CARE PROVIDERS DIRECT ADDRESSES
[email or web address]

## 2018-07-24 NOTE — DISCHARGE NOTE ADULT - OTHER SIGNIFICANT FINDINGS
LABS:                        11.0   8.23  )-----------( 319      ( 24 Jul 2018 08:47 )             36.3     07-24    142  |  100  |  9<L>  ----------------------------<  146<H>  4.1   |  27  |  0.6<L>    Ca    7.9<L>      24 Jul 2018 08:47  Phos  3.2     07-24  Mg     2.0     07-24    TPro  6.0  /  Alb  3.3<L>  /  TBili  0.3  /  DBili  x   /  AST  9   /  ALT  11  /  AlkPhos  92  07-24      Lactate, Blood: 2.3 mmol/L (07.23.18 @ 13:14)  Lactate, Blood: 1.8 mmol/L (07.23.18 @ 19:55)      MICROBIOLOGY: None       RADIOLOGY & ADDITIONAL TESTS:    X-ray Chest 1 View- PORTABLE-Routine (07.23.18 @ 12:25)   No radiographic evidence of acute cardiopulmonary disease.      VA Duplex Lower Ext Vein Scan, Bilat (07.23.18 @ 21:39)  No evidence of deep venous thrombosis or superficial thrombophlebitis in   the bilateral lower extremities.      ECHOCARDIOGRAM:     Summary:   1. Left ventricular ejection fraction, by visual estimation, is 55 to   60%.   2. Normal left ventricular size and wall thicknesses, with normal   systolic function.   3. Spectral Doppler shows impaired relaxation pattern of left   ventricular myocardial filling (Grade I diastolic dysfunction).   4. Trace tricuspid regurgitation.

## 2018-07-24 NOTE — PROGRESS NOTE ADULT - ASSESSMENT
Patient is a 56 yo M with h/o DM (off medications), Morbid Obesity, Chronic LE venous stasis ulcers, Asthma comes in from [facility] with complaints of bilateral leg swelling with persistent discharge. Patient was recently discharged from [facility], on 7/20/18 after being admitted with similar complaints. According to carlo he completed his Antibiotic course whilst inpatient thus was not discharged home on any ABx but the wound care nurse that was supposed to have his dressings changed did not show up does prompting this current ED visit. In ED, work up done was unremarkable but per ED physician there was copious greenish oozing with erythema  thus patient had to be admitted for cellulitis and home care set up. Patient denies any pain, fever, chills, nausea or vomiting.      Assessment and Plan:    1. Bilateral Chronic venous Stasis ulcers: No cellulitis.  ID following: Off Antibiotics.  Continue PO Lasix. Wound care with Xeroform/DSD/ACE, leg elevation.   Podiatry consulted:  appreciate input and recommendations noted.  Out patient follow up with vascular. Tena sánchez outpatient.      2. Left eye visual impairment:  Seen by Ophthalmologist on previous admission and recommended a retinal exam with a fluorescein angiogram outpatient.   Follow up outpatient.         3. Diabetes Mellitus:  Hemoglobin A1C, Whole Blood: 8.0 % (07.04.18 @ 21:29)  Patient not on medications at home.   Monitor finger sticks, started on metformin 500 mg twice a day.      4. Asthma:   Duoneb PRN       5. Diastolic Heart failure:  ECHO: 6/28/18: ef >55% with G1DD.  Continue Lasix.      DVT prophylaxis: Lovenox  Disposition: Anticipate for discharge to [facility] in AM.

## 2018-07-24 NOTE — DISCHARGE NOTE ADULT - MEDICATION SUMMARY - MEDICATIONS TO TAKE
I will START or STAY ON the medications listed below when I get home from the hospital:    aspirin 81 mg oral delayed release tablet  -- 1 tab(s) by mouth once a day  -- Indication: For Prophylaxis    metFORMIN 500 mg oral tablet  -- 1 tab(s) by mouth 2 times a day  -- Indication: For Diabetes    loperamide 2 mg oral tablet  -- 1 tab(s) by mouth 2 times a day x 14 days, As Needed   -- It is very important that you take or use this exactly as directed.  Do not skip doses or discontinue unless directed by your doctor.  May cause drowsiness.  Alcohol may intensify this effect.  Use care when operating dangerous machinery.  Obtain medical advice before taking any non-prescription drugs as some may affect the action of this medication.    -- Indication: For Diarrhea    atorvastatin 40 mg oral tablet  -- 1 tab(s) by mouth every 24 hours  -- Indication: For Hyperlipidemia    nystatin 100,000 units/g topical cream  -- 1 application on skin 2 times a day  -- Indication: For Skin care    furosemide 40 mg oral tablet  -- 1 tab(s) by mouth once a day  -- Indication: For Edema    Daily Multiple Vitamins oral tablet  -- 1 tab(s) by mouth once a day   -- Indication: For Supplement

## 2018-07-24 NOTE — DISCHARGE NOTE ADULT - CARE PROVIDER_API CALL
Fazal Herrera), Surgery; Vascular Surgery  [address] [place] [address]  Phone: [phone]  Fax: [phone]    PMD,   Phone: (   )    -  Fax: (   )    -

## 2018-07-24 NOTE — PROGRESS NOTE ADULT - SUBJECTIVE AND OBJECTIVE BOX
Patient slipped on drainage (liquid) from his foot when he got up to be weighed. He landed on left buttocks, denies hitting his head. Feels a little sore but no difficulty with movement. For now would monitor for late signs of injury-no x-rays ordered

## 2018-07-24 NOTE — DISCHARGE NOTE ADULT - HOSPITAL COURSE
Patient is a 58 yo M with h/o DM (off medications), Morbid Obesity, Chronic LE venous stasis ulcers, Asthma comes in from [facility] with complaints of bilateral leg swelling with persistent discharge. Patient was recently discharged from [facility], on 7/20/18 after being admitted with similar complaints. According to carlo he completed his Antibiotic course whilst inpatient thus was not discharged home on any ABx but the wound care nurse that was supposed to have his dressings changed did not show up does prompting this current ED visit. In ED, work up done was unremarkable but per ED physician there was copious greenish oozing with erythema  thus patient had to be admitted for cellulitis and home care set up. Patient denies any pain, fever, chills, nausea or vomiting.      Assessment and Plan:    1. Bilateral Chronic venous Stasis ulcers: No cellulitis.  ID following: Off Antibiotics.  Continue PO Lasix. Wound care with Xeroform/DSD/ACE, leg elevation.   Podiatry consulted:  appreciate input and recommendations noted.  Out patient follow up with vascular. Tena sánchez outpatient.      2. Left eye visual impairment:  Seen by Ophthalmologist on previous admission and recommended a retinal exam with a fluorescein angiogram outpatient.   Follow up outpatient.         3. Diabetes Mellitus:  Hemoglobin A1C, Whole Blood: 8.0 % (07.04.18 @ 21:29)  Patient not on medications at home.   Monitor finger sticks, started on metformin 500 mg twice a day.      4. Asthma:   Duoneb PRN       5. Diastolic Heart failure:  ECHO: 6/28/18: ef >55% with G1DD.  Continue Lasix. Patient is a 58 yo M with h/o DM (off medications), Morbid Obesity, Chronic LE venous stasis ulcers, Asthma came in from [facility] with complaints of bilateral leg swelling with persistent discharge. Patient was recently discharged from [facility] on 7/20/18 after being admitted with similar complaints. According to him, he completed his Antibiotic course while inpatient thus was not discharged home on any ABx but the wound care nurse that was supposed to do his dressings was unable to do so, and this prompted this current ED visit. In ED, work up was unremarkable but ED physician decided to admit the patient for cellulitis and also to enable setting up home/wound care for the patient. Podiatry was consulted and they recommended Duplex studies of the veins and arteries of the lower extremities. High grade stenosis in the right popliteal artery was diagnosed. Vascular scheduled patient for angiogram and possible intervention. Angiogram was aborted due to patient discomfort, body habitus and inability to establish access. He is scheduled to [facility] 2 weeks after diacharge for further management. Patient is being discharged in stable condition with instructions to take his medications as prescribed and follow up with his physicians for appointments.        Assessment and Plan:    1. Bilateral Chronic venous Stasis ulcers: No cellulitis.  ID following: Off Antibiotics.  Continue PO Lasix. Wound care with Xeroform/DSD/ACE, leg elevation.   Podiatry consulted:  appreciate input and recommendations noted.  Out patient follow up with vascular. Tena sánchez outpatient.      2. Left eye visual impairment:  Seen by Ophthalmologist on previous admission and recommended a retinal exam with a fluorescein angiogram outpatient.   Follow up outpatient.         3. Diabetes Mellitus:  Hemoglobin A1C, Whole Blood: 8.0 % (07.04.18 @ 21:29)  Patient not on medications at home.   Monitor finger sticks, started on metformin 500 mg twice a day.      4. Asthma:   Duoneb PRN       5. Diastolic Heart failure:  ECHO: 6/28/18: ef >55% with G1DD.  Continue Lasix.

## 2018-07-24 NOTE — DISCHARGE NOTE ADULT - PATIENT PORTAL LINK FT
You can access the [facility] Patient Portal, offered by [facility], by registering with the following website: [email or web address]

## 2018-07-24 NOTE — DISCHARGE NOTE ADULT - PLAN OF CARE
Prevent worsening edema Follow up with Vascular surgery outpatient. You will need an Unna boot ordered.  There is no need for Antibiotics as there is no infection.  Keep legs elevated and continue Lasix as prescribed.  Continue wound care: Xeroform/DSD/ACE. Good glycemic control. Take Metformin twice a day with meals.   Monitor Finger sticks at home. Follow up with PMD.

## 2018-07-24 NOTE — PROGRESS NOTE ADULT - SUBJECTIVE AND OBJECTIVE BOX
FABIAN CLEARY  57y  Male    Patient is a 57y old  Male who presents with a chief complaint of Bilateral Lower extremity swelling (23 Jul 2018 15:47)      INTERVAL HPI/OVERNIGHT EVENTS:  Patient sustained a fall overnight when he slipped on the floor due to oozing ulcers.  He has no complaints today and no injuries were sustained.       REVIEW OF SYSTEMS:  CONSTITUTIONAL: No fever, weight loss, or fatigue  EYES: No eye pain, visual disturbances, or discharge  ENMT:  No difficulty hearing, tinnitus, vertigo; No sinus or throat pain  RESPIRATORY: No cough, wheezing, chills or hemoptysis; No shortness of breath  CARDIOVASCULAR: No chest pain, palpitations, dizziness, + Chronic leg swelling  GASTROINTESTINAL: No abdominal or epigastric pain. No nausea, vomiting, or hematemesis; No diarrhea or constipation. No melena or hematochezia.  GENITOURINARY: No dysuria, frequency, hematuria, or incontinence  NEUROLOGICAL: No headaches, memory loss, loss of strength, numbness, or tremors  SKIN: ulcerations on the legs with constant discharge.  LYMPH NODES: No enlarged glands  MUSCULOSKELETAL: No joint pain or swelling; No muscle, back, or extremity pain  PSYCHIATRIC: No depression, anxiety, mood swings, or difficulty sleeping        VITALS:  T(F): 98.5 (07-24-18 @ 05:32), Max: 100.1 (07-23-18 @ 18:20)  HR: 97 (07-24-18 @ 05:32) (96 - 112)  BP: 130/60 (07-24-18 @ 05:32) (103/54 - 130/60)  RR: 16 (07-24-18 @ 05:32) (16 - 20)  SpO2: 99% (07-23-18 @ 11:28) (99% - 99%)      CAPILLARY BLOOD GLUCOSE  141 (24 Jul 2018 11:14)  135 (24 Jul 2018 08:03)  166 (23 Jul 2018 20:40)  200 (23 Jul 2018 18:20)  173 (23 Jul 2018 17:10)  180 (23 Jul 2018 11:56)  180 (23 Jul 2018 11:56)          PHYSICAL EXAM:  GENERAL: NAD, morbidly obese  HEAD:  Atraumatic, Normocephalic  EYES: conjunctiva and sclera clear  ENMT: Moist mucous membranes  NECK: Supple, Normal thyroid  NERVOUS SYSTEM:  Alert & Oriented X 3, Good concentration; Motor Strength 4/5 B/L upper and lower extremities  CHEST/LUNG: Clear to auscultation bilaterally; No rales, rhonchi, wheezing, or rubs  HEART: Regular rate and rhythm; No murmurs, rubs, or gallops  ABDOMEN: Soft, Nontender, Nondistended; Bowel sounds present  EXTREMITIES:  No clubbing, cyanosis, non-pitting edema, diminished sensation in the feet bilaterally.  LYMPH: No lymphadenopathy noted  SKIN: Chronic stasis ulcers.    Consultant(s) Notes Reviewed:  [x ] YES  [ ] NO  Care Discussed with Consultants/Other Providers [ x] YES  [ ] NO    LABS:                        11.0   8.23  )-----------( 319      ( 24 Jul 2018 08:47 )             36.3     07-24    142  |  100  |  9<L>  ----------------------------<  146<H>  4.1   |  27  |  0.6<L>    Ca    7.9<L>      24 Jul 2018 08:47  Phos  3.2     07-24  Mg     2.0     07-24    TPro  6.0  /  Alb  3.3<L>  /  TBili  0.3  /  DBili  x   /  AST  9   /  ALT  11  /  AlkPhos  92  07-24      Lactate, Blood: 2.3 mmol/L (07.23.18 @ 13:14)  Lactate, Blood: 1.8 mmol/L (07.23.18 @ 19:55)      MICROBIOLOGY: None       RADIOLOGY & ADDITIONAL TESTS:    X-ray Chest 1 View- PORTABLE-Routine (07.23.18 @ 12:25)   No radiographic evidence of acute cardiopulmonary disease.      VA Duplex Lower Ext Vein Scan, Bilat (07.23.18 @ 21:39)  No evidence of deep venous thrombosis or superficial thrombophlebitis in   the bilateral lower extremities.      ECHOCARDIOGRAM:     Summary:   1. Left ventricular ejection fraction, by visual estimation, is 55 to   60%.   2. Normal left ventricular size and wall thicknesses, with normal   systolic function.   3. Spectral Doppler shows impaired relaxation pattern of left   ventricular myocardial filling (Grade I diastolic dysfunction).   4. Trace tricuspid regurgitation.    Imaging Personally Reviewed:  [x] YES  [ ] NO    MEDICATIONS  (STANDING):  aspirin enteric coated 81 milliGRAM(s) Oral daily  atorvastatin 40 milliGRAM(s) Oral at bedtime  dextrose 5%. 1000 milliLiter(s) (50 mL/Hr) IV Continuous <Continuous>  dextrose 50% Injectable 12.5 Gram(s) IV Push once  dextrose 50% Injectable 25 Gram(s) IV Push once  dextrose 50% Injectable 25 Gram(s) IV Push once  enoxaparin Injectable 40 milliGRAM(s) SubCutaneous every 24 hours  insulin glargine Injectable (LANTUS) 10 Unit(s) SubCutaneous at bedtime  insulin lispro (HumaLOG) corrective regimen sliding scale   SubCutaneous three times a day before meals  multivitamin 1 Tablet(s) Oral daily    MEDICATIONS  (PRN):  dextrose 40% Gel 15 Gram(s) Oral once PRN Blood Glucose LESS THAN 70 milliGRAM(s)/deciliter  glucagon  Injectable 1 milliGRAM(s) IntraMuscular once PRN Glucose LESS THAN 70 milligrams/deciliter      HEALTH ISSUES - PROBLEM Dx:  Venous stasis ulcers of both lower extremities  Morbid obesity  Osteoarthritis  Diabetes  Asthma  H/O umbilical hernia repair

## 2018-07-24 NOTE — CONSULT NOTE ADULT - SUBJECTIVE AND OBJECTIVE BOX
[facility]Patient is a 57[facility] old  Male who presents with a chief complaint of Bilateral Lower extremity swelling (2018 15:47)      Patient has history of:  No Known Allergies      PMH - not relevant    FSH - not relevant    ROS - not contributory       PHYSICAL EXAM  T(F): 98.5 (18 @ 05:32), Max: 100.1 (18 @ 18:20)  HR: 97 (18 @ 05:32) (96 - 112)  BP: 130/60 (18 @ 05:32) (103/54 - 130/60)  RR: 16 (18 @ 05:32) (16 - 20)  SpO2: 99% (18 @ 11:28) (99% - 99%)  Daily Height in cm: 165.1 (2018 18:20)    Daily Weight in k (2018 18:20)      HEENT: normal, no nuchal rigidity  Cor: RSR Nl S1 S2  Lungs: clear  Abdomen: Nontender, Nl BS,   Ext: LE chronic stasis with ulcers    LAB & RADIOLOGIC RESULTS:                        10.8   10.58 )-----------( 306      ( 2018 13:14 )             34.5         07    139  |  100  |  9<L>  ----------------------------<  191<H>  5.2<H>   |  24  |  0.6<L>    Ca    7.8<L>      2018 13:14    TPro  5.8<L>  /  Alb  3.0<L>  /  TBili  0.2  /  DBili  x   /  AST  22  /  ALT  12  /  AlkPhos  97      Sodium, Serum: 139 mmol/L (18 @ 13:14)   Creatinine, Serum: 0.6 mg/dL (18 @ 13:14)  eGFR if Non African American: 113 mL/min/1.73M2 (18 @ 13:14)  eGFR if : 131 mL/min/1.73M2 (18 @ 13:14)    LIVER FUNCTIONS - ( 2018 13:14 )  Alb: 3.0 g/dL / Pro: 5.8 g/dL / ALK PHOS: 97 U/L / ALT: 12 U/L / AST: 22 U/L / GGT: x           PT/INR - ( 2018 13:14 )   PT: 12.70 sec;   INR: 1.17 ratio         PTT - ( 2018 13:14 )  PTT:24.8 sec  Patient Profile Adult [YIN Walton] (18 @ 18:37)  H&P Adult [ANDRADE Steele] (18 @ 15:47)  ED Provider Note [HEMAL Lares] (18 @ 11:34)  ED ADULT Triage Note [RONN Valle] (18 @ 11:28)  ED ADULT Nurse Note [ANDRADE Platt] (18 @ 11:24)  Progress Notes - Care Coordination [C. Provider] (18 @ 11:13)  Progress Notes - Care Coordination [C. Provider] (18 @ 15:20)  Progress Note Adult-Hospitalist Attending [BRANDY Hector] (18 @ 15:18)  Progress Notes - Care Coordination [C. Provider] (18 @ 10:37)  Progress Note Adult-Internal Medicine Attending [JASON Romano] (18 @ 12:37)  Progress Notes - Care Coordination [C. Provider] (18 @ 11:48)  Chart Note-Event Note f/u RD [ANDRADE Pena] (18 @ 14:16)  Progress Notes - Care Coordination [C. Provider] (18 @ 14:10)  Progress Note Adult-Internal Medicine Attending [JASON Romano] (18 @ 12:14)  Progress Note Adult-Internal Medicine Resident [DORIE Flanagan] (18 @ 11:28)  Consult Note Adult-Ophthalmology Attending [HEMAL Troncoso] (18 @ 12:50)  Progress Notes - Care Coordination [C. Provider] (18 @ 10:44)  Progress Note Adult-Internal Medicine Attending [DOMENICO Rodrigues] (18 @ 10:18)  Consult Note Adult-Neurology Attending [MARK Bey] (07-15-18 @ 14:30)  Progress Note Adult-Internal Medicine Attending [THIEN Park] (07-15-18 @ 12:33)  Chart Note-Event Note Resident [MARK Cisneros] (07-15-18 @ 00:15)  Chart Note-Event Note Resident [MARK Dolann] (18 @ 23:18)  Provider Contact Note (Other) [HEMAL Preston] (18 @ 21:25)  Progress Note Adult-Internal Medicine Resident [HEMAL Cruz] (18 @ 13:36)  Progress Note Adult-Internal Medicine Attending [THIEN Park] (18 @ 13:18)  Progress Note Adult-Internal Medicine Resident [HEMAL Cruz] (18 @ 16:47)  Progress Notes - Care Coordination [C. Provider] (18 @ 15:25)  Progress Note Adult-Internal Medicine Attending [JASON Romano] (18 @ 13:47)  Progress Notes - Care Coordination [C. Provider] (18 @ 10:26)  Progress Note Adult-Internal Medicine Resident [HEMAL Cruz] (18 @ 15:52)  Progress Note Adult-Internal Medicine Attending [JASON Romano] (18 @ 12:56)  Progress Notes - Care Coordination [C. Provider] (18 @ 12:25)  Progress Notes - Care Coordination [C. Provider] (18 @ 09:31)  Progress Note Adult-Internal Medicine Resident [HEMAL Cruz] (18 @ 14:22)  Progress Note Adult-Internal Medicine Attending [JASON Romano] (18 @ 13:04)  Progress Notes - Care Coordination [C. Provider] (18 @ 11:42)  Dietitian Initial Evaluation Adult [ANDRADE Pena] (18 @ 10:13)  Progress Notes - Care Coordination [C. Provider] (18 @ 09:18)  Progress Notes - Care Coordination [C. Provider] (07-10-18 @ 15:59)  Progress Notes - Care Coordination [C. Provider] (07-10-18 @ 14:19)  Progress Note Adult-Internal Medicine Resident/Attending [HEMAL Raines] (07-10-18 @ 14:04)  Discharge Note Adult [DORIE Singh, DOMENICO Ervin, HEMAL Cruz] (07-10-18 @ 11:49)  Progress Notes - Care Coordination [C. Provider] (07-10-18 @ 09:58)  Progress Note Adult-Infectious Disease Attending [JASON Pollock] (07-10-18 @ 09:09)  Progress Note Adult-Internal Medicine Resident [HEMAL Cruz] (18 @ 15:31)  Care Coordination Assessment [C. Provider] (18 @ 14:56)  Progress Note Adult-Hospitalist Attending [JASON Romano] (18 @ 13:04)  Progress Notes - Care Coordination [C. Provider] (18 @ 10:20)  Progress Note Adult-Internal Medicine Resident [HEMAL Cruz] (18 @ 17:42)  Progress Note Adult-Hospitalist Attending [JASON Romano] (18 @ 16:35)  Progress Note Adult-Hospitalist Attending [ANDRADE Maria] (18 @ 14:24)  Physical Therapy Initial Evaluation Adult [ANDRADE Carranza] (18 @ 08:54)  Progress Note Adult-Hospitalist Attending [ANDRADE Maria] (18 @ 17:00)  Consult Note Adult-Physiatry Attending [KETAN Beckman] (18 @ 14:11)  Progress Notes - Care Coordination [C. Provider] (18 @ 12:46)  Provider Contact Note (Other) [MICHELLE Barnhart] (18 @ 10:38)  Progress Note Adult-Internal Medicine Resident [HEMAL Cruz] (18 @ 10:00)  Progress Note Adult-Infectious Disease Attending [JASON Pollock] (18 @ 08:31)  Consult Note Adult-Burn Attending [ANDRADE Villagran] (18 @ 17:30)  Progress Notes - Care Coordination [C. Provider] (18 @ 15:46)  Progress Note Adult-Internal Medicine Resident [HEMAL Cruz] (18 @ 15:00)  Progress Notes - Care Coordination [C. Provider] (18 @ 11:05)  Consult Note Adult-Infectious Disease Resident/Attending [ALEXUS Gallardo] (18 @ 10:31)  Progress Notes - Care Coordination [C. Provider] (18 @ 20:23)  Patient Profile Adult [JASON Gray] (18 @ 17:38)  H&P Adult [ANDRADE MariaRONN] (18 @ 16:50)  ED Provider Note [DORIE Winslow] (18 @ 14:17)  ED ADULT Nurse Note [LEOBARDO Denise] (18 @ 13:46)  ED ADULT Triage Note [JASON Carvalho] (18 @ 12:54)  Progress Notes - Care Coordination [C. Provider] (18 @ 12:38)  Progress Notes - Care Coordination [C. Provider] (18 @ 12:27)  Progress Note Adult-Internal Medicine Resident/Attending [THIEN Skinner] (18 @ 11:07)  Progress Notes - Care Coordination [C. Provider] (18 @ 15:12)  Care Coordination Assessment [C. Provider] (18 @ 15:06)  Progress Note Adult-Internal Medicine Resident/Attending [THIEN Skinner] (18 @ 10:59)  Progress Note Adult-Infectious Disease Attending [PRINCESS Chong] (18 @ 07:35)  Progress Notes - Care Coordination [C. Provider] (18 @ 17:04)  Progress Notes - Care Coordination [C. Provider] (18 @ 16:28)  Discharge Note Adult [MARK Serrano, DOMENICO Nixon] (18 @ 14:50)  Progress Note Adult-Infectious Disease Attending [PRINCESS Chong] (18 @ 12:09)  Progress Note Adult-Internal Medicine Resident [MARK Serrano] (18 @ 09:37)  Progress Note Adult-Surgery Resident [PRINCESS Carballo S. Cohn] (18 @ 05:15)  Consult Note Adult-Burn Attending [ANDRADE Villagran] (18 @ 23:10)  H&P Adult [DOMENICO Nixon] (18 @ 14:41)  Consult Note Adult-Infectious Disease Attending [PRINCESS Chong] (18 @ 14:37)  Provider Contact Note (Other) [RONN Ibarra] (18 @ 11:56)  Chart Note-Event Note Resident [ANDRADE Oneal] (18 @ 11:34)  Consult Note Adult-Surgery Resident/Attending [BRANDY Ramirez] (18 @ 10:17)  Patient Profile Adult [RONN Ibarra] (18 @ 10:04)  ED Provider Note [THIEN Wong] (18 @ 22:25)  ED ADULT Nurse Note [ANDRADE Moya] (18 @ 22:02)  ED ADULT Triage Note [DOMENICO Rodas] (18 @ 20:50)

## 2018-07-25 RX ORDER — SODIUM CHLORIDE 9 MG/ML
1000 INJECTION INTRAMUSCULAR; INTRAVENOUS; SUBCUTANEOUS
Qty: 0 | Refills: 0 | Status: DISCONTINUED | OUTPATIENT
Start: 2018-07-25 | End: 2018-07-26

## 2018-07-25 RX ADMIN — INSULIN GLARGINE 10 UNIT(S): 100 INJECTION, SOLUTION SUBCUTANEOUS at 21:05

## 2018-07-25 RX ADMIN — METFORMIN HYDROCHLORIDE 500 MILLIGRAM(S): 850 TABLET ORAL at 17:45

## 2018-07-25 RX ADMIN — ATORVASTATIN CALCIUM 40 MILLIGRAM(S): 80 TABLET, FILM COATED ORAL at 21:05

## 2018-07-25 RX ADMIN — SODIUM CHLORIDE 100 MILLILITER(S): 9 INJECTION INTRAMUSCULAR; INTRAVENOUS; SUBCUTANEOUS at 23:39

## 2018-07-25 RX ADMIN — Medication 1 TABLET(S): at 11:25

## 2018-07-25 RX ADMIN — Medication 81 MILLIGRAM(S): at 11:25

## 2018-07-25 RX ADMIN — METFORMIN HYDROCHLORIDE 500 MILLIGRAM(S): 850 TABLET ORAL at 05:15

## 2018-07-25 NOTE — PROGRESS NOTE ADULT - SUBJECTIVE AND OBJECTIVE BOX
PROGRESS NOTE   Patient is a 57y old  Male who presents with a chief complaint of Bilateral Lower extremity swelling (24 Jul 2018 11:59)      HPI:  Patient is a 58 yo M with h/o DM (off medications), Morbid Obesity, Chronic LE venous stasis ulcers, Asthma comes in from [facility] with complaints of bilateral leg swelling with persistent discharge. Patient was recently discharged from [facility] on 7/20/18 after being admitted with similar complaints. According to carlo he completed his Antibiotic course whilst inpatient thus was not discharged home on any ABx but the wound care nurse that was supposed to have his dressings changed did not show up does prompting this current ED visit. In ED, work up done was unremarkable but per ED physician there was copious greenish oozing with erythema  thus patient had to be admitted for cellulitis and home  care set up. Patient denies any pain, fever, chills, nausea or vomiting. (23 Jul 2018 15:47)      CELLULITIS  ^CELLULITIS/LEGS  Family history of obesity (Father, Sibling)  Handoff  MEWS Score  Morbid obesity  Osteoarthritis  Diabetes  Asthma  Venous stasis ulcers of both lower extremities  Cellulitis  Venous stasis ulcers of both lower extremities  H/O umbilical hernia repair  No significant past surgical history  CELLULITIS/LEGS  2  Diabetes      VITALS:  Vital Signs Last 24 Hrs  T(C): 36.7 (25 Jul 2018 06:04), Max: 36.7 (25 Jul 2018 06:04)  T(F): 98 (25 Jul 2018 06:04), Max: 98 (25 Jul 2018 06:04)  HR: 93 (25 Jul 2018 06:04) (93 - 104)  BP: 124/57 (25 Jul 2018 06:04) (113/65 - 136/64)  BP(mean): --  RR: 16 (25 Jul 2018 06:04) (16 - 18)  SpO2: --    LABS:                        11.0   8.23  )-----------( 319      ( 24 Jul 2018 08:47 )             36.3     07-24    142  |  100  |  9<L>  ----------------------------<  146<H>  4.1   |  27  |  0.6<L>    Ca    7.9<L>      24 Jul 2018 08:47  Phos  3.2     07-24  Mg     2.0     07-24    TPro  6.0  /  Alb  3.3<L>  /  TBili  0.3  /  DBili  x   /  AST  9   /  ALT  11  /  AlkPhos  92  07-24    PT/INR - ( 23 Jul 2018 13:14 )   PT: 12.70 sec;   INR: 1.17 ratio         PTT - ( 23 Jul 2018 13:14 )  PTT:24.8 sec  Hemoglobin A1C Hemoglobin A1C, Whole Blood: 7.4 % (07-24-18 @ 08:47)      PHYSICAL EXAM  GEN: FABIAN CLEARY is a pleasant well-nourished, well developed 57y Male in no acute distress, alert awake, and oriented to person, place and time.     Medication(s):   ALBUTerol/ipratropium for Nebulization 3 milliLiter(s) Nebulizer every 6 hours PRN  aspirin enteric coated 81 milliGRAM(s) Oral daily  atorvastatin 40 milliGRAM(s) Oral at bedtime  dextrose 40% Gel 15 Gram(s) Oral once PRN  dextrose 5%. 1000 milliLiter(s) IV Continuous <Continuous>  dextrose 50% Injectable 12.5 Gram(s) IV Push once  dextrose 50% Injectable 25 Gram(s) IV Push once  dextrose 50% Injectable 25 Gram(s) IV Push once  enoxaparin Injectable 40 milliGRAM(s) SubCutaneous every 24 hours  furosemide    Tablet 40 milliGRAM(s) Oral daily  glucagon  Injectable 1 milliGRAM(s) IntraMuscular once PRN  insulin glargine Injectable (LANTUS) 10 Unit(s) SubCutaneous at bedtime  insulin lispro (HumaLOG) corrective regimen sliding scale   SubCutaneous three times a day before meals  metFORMIN 500 milliGRAM(s) Oral two times a day  multivitamin 1 Tablet(s) Oral daily

## 2018-07-25 NOTE — PROGRESS NOTE ADULT - ASSESSMENT
Patient is a 56 yo M with h/o DM (off medications), Morbid Obesity, Chronic LE venous stasis ulcers, Asthma comes in from [facility] with complaints of bilateral leg swelling with persistent discharge. Patient was recently discharged from [facility], on 7/20/18 after being admitted with similar complaints. According to carlo he completed his Antibiotic course whilst inpatient thus was not discharged home on any ABx but the wound care nurse that was supposed to have his dressings changed did not show up does prompting this current ED visit. In ED, work up done was unremarkable but per ED physician there was copious greenish oozing with erythema  thus patient had to be admitted for cellulitis and home care set up. Patient denies any pain, fever, chills, nausea or vomiting.      Assessment and Plan:    1. Bilateral Chronic venous Stasis ulcers: No cellulitis.  ID consult, no need for Antibiotics.  Continue PO Lasix. Wound care with Xeroform/DSD/ACE, leg elevation.   Podiatry consulted:  appreciate input and recommendations noted.  Venous duplex negative for DVT  Arterial Duplex showed high grade right popliteal artery stenosis,   Vascular consulted, I spoke to Dr Herrera, recommended to transfer to [facility] for angiogram tomorrow.       2. Left eye visual impairment:  Seen by Ophthalmologist on previous admission and recommended a retinal exam with a fluorescein angiogram outpatient.   Follow up outpatient.         3. Diabetes Mellitus:  Hemoglobin A1C 7.4  He started on metformin, will hold it today for angiogram tomorrow.       4. Asthma:   Duoneb PRN       5. Diastolic Heart failure:  ECHO: 6/28/18: ef >55% with G1DD.  Continue Lasix.    6. Morbid Obesity:   patient will benefit from weight loss program and possible weight loss surgery.     DVT prophylaxis: Lovenox  Disposition: transfer to [facility] for RLE angiogram tomorrow.

## 2018-07-25 NOTE — CONSULT NOTE ADULT - ASSESSMENT
ASSESSMENT: 57y Male who presented with c/o of swollen and painful LE. Arterial duplex showed High-grade right popliteal artery stenosis.    PLAN:   Patient is scheduled for OR tomorrow for right lower extremity angiogram possible endovascular revascularization  f/u preop labs and replete electrolytes as needed.

## 2018-07-25 NOTE — PROGRESS NOTE ADULT - ATTENDING COMMENTS
Continue with local wound care as noted above twice a day. Patient can follow up in Podiatry clinic at [address] [place] [address]     Thank you for allowing me to participate in your patient care  Gabe Etsevez DPM

## 2018-07-25 NOTE — CONSULT NOTE ADULT - ATTENDING COMMENTS
Chronic mixed ulcers both legs, non healing for few months. Has right popliteal artery stenosis on duplex. Offered him right leg angiogram, he agrees. All risks and benefits discussed.

## 2018-07-25 NOTE — CONSULT NOTE ADULT - SUBJECTIVE AND OBJECTIVE BOX
HPI: 57y old  Male with pmhx of DM, morbid obesity, chronic LE venous stasis ulcers, asthma who presented with a chief complaint of Bilateral Lower extremity swelling and ulcers since March 2018. He also states the LE are painful.     PAST MEDICAL & SURGICAL HISTORY:  Morbid obesity  Osteoarthritis  Diabetes  Asthma  H/O umbilical hernia repair    FAMILY HISTORY:  Family history of obesity (Father, Sibling)    SOCIAL HISTORY:  Smoker. States he smokes only 2 cigaretes a day.     ALLERGIES: No Known Allergies    CURRENT MEDICATIONS  MEDICATIONS (STANDING): aspirin enteric coated 81 milliGRAM(s) Oral daily  atorvastatin 40 milliGRAM(s) Oral at bedtime  dextrose 5%. 1000 milliLiter(s) IV Continuous <Continuous>  dextrose 50% Injectable 12.5 Gram(s) IV Push once  dextrose 50% Injectable 25 Gram(s) IV Push once  dextrose 50% Injectable 25 Gram(s) IV Push once  enoxaparin Injectable 40 milliGRAM(s) SubCutaneous every 24 hours  furosemide    Tablet 40 milliGRAM(s) Oral daily  insulin glargine Injectable (LANTUS) 10 Unit(s) SubCutaneous at bedtime  insulin lispro (HumaLOG) corrective regimen sliding scale   SubCutaneous three times a day before meals  metFORMIN 500 milliGRAM(s) Oral two times a day  multivitamin 1 Tablet(s) Oral daily  sodium chloride 0.9%. 1000 milliLiter(s) IV Continuous <Continuous>    MEDICATIONS (PRN):ALBUTerol/ipratropium for Nebulization 3 milliLiter(s) Nebulizer every 6 hours PRN Shortness of Breath and/or Wheezing  dextrose 40% Gel 15 Gram(s) Oral once PRN Blood Glucose LESS THAN 70 milliGRAM(s)/deciliter  glucagon  Injectable 1 milliGRAM(s) IntraMuscular once PRN Glucose LESS THAN 70 milligrams/deciliter      Vitals:   T(C): 36.5 (07-25-18 @ 21:06), Max: 37 (07-25-18 @ 15:39)  HR: 95 (07-25-18 @ 21:06) (93 - 96)  BP: 107/56 (07-25-18 @ 21:06) (107/56 - 136/67)  RR: 18 (07-25-18 @ 21:06) (16 - 18)  Height (cm): 165.1 (07-25 @ 15:39)  Weight (kg): 182 (07-25 @ 15:39)  BMI (kg/m2): 66.8 (07-25 @ 15:39)  BSA (m2): 2.66 (07-25 @ 15:39)    PHYSICAL EXAM:   General: NAD   Cardiac: S1, S2, RRR  Respiratory: Bilateral breath sounds, clear and equal bilaterally  Ext: doplerable dp/pt in b/l lower extremities. LE are swollen, erythematous, tender, with ulcers     IMAGING:   VA Duplex Lower Extrem Arterial, Bilat (07.24.18 @ 12:53) >  Impression:  High-grade right popliteal artery stenosis.  Limited study due to nonvisualization of bilateral tibial arteries.  No evidence of significant arterial occlusive disease in left lower   extremity in the visualized arteries.    VA Duplex Lower Ext Vein Scan, Bilat (07.23.18 @ 21:39) >  Impression:  No evidence of deep venous thrombosis or superficial thrombophlebitis in   the bilateral lower extremities.

## 2018-07-25 NOTE — PROGRESS NOTE ADULT - SUBJECTIVE AND OBJECTIVE BOX
EVIN FABIAN  57y  Male      Patient is a 57y old  Male who presents with a chief complaint of Bilateral Lower extremity swelling (24 Jul 2018 11:59)      INTERVAL HPI/OVERNIGHT EVENTS:  He feels ok, no acute complaints.     Vital Signs Last 24 Hrs  T(C): 36.7 (25 Jul 2018 06:04), Max: 36.7 (25 Jul 2018 06:04)  T(F): 98 (25 Jul 2018 06:04), Max: 98 (25 Jul 2018 06:04)  HR: 93 (25 Jul 2018 06:04) (93 - 104)  BP: 124/57 (25 Jul 2018 06:04) (113/65 - 136/64)  BP(mean): --  RR: 16 (25 Jul 2018 06:04) (16 - 18)  SpO2: --            Consultant(s) Notes Reviewed:  [x ] YES  [ ] NO          MEDICATIONS  (STANDING):  aspirin enteric coated 81 milliGRAM(s) Oral daily  atorvastatin 40 milliGRAM(s) Oral at bedtime  dextrose 5%. 1000 milliLiter(s) (50 mL/Hr) IV Continuous <Continuous>  dextrose 50% Injectable 12.5 Gram(s) IV Push once  dextrose 50% Injectable 25 Gram(s) IV Push once  dextrose 50% Injectable 25 Gram(s) IV Push once  enoxaparin Injectable 40 milliGRAM(s) SubCutaneous every 24 hours  furosemide    Tablet 40 milliGRAM(s) Oral daily  insulin glargine Injectable (LANTUS) 10 Unit(s) SubCutaneous at bedtime  insulin lispro (HumaLOG) corrective regimen sliding scale   SubCutaneous three times a day before meals  metFORMIN 500 milliGRAM(s) Oral two times a day  multivitamin 1 Tablet(s) Oral daily    MEDICATIONS  (PRN):  ALBUTerol/ipratropium for Nebulization 3 milliLiter(s) Nebulizer every 6 hours PRN Shortness of Breath and/or Wheezing  dextrose 40% Gel 15 Gram(s) Oral once PRN Blood Glucose LESS THAN 70 milliGRAM(s)/deciliter  glucagon  Injectable 1 milliGRAM(s) IntraMuscular once PRN Glucose LESS THAN 70 milligrams/deciliter      LABS                          11.0   8.23  )-----------( 319      ( 24 Jul 2018 08:47 )             36.3     07-24    142  |  100  |  9<L>  ----------------------------<  146<H>  4.1   |  27  |  0.6<L>    Ca    7.9<L>      24 Jul 2018 08:47  Phos  3.2     07-24  Mg     2.0     07-24    TPro  6.0  /  Alb  3.3<L>  /  TBili  0.3  /  DBili  x   /  AST  9   /  ALT  11  /  AlkPhos  92  07-24          Lactate Trend  07-23 @ 19:55 Lactate:1.8   07-23 @ 13:14 Lactate:2.3         CAPILLARY BLOOD GLUCOSE  150 (25 Jul 2018 11:15)            RADIOLOGY & ADDITIONAL TESTS:    Imaging Personally Reviewed:  [ ] YES  [ ] NO    HEALTH ISSUES - PROBLEM Dx:  Venous stasis ulcers of both lower extremities: Venous stasis ulcers of both lower extremities    PHYSICAL EXAM:  GENERAL: NAD, morbidly obese.   HEAD:  Atraumatic, Normocephalic  EYES: EOMI, PERRLA, conjunctiva and sclera clear  NECK: Supple, No JVD  CHEST/LUNG: Clear to auscultation bilaterally; No wheeze  HEART: Regular rate and rhythm; No murmurs, rubs, or gallops  ABDOMEN: Soft, Nontender, Nondistended; Bowel sounds present  EXTREMITIES:  LE erythema edema with chronic ulceration.

## 2018-07-25 NOTE — PROGRESS NOTE ADULT - ASSESSMENT
Assessment:   - B/L LE venous stasis ulcerations. + serous drainage, + erythema w/ decreasing edema   - no clinical signs of infection @ this time     Plan:   - pt seen/evaluated @ bedside w/ attending   - Dressing changed w/ xeroform/DSD/ACE  - Arterial duplex showed high grade stenosis in RLE. Vasc should be re-consulted for additional intervention.   - No surgical intervention from podiatric standpoint  - Podiatry signing off on pt. Please re-consult PRN

## 2018-07-26 ENCOUNTER — APPOINTMENT (OUTPATIENT)
Dept: VASCULAR SURGERY | Facility: HOSPITAL | Age: 58
End: 2018-07-26
Payer: MEDICAID

## 2018-07-26 LAB
ANION GAP SERPL CALC-SCNC: 13 MMOL/L — SIGNIFICANT CHANGE UP (ref 7–14)
APTT BLD: 30.6 SEC — SIGNIFICANT CHANGE UP (ref 27–39.2)
BASOPHILS # BLD AUTO: 0.02 K/UL — SIGNIFICANT CHANGE UP (ref 0–0.2)
BASOPHILS NFR BLD AUTO: 0.2 % — SIGNIFICANT CHANGE UP (ref 0–1)
BUN SERPL-MCNC: 10 MG/DL — SIGNIFICANT CHANGE UP (ref 10–20)
CALCIUM SERPL-MCNC: 8.4 MG/DL — LOW (ref 8.5–10.1)
CHLORIDE SERPL-SCNC: 101 MMOL/L — SIGNIFICANT CHANGE UP (ref 98–110)
CO2 SERPL-SCNC: 24 MMOL/L — SIGNIFICANT CHANGE UP (ref 17–32)
CREAT SERPL-MCNC: 0.6 MG/DL — LOW (ref 0.7–1.5)
EOSINOPHIL # BLD AUTO: 0.39 K/UL — SIGNIFICANT CHANGE UP (ref 0–0.7)
EOSINOPHIL NFR BLD AUTO: 4.7 % — SIGNIFICANT CHANGE UP (ref 0–8)
GLUCOSE SERPL-MCNC: 140 MG/DL — HIGH (ref 70–99)
HCT VFR BLD CALC: 35 % — LOW (ref 42–52)
HGB BLD-MCNC: 10.8 G/DL — LOW (ref 14–18)
IMM GRANULOCYTES NFR BLD AUTO: 0.4 % — HIGH (ref 0.1–0.3)
INR BLD: 1.19 RATIO — SIGNIFICANT CHANGE UP (ref 0.65–1.3)
LYMPHOCYTES # BLD AUTO: 1.61 K/UL — SIGNIFICANT CHANGE UP (ref 1.2–3.4)
LYMPHOCYTES # BLD AUTO: 19.5 % — LOW (ref 20.5–51.1)
MAGNESIUM SERPL-MCNC: 2 MG/DL — SIGNIFICANT CHANGE UP (ref 1.8–2.4)
MCHC RBC-ENTMCNC: 25.1 PG — LOW (ref 27–31)
MCHC RBC-ENTMCNC: 30.9 G/DL — LOW (ref 32–37)
MCV RBC AUTO: 81.2 FL — SIGNIFICANT CHANGE UP (ref 80–94)
MONOCYTES # BLD AUTO: 0.38 K/UL — SIGNIFICANT CHANGE UP (ref 0.1–0.6)
MONOCYTES NFR BLD AUTO: 4.6 % — SIGNIFICANT CHANGE UP (ref 1.7–9.3)
NEUTROPHILS # BLD AUTO: 5.82 K/UL — SIGNIFICANT CHANGE UP (ref 1.4–6.5)
NEUTROPHILS NFR BLD AUTO: 70.6 % — SIGNIFICANT CHANGE UP (ref 42.2–75.2)
PHOSPHATE SERPL-MCNC: 3.6 MG/DL — SIGNIFICANT CHANGE UP (ref 2.1–4.9)
PLATELET # BLD AUTO: 332 K/UL — SIGNIFICANT CHANGE UP (ref 130–400)
POTASSIUM SERPL-MCNC: 4.5 MMOL/L — SIGNIFICANT CHANGE UP (ref 3.5–5)
POTASSIUM SERPL-SCNC: 4.5 MMOL/L — SIGNIFICANT CHANGE UP (ref 3.5–5)
PROTHROM AB SERPL-ACNC: 12.8 SEC — SIGNIFICANT CHANGE UP (ref 9.95–12.87)
RBC # BLD: 4.31 M/UL — LOW (ref 4.7–6.1)
RBC # FLD: 15 % — HIGH (ref 11.5–14.5)
SODIUM SERPL-SCNC: 138 MMOL/L — SIGNIFICANT CHANGE UP (ref 135–146)
TYPE + AB SCN PNL BLD: SIGNIFICANT CHANGE UP
WBC # BLD: 8.25 K/UL — SIGNIFICANT CHANGE UP (ref 4.8–10.8)
WBC # FLD AUTO: 8.25 K/UL — SIGNIFICANT CHANGE UP (ref 4.8–10.8)

## 2018-07-26 PROCEDURE — 99222 1ST HOSP IP/OBS MODERATE 55: CPT

## 2018-07-26 PROCEDURE — 76937 US GUIDE VASCULAR ACCESS: CPT | Mod: 26

## 2018-07-26 RX ORDER — DEXTROSE 50 % IN WATER 50 %
25 SYRINGE (ML) INTRAVENOUS ONCE
Qty: 0 | Refills: 0 | Status: DISCONTINUED | OUTPATIENT
Start: 2018-07-26 | End: 2018-07-31

## 2018-07-26 RX ORDER — SODIUM CHLORIDE 9 MG/ML
1000 INJECTION, SOLUTION INTRAVENOUS
Qty: 0 | Refills: 0 | Status: DISCONTINUED | OUTPATIENT
Start: 2018-07-26 | End: 2018-07-26

## 2018-07-26 RX ORDER — ATORVASTATIN CALCIUM 80 MG/1
40 TABLET, FILM COATED ORAL AT BEDTIME
Qty: 0 | Refills: 0 | Status: DISCONTINUED | OUTPATIENT
Start: 2018-07-26 | End: 2018-07-31

## 2018-07-26 RX ORDER — HYDROMORPHONE HYDROCHLORIDE 2 MG/ML
0.5 INJECTION INTRAMUSCULAR; INTRAVENOUS; SUBCUTANEOUS
Qty: 0 | Refills: 0 | Status: DISCONTINUED | OUTPATIENT
Start: 2018-07-26 | End: 2018-07-26

## 2018-07-26 RX ORDER — FUROSEMIDE 40 MG
40 TABLET ORAL DAILY
Qty: 0 | Refills: 0 | Status: DISCONTINUED | OUTPATIENT
Start: 2018-07-26 | End: 2018-07-31

## 2018-07-26 RX ORDER — OXYCODONE AND ACETAMINOPHEN 5; 325 MG/1; MG/1
1 TABLET ORAL ONCE
Qty: 0 | Refills: 0 | Status: DISCONTINUED | OUTPATIENT
Start: 2018-07-26 | End: 2018-07-26

## 2018-07-26 RX ORDER — ASPIRIN/CALCIUM CARB/MAGNESIUM 324 MG
81 TABLET ORAL DAILY
Qty: 0 | Refills: 0 | Status: DISCONTINUED | OUTPATIENT
Start: 2018-07-26 | End: 2018-07-31

## 2018-07-26 RX ORDER — INSULIN LISPRO 100/ML
VIAL (ML) SUBCUTANEOUS
Qty: 0 | Refills: 0 | Status: DISCONTINUED | OUTPATIENT
Start: 2018-07-26 | End: 2018-07-31

## 2018-07-26 RX ORDER — ENOXAPARIN SODIUM 100 MG/ML
40 INJECTION SUBCUTANEOUS EVERY 12 HOURS
Qty: 0 | Refills: 0 | Status: DISCONTINUED | OUTPATIENT
Start: 2018-07-26 | End: 2018-07-31

## 2018-07-26 RX ORDER — ACETAMINOPHEN 500 MG
650 TABLET ORAL EVERY 4 HOURS
Qty: 0 | Refills: 0 | Status: DISCONTINUED | OUTPATIENT
Start: 2018-07-26 | End: 2018-07-31

## 2018-07-26 RX ORDER — ONDANSETRON 8 MG/1
4 TABLET, FILM COATED ORAL ONCE
Qty: 0 | Refills: 0 | Status: DISCONTINUED | OUTPATIENT
Start: 2018-07-26 | End: 2018-07-26

## 2018-07-26 RX ORDER — INSULIN GLARGINE 100 [IU]/ML
10 INJECTION, SOLUTION SUBCUTANEOUS AT BEDTIME
Qty: 0 | Refills: 0 | Status: DISCONTINUED | OUTPATIENT
Start: 2018-07-26 | End: 2018-07-31

## 2018-07-26 RX ADMIN — HYDROMORPHONE HYDROCHLORIDE 0.5 MILLIGRAM(S): 2 INJECTION INTRAMUSCULAR; INTRAVENOUS; SUBCUTANEOUS at 17:20

## 2018-07-26 RX ADMIN — METFORMIN HYDROCHLORIDE 500 MILLIGRAM(S): 850 TABLET ORAL at 05:18

## 2018-07-26 RX ADMIN — INSULIN GLARGINE 10 UNIT(S): 100 INJECTION, SOLUTION SUBCUTANEOUS at 22:13

## 2018-07-26 RX ADMIN — Medication 1 TABLET(S): at 11:33

## 2018-07-26 RX ADMIN — Medication 81 MILLIGRAM(S): at 11:41

## 2018-07-26 RX ADMIN — ATORVASTATIN CALCIUM 40 MILLIGRAM(S): 80 TABLET, FILM COATED ORAL at 22:13

## 2018-07-26 RX ADMIN — HYDROMORPHONE HYDROCHLORIDE 0.5 MILLIGRAM(S): 2 INJECTION INTRAMUSCULAR; INTRAVENOUS; SUBCUTANEOUS at 18:36

## 2018-07-26 RX ADMIN — SODIUM CHLORIDE 100 MILLILITER(S): 9 INJECTION INTRAMUSCULAR; INTRAVENOUS; SUBCUTANEOUS at 11:34

## 2018-07-26 RX ADMIN — Medication 40 MILLIGRAM(S): at 05:18

## 2018-07-26 NOTE — CHART NOTE - NSCHARTNOTEFT_GEN_A_CORE
PACU ANESTHESIA ADMISSION NOTE      Procedure:   Post op diagnosis:      ____  Intubated  TV:______       Rate: ______      FiO2: ______    x____  Patent Airway    __x__  Full return of protective reflexes    _x___  Full recovery from anesthesia / back to baseline     Vitals:   T: 97.4          R:17                  BP:   138/70               Sat:   99                P: 105      Mental Status:  _x___ Awake   _x____ Alert   _____ Drowsy   _____ Sedated    Nausea/Vomiting:  x____ NO  ______Yes, xx  See Post - Op Orders          Pain Scale (0-10):  ___0__    Treatment: ____ None    x____ See Post - Op/PCA Orders    Post - Operative Fluids:   ____ Oral   ____ See Post - Op Orders    Plan: Discharge:   ____Home       _x____Floor     _____Critical Care    _____  Other:_________________    Comments:

## 2018-07-26 NOTE — PROGRESS NOTE ADULT - ASSESSMENT
57 y o m with pmh of D.M not on any medications at home , asthma, Osteoarthritis and morbid obesity presented to ER worsening erythema and drainage from B/L LE Venous Stasis Ulcers. Pt recently admitted to [facility] on 6/25/18 for similar presentation but he  was noted to have had maggots on his wounds in the LLE that was removed by burn surgery.  He was seen by ID and managed inpatient with Unasyn and was d/c'ed home with VNS services on 6/26/18 and Augmentin. Patient was not aware he was supposed to take Agmentin and returns w/ worsening wounds.     1)Worsening B/L Venous Stasis Ulcers w/ no purulent discharge   - admit to medicine.  - IV unasyn as recommended by ID on previous admission   -ID consult   - Burn consult placed,will use previous recs for now:  soap and water qd, dakins solution  wet to dry dressing bid  - pain control w/ Percocet   - PO lasix       2) DIABETES MELLITIS :  -Last Hb A1c 7.8 , monitor finger stick.  - started on Basal Bolus Insulin   -CC diet       3)ASTHMA:  -not on any inhalers at home  -Albuterol PRN     4)OA:  -pain control.    5)DVT ppx  -Lovenox     6)DISPO:  - from home ; lives by himself ambulates using a walker, Unable to care for himself at home. He would like to go to [facility] on [address]. They already have seen him  - full code. 57 y o m with pmh of D.M not on any medications at home , asthma, Osteoarthritis and morbid obesity presented to ER worsening erythema and drainage from B/L LE Venous Stasis Ulcers. Pt recently admitted to [facility] on 6/25/18 for similar presentation but he  was noted to have had maggots on his wounds in the LLE that was removed by burn surgery.  He was seen by ID and managed inpatient with Unasyn and was d/c'ed home with VNS services on 6/26/18 and Augmentin. Patient was not aware he was supposed to take Agmentin and returns w/ worsening wounds. Venous Duplex study was normal but arterial duplex study revealed high grade stenosis in right popliteal artery.      # Chronic B/L Venous Stasis Ulcers: Progressive  Venous Doppler Studies: no evidence of deep venous thrombosis or superficial thrombophlebitis in   the bilateral lower extremities.  Vascular consult: Arterial Doppler Studies: high grade Popliteal Artery disease  ID consult: No cellulitis, no antibiotics  Podiatry consult: Wound dressing, vascular studies ordered    # High grade right Popliteal Artery Stenosis:   Arterial Doppler Study: Popliteal Stenosis  Vascular: Arterial Angiogram with endovascular revascularization      # DM: Uncontrolled  HbA1C 7.8   Monitor finger stick.  Basal/Bolus Insulin   Carbohydrate Consistent diet

## 2018-07-26 NOTE — BRIEF OPERATIVE NOTE - OPERATION/FINDINGS
Angiogram ABORTED. Procedure was terminated secondary to body habitus, inability to establish sheath, and patient discomfort. Angiogram ABORTED. Procedure was terminated secondary to body habitus, inability to establish sheath access, and patient discomfort.

## 2018-07-26 NOTE — PROGRESS NOTE ADULT - SUBJECTIVE AND OBJECTIVE BOX
FABIAN CLEARY  57y  Male      Patient is a 57y old  Male who presents with a chief complaint of Bilateral Lower extremity swelling (24 Jul 2018 11:59)      INTERVAL HPI/OVERNIGHT EVENTS:      ******************************* REVIEW OF SYSTEMS:**********************************************      All other review of systems negative    *********************** VITALS ******************************************    T(F): 97.4 (07-26-18 @ 11:38)  HR: 87 (07-26-18 @ 11:38) (87 - 96)  BP: 125/60 (07-26-18 @ 11:38) (107/56 - 136/67)  RR: 18 (07-26-18 @ 11:38) (16 - 18)  SpO2: 98% (07-26-18 @ 12:14) (96% - 98%)            ******************************** PHYSICAL EXAM:**************************************************  GENERAL: NAD    PSYCH: no agitation, baseline mentation  HEENT:     NERVOUS SYSTEM:  Alert & Oriented X3, MS  5/5 B/L  UE and LE ; Sensory intact    PULMONARY: AGNIESZKA, CTA    CARDIOVASCULAR: S1S2 RRR    GI: Soft, NT, ND; BS present.    EXTREMITIES:  LE edema with venous stasis ulcer B/L wrapped up with ACE bandage.  LYMPH: No lymphadenopathy noted    SKIN: as above.    ******************************************************************************************    Consultant(s) Notes Reviewed:  [x ] YES  [ ] NO    Discussed with Consultants/Other Providers [ x] YES     **************************** LABS *******************************************************                          10.8   8.25  )-----------( 332      ( 26 Jul 2018 01:09 )             35.0     07-26    138  |  101  |  10  ----------------------------<  140<H>  4.5   |  24  |  0.6<L>    Ca    8.4<L>      26 Jul 2018 01:09  Phos  3.6     07-26  Mg     2.0     07-26          PT/INR - ( 26 Jul 2018 01:09 )   PT: 12.80 sec;   INR: 1.19 ratio         PTT - ( 26 Jul 2018 01:09 )  PTT:30.6 sec  Lactate Trend  07-23 @ 19:55 Lactate:1.8   07-23 @ 13:14 Lactate:2.3         CAPILLARY BLOOD GLUCOSE  106 (26 Jul 2018 11:09)              **************************Active Medications *******************************************  No Known Allergies      ALBUTerol/ipratropium for Nebulization 3 milliLiter(s) Nebulizer every 6 hours PRN  aspirin enteric coated 81 milliGRAM(s) Oral daily  atorvastatin 40 milliGRAM(s) Oral at bedtime  dextrose 40% Gel 15 Gram(s) Oral once PRN  dextrose 5%. 1000 milliLiter(s) IV Continuous <Continuous>  dextrose 50% Injectable 12.5 Gram(s) IV Push once  dextrose 50% Injectable 25 Gram(s) IV Push once  dextrose 50% Injectable 25 Gram(s) IV Push once  enoxaparin Injectable 40 milliGRAM(s) SubCutaneous every 24 hours  furosemide    Tablet 40 milliGRAM(s) Oral daily  glucagon  Injectable 1 milliGRAM(s) IntraMuscular once PRN  insulin glargine Injectable (LANTUS) 10 Unit(s) SubCutaneous at bedtime  insulin lispro (HumaLOG) corrective regimen sliding scale   SubCutaneous three times a day before meals  multivitamin 1 Tablet(s) Oral daily  sodium chloride 0.9%. 1000 milliLiter(s) IV Continuous <Continuous>      ***************************************************  RADIOLOGY & ADDITIONAL TESTS:    Imaging Personally Reviewed:  [ ] YES  [ ] NO    HEALTH ISSUES - PROBLEM Dx:  Venous stasis ulcers of both lower extremities: Venous stasis ulcers of both lower extremities

## 2018-07-26 NOTE — PROGRESS NOTE ADULT - ASSESSMENT
Patient is a 58 yo M with h/o DM (off medications), Morbid Obesity, Chronic LE venous stasis ulcers, Asthma comes in from [facility] with complaints of bilateral leg swelling with persistent discharge. Patient was recently discharged from [facility], on 7/20/18 after being admitted with similar complaints. According to carlo he completed his Antibiotic course whilst inpatient thus was not discharged home on any ABx but the wound care nurse that was supposed to have his dressings changed did not show up does prompting this current ED visit. In ED, work up done was unremarkable but per ED physician there was copious greenish oozing with erythema  thus patient had to be admitted for cellulitis and home care set up. Patient denies any pain, fever, chills, nausea or vomiting.      Assessment and Plan:    1. Bilateral Chronic venous Stasis ulcers: No cellulitis.  ID consult, no need for Antibiotics.  Continue PO Lasix. Wound care with Xeroform/DSD/ACE, leg elevation.   Podiatry consulted:  appreciate input and recommendations noted.  Venous duplex negative for DVT  Arterial Duplex showed high grade right popliteal artery stenosis,   Awaiting  angiogram today.       2. Left eye visual impairment:  Seen by Ophthalmologist on previous admission and recommended a retinal exam with a fluorescein angiogram outpatient.   Follow up outpatient.         3. Diabetes Mellitus:  Hemoglobin A1C 7.4  He started on metformin, on hold  for angiogram today.       4. Asthma:   Duoneb PRN       5. Chronic Diastolic Heart failure:  ECHO: 6/28/18: ef >55% with G1DD.  Continue Lasix.    6. Morbid Obesity:   patient will benefit from weight loss program and possible weight loss surgery.       Will follow.        DVT prophylaxis: Lovenox  Disposition: transfer to [facility] for RLE angiogram tomorrow.

## 2018-07-26 NOTE — PROGRESS NOTE ADULT - SUBJECTIVE AND OBJECTIVE BOX
CHIEF COMPLAINT:    Patient is a 57y old Male who presents with a chief complaint of Bilateral Lower extremity swelling (24 Jul 2018 11:59)    Currently admitted to medicine with the primary diagnosis of Venous stasis ulcers of both lower extremities     Today is hospital day 3d. This morning he is resting comfortably in bed and reports no new issues or overnight events.     PAST MEDICAL & SURGICAL HISTORY  Morbid obesity  Osteoarthritis  Diabetes  Asthma  H/O umbilical hernia repair    SOCIAL HISTORY:  Negative for smoking/alcohol/drug use.     ALLERGIES:  No Known Allergies    MEDICATIONS:  STANDING MEDICATIONS  aspirin enteric coated 81 milliGRAM(s) Oral daily  atorvastatin 40 milliGRAM(s) Oral at bedtime  dextrose 5%. 1000 milliLiter(s) IV Continuous <Continuous>  dextrose 50% Injectable 12.5 Gram(s) IV Push once  dextrose 50% Injectable 25 Gram(s) IV Push once  dextrose 50% Injectable 25 Gram(s) IV Push once  enoxaparin Injectable 40 milliGRAM(s) SubCutaneous every 24 hours  furosemide    Tablet 40 milliGRAM(s) Oral daily  insulin glargine Injectable (LANTUS) 10 Unit(s) SubCutaneous at bedtime  insulin lispro (HumaLOG) corrective regimen sliding scale   SubCutaneous three times a day before meals  multivitamin 1 Tablet(s) Oral daily  sodium chloride 0.9%. 1000 milliLiter(s) IV Continuous <Continuous>    PRN MEDICATIONS  ALBUTerol/ipratropium for Nebulization 3 milliLiter(s) Nebulizer every 6 hours PRN  dextrose 40% Gel 15 Gram(s) Oral once PRN  glucagon  Injectable 1 milliGRAM(s) IntraMuscular once PRN    VITALS:   T(F): 97.4  HR: 87  BP: 125/60  RR: 18  SpO2: 98%    LABS:                        10.8   8.25  )-----------( 332      ( 26 Jul 2018 01:09 )             35.0     07-26    138  |  101  |  10  ----------------------------<  140<H>  4.5   |  24  |  0.6<L>    Ca    8.4<L>      26 Jul 2018 01:09  Phos  3.6     07-26  Mg     2.0     07-26      PT/INR - ( 26 Jul 2018 01:09 )   PT: 12.80 sec;   INR: 1.19 ratio         PTT - ( 26 Jul 2018 01:09 )  PTT:30.6 sec                RADIOLOGY:    PHYSICAL EXAM:  GEN: No acute distress  PULM: Clear to auscultation bilaterally   CARD: S1/S2 present. RRR.   GI: Soft, non-tender, non-distended. Bowel sounds present  MSK: NC/NC/NE/2+PP/VILLEGAS  NEURO: AAOX3 CHIEF COMPLAINT:    Patient is a 57y old Male who presents with a chief complaint of Bilateral Lower extremity swelling (24 Jul 2018 11:59)    Currently admitted to medicine with the primary diagnosis of Venous stasis ulcers of both lower extremities     Today is hospital day 3d. This morning he is resting comfortably in a chair and reports no new issues or overnight events. He has dressings on b/l lower extremities from the foot to just below the knees.       PAST MEDICAL & SURGICAL HISTORY  Morbid obesity  Osteoarthritis  Diabetes  Asthma  H/O umbilical hernia repair    SOCIAL HISTORY:  Negative for smoking/alcohol/drug use.     ALLERGIES:  No Known Allergies    MEDICATIONS:  STANDING MEDICATIONS  aspirin enteric coated 81 milliGRAM(s) Oral daily  atorvastatin 40 milliGRAM(s) Oral at bedtime  dextrose 5%. 1000 milliLiter(s) IV Continuous <Continuous>  dextrose 50% Injectable 12.5 Gram(s) IV Push once  dextrose 50% Injectable 25 Gram(s) IV Push once  dextrose 50% Injectable 25 Gram(s) IV Push once  enoxaparin Injectable 40 milliGRAM(s) SubCutaneous every 24 hours  furosemide    Tablet 40 milliGRAM(s) Oral daily  insulin glargine Injectable (LANTUS) 10 Unit(s) SubCutaneous at bedtime  insulin lispro (HumaLOG) corrective regimen sliding scale   SubCutaneous three times a day before meals  multivitamin 1 Tablet(s) Oral daily  sodium chloride 0.9%. 1000 milliLiter(s) IV Continuous <Continuous>    PRN MEDICATIONS  ALBUTerol/ipratropium for Nebulization 3 milliLiter(s) Nebulizer every 6 hours PRN  dextrose 40% Gel 15 Gram(s) Oral once PRN  glucagon  Injectable 1 milliGRAM(s) IntraMuscular once PRN    VITALS:   T(F): 97.4  HR: 87  BP: 125/60  RR: 18  SpO2: 98%    LABS:                        10.8   8.25  )-----------( 332      ( 26 Jul 2018 01:09 )             35.0     07-26    138  |  101  |  10  ----------------------------<  140<H>  4.5   |  24  |  0.6<L>    Ca    8.4<L>      26 Jul 2018 01:09  Phos  3.6     07-26  Mg     2.0     07-26      PT/INR - ( 26 Jul 2018 01:09 )   PT: 12.80 sec;   INR: 1.19 ratio         PTT - ( 26 Jul 2018 01:09 )  PTT:30.6 sec                RADIOLOGY:    PHYSICAL EXAM:  GEN: No acute distress  PULM: Clear to auscultation bilaterally   CARD: S1/S2 present. RRR.   GI: Soft, non-tender, non-distended. Bowel sounds present  MSK: Bilateral pitting edema, skin is cold to touch, pedal pulses barely palpable  NEURO: AAOX3

## 2018-07-26 NOTE — BRIEF OPERATIVE NOTE - PROCEDURE
<<-----Click on this checkbox to enter Procedure Angiogram, peripheral  07/26/2018  Angiogram ABORTED. Attempted right leg angiogram via US-guided left femoral access; procedure was terminated secondary to body habitus, inability to establish sheath, and patient discomfort.  Active  JOVANNY

## 2018-07-26 NOTE — BRIEF OPERATIVE NOTE - PRE-OP DX
Leg ulcer, unspecified laterality, limited to breakdown of skin  07/26/2018    Active  Ophelia Cherry

## 2018-07-26 NOTE — BRIEF OPERATIVE NOTE - POST-OP DX
Ulcer of lower extremity, limited to breakdown of skin, unspecified laterality  07/26/2018    Active  Ophelia Cherry

## 2018-07-26 NOTE — CHART NOTE - NSCHARTNOTEFT_GEN_A_CORE
Surgery post-op Note 07-26-18 @ 21:15    Procedure: Angiogram, ABORTED. Attempted right leg angiogram via US-guided left femoral access; procedure was terminated secondary to body habitus, inability to establish sheath, and patient discomfort.     S: 57yMale s/p angiogram, aborted. Patient is sitting comfortably in the chair. Denies any pain at the incision site, fever, chills, chest pain, shortness of breath.    O:   T(C): 36.1 (07-26-18 @ 19:14), Max: 36.4 (07-26-18 @ 18:35)  HR: 108 (07-26-18 @ 19:14) (87 - 108)  BP: 119/56 (07-26-18 @ 19:14) (113/72 - 139/63)  RR: 18 (07-26-18 @ 19:14) (14 - 22)  SpO2: 98% (07-26-18 @ 18:35) (96% - 98%)    General: AAOx 3. NAD  Heart: S1 and S2 noted  Lungs: Clear to auscultation bilaterally  Abdomen: Soft. Obese  Extremities: erythematous, tender. pt pulse dopplerable bilaterally. capillary refill present.   Wound: No bleeding or discharge noted from the incision site at the left groin. dressing present present    Assessment:  57y Male s/p Attempted right leg angiogram via US-guided left femoral access; procedure was terminated secondary to body habitus, inability to establish sheath, and patient discomfort.     Plan:  no further vascular intervention needed at this time.   Pain control  DVT prophylaxis  Encourage ambulation  Incentive spirometry Surgery post-op Note 07-26-18 @ 21:15    Procedure: Angiogram, ABORTED. Attempted right leg angiogram via US-guided left femoral access; procedure was terminated secondary to body habitus, inability to establish sheath, and patient discomfort.     S: 57yMale s/p angiogram, aborted. Patient is sitting comfortably in the chair. Denies any pain at the incision site, fever, chills, chest pain, shortness of breath.    O:   T(C): 36.1 (07-26-18 @ 19:14), Max: 36.4 (07-26-18 @ 18:35)  HR: 108 (07-26-18 @ 19:14) (87 - 108)  BP: 119/56 (07-26-18 @ 19:14) (113/72 - 139/63)  RR: 18 (07-26-18 @ 19:14) (14 - 22)  SpO2: 98% (07-26-18 @ 18:35) (96% - 98%)    General: AAOx 3. NAD  Heart: S1 and S2 noted  Lungs: Clear to auscultation bilaterally  Abdomen: Soft. Obese  Extremities: erythematous, tender. pt pulse dopplerable bilaterally. capillary refill present.   Wound: No bleeding or discharge noted from the incision site at the left groin. dressing present present    Assessment:  57y Male s/p Attempted right leg angiogram via US-guided left femoral access; procedure was terminated secondary to body habitus, inability to establish sheath, and patient discomfort.     Plan:  no further vascular intervention needed at this time.   Pain control  DVT prophylaxis  Encourage ambulation  Incentive spirometry  Spoke about our above recommendations to medicine resident at [phone]. response to care/treatments:

## 2018-07-27 PROBLEM — Z00.00 ENCOUNTER FOR PREVENTIVE HEALTH EXAMINATION: Status: ACTIVE | Noted: 2018-07-27

## 2018-07-27 LAB
ANION GAP SERPL CALC-SCNC: 12 MMOL/L — SIGNIFICANT CHANGE UP (ref 7–14)
BASOPHILS # BLD AUTO: 0.01 K/UL — SIGNIFICANT CHANGE UP (ref 0–0.2)
BASOPHILS NFR BLD AUTO: 0.1 % — SIGNIFICANT CHANGE UP (ref 0–1)
BUN SERPL-MCNC: 8 MG/DL — LOW (ref 10–20)
CALCIUM SERPL-MCNC: 8.3 MG/DL — LOW (ref 8.5–10.1)
CHLORIDE SERPL-SCNC: 95 MMOL/L — LOW (ref 98–110)
CO2 SERPL-SCNC: 29 MMOL/L — SIGNIFICANT CHANGE UP (ref 17–32)
CREAT SERPL-MCNC: 0.6 MG/DL — LOW (ref 0.7–1.5)
EOSINOPHIL # BLD AUTO: 0.34 K/UL — SIGNIFICANT CHANGE UP (ref 0–0.7)
EOSINOPHIL NFR BLD AUTO: 4.3 % — SIGNIFICANT CHANGE UP (ref 0–8)
GLUCOSE SERPL-MCNC: 98 MG/DL — SIGNIFICANT CHANGE UP (ref 70–99)
HCT VFR BLD CALC: 35.9 % — LOW (ref 42–52)
HGB BLD-MCNC: 11 G/DL — LOW (ref 14–18)
IMM GRANULOCYTES NFR BLD AUTO: 0.3 % — SIGNIFICANT CHANGE UP (ref 0.1–0.3)
LYMPHOCYTES # BLD AUTO: 1.58 K/UL — SIGNIFICANT CHANGE UP (ref 1.2–3.4)
LYMPHOCYTES # BLD AUTO: 20.1 % — LOW (ref 20.5–51.1)
MCHC RBC-ENTMCNC: 25.1 PG — LOW (ref 27–31)
MCHC RBC-ENTMCNC: 30.6 G/DL — LOW (ref 32–37)
MCV RBC AUTO: 82 FL — SIGNIFICANT CHANGE UP (ref 80–94)
MONOCYTES # BLD AUTO: 0.39 K/UL — SIGNIFICANT CHANGE UP (ref 0.1–0.6)
MONOCYTES NFR BLD AUTO: 5 % — SIGNIFICANT CHANGE UP (ref 1.7–9.3)
NEUTROPHILS # BLD AUTO: 5.51 K/UL — SIGNIFICANT CHANGE UP (ref 1.4–6.5)
NEUTROPHILS NFR BLD AUTO: 70.2 % — SIGNIFICANT CHANGE UP (ref 42.2–75.2)
NRBC # BLD: 0 /100 WBCS — SIGNIFICANT CHANGE UP (ref 0–0)
PLATELET # BLD AUTO: 376 K/UL — SIGNIFICANT CHANGE UP (ref 130–400)
POTASSIUM SERPL-MCNC: 4.5 MMOL/L — SIGNIFICANT CHANGE UP (ref 3.5–5)
POTASSIUM SERPL-SCNC: 4.5 MMOL/L — SIGNIFICANT CHANGE UP (ref 3.5–5)
RBC # BLD: 4.38 M/UL — LOW (ref 4.7–6.1)
RBC # FLD: 15.1 % — HIGH (ref 11.5–14.5)
SODIUM SERPL-SCNC: 136 MMOL/L — SIGNIFICANT CHANGE UP (ref 135–146)
WBC # BLD: 7.85 K/UL — SIGNIFICANT CHANGE UP (ref 4.8–10.8)
WBC # FLD AUTO: 7.85 K/UL — SIGNIFICANT CHANGE UP (ref 4.8–10.8)

## 2018-07-27 RX ORDER — METFORMIN HYDROCHLORIDE 850 MG/1
1 TABLET ORAL
Qty: 60 | Refills: 0 | OUTPATIENT
Start: 2018-07-27 | End: 2018-08-25

## 2018-07-27 RX ORDER — FUROSEMIDE 40 MG
1 TABLET ORAL
Qty: 30 | Refills: 0 | OUTPATIENT
Start: 2018-07-27 | End: 2018-08-25

## 2018-07-27 RX ADMIN — INSULIN GLARGINE 10 UNIT(S): 100 INJECTION, SOLUTION SUBCUTANEOUS at 21:32

## 2018-07-27 RX ADMIN — Medication 2: at 17:27

## 2018-07-27 RX ADMIN — Medication 81 MILLIGRAM(S): at 11:38

## 2018-07-27 RX ADMIN — Medication 1 TABLET(S): at 11:38

## 2018-07-27 RX ADMIN — ENOXAPARIN SODIUM 40 MILLIGRAM(S): 100 INJECTION SUBCUTANEOUS at 05:07

## 2018-07-27 RX ADMIN — ENOXAPARIN SODIUM 40 MILLIGRAM(S): 100 INJECTION SUBCUTANEOUS at 17:27

## 2018-07-27 RX ADMIN — ATORVASTATIN CALCIUM 40 MILLIGRAM(S): 80 TABLET, FILM COATED ORAL at 21:32

## 2018-07-27 NOTE — PROGRESS NOTE ADULT - SUBJECTIVE AND OBJECTIVE BOX
CHIEF COMPLAINT:    Patient is a 57y old Male who presents with a chief complaint of Bilateral Lower extremity swelling (24 Jul 2018 11:59)    Currently admitted to medicine with the primary diagnosis of Venous stasis ulcers of both lower extremities     Today is hospital day 4d. This morning he is resting comfortably in bed and reports no new issues or overnight events.     PAST MEDICAL & SURGICAL HISTORY  Morbid obesity  Osteoarthritis  Diabetes  Asthma  H/O umbilical hernia repair    SOCIAL HISTORY:  Negative for smoking/alcohol/drug use.     ALLERGIES:  No Known Allergies    MEDICATIONS:  STANDING MEDICATIONS  aspirin enteric coated 81 milliGRAM(s) Oral daily  atorvastatin 40 milliGRAM(s) Oral at bedtime  dextrose 50% Injectable 25 Gram(s) IV Push once  enoxaparin Injectable 40 milliGRAM(s) SubCutaneous every 12 hours  furosemide    Tablet 40 milliGRAM(s) Oral daily  insulin glargine Injectable (LANTUS) 10 Unit(s) SubCutaneous at bedtime  insulin lispro (HumaLOG) corrective regimen sliding scale   SubCutaneous three times a day before meals  multivitamin 1 Tablet(s) Oral daily    PRN MEDICATIONS  acetaminophen   Tablet. 650 milliGRAM(s) Oral every 4 hours PRN    VITALS:   T(F): 97.2  HR: 88  BP: 129/58  RR: 20  SpO2: 98%    LABS:                        11.0   7.85  )-----------( 376      ( 27 Jul 2018 08:07 )             35.9     07-27    136  |  95<L>  |  8<L>  ----------------------------<  98  4.5   |  29  |  0.6<L>    Ca    8.3<L>      27 Jul 2018 08:07  Phos  3.6     07-26  Mg     2.0     07-26      PT/INR - ( 26 Jul 2018 01:09 )   PT: 12.80 sec;   INR: 1.19 ratio         PTT - ( 26 Jul 2018 01:09 )  PTT:30.6 sec                RADIOLOGY:    PHYSICAL EXAM:  GEN: No acute distress  PULM: Clear to auscultation bilaterally   CARD: S1/S2 present. RRR.   GI: Soft, non-tender, non-distended. Bowel sounds present  MSK: NC/NC/NE/2+PP/VILLEGAS  NEURO: AAOX3 CHIEF COMPLAINT:    Patient is a 57y old Male who presents with a chief complaint of Bilateral Lower extremity swelling (24 Jul 2018 11:59)    Currently admitted to medicine with the primary diagnosis of Venous stasis ulcers of both lower extremities     Today is hospital day 4d. This morning he is resting comfortably in bed and reports no new issues or overnight events.     PAST MEDICAL & SURGICAL HISTORY  Morbid obesity  Osteoarthritis  Diabetes  Asthma  H/O umbilical hernia repair    SOCIAL HISTORY:  Negative for alcohol/drug use.     ALLERGIES:  No Known Allergies    MEDICATIONS:  STANDING MEDICATIONS  aspirin enteric coated 81 milliGRAM(s) Oral daily  atorvastatin 40 milliGRAM(s) Oral at bedtime  dextrose 50% Injectable 25 Gram(s) IV Push once  enoxaparin Injectable 40 milliGRAM(s) SubCutaneous every 12 hours  furosemide    Tablet 40 milliGRAM(s) Oral daily  insulin glargine Injectable (LANTUS) 10 Unit(s) SubCutaneous at bedtime  insulin lispro (HumaLOG) corrective regimen sliding scale   SubCutaneous three times a day before meals  multivitamin 1 Tablet(s) Oral daily    PRN MEDICATIONS  acetaminophen   Tablet. 650 milliGRAM(s) Oral every 4 hours PRN    VITALS:   T(F): 97.2  HR: 88  BP: 129/58  RR: 20  SpO2: 98%    LABS:                        11.0   7.85  )-----------( 376      ( 27 Jul 2018 08:07 )             35.9     07-27    136  |  95<L>  |  8<L>  ----------------------------<  98  4.5   |  29  |  0.6<L>    Ca    8.3<L>      27 Jul 2018 08:07  Phos  3.6     07-26  Mg     2.0     07-26      PT/INR - ( 26 Jul 2018 01:09 )   PT: 12.80 sec;   INR: 1.19 ratio         PTT - ( 26 Jul 2018 01:09 )  PTT:30.6 sec          RADIOLOGY:    PHYSICAL EXAM:  GEN: No acute distress  PULM: Clear to auscultation bilaterally   CARD: S1/S2 present. RRR.   GI: Soft, non-tender, obese. Bowel sounds present  MSK: Pitting edema of B/L lower extremities  NEURO: AAOX3

## 2018-07-27 NOTE — PROGRESS NOTE ADULT - SUBJECTIVE AND OBJECTIVE BOX
FABIAN CLEARY  57y  Male      Patient is a 57y old  Male who presents with a chief complaint of Bilateral Lower extremity swelling (24 Jul 2018 11:59)      INTERVAL HPI/OVERNIGHT EVENTS:      ******************************* REVIEW OF SYSTEMS:**********************************************      All other review of systems negative    *********************** VITALS ******************************************    T(F): 97.2 (07-27-18 @ 05:41)  HR: 88 (07-27-18 @ 05:41) (87 - 108)  BP: 129/58 (07-27-18 @ 05:41) (113/72 - 139/63)  RR: 20 (07-27-18 @ 05:41) (14 - 22)  SpO2: 98% (07-26-18 @ 18:35) (97% - 98%)            ******************************** PHYSICAL EXAM:**************************************************  GENERAL: NAD    PSYCH: no agitation, baseline mentation  HEENT:     NERVOUS SYSTEM:  Alert & Oriented X3, MS  5/5 B/L  UE and LE ; Sensory intact    PULMONARY: AGNIESZKA, CTA    CARDIOVASCULAR: S1S2 RRR    GI: Soft, NT, ND; BS present.    EXTREMITIES: LE B/L EDEMA with chronic venous stasis    LYMPH: No lymphadenopathy noted    SKIN: as above.    ******************************************************************************************    Consultant(s) Notes Reviewed:  [x ] YES  [ ] NO    Discussed with Consultants/Other Providers [ x] YES     **************************** LABS *******************************************************                          11.0   7.85  )-----------( 376      ( 27 Jul 2018 08:07 )             35.9     07-27    136  |  95<L>  |  8<L>  ----------------------------<  98  4.5   |  29  |  0.6<L>    Ca    8.3<L>      27 Jul 2018 08:07  Phos  3.6     07-26  Mg     2.0     07-26          PT/INR - ( 26 Jul 2018 01:09 )   PT: 12.80 sec;   INR: 1.19 ratio         PTT - ( 26 Jul 2018 01:09 )  PTT:30.6 sec  Lactate Trend  07-23 @ 19:55 Lactate:1.8   07-23 @ 13:14 Lactate:2.3         CAPILLARY BLOOD GLUCOSE  131 (27 Jul 2018 11:13)              **************************Active Medications *******************************************  No Known Allergies      acetaminophen   Tablet. 650 milliGRAM(s) Oral every 4 hours PRN  aspirin enteric coated 81 milliGRAM(s) Oral daily  atorvastatin 40 milliGRAM(s) Oral at bedtime  dextrose 50% Injectable 25 Gram(s) IV Push once  enoxaparin Injectable 40 milliGRAM(s) SubCutaneous every 12 hours  furosemide    Tablet 40 milliGRAM(s) Oral daily  insulin glargine Injectable (LANTUS) 10 Unit(s) SubCutaneous at bedtime  insulin lispro (HumaLOG) corrective regimen sliding scale   SubCutaneous three times a day before meals  multivitamin 1 Tablet(s) Oral daily      ***************************************************  RADIOLOGY & ADDITIONAL TESTS:    Imaging Personally Reviewed:  [ ] YES  [ ] NO    HEALTH ISSUES - PROBLEM Dx:  Venous stasis ulcers of both lower extremities: Venous stasis ulcers of both lower extremities

## 2018-07-27 NOTE — PROGRESS NOTE ADULT - ASSESSMENT
Patient is a 58 yo M with h/o DM (off medications), Morbid Obesity, Chronic LE venous stasis ulcers, Asthma comes in from [facility] with complaints of bilateral leg swelling with persistent discharge. Patient was recently discharged from [facility], on 7/20/18 after being admitted with similar complaints. According to carlo he completed his Antibiotic course whilst inpatient thus was not discharged home on any ABx but the wound care nurse that was supposed to have his dressings changed did not show up does prompting this current ED visit. In ED, work up done was unremarkable but per ED physician there was copious greenish oozing with erythema  thus patient had to be admitted for cellulitis and home care set up. Patient denies any pain, fever, chills, nausea or vomiting.      Assessment and Plan:    1. Bilateral Chronic venous Stasis ulcers: No cellulitis.  ID consult, no need for Antibiotics.  Continue PO Lasix. Wound care with Xeroform/DSD/ACE, leg elevation.   Podiatry consulted:  appreciate input and recommendations noted.  Venous duplex negative for DVT  Arterial Duplex showed high grade right popliteal artery stenosis,   Aborted angiogram yesterday.  F/U with vascular.      2. Left eye visual impairment:  Seen by Ophthalmologist on previous admission and recommended a retinal exam with a fluorescein angiogram outpatient.   Follow up outpatient.         3. Diabetes Mellitus:  Hemoglobin A1C 7.4  He started on metformin, on hold  for angiogram today.       4. Asthma:   Duoneb PRN       5. Chronic Diastolic Heart failure:  ECHO: 6/28/18: ef >55% with G1DD.  Continue Lasix.    6. Morbid Obesity:   patient will benefit from weight loss program and possible weight loss surgery.       Will follow.    DVT prophylaxis: Lovenox  Disposition: From assisted living.

## 2018-07-27 NOTE — CHART NOTE - NSCHARTNOTEFT_GEN_A_CORE
Vascular Surgery - Follow up    cc: bilateral mixed leg ulcerations    dx: 57y old  Male with pmhx of DM, morbid obesity, chronic LE venous stasis ulcers, asthma who presented with a chief complaint of chronic mixed Bilateral Lower extremity swelling and ulcers since March 2018. Tried to do an angiogram however it was aborted due to body habitus and inability to establish the access.    Plan: Pt may follow up with Dr. Herrera in the office for [facility] boot therapy  [phone] spectra [phone]

## 2018-07-27 NOTE — PROGRESS NOTE ADULT - ASSESSMENT
57 y o m with pmh of D.M not on any medications at home , asthma, Osteoarthritis and morbid obesity presented to ER worsening erythema and drainage from B/L LE Venous Stasis Ulcers. Pt recently admitted to [facility] on 6/25/18 for similar presentation but he  was noted to have had maggots on his wounds in the LLE that was removed by burn surgery.  He was seen by ID and managed inpatient with Unasyn and was d/c'ed home with VNS services on 6/26/18 and Augmentin. Patient was not aware he was supposed to take Agmentin and returns w/ worsening wounds. Venous Duplex study was normal but arterial duplex study revealed high grade stenosis in right popliteal artery.      # Chronic B/L Venous Stasis Ulcers: Progressive  Venous Doppler Studies: no evidence of deep venous thrombosis or superficial thrombophlebitis in   the bilateral lower extremities.  Vascular consult: Arterial Doppler Studies: high grade Popliteal Artery disease  ID consult: No cellulitis, no antibiotics  Podiatry consult: Wound dressing, vascular studies ordered    # High grade right Popliteal Artery Stenosis:   Arterial Doppler Study: Popliteal Stenosis  Vascular: Arterial Angiogram with endovascular revascularization      # DM: Uncontrolled  HbA1C 7.8   Monitor finger stick.  Basal/Bolus Insulin   Carbohydrate Consistent diet 57 y o m with pmh of D.M not on any medications at home , asthma, Osteoarthritis and morbid obesity presented to ER worsening erythema and drainage from B/L LE Venous Stasis Ulcers. Pt recently admitted to [facility] on 6/25/18 for similar presentation but he  was noted to have had maggots on his wounds in the LLE that was removed by burn surgery.  He was seen by ID and managed inpatient with Unasyn and was d/c'ed home with VNS services on 6/26/18 and Augmentin. Patient was not aware he was supposed to take Agmentin and returns w/ worsening wounds. Venous Duplex study was normal but arterial duplex study revealed high grade stenosis in right popliteal artery.      # Chronic B/L Venous Stasis Ulcers: Progressive  Venous Doppler Studies: no evidence of deep venous thrombosis or superficial thrombophlebitis in   the bilateral lower extremities.  Vascular consult: Arterial Doppler Studies: high grade Popliteal Artery disease  ID consult: No cellulitis, no antibiotics  Podiatry consult: Wound dressing, vascular studies ordered, signed off    # High grade right Popliteal Artery Stenosis:   Arterial Doppler Study: Popliteal Stenosis  Arterial Angiogram: ABORTED. Procedure was terminated secondary to body habitus, inability to establish sheath, and patient discomfort.	  Plan: Pt [facility]  [phone]    # DM: Uncontrolled  HbA1C 7.8   Finger stick (100. 131)  Basal/Bolus Insulin   Carbohydrate Consistent diet

## 2018-07-28 LAB
ANION GAP SERPL CALC-SCNC: 13 MMOL/L — SIGNIFICANT CHANGE UP (ref 7–14)
BASOPHILS # BLD AUTO: 0.03 K/UL — SIGNIFICANT CHANGE UP (ref 0–0.2)
BASOPHILS NFR BLD AUTO: 0.4 % — SIGNIFICANT CHANGE UP (ref 0–1)
BUN SERPL-MCNC: 8 MG/DL — LOW (ref 10–20)
CALCIUM SERPL-MCNC: 8.3 MG/DL — LOW (ref 8.5–10.1)
CHLORIDE SERPL-SCNC: 101 MMOL/L — SIGNIFICANT CHANGE UP (ref 98–110)
CO2 SERPL-SCNC: 26 MMOL/L — SIGNIFICANT CHANGE UP (ref 17–32)
CREAT SERPL-MCNC: 0.6 MG/DL — LOW (ref 0.7–1.5)
EOSINOPHIL # BLD AUTO: 0.43 K/UL — SIGNIFICANT CHANGE UP (ref 0–0.7)
EOSINOPHIL NFR BLD AUTO: 5.3 % — SIGNIFICANT CHANGE UP (ref 0–8)
GLUCOSE SERPL-MCNC: 127 MG/DL — HIGH (ref 70–99)
HCT VFR BLD CALC: 36.7 % — LOW (ref 42–52)
HGB BLD-MCNC: 11.3 G/DL — LOW (ref 14–18)
IMM GRANULOCYTES NFR BLD AUTO: 0.4 % — HIGH (ref 0.1–0.3)
LYMPHOCYTES # BLD AUTO: 1.71 K/UL — SIGNIFICANT CHANGE UP (ref 1.2–3.4)
LYMPHOCYTES # BLD AUTO: 21.1 % — SIGNIFICANT CHANGE UP (ref 20.5–51.1)
MCHC RBC-ENTMCNC: 25.3 PG — LOW (ref 27–31)
MCHC RBC-ENTMCNC: 30.8 G/DL — LOW (ref 32–37)
MCV RBC AUTO: 82.3 FL — SIGNIFICANT CHANGE UP (ref 80–94)
MONOCYTES # BLD AUTO: 0.43 K/UL — SIGNIFICANT CHANGE UP (ref 0.1–0.6)
MONOCYTES NFR BLD AUTO: 5.3 % — SIGNIFICANT CHANGE UP (ref 1.7–9.3)
NEUTROPHILS # BLD AUTO: 5.47 K/UL — SIGNIFICANT CHANGE UP (ref 1.4–6.5)
NEUTROPHILS NFR BLD AUTO: 67.5 % — SIGNIFICANT CHANGE UP (ref 42.2–75.2)
NRBC # BLD: 0 /100 WBCS — SIGNIFICANT CHANGE UP (ref 0–0)
PLATELET # BLD AUTO: 333 K/UL — SIGNIFICANT CHANGE UP (ref 130–400)
POTASSIUM SERPL-MCNC: 4.5 MMOL/L — SIGNIFICANT CHANGE UP (ref 3.5–5)
POTASSIUM SERPL-SCNC: 4.5 MMOL/L — SIGNIFICANT CHANGE UP (ref 3.5–5)
RBC # BLD: 4.46 M/UL — LOW (ref 4.7–6.1)
RBC # FLD: 15 % — HIGH (ref 11.5–14.5)
SODIUM SERPL-SCNC: 140 MMOL/L — SIGNIFICANT CHANGE UP (ref 135–146)
WBC # BLD: 8.1 K/UL — SIGNIFICANT CHANGE UP (ref 4.8–10.8)
WBC # FLD AUTO: 8.1 K/UL — SIGNIFICANT CHANGE UP (ref 4.8–10.8)

## 2018-07-28 RX ADMIN — ENOXAPARIN SODIUM 40 MILLIGRAM(S): 100 INJECTION SUBCUTANEOUS at 17:39

## 2018-07-28 RX ADMIN — Medication 1 TABLET(S): at 12:51

## 2018-07-28 RX ADMIN — ENOXAPARIN SODIUM 40 MILLIGRAM(S): 100 INJECTION SUBCUTANEOUS at 05:56

## 2018-07-28 RX ADMIN — INSULIN GLARGINE 10 UNIT(S): 100 INJECTION, SOLUTION SUBCUTANEOUS at 21:56

## 2018-07-28 RX ADMIN — Medication 81 MILLIGRAM(S): at 12:51

## 2018-07-28 RX ADMIN — ATORVASTATIN CALCIUM 40 MILLIGRAM(S): 80 TABLET, FILM COATED ORAL at 21:18

## 2018-07-28 NOTE — PROGRESS NOTE ADULT - ASSESSMENT
57 y o m with pmh of D.M not on any medications at home , asthma, Osteoarthritis and morbid obesity presented to ER worsening erythema and drainage from B/L LE Venous Stasis Ulcers. Pt recently admitted to [facility] on 6/25/18 for similar presentation but he  was noted to have had maggots on his wounds in the LLE that was removed by burn surgery.  He was seen by ID and managed inpatient with Unasyn and was d/c'ed home with VNS services on 6/26/18 and Augmentin. Patient was not aware he was supposed to take Agmentin and returns w/ worsening wounds. Venous Duplex study was normal but arterial duplex study revealed high grade stenosis in right popliteal artery.      # Chronic B/L Venous Stasis Ulcers: Progressive  Venous Doppler Studies: no evidence of deep venous thrombosis or superficial thrombophlebitis in   the bilateral lower extremities.  Vascular consult: Arterial Doppler Studies: high grade Popliteal Artery disease  ID consult: No cellulitis, no antibiotics  Podiatry consult: Wound dressing, vascular studies ordered, signed off    # High grade right Popliteal Artery Stenosis:   Arterial Doppler Study: Popliteal Stenosis  Arterial Angiogram: ABORTED. Procedure was terminated secondary to body habitus, inability to establish sheath, and patient discomfort.	  Plan: Pt [facility]  [phone]    # DM: Uncontrolled  HbA1C 7.8   Finger stick (100. 131)  Basal/Bolus Insulin   Carbohydrate Consistent diet 57 y o m with pmh of D.M not on any medications at home , asthma, Osteoarthritis and morbid obesity presented to ER worsening erythema and drainage from B/L LE Venous Stasis Ulcers. Pt recently admitted to [facility] on 6/25/18 for similar presentation but he  was noted to have had maggots on his wounds in the LLE that was removed by burn surgery.  He was seen by ID and managed inpatient with Unasyn and was d/c'ed home with VNS services on 6/26/18 and Augmentin. Patient was not aware he was supposed to take Agmentin and returns w/ worsening wounds. Venous Duplex study was normal but arterial duplex study revealed high grade stenosis in right popliteal artery.      # Chronic B/L Venous Stasis Ulcers: Progressive  Venous Doppler Studies: no evidence of deep venous thrombosis or superficial thrombophlebitis in   the bilateral lower extremities.  Vascular consult: Arterial Doppler Studies: high grade Popliteal Artery disease  ID consult: No cellulitis, no antibiotics  Podiatry consult: Wound dressing, vascular studies ordered, signed off  Patient anticipated for discharge on Monday    # High grade right Popliteal Artery Stenosis:   Arterial Doppler Study: Popliteal Stenosis  Arterial Angiogram: ABORTED. Procedure was terminated secondary to body habitus, inability to establish sheath, and patient discomfort.	  Plan: Pt may follow up with Dr. Herrera in the office for [facility] boot therapy  [phone]    # DM: Uncontrolled  HbA1C 7.8   Finger stick (121-136)  Metformin 500 BID restarted  Carbohydrate Consistent diet

## 2018-07-28 NOTE — PROGRESS NOTE ADULT - ASSESSMENT
Patient is a 58 yo M with h/o DM (off medications), Morbid Obesity, Chronic LE venous stasis ulcers, Asthma comes in from [facility] with complaints of bilateral leg swelling with persistent discharge. Patient was recently discharged from [facility], on 7/20/18 after being admitted with similar complaints. According to carlo he completed his Antibiotic course whilst inpatient thus was not discharged home on any ABx but the wound care nurse that was supposed to have his dressings changed did not show up does prompting this current ED visit. In ED, work up done was unremarkable but per ED physician there was copious greenish oozing with erythema  thus patient had to be admitted for cellulitis and home care set up. Patient denies any pain, fever, chills, nausea or vomiting.      Assessment and Plan:    1. Bilateral Chronic venous Stasis ulcers: No cellulitis.  ID consult, no need for Antibiotics.  Continue PO Lasix. Wound care with Xeroform/DSD/ACE, leg elevation.   Podiatry consulted:  appreciate input and recommendations noted.  Venous duplex negative for DVT  Arterial Duplex showed high grade right popliteal artery stenosis,   Aborted angiogram , would f/u with vascular as out pt for [facility] boot training/possible angiogram.      2. Left eye visual impairment:  Seen by Ophthalmologist on previous admission and recommended a retinal exam with a fluorescein angiogram outpatient.   Follow up outpatient.         3. Diabetes Mellitus:  Hemoglobin A1C 7.4  He started on metformin, on hold  for angiogram today.       4. Asthma:   Duoneb PRN       5. Chronic Diastolic Heart failure:  ECHO: 6/28/18: ef >55% with G1DD.  Continue Lasix.    6. Morbid Obesity:   patient will benefit from weight loss program and possible weight loss surgery.     Will follow.    DVT prophylaxis: Lovenox  Disposition: From assisted living. D/C PLANNING.

## 2018-07-28 NOTE — PROGRESS NOTE ADULT - SUBJECTIVE AND OBJECTIVE BOX
CHIEF COMPLAINT:    Patient is a 57y old Male who presents with a chief complaint of Bilateral Lower extremity swelling (24 Jul 2018 11:59)    Currently admitted to medicine with the primary diagnosis of Venous stasis ulcers of both lower extremities     Today is hospital day 5d. This morning he is resting comfortably in bed and reports no new issues or overnight events.     PAST MEDICAL & SURGICAL HISTORY  Morbid obesity  Osteoarthritis  Diabetes  Asthma  H/O umbilical hernia repair    SOCIAL HISTORY:  Negative for smoking/alcohol/drug use.     ALLERGIES:  No Known Allergies    MEDICATIONS:  STANDING MEDICATIONS  aspirin enteric coated 81 milliGRAM(s) Oral daily  atorvastatin 40 milliGRAM(s) Oral at bedtime  dextrose 50% Injectable 25 Gram(s) IV Push once  enoxaparin Injectable 40 milliGRAM(s) SubCutaneous every 12 hours  furosemide    Tablet 40 milliGRAM(s) Oral daily  insulin glargine Injectable (LANTUS) 10 Unit(s) SubCutaneous at bedtime  insulin lispro (HumaLOG) corrective regimen sliding scale   SubCutaneous three times a day before meals  multivitamin 1 Tablet(s) Oral daily    PRN MEDICATIONS  acetaminophen   Tablet. 650 milliGRAM(s) Oral every 4 hours PRN    VITALS:   T(F): 98.6  HR: 97  BP: 122/63  RR: 18  SpO2: --    LABS:                        11.3   8.10  )-----------( 333      ( 28 Jul 2018 06:55 )             36.7     07-28    140  |  101  |  8<L>  ----------------------------<  127<H>  4.5   |  26  |  0.6<L>    Ca    8.3<L>      28 Jul 2018 06:55                      RADIOLOGY:    PHYSICAL EXAM:  GEN: No acute distress  PULM: Clear to auscultation bilaterally   CARD: S1/S2 present. RRR.   GI: Soft, non-tender, non-distended. Bowel sounds present  MSK: NC/NC/NE/2+PP/VILLEGAS  NEURO: AAOX3 CHIEF COMPLAINT:    Patient is a 57y old Male who presents with a chief complaint of Bilateral Lower extremity swelling (24 Jul 2018 11:59)    Currently admitted to medicine with the primary diagnosis of Venous stasis ulcers of both lower extremities     Today is hospital day 5d. This morning he is resting comfortably on a chair and reports tingling sensation in the left leg. He denies pain, SOB, fever, chills, n/v/d. Patient does not want to be discharged and asked to stay till Monday. He has clean dressing on both legs from below the knee to above the ankle.    PAST MEDICAL & SURGICAL HISTORY  Morbid obesity  Osteoarthritis  Diabetes  Asthma  H/O umbilical hernia repair    SOCIAL HISTORY:  Negative for smoking/alcohol/drug use.     ALLERGIES:  No Known Allergies    MEDICATIONS:  STANDING MEDICATIONS  aspirin enteric coated 81 milliGRAM(s) Oral daily  atorvastatin 40 milliGRAM(s) Oral at bedtime  dextrose 50% Injectable 25 Gram(s) IV Push once  enoxaparin Injectable 40 milliGRAM(s) SubCutaneous every 12 hours  furosemide    Tablet 40 milliGRAM(s) Oral daily  insulin glargine Injectable (LANTUS) 10 Unit(s) SubCutaneous at bedtime  insulin lispro (HumaLOG) corrective regimen sliding scale   SubCutaneous three times a day before meals  multivitamin 1 Tablet(s) Oral daily    PRN MEDICATIONS  acetaminophen   Tablet. 650 milliGRAM(s) Oral every 4 hours PRN    VITALS:   T(F): 98.6  HR: 97  BP: 122/63  RR: 18  SpO2: --    LABS:                        11.3   8.10  )-----------( 333      ( 28 Jul 2018 06:55 )             36.7     07-28    140  |  101  |  8<L>  ----------------------------<  127<H>  4.5   |  26  |  0.6<L>    Ca    8.3<L>      28 Jul 2018 06:55                      RADIOLOGY:    PHYSICAL EXAM:  GEN: No acute distress  PULM: Clear to auscultation bilaterally   CARD: S1/S2 present. RRR.   GI: Soft, non-tender, non-distended. Bowel sounds present  MSK: NC/NC/NE/2+PP/VILLEGAS  NEURO: AAOX3

## 2018-07-28 NOTE — PROGRESS NOTE ADULT - SUBJECTIVE AND OBJECTIVE BOX
FABIAN CLEARY  57y  Male      Patient is a 57y old  Male who presents with a chief complaint of Bilateral Lower extremity swelling (24 Jul 2018 11:59)      INTERVAL HPI/OVERNIGHT EVENTS:      ******************************* REVIEW OF SYSTEMS:**********************************************    All other review of systems negative    *********************** VITALS ******************************************    T(F): 98.6 (07-28-18 @ 05:42)  HR: 97 (07-28-18 @ 05:42) (91 - 97)  BP: 122/63 (07-28-18 @ 05:42) (122/63 - 133/-)  RR: 18 (07-28-18 @ 05:42) (18 - 18)  SpO2: --            ******************************** PHYSICAL EXAM:**************************************************  GENERAL: NAD    PSYCH: no agitation, baseline mentation  HEENT:     NERVOUS SYSTEM:  Alert & Oriented X3, MS  5/5 B/L  UE and LE ; Sensory intact    PULMONARY: AGNIESZKA, CTA    CARDIOVASCULAR: S1S2 RRR    GI: Soft, NT, ND; BS present.    EXTREMITIES:  LE B/L Chronic edema (? lymphedema) with dressing around     LYMPH: as above    SKIN: as above.  ******************************************************************************************    Consultant(s) Notes Reviewed:  [x ] YES  [ ] NO    Discussed with Consultants/Other Providers [ x] YES     **************************** LABS *******************************************************                          11.3   8.10  )-----------( 333      ( 28 Jul 2018 06:55 )             36.7     07-28    140  |  101  |  8<L>  ----------------------------<  127<H>  4.5   |  26  |  0.6<L>    Ca    8.3<L>      28 Jul 2018 06:55            Lactate Trend  07-23 @ 19:55 Lactate:1.8         CAPILLARY BLOOD GLUCOSE  136 (28 Jul 2018 11:24)              **************************Active Medications *******************************************  No Known Allergies      acetaminophen   Tablet. 650 milliGRAM(s) Oral every 4 hours PRN  aspirin enteric coated 81 milliGRAM(s) Oral daily  atorvastatin 40 milliGRAM(s) Oral at bedtime  dextrose 50% Injectable 25 Gram(s) IV Push once  enoxaparin Injectable 40 milliGRAM(s) SubCutaneous every 12 hours  furosemide    Tablet 40 milliGRAM(s) Oral daily  insulin glargine Injectable (LANTUS) 10 Unit(s) SubCutaneous at bedtime  insulin lispro (HumaLOG) corrective regimen sliding scale   SubCutaneous three times a day before meals  multivitamin 1 Tablet(s) Oral daily      ***************************************************  RADIOLOGY & ADDITIONAL TESTS:    Imaging Personally Reviewed:  [ ] YES  [ ] NO    HEALTH ISSUES - PROBLEM Dx:  Venous stasis ulcers of both lower extremities: Venous stasis ulcers of both lower extremities

## 2018-07-29 LAB
ANION GAP SERPL CALC-SCNC: 13 MMOL/L — SIGNIFICANT CHANGE UP (ref 7–14)
BASOPHILS # BLD AUTO: 0.02 K/UL — SIGNIFICANT CHANGE UP (ref 0–0.2)
BASOPHILS NFR BLD AUTO: 0.2 % — SIGNIFICANT CHANGE UP (ref 0–1)
BUN SERPL-MCNC: 9 MG/DL — LOW (ref 10–20)
CALCIUM SERPL-MCNC: 8.1 MG/DL — LOW (ref 8.5–10.1)
CHLORIDE SERPL-SCNC: 99 MMOL/L — SIGNIFICANT CHANGE UP (ref 98–110)
CO2 SERPL-SCNC: 28 MMOL/L — SIGNIFICANT CHANGE UP (ref 17–32)
CREAT SERPL-MCNC: 0.6 MG/DL — LOW (ref 0.7–1.5)
EOSINOPHIL # BLD AUTO: 0.51 K/UL — SIGNIFICANT CHANGE UP (ref 0–0.7)
EOSINOPHIL NFR BLD AUTO: 6 % — SIGNIFICANT CHANGE UP (ref 0–8)
GLUCOSE SERPL-MCNC: 183 MG/DL — HIGH (ref 70–99)
HCT VFR BLD CALC: 35.8 % — LOW (ref 42–52)
HGB BLD-MCNC: 11 G/DL — LOW (ref 14–18)
IMM GRANULOCYTES NFR BLD AUTO: 0.5 % — HIGH (ref 0.1–0.3)
LYMPHOCYTES # BLD AUTO: 1.65 K/UL — SIGNIFICANT CHANGE UP (ref 1.2–3.4)
LYMPHOCYTES # BLD AUTO: 19.3 % — LOW (ref 20.5–51.1)
MCHC RBC-ENTMCNC: 25.4 PG — LOW (ref 27–31)
MCHC RBC-ENTMCNC: 30.7 G/DL — LOW (ref 32–37)
MCV RBC AUTO: 82.7 FL — SIGNIFICANT CHANGE UP (ref 80–94)
MONOCYTES # BLD AUTO: 0.38 K/UL — SIGNIFICANT CHANGE UP (ref 0.1–0.6)
MONOCYTES NFR BLD AUTO: 4.4 % — SIGNIFICANT CHANGE UP (ref 1.7–9.3)
NEUTROPHILS # BLD AUTO: 5.95 K/UL — SIGNIFICANT CHANGE UP (ref 1.4–6.5)
NEUTROPHILS NFR BLD AUTO: 69.6 % — SIGNIFICANT CHANGE UP (ref 42.2–75.2)
NRBC # BLD: 0 /100 WBCS — SIGNIFICANT CHANGE UP (ref 0–0)
PLATELET # BLD AUTO: 363 K/UL — SIGNIFICANT CHANGE UP (ref 130–400)
POTASSIUM SERPL-MCNC: 4.4 MMOL/L — SIGNIFICANT CHANGE UP (ref 3.5–5)
POTASSIUM SERPL-SCNC: 4.4 MMOL/L — SIGNIFICANT CHANGE UP (ref 3.5–5)
RBC # BLD: 4.33 M/UL — LOW (ref 4.7–6.1)
RBC # FLD: 14.9 % — HIGH (ref 11.5–14.5)
SODIUM SERPL-SCNC: 140 MMOL/L — SIGNIFICANT CHANGE UP (ref 135–146)
WBC # BLD: 8.55 K/UL — SIGNIFICANT CHANGE UP (ref 4.8–10.8)
WBC # FLD AUTO: 8.55 K/UL — SIGNIFICANT CHANGE UP (ref 4.8–10.8)

## 2018-07-29 RX ADMIN — Medication 1 TABLET(S): at 11:36

## 2018-07-29 RX ADMIN — Medication 81 MILLIGRAM(S): at 11:36

## 2018-07-29 RX ADMIN — Medication 40 MILLIGRAM(S): at 05:51

## 2018-07-29 RX ADMIN — ATORVASTATIN CALCIUM 40 MILLIGRAM(S): 80 TABLET, FILM COATED ORAL at 21:01

## 2018-07-29 RX ADMIN — ENOXAPARIN SODIUM 40 MILLIGRAM(S): 100 INJECTION SUBCUTANEOUS at 05:51

## 2018-07-29 RX ADMIN — INSULIN GLARGINE 10 UNIT(S): 100 INJECTION, SOLUTION SUBCUTANEOUS at 21:01

## 2018-07-29 NOTE — PROGRESS NOTE ADULT - SUBJECTIVE AND OBJECTIVE BOX
FABIAN CLEARY  57y  Male      Patient is a 57y old  Male who presents with a chief complaint of Bilateral Lower extremity swelling (24 Jul 2018 11:59)      INTERVAL HPI/OVERNIGHT EVENTS:      ******************************* REVIEW OF SYSTEMS:**********************************************      All other review of systems negative    *********************** VITALS ******************************************    T(F): 98.3 (07-29-18 @ 05:10)  HR: 88 (07-29-18 @ 05:10) (87 - 94)  BP: 125/57 (07-29-18 @ 05:10) (125/57 - 160/65)  RR: 18 (07-29-18 @ 05:10) (18 - 18)  SpO2: 99% (07-28-18 @ 20:04) (99% - 99%)    07-29-18 @ 07:01  -  07-29-18 @ 11:38  --------------------------------------------------------  IN: 350 mL / OUT: 0 mL / NET: 350 mL            07-29-18 @ 07:01  -  07-29-18 @ 11:38  --------------------------------------------------------  IN: 350 mL / OUT: 0 mL / NET: 350 mL        ******************************** PHYSICAL EXAM:**************************************************  GENERAL: NAD    PSYCH: no agitation, baseline mentation  HEENT:     NERVOUS SYSTEM:  Alert & Oriented X3, MS  5/5 B/L  UE and LE ; Sensory intact    PULMONARY: AGNIESZKA, CTA    CARDIOVASCULAR: S1S2 RRR    GI: Soft, NT, ND; BS present.    EXTREMITIES: LE ? Lymphedema with chronic venous stasis ulcer.    LYMPH: No lymphadenopathy noted    SKIN: as above.  ******************************************************************************************    Consultant(s) Notes Reviewed:  [x ] YES  [ ] NO    Discussed with Consultants/Other Providers [ x] YES     **************************** LABS *******************************************************                          11.0   8.55  )-----------( 363      ( 29 Jul 2018 08:46 )             35.8     07-29    140  |  99  |  9<L>  ----------------------------<  183<H>  4.4   |  28  |  0.6<L>    Ca    8.1<L>      29 Jul 2018 08:46            Lactate Trend        CAPILLARY BLOOD GLUCOSE  120 (29 Jul 2018 11:16)              **************************Active Medications *******************************************  No Known Allergies      acetaminophen   Tablet. 650 milliGRAM(s) Oral every 4 hours PRN  aspirin enteric coated 81 milliGRAM(s) Oral daily  atorvastatin 40 milliGRAM(s) Oral at bedtime  dextrose 50% Injectable 25 Gram(s) IV Push once  enoxaparin Injectable 40 milliGRAM(s) SubCutaneous every 12 hours  furosemide    Tablet 40 milliGRAM(s) Oral daily  insulin glargine Injectable (LANTUS) 10 Unit(s) SubCutaneous at bedtime  insulin lispro (HumaLOG) corrective regimen sliding scale   SubCutaneous three times a day before meals  multivitamin 1 Tablet(s) Oral daily      ***************************************************  RADIOLOGY & ADDITIONAL TESTS:    Imaging Personally Reviewed:  [ ] YES  [ ] NO    HEALTH ISSUES - PROBLEM Dx:  Venous stasis ulcers of both lower extremities: Venous stasis ulcers of both lower extremities

## 2018-07-30 RX ADMIN — INSULIN GLARGINE 10 UNIT(S): 100 INJECTION, SOLUTION SUBCUTANEOUS at 23:20

## 2018-07-30 RX ADMIN — Medication 40 MILLIGRAM(S): at 05:10

## 2018-07-30 RX ADMIN — Medication 81 MILLIGRAM(S): at 13:34

## 2018-07-30 RX ADMIN — Medication 1 TABLET(S): at 13:34

## 2018-07-30 RX ADMIN — ATORVASTATIN CALCIUM 40 MILLIGRAM(S): 80 TABLET, FILM COATED ORAL at 23:20

## 2018-07-30 RX ADMIN — ENOXAPARIN SODIUM 40 MILLIGRAM(S): 100 INJECTION SUBCUTANEOUS at 20:47

## 2018-07-30 RX ADMIN — ENOXAPARIN SODIUM 40 MILLIGRAM(S): 100 INJECTION SUBCUTANEOUS at 05:10

## 2018-07-30 NOTE — PROGRESS NOTE ADULT - SUBJECTIVE AND OBJECTIVE BOX
FABIAN CLEARY  57y  Male      Patient is a 57y old  Male who presents with a chief complaint of Bilateral Lower extremity swelling (24 Jul 2018 11:59)      INTERVAL HPI/OVERNIGHT EVENTS:      ******************************* REVIEW OF SYSTEMS:**********************************************      All other review of systems negative    *********************** VITALS ******************************************    T(F): 97.9 (07-30-18 @ 14:35)  HR: 87 (07-30-18 @ 14:35) (87 - 95)  BP: 138/73 (07-30-18 @ 14:35) (122/69 - 138/73)  RR: 17 (07-30-18 @ 14:35) (17 - 18)  SpO2: 99% (07-29-18 @ 20:02) (99% - 99%)    07-29-18 @ 07:01  -  07-30-18 @ 07:00  --------------------------------------------------------  IN: 1600 mL / OUT: 0 mL / NET: 1600 mL            07-29-18 @ 07:01  -  07-30-18 @ 07:00  --------------------------------------------------------  IN: 1600 mL / OUT: 0 mL / NET: 1600 mL        ******************************** PHYSICAL EXAM:**************************************************  GENERAL: NAD    PSYCH: no agitation, baseline mentation  HEENT:     NERVOUS SYSTEM:  Alert & Oriented X3, MS  5/5 B/L  UE and LE ; Sensory intact    PULMONARY: AGNIESZKA, CTA    CARDIOVASCULAR: S1S2 RRR    GI: Soft, NT, ND; BS present.    EXTREMITIES: LE edema/ lymphedema ? with dressing.    LYMPH: No lymphadenopathy noted    SKIN: No rashes or lesions    ******************************************************************************************    Consultant(s) Notes Reviewed:  [x ] YES  [ ] NO    Discussed with Consultants/Other Providers [ x] YES     **************************** LABS *******************************************************                          11.0   8.55  )-----------( 363      ( 29 Jul 2018 08:46 )             35.8     07-29    140  |  99  |  9<L>  ----------------------------<  183<H>  4.4   |  28  |  0.6<L>    Ca    8.1<L>      29 Jul 2018 08:46            Lactate Trend        CAPILLARY BLOOD GLUCOSE  119 (30 Jul 2018 12:00)              **************************Active Medications *******************************************  No Known Allergies      acetaminophen   Tablet. 650 milliGRAM(s) Oral every 4 hours PRN  aspirin enteric coated 81 milliGRAM(s) Oral daily  atorvastatin 40 milliGRAM(s) Oral at bedtime  dextrose 50% Injectable 25 Gram(s) IV Push once  enoxaparin Injectable 40 milliGRAM(s) SubCutaneous every 12 hours  furosemide    Tablet 40 milliGRAM(s) Oral daily  insulin glargine Injectable (LANTUS) 10 Unit(s) SubCutaneous at bedtime  insulin lispro (HumaLOG) corrective regimen sliding scale   SubCutaneous three times a day before meals  multivitamin 1 Tablet(s) Oral daily      ***************************************************  RADIOLOGY & ADDITIONAL TESTS:    Imaging Personally Reviewed:  [ ] YES  [ ] NO    HEALTH ISSUES - PROBLEM Dx:  Venous stasis ulcers of both lower extremities: Venous stasis ulcers of both lower extremities

## 2018-07-30 NOTE — DIETITIAN INITIAL EVALUATION ADULT. - PT NOT SOURCE
other (specify)/LOS - pt is alert and oriented sitting on chair as usual. 3+ b/l ankle edema. excoriation. No oral/GI problem

## 2018-07-30 NOTE — DIETITIAN INITIAL EVALUATION ADULT. - OTHER INFO
p/w b/l lower extremity swelling, recent d/c from [facility] on 7/20 w/ similar pain issue. Now ID consulted. Now Abx. podiatry consulted. neg on DVT. possible angiogram. Dm2. Morbidity obese, d/c planning

## 2018-07-30 NOTE — PROGRESS NOTE ADULT - ASSESSMENT
Patient is a 58 yo M with h/o DM (off medications), Morbid Obesity, Chronic LE venous stasis ulcers, Asthma comes in from [facility] [place] with complaints of bilateral leg swelling with persistent discharge. Patient was recently discharged from [facility], on 7/20/18 after being admitted with similar complaints. According to carlo he completed his Antibiotic course whilst inpatient thus was not discharged home on any ABx but the wound care nurse that was supposed to have his dressings changed did not show up does prompting this current ED visit. In ED, work up done was unremarkable but per ED physician there was copious greenish oozing with erythema  thus patient had to be admitted for cellulitis and home care set up. Patient denies any pain, fever, chills, nausea or vomiting.      Assessment and Plan:    1. Bilateral Chronic venous Stasis ulcers: No cellulitis.  ID consult, no need for Antibiotics.  Continue PO Lasix. Wound care with Xeroform/DSD/ACE, leg elevation.   Podiatry consulted:  appreciate input and recommendations noted.  Venous duplex negative for DVT  Arterial Duplex showed high grade right popliteal artery stenosis,   Aborted angiogram , would f/u with vascular as out pt for UNNA boot training/possible angiogram.      2. Left eye visual impairment:  Seen by Ophthalmologist on previous admission and recommended a retinal exam with a fluorescein angiogram outpatient.   Follow up outpatient.         3. Diabetes Mellitus:  Hemoglobin A1C 7.4  He started on metformin, on hold  for angiogram today.       4. Asthma:   Duoneb PRN       5. Chronic Diastolic Heart failure:  ECHO: 6/28/18: ef >55% with G1DD.  Continue Lasix.    6. Morbid Obesity:   patient will benefit from weight loss program and possible weight loss surgery.     Will follow.    DVT prophylaxis: Lovenox  Disposition: From assisted living. D/C PLANNING.

## 2018-07-30 NOTE — DIETITIAN INITIAL EVALUATION ADULT. - NS FNS WEIGHT USED FOR CALC
CBW documented is not correct, will use the dosing weight of 165.9kg on 7/26 to calculate. and also pt's IBW is 61.8kg/current

## 2018-07-31 VITALS
SYSTOLIC BLOOD PRESSURE: 122 MMHG | RESPIRATION RATE: 19 BRPM | HEART RATE: 97 BPM | TEMPERATURE: 98 F | DIASTOLIC BLOOD PRESSURE: 72 MMHG

## 2018-07-31 LAB
ANION GAP SERPL CALC-SCNC: 16 MMOL/L — HIGH (ref 7–14)
BUN SERPL-MCNC: 9 MG/DL — LOW (ref 10–20)
CALCIUM SERPL-MCNC: 8.8 MG/DL — SIGNIFICANT CHANGE UP (ref 8.5–10.1)
CHLORIDE SERPL-SCNC: 97 MMOL/L — LOW (ref 98–110)
CO2 SERPL-SCNC: 27 MMOL/L — SIGNIFICANT CHANGE UP (ref 17–32)
CREAT SERPL-MCNC: 0.7 MG/DL — SIGNIFICANT CHANGE UP (ref 0.7–1.5)
GLUCOSE SERPL-MCNC: 117 MG/DL — HIGH (ref 70–99)
HCT VFR BLD CALC: 40.4 % — LOW (ref 42–52)
HGB BLD-MCNC: 12.3 G/DL — LOW (ref 14–18)
MAGNESIUM SERPL-MCNC: 2.2 MG/DL — SIGNIFICANT CHANGE UP (ref 1.8–2.4)
MCHC RBC-ENTMCNC: 25.2 PG — LOW (ref 27–31)
MCHC RBC-ENTMCNC: 30.4 G/DL — LOW (ref 32–37)
MCV RBC AUTO: 82.6 FL — SIGNIFICANT CHANGE UP (ref 80–94)
NRBC # BLD: 0 /100 WBCS — SIGNIFICANT CHANGE UP (ref 0–0)
PLATELET # BLD AUTO: 458 K/UL — HIGH (ref 130–400)
POTASSIUM SERPL-MCNC: 4.7 MMOL/L — SIGNIFICANT CHANGE UP (ref 3.5–5)
POTASSIUM SERPL-SCNC: 4.7 MMOL/L — SIGNIFICANT CHANGE UP (ref 3.5–5)
RBC # BLD: 4.89 M/UL — SIGNIFICANT CHANGE UP (ref 4.7–6.1)
RBC # FLD: 15.1 % — HIGH (ref 11.5–14.5)
SODIUM SERPL-SCNC: 140 MMOL/L — SIGNIFICANT CHANGE UP (ref 135–146)
WBC # BLD: 9.83 K/UL — SIGNIFICANT CHANGE UP (ref 4.8–10.8)
WBC # FLD AUTO: 9.83 K/UL — SIGNIFICANT CHANGE UP (ref 4.8–10.8)

## 2018-07-31 RX ORDER — FUROSEMIDE 40 MG
1 TABLET ORAL
Qty: 30 | Refills: 0 | OUTPATIENT
Start: 2018-07-31 | End: 2018-08-29

## 2018-07-31 RX ORDER — METFORMIN HYDROCHLORIDE 850 MG/1
1 TABLET ORAL
Qty: 60 | Refills: 0 | OUTPATIENT
Start: 2018-07-31 | End: 2018-08-29

## 2018-07-31 RX ADMIN — ENOXAPARIN SODIUM 40 MILLIGRAM(S): 100 INJECTION SUBCUTANEOUS at 06:52

## 2018-07-31 RX ADMIN — Medication 40 MILLIGRAM(S): at 06:52

## 2018-07-31 RX ADMIN — Medication 81 MILLIGRAM(S): at 12:36

## 2018-07-31 RX ADMIN — Medication 1 TABLET(S): at 12:36

## 2018-07-31 NOTE — PROGRESS NOTE ADULT - ASSESSMENT
Patient is a 56 yo M with h/o DM (off medications), Morbid Obesity, Chronic LE venous stasis ulcers, Asthma comes in from [facility] with complaints of bilateral leg swelling with persistent discharge. Patient was recently discharged from [facility], on 7/20/18 after being admitted with similar complaints. According to carlo he completed his Antibiotic course whilst inpatient thus was not discharged home on any ABx but the wound care nurse that was supposed to have his dressings changed did not show up does prompting this current ED visit. In ED, work up done was unremarkable but per ED physician there was copious greenish oozing with erythema  thus patient had to be admitted for cellulitis and home care set up. Patient denies any pain, fever, chills, nausea or vomiting.      Assessment and Plan:    1. Bilateral Chronic venous Stasis ulcers: No cellulitis.  ID consult, no need for Antibiotics.  Continue PO Lasix. Wound care with Xeroform/DSD/ACE, leg elevation.   Podiatry consulted:  appreciate input and recommendations noted.  Venous duplex negative for DVT  Arterial Duplex showed high grade right popliteal artery stenosis,   Aborted angiogram , would f/u with vascular as out pt for UNNA boot training/possible angiogram.      2. Left eye visual impairment:  Seen by Ophthalmologist on previous admission and recommended a retinal exam with a fluorescein angiogram outpatient.   Follow up outpatient.         3. Diabetes Mellitus:  Hemoglobin A1C 7.4  He started on metformin, on hold  for angiogram today.       4. Asthma:   Duoneb PRN       5. Chronic Diastolic Heart failure:  ECHO: 6/28/18: ef >55% with G1DD.  Continue Lasix.    6. Morbid Obesity:   patient will benefit from weight loss program and possible weight loss surgery.     Will follow.    DVT prophylaxis: Lovenox  Disposition: From assisted living. D/C PLANNING.

## 2018-07-31 NOTE — PROGRESS NOTE ADULT - SUBJECTIVE AND OBJECTIVE BOX
FABIAN CLEARY  57y  Male      Patient is a 57y old  Male who presents with a chief complaint of Bilateral Lower extremity swelling (24 Jul 2018 11:59)      INTERVAL HPI/OVERNIGHT EVENTS:      ******************************* REVIEW OF SYSTEMS:**********************************************      All other review of systems negative    *********************** VITALS ******************************************    T(F): 97.6 (07-31-18 @ 13:35)  HR: 97 (07-31-18 @ 13:35) (91 - 97)  BP: 122/72 (07-31-18 @ 13:35) (122/72 - 132/66)  RR: 19 (07-31-18 @ 13:35) (16 - 19)  SpO2: --            ******************************** PHYSICAL EXAM:**************************************************  GENERAL: NAD    PSYCH: no agitation, baseline mentation  HEENT:     NERVOUS SYSTEM:  Alert & Oriented X3, MS  5/5 B/L  UE and LE ; Sensory intact    PULMONARY: AGNIESZKA, CTA    CARDIOVASCULAR: S1S2 RRR    GI: Soft, NT, ND; BS present.    EXTREMITIES:  B/L LE edema/ lymphedema with chronic venous stasis  ulcer    LYMPH: No lymphadenopathy noted    SKIN: as above    ******************************************************************************************    Consultant(s) Notes Reviewed:  [x ] YES  [ ] NO    Discussed with Consultants/Other Providers [ x] YES     **************************** LABS *******************************************************                          12.3   9.83  )-----------( 458      ( 31 Jul 2018 07:26 )             40.4     07-31    140  |  97<L>  |  9<L>  ----------------------------<  117<H>  4.7   |  27  |  0.7    Ca    8.8      31 Jul 2018 07:26  Mg     2.2     07-31            Lactate Trend        CAPILLARY BLOOD GLUCOSE  150 (31 Jul 2018 11:11)              **************************Active Medications *******************************************  No Known Allergies      acetaminophen   Tablet. 650 milliGRAM(s) Oral every 4 hours PRN  aspirin enteric coated 81 milliGRAM(s) Oral daily  atorvastatin 40 milliGRAM(s) Oral at bedtime  dextrose 50% Injectable 25 Gram(s) IV Push once  enoxaparin Injectable 40 milliGRAM(s) SubCutaneous every 12 hours  furosemide    Tablet 40 milliGRAM(s) Oral daily  insulin glargine Injectable (LANTUS) 10 Unit(s) SubCutaneous at bedtime  insulin lispro (HumaLOG) corrective regimen sliding scale   SubCutaneous three times a day before meals  multivitamin 1 Tablet(s) Oral daily      ***************************************************  RADIOLOGY & ADDITIONAL TESTS:    Imaging Personally Reviewed:  [ ] YES  [ ] NO    HEALTH ISSUES - PROBLEM Dx:  Venous stasis ulcers of both lower extremities: Venous stasis ulcers of both lower extremities

## 2018-08-02 DIAGNOSIS — M19.90 UNSPECIFIED OSTEOARTHRITIS, UNSPECIFIED SITE: ICD-10-CM

## 2018-08-02 DIAGNOSIS — J45.909 UNSPECIFIED ASTHMA, UNCOMPLICATED: ICD-10-CM

## 2018-08-02 DIAGNOSIS — E66.01 MORBID (SEVERE) OBESITY DUE TO EXCESS CALORIES: ICD-10-CM

## 2018-08-02 DIAGNOSIS — Z91.14 PATIENT'S OTHER NONCOMPLIANCE WITH MEDICATION REGIMEN: ICD-10-CM

## 2018-08-02 DIAGNOSIS — I87.2 VENOUS INSUFFICIENCY (CHRONIC) (PERIPHERAL): ICD-10-CM

## 2018-08-02 DIAGNOSIS — D64.9 ANEMIA, UNSPECIFIED: ICD-10-CM

## 2018-08-02 DIAGNOSIS — L03.116 CELLULITIS OF LEFT LOWER LIMB: ICD-10-CM

## 2018-08-02 DIAGNOSIS — H54.62 UNQUALIFIED VISUAL LOSS, LEFT EYE, NORMAL VISION RIGHT EYE: ICD-10-CM

## 2018-08-02 DIAGNOSIS — L03.115 CELLULITIS OF RIGHT LOWER LIMB: ICD-10-CM

## 2018-08-02 DIAGNOSIS — I83.018 VARICOSE VEINS OF RIGHT LOWER EXTREMITY WITH ULCER OTHER PART OF LOWER LEG: ICD-10-CM

## 2018-08-02 DIAGNOSIS — E83.42 HYPOMAGNESEMIA: ICD-10-CM

## 2018-08-02 DIAGNOSIS — L97.929 NON-PRESSURE CHRONIC ULCER OF UNSPECIFIED PART OF LEFT LOWER LEG WITH UNSPECIFIED SEVERITY: ICD-10-CM

## 2018-08-02 DIAGNOSIS — L97.919 NON-PRESSURE CHRONIC ULCER OF UNSPECIFIED PART OF RIGHT LOWER LEG WITH UNSPECIFIED SEVERITY: ICD-10-CM

## 2018-08-02 DIAGNOSIS — E87.6 HYPOKALEMIA: ICD-10-CM

## 2018-08-04 ENCOUNTER — INPATIENT (INPATIENT)
Facility: HOSPITAL | Age: 58
LOS: 2 days | Discharge: NURSING, REHAB OR LONG-TERM CARE | End: 2018-08-07
Attending: INTERNAL MEDICINE | Admitting: INTERNAL MEDICINE
Payer: MEDICAID

## 2018-08-04 VITALS
HEART RATE: 103 BPM | RESPIRATION RATE: 20 BRPM | OXYGEN SATURATION: 98 % | TEMPERATURE: 98 F | SYSTOLIC BLOOD PRESSURE: 114 MMHG | DIASTOLIC BLOOD PRESSURE: 64 MMHG

## 2018-08-04 DIAGNOSIS — Z98.890 OTHER SPECIFIED POSTPROCEDURAL STATES: Chronic | ICD-10-CM

## 2018-08-04 LAB
ALBUMIN SERPL ELPH-MCNC: 3.3 G/DL — LOW (ref 3.5–5.2)
ALP SERPL-CCNC: 104 U/L — SIGNIFICANT CHANGE UP (ref 30–115)
ALT FLD-CCNC: 13 U/L — SIGNIFICANT CHANGE UP (ref 0–41)
ANION GAP SERPL CALC-SCNC: 14 MMOL/L — SIGNIFICANT CHANGE UP (ref 7–14)
APTT BLD: 29.2 SEC — SIGNIFICANT CHANGE UP (ref 27–39.2)
AST SERPL-CCNC: 20 U/L — SIGNIFICANT CHANGE UP (ref 0–41)
BASOPHILS # BLD AUTO: 0.02 K/UL — SIGNIFICANT CHANGE UP (ref 0–0.2)
BASOPHILS NFR BLD AUTO: 0.2 % — SIGNIFICANT CHANGE UP (ref 0–1)
BILIRUB SERPL-MCNC: 0.4 MG/DL — SIGNIFICANT CHANGE UP (ref 0.2–1.2)
BLD GP AB SCN SERPL QL: SIGNIFICANT CHANGE UP
BUN SERPL-MCNC: 7 MG/DL — LOW (ref 10–20)
CALCIUM SERPL-MCNC: 8.4 MG/DL — LOW (ref 8.5–10.1)
CHLORIDE SERPL-SCNC: 103 MMOL/L — SIGNIFICANT CHANGE UP (ref 98–110)
CO2 SERPL-SCNC: 26 MMOL/L — SIGNIFICANT CHANGE UP (ref 17–32)
CREAT SERPL-MCNC: 0.6 MG/DL — LOW (ref 0.7–1.5)
EOSINOPHIL # BLD AUTO: 0.33 K/UL — SIGNIFICANT CHANGE UP (ref 0–0.7)
EOSINOPHIL NFR BLD AUTO: 3.1 % — SIGNIFICANT CHANGE UP (ref 0–8)
GLUCOSE SERPL-MCNC: 129 MG/DL — HIGH (ref 70–99)
HCT VFR BLD CALC: 37.9 % — LOW (ref 42–52)
HGB BLD-MCNC: 11.5 G/DL — LOW (ref 14–18)
IMM GRANULOCYTES NFR BLD AUTO: 0.4 % — HIGH (ref 0.1–0.3)
INR BLD: 1.23 RATIO — SIGNIFICANT CHANGE UP (ref 0.65–1.3)
LYMPHOCYTES # BLD AUTO: 1.47 K/UL — SIGNIFICANT CHANGE UP (ref 1.2–3.4)
LYMPHOCYTES # BLD AUTO: 13.7 % — LOW (ref 20.5–51.1)
MCHC RBC-ENTMCNC: 25.2 PG — LOW (ref 27–31)
MCHC RBC-ENTMCNC: 30.3 G/DL — LOW (ref 32–37)
MCV RBC AUTO: 82.9 FL — SIGNIFICANT CHANGE UP (ref 80–94)
MONOCYTES # BLD AUTO: 0.53 K/UL — SIGNIFICANT CHANGE UP (ref 0.1–0.6)
MONOCYTES NFR BLD AUTO: 4.9 % — SIGNIFICANT CHANGE UP (ref 1.7–9.3)
NEUTROPHILS # BLD AUTO: 8.34 K/UL — HIGH (ref 1.4–6.5)
NEUTROPHILS NFR BLD AUTO: 77.7 % — HIGH (ref 42.2–75.2)
NRBC # BLD: 0 /100 WBCS — SIGNIFICANT CHANGE UP (ref 0–0)
PLATELET # BLD AUTO: 408 K/UL — HIGH (ref 130–400)
POTASSIUM SERPL-MCNC: 5.6 MMOL/L — HIGH (ref 3.5–5)
POTASSIUM SERPL-SCNC: 5.6 MMOL/L — HIGH (ref 3.5–5)
PROT SERPL-MCNC: 6.1 G/DL — SIGNIFICANT CHANGE UP (ref 6–8)
PROTHROM AB SERPL-ACNC: 13.3 SEC — HIGH (ref 9.95–12.87)
RBC # BLD: 4.57 M/UL — LOW (ref 4.7–6.1)
RBC # FLD: 15.3 % — HIGH (ref 11.5–14.5)
SODIUM SERPL-SCNC: 143 MMOL/L — SIGNIFICANT CHANGE UP (ref 135–146)
TROPONIN T SERPL-MCNC: <0.01 NG/ML — SIGNIFICANT CHANGE UP
TYPE + AB SCN PNL BLD: SIGNIFICANT CHANGE UP
WBC # BLD: 10.73 K/UL — SIGNIFICANT CHANGE UP (ref 4.8–10.8)
WBC # FLD AUTO: 10.73 K/UL — SIGNIFICANT CHANGE UP (ref 4.8–10.8)

## 2018-08-04 RX ORDER — ASPIRIN/CALCIUM CARB/MAGNESIUM 324 MG
325 TABLET ORAL ONCE
Qty: 0 | Refills: 0 | Status: COMPLETED | OUTPATIENT
Start: 2018-08-04 | End: 2018-08-04

## 2018-08-04 RX ORDER — FUROSEMIDE 40 MG
40 TABLET ORAL DAILY
Qty: 0 | Refills: 0 | Status: DISCONTINUED | OUTPATIENT
Start: 2018-08-04 | End: 2018-08-07

## 2018-08-04 RX ORDER — ATORVASTATIN CALCIUM 80 MG/1
80 TABLET, FILM COATED ORAL AT BEDTIME
Qty: 0 | Refills: 0 | Status: DISCONTINUED | OUTPATIENT
Start: 2018-08-04 | End: 2018-08-07

## 2018-08-04 RX ORDER — ENOXAPARIN SODIUM 100 MG/ML
40 INJECTION SUBCUTANEOUS EVERY 24 HOURS
Qty: 0 | Refills: 0 | Status: DISCONTINUED | OUTPATIENT
Start: 2018-08-04 | End: 2018-08-07

## 2018-08-04 RX ADMIN — ATORVASTATIN CALCIUM 80 MILLIGRAM(S): 80 TABLET, FILM COATED ORAL at 22:53

## 2018-08-04 RX ADMIN — ENOXAPARIN SODIUM 40 MILLIGRAM(S): 100 INJECTION SUBCUTANEOUS at 22:54

## 2018-08-04 RX ADMIN — Medication 325 MILLIGRAM(S): at 14:43

## 2018-08-04 NOTE — ED ADULT NURSE NOTE - OBJECTIVE STATEMENT
Patient reports to ED with b/l lower leg wounds. Patient was recently dc' d from hospital for venostasis ulcers and cellulitis.

## 2018-08-04 NOTE — H&P ADULT - ATTENDING COMMENTS
Pt seen and examined at bedside. Pt states that he woke up with left lower ext weakness. He was unable to walk on it. He felt numbness in his lower toes. Weakness slowly improved but still present.    Physical exam    PHYSICAL EXAM:  GENERAL: NAD, well-developed  HEAD:  Atraumatic, Normocephalic  EYES: EOMI, PERRLA, conjunctiva and sclera clear  NECK: Supple, No JVD  CHEST/LUNG: Clear to auscultation bilaterally; No wheeze  HEART: Regular rate and rhythm; No murmurs, rubs, or gallops  ABDOMEN: Soft, Nontender, Nondistended; Bowel sounds present  EXTREMITIES:  2+ Peripheral Pulses, No clubbing, cyanosis, or edema  PSYCH: AAOx3  Neuro: left lower ext 3/5 power, 2-3+ lymphedema, CNII-XII intact   SKIN: No rashes or lesions    assessment  EKG reviewed by me: , Sinus tach left axis (not new), intervals wnl no ischmic changes  CXR reviewed: no PNA or effusions     Left lower ext weakness  fu neuro work up   admit to stroke unit.     Morbid obesity   -diet and exercise.     Rash on back  Nystatin powder

## 2018-08-04 NOTE — H&P ADULT - HISTORY OF PRESENT ILLNESS
56 yo M w/ PMH of DM, morbid obesity, chronic LE venous stasis ulcers presented to ED with inability to move LLE. Patient reports he woke up in bed and was unable to move L leg. Called 911, brought in by EMS. Patient reports have 2 similar episodes to this in past, self-resolved. CT head in ED was negative for acute pathology. Patient was evaluated by Neuro that recommended care in stroke unit. Patient has had multiple admissions for LE swelling/edema/oozing in past. Patient had arterial duplex last admission which showed high grade stenosis of popliteal vein. Vascular took patient for angiogram but were unable to finish procedure because of body habitus. Was suppose to follow up as outpatient. Patient has also had maggots infection of LE that was debrided by Burn in OR.    Patient reports sensation is returning to leg though still has limited movement/strength. In ED received . 58 yo M w/ PMH of DM, morbid obesity, chronic LE venous stasis ulcers presented to ED with inability to move LLE. Patient reports he woke up in bed and was unable to move L leg. Called 911, brought in by EMS. Patient reports have 2 similar episodes to this in past, self-resolved. Denies any other complaints or symptoms (no HA, CP, dizziness, N/V). CT head in ED was negative for acute pathology. Patient was evaluated by Neuro that recommended care in stroke unit. Patient has had multiple admissions for LE swelling/edema/oozing in past. Patient had arterial duplex last admission which showed high grade stenosis of popliteal vein. Vascular took patient for angiogram but were unable to finish procedure because of body habitus. Was suppose to follow up as outpatient. Patient has also had maggots infection of LE that was debrided by Burn in OR.    Patient reports sensation is returning to leg though still has limited movement/strength. In ED received .

## 2018-08-04 NOTE — ED PROVIDER NOTE - OBJECTIVE STATEMENT
57 year old year old male with a pmh of morbid obesity, oa, dm, asthma history of b/l cellulitis admitted 7/18/18-7/20/18, discharged and came back admitted 7/23/18 was seen by ID they recommended no antibiotics, and a CELE boot, patient was found to have high grade rle stenosis, was taken to the OR 7/26 but procedure was unsuccessfully. Today patient returned because this morning he couldn't feel his left lower extremity. Patient states he's had this in the past and resolved spontaneously. Currently patient does feel that he is regaining sensation. Denies fever chills, cough, cp, n/v, abdominal pain.

## 2018-08-04 NOTE — ED PROVIDER NOTE - PHYSICAL EXAMINATION
CONSTITUTIONAL: WA / WN / NAD  HEAD: NCAT  EYES: PERRL; EOMI;   NECK: Supple;   CARD: RRR; nl S1/S2; no M/R/G.   RESP: Respiratory rate and effort are normal; breath sounds clear and equal bilaterally.  ABD: Soft, NT ND   MSK/EXT: b/l lower extremity venous status. decreased motion in the left lower extremity. sensation in tact  SKIN: Warm and dry;   NEURO: AAOx3, Motor 5/5 upper extremities  PSYCH: Memory Intact, Normal Affect CONSTITUTIONAL: WA / WN / NAD  HEAD: NCAT  EYES: PERRL; EOMI;   NECK: Supple;   CARD: RRR; nl S1/S2; no M/R/G.   RESP: Respiratory rate and effort are normal; breath sounds clear and equal bilaterally.  ABD: Soft, NT ND   MSK/EXT: b/l lower extremity venous status. decreased motion in the left lower extremity. sensation in tact  SKIN: b/l venous status lower extremities  NEURO: AAOx3, Motor 5/5 upper extremities  PSYCH: Memory Intact, Normal Affect

## 2018-08-04 NOTE — H&P ADULT - NSHPPHYSICALEXAM_GEN_ALL_CORE
GEN: NAD, obese  CARDIO: RRR, no m/r/g  RESP: CTAB, no w/r/r  ABD: distended, soft, +BS  EXT: chronic b/l venous stasis changes, +1/2 pitting edema b/l  NEURO: 1/5 strength LLE, decreased sensation to light touch, deep touch intact, rest of exam non-focal GEN: NAD, obese  CARDIO: RRR, no m/r/g  RESP: CTAB, no w/r/r  ABD: distended, soft, +BS  EXT: chronic b/l le venous stasis changes, +1/2 pitting edema b/l le  NEURO: 1/5 strength LLE, decreased sensation to light touch, deep touch intact, rest of exam non-focal

## 2018-08-04 NOTE — ED PROVIDER NOTE - ATTENDING CONTRIBUTION TO CARE
I personally evaluated the patient. I reviewed the Resident’s or Physician Assistant’s note (as assigned above), and agree with the findings and plan except as documented in my note.    57 year old year old male with a pmh of morbid obesity, oa, dm, asthma, PVD presents with left sided leg weakness after waking up this morning. Patient went to sleep feeling well, but woke up this morning with weakness in the left leg. Denies any slurred speech, leg pain, numbness. No HA. No neck pain. No back pain.     CONSTITUTIONAL:  Morbidly obese   SKIN: skin exam is warm and dry, no acute rash.  HEAD: Normocephalic; atraumatic.  EYES: PERRL, 3 mm bilateral, no nystagmus, EOM intact; conjunctiva and sclera clear.  ENT: No nasal discharge; airway clear.  NECK: Supple; non tender.+ full passive ROM in all directions. No JVD  CARD: S1, S2 normal; no murmurs, gallops, or rubs. Regular rate and rhythm. + Symmetric Strong Pulses  RESP: No wheezes, rales or rhonchi. Good air movement bilaterally  ABD: soft; non-distended; non-tender. No Rebound, No Gaurding, No signs of peritnitis, No CVA tenderness  EXT: Bilateral chronic LE wounds and skin changes. Well perfused. No cyanosis.   NEURO: CN 2-12 intact, normal finger to nose, 2/5 LLE weakness compared to RLE 5/5. Neurology paged STAT.    Plan: CT head, ECG, stroke labs, STAT neurology consult

## 2018-08-04 NOTE — CONSULT NOTE ADULT - SUBJECTIVE AND OBJECTIVE BOX
Neurology Consult    Patient is a 57y old  Male who presents with a chief complaint of inability to move LLE (04 Aug 2018 12:26)      HPI:  56 yo M w/ PMH of DM, morbid obesity, chronic LE venous stasis ulcers w/ recent evaluation for acute monocular vision loss with suspected BRAO/amaurosis with negative w/u, RLE debridement for maggot infestation with high-grade popliteal stenosis RLE currently p/w acute LLE weakness while trying to get out of bed.  Per pt unable to lift LLE off bed this morning.  (+) numbness entire leg without paresthesias.  Numbness slightly improving but remains weak.  LKNT last night when he went to bed.  denies arm involvement.  denies facial droop.  Was otherwise in his usual state of health.  Denies LBP currently.  No saddle anesthesia.  No neck pain.  No incontinence.  RLE baseline.  UE asymptomatic.      PAST MEDICAL & SURGICAL HISTORY:  Morbid obesity  Osteoarthritis  Diabetes  Asthma  H/O umbilical hernia repair    FAMILY HISTORY:  Family history of obesity (Father, Sibling)    Social History: (-) x 3    Allergies    No Known Allergies    Intolerances    MEDICATIONS  (STANDING):  aspirin 325 milliGRAM(s) Oral Once  atorvastatin 80 milliGRAM(s) Oral at bedtime  enoxaparin Injectable 40 milliGRAM(s) SubCutaneous every 24 hours  furosemide    Tablet 40 milliGRAM(s) Oral daily    MEDICATIONS  (PRN):    Review of systems:    Constitutional: No fever, weight loss or fatigue    Eyes: No eye pain or discharge  ENMT:  No difficulty hearing; No sinus or throat pain  Neck: No pain or stiffness  Respiratory: No cough, wheezing, chills or hemoptysis  Cardiovascular: No chest pain, palpitations, shortness of breath, dyspnea on exertion  Gastrointestinal: No abdominal pain, nausea, vomiting or hematemesis; No diarrhea or constipation.   Genitourinary: No dysuria, frequency, hematuria or incontinence  Neurological: As per HPI  Skin: No rashes or lesions   Endocrine: No heat or cold intolerance; No hair loss  Musculoskeletal: No joint pain or swelling  Psychiatric: No depression, anxiety, mood swings  Heme/Lymph: No easy bruising or bleeding gums    Vital Signs Last 24 Hrs  T(C): 36.6 (04 Aug 2018 12:03), Max: 37.1 (04 Aug 2018 07:44)  T(F): 97.9 (04 Aug 2018 12:03), Max: 98.8 (04 Aug 2018 07:44)  HR: 101 (04 Aug 2018 12:03) (101 - 107)  BP: 121/72 (04 Aug 2018 12:03) (114/64 - 121/72)  BP(mean): --  RR: 20 (04 Aug 2018 12:03) (20 - 20)  SpO2: 100% (04 Aug 2018 12:03) (98% - 100%)    Neurologic Examination:  General:  Appearance is consistent with chronologic age.  morbidly obese.  General: The patient is oriented to person, place, time and date.  Recent and remote memory intact.  Fund of knowledge is intact and normal.  Language with normal repetition, comprehension and naming.  Nondysarthric.    Cranial nerves: intact VA, VFF.  EOMI w/o nystagmus, skew or reported double vision.  PERRL.  No ptosis/weakness of eyelid closure.  Facial sensation is normal with normal bite.  No facial asymmetry.  Hearing grossly intact b/l.  Palate elevates midline.  Tongue midline.  Motor examination:   Normal tone, bulk and range of motion.  No tenderness, twitching, tremors or involuntary movements.  Formal Muscle Strength Testing: (MRC grade R/L) 5/5 UE; 5/2 LE w/ some inconsistencies.  palpable dorsal pedalis b/l.  (+) cool to touch but no pain.  (+) erythema b/l LE.    Reflexes:  1+ b/l pectoralis, biceps, triceps, brachioradialis, 0+ b/l patella and Achilles.  Plantar response downgoing b/l.    Sensory examination:  decreased in LLE except for soles which are intact.   Cerebellum:   normal UE/RLE    Labs:   CBC Full  -  ( 04 Aug 2018 09:03 )  WBC Count : 10.73 K/uL  Hemoglobin : 11.5 g/dL  Hematocrit : 37.9 %  Platelet Count - Automated : 408 K/uL  Mean Cell Volume : 82.9 fL  Mean Cell Hemoglobin : 25.2 pg  Mean Cell Hemoglobin Concentration : 30.3 g/dL  Auto Neutrophil # : 8.34 K/uL  Auto Lymphocyte # : 1.47 K/uL  Auto Monocyte # : 0.53 K/uL  Auto Eosinophil # : 0.33 K/uL  Auto Basophil # : 0.02 K/uL  Auto Neutrophil % : 77.7 %  Auto Lymphocyte % : 13.7 %  Auto Monocyte % : 4.9 %  Auto Eosinophil % : 3.1 %  Auto Basophil % : 0.2 %    08-04    143  |  103  |  7<L>  ----------------------------<  129<H>  5.6<H>   |  26  |  0.6<L>    Ca    8.4<L>      04 Aug 2018 09:03    TPro  6.1  /  Alb  3.3<L>  /  TBili  0.4  /  DBili  x   /  AST  20  /  ALT  13  /  AlkPhos  104  08-04    LIVER FUNCTIONS - ( 04 Aug 2018 09:03 )  Alb: 3.3 g/dL / Pro: 6.1 g/dL / ALK PHOS: 104 U/L / ALT: 13 U/L / AST: 20 U/L / GGT: x           PT/INR - ( 04 Aug 2018 09:03 )   PT: 13.30 sec;   INR: 1.23 ratio       PTT - ( 04 Aug 2018 09:03 )  PTT:29.2 sec    Neuroimaging:  NCHCT: CT Head No Cont:   EXAM:  CT BRAIN          PROCEDURE DATE:  08/04/2018      INTERPRETATION:  Clinical History / Reason for exam: TIA.    Technique: Multiple contiguous axial CT images of the head were obtained   from the base of the skull to the vertex without administration of   intravenous contrast.    Comparison: Noncontrast CT head dated 7/14/2018.     Findings: The ventricles, basal cisterns and sulcal pattern are slightly   prominent consistent with parenchymal volume loss. There are scattered   patchy and punctate foci of hypodensities in the bilateral frontal and   parietal periventricular and subcortical white matter which are   nonspecific and without mass effect most likely consistent with chronic   small vessel ischemic changes in a patient of this stated age.     Small foci of hypodensities are noted in the bilateral subinsular regions   which may represent areas of old lacunar infarcts. There is no acute mass   effect, midline shift or hemorrhage. No extra-axial fluid collections are   identified.    The bones of the calvarium are intact. The paranasal sinuses, bilateral   mastoid complexes and globes and orbits are grossly unremarkable.    Beam hardening artifact is noted overlying the brain stem and posterior   fossa which is inherent to CT in this location.    IMPRESSION:     1.  Periventricular and subcortical white matter chronic small vessel   ischemic changes and old lacunar infarcts as described above.    2.  No acute mass effect, midline shift or hemorrhage.     3.  If the patient continues to be symptomatic follow-up MRI of the brain   may be helpful for further evaluation.     CORINE DEL CID M.D., ATTENDING RADIOLOGIST    Assessment:  This is a 57y Male with h/o     Plan:   -   08-04-18 @ 13:25

## 2018-08-04 NOTE — CONSULT NOTE ADULT - ASSESSMENT
58 yo M w/ MMP including morbid obesity with venous stasis ulcers p/w acute LLE weakness of unclear etiology.  Given degree and acuity of symptoms, recommend r/o R HALIE stroke.  Possible vascular issue but no evidence of compartment syndrome currently.    Plan:  - MRI Brain NC  - MRA H/N  - continue secondary stroke prevention  - stroke unit for telemetry admission  - consider vascular evaluation

## 2018-08-04 NOTE — ED ADULT NURSE NOTE - NSIMPLEMENTINTERV_GEN_ALL_ED
Implemented All Fall Risk Interventions:  [place] to call system. Call bell, personal items and telephone within reach. Instruct patient to call for assistance. Room bathroom lighting operational. Non-slip footwear when patient is off stretcher. Physically safe environment: no spills, clutter or unnecessary equipment. Stretcher in lowest position, wheels locked, appropriate side rails in place. Provide visual cue, wrist band, yellow gown, etc. Monitor gait and stability. Monitor for mental status changes and reorient to person, place, and time. Review medications for side effects contributing to fall risk. Reinforce activity limits and safety measures with patient and family.

## 2018-08-04 NOTE — ED PROVIDER NOTE - NS ED ROS FT
Constitutional:  See HPI.  Eyes:  No visual changes  ENMT:  No hearing changes, pain, discharge or infections. No neck pain or stiffness.  Cardiac:  No chest pain, SOB   Respiratory:  No cough or respiratory distress  GI:  No nausea, vomiting, diarrhea or abdominal pain.  MS:  see hpi  Skin:  chronic venous status b/l

## 2018-08-04 NOTE — ED PROVIDER NOTE - MEDICAL DECISION MAKING DETAILS
Patient presented with LE weakness. LAst well known >12 hours prior. Neurology consulted. Patient admitted to stroke unit.

## 2018-08-05 LAB
ANION GAP SERPL CALC-SCNC: 15 MMOL/L — HIGH (ref 7–14)
BASOPHILS # BLD AUTO: 0.02 K/UL — SIGNIFICANT CHANGE UP (ref 0–0.2)
BASOPHILS NFR BLD AUTO: 0.2 % — SIGNIFICANT CHANGE UP (ref 0–1)
BUN SERPL-MCNC: 8 MG/DL — LOW (ref 10–20)
CALCIUM SERPL-MCNC: 8.2 MG/DL — LOW (ref 8.5–10.1)
CHLORIDE SERPL-SCNC: 103 MMOL/L — SIGNIFICANT CHANGE UP (ref 98–110)
CO2 SERPL-SCNC: 25 MMOL/L — SIGNIFICANT CHANGE UP (ref 17–32)
CREAT SERPL-MCNC: 0.6 MG/DL — LOW (ref 0.7–1.5)
EOSINOPHIL # BLD AUTO: 0.33 K/UL — SIGNIFICANT CHANGE UP (ref 0–0.7)
EOSINOPHIL NFR BLD AUTO: 3.8 % — SIGNIFICANT CHANGE UP (ref 0–8)
GLUCOSE SERPL-MCNC: 113 MG/DL — HIGH (ref 70–99)
HCT VFR BLD CALC: 35.4 % — LOW (ref 42–52)
HGB BLD-MCNC: 10.7 G/DL — LOW (ref 14–18)
IMM GRANULOCYTES NFR BLD AUTO: 0.2 % — SIGNIFICANT CHANGE UP (ref 0.1–0.3)
LYMPHOCYTES # BLD AUTO: 1.97 K/UL — SIGNIFICANT CHANGE UP (ref 1.2–3.4)
LYMPHOCYTES # BLD AUTO: 22.8 % — SIGNIFICANT CHANGE UP (ref 20.5–51.1)
MAGNESIUM SERPL-MCNC: 2.1 MG/DL — SIGNIFICANT CHANGE UP (ref 1.8–2.4)
MCHC RBC-ENTMCNC: 24.7 PG — LOW (ref 27–31)
MCHC RBC-ENTMCNC: 30.2 G/DL — LOW (ref 32–37)
MCV RBC AUTO: 81.8 FL — SIGNIFICANT CHANGE UP (ref 80–94)
MONOCYTES # BLD AUTO: 0.66 K/UL — HIGH (ref 0.1–0.6)
MONOCYTES NFR BLD AUTO: 7.6 % — SIGNIFICANT CHANGE UP (ref 1.7–9.3)
NEUTROPHILS # BLD AUTO: 5.64 K/UL — SIGNIFICANT CHANGE UP (ref 1.4–6.5)
NEUTROPHILS NFR BLD AUTO: 65.4 % — SIGNIFICANT CHANGE UP (ref 42.2–75.2)
NRBC # BLD: 0 /100 WBCS — SIGNIFICANT CHANGE UP (ref 0–0)
PLATELET # BLD AUTO: 381 K/UL — SIGNIFICANT CHANGE UP (ref 130–400)
POTASSIUM SERPL-MCNC: 4.6 MMOL/L — SIGNIFICANT CHANGE UP (ref 3.5–5)
POTASSIUM SERPL-SCNC: 4.6 MMOL/L — SIGNIFICANT CHANGE UP (ref 3.5–5)
RBC # BLD: 4.33 M/UL — LOW (ref 4.7–6.1)
RBC # FLD: 15.5 % — HIGH (ref 11.5–14.5)
SODIUM SERPL-SCNC: 143 MMOL/L — SIGNIFICANT CHANGE UP (ref 135–146)
WBC # BLD: 8.64 K/UL — SIGNIFICANT CHANGE UP (ref 4.8–10.8)
WBC # FLD AUTO: 8.64 K/UL — SIGNIFICANT CHANGE UP (ref 4.8–10.8)

## 2018-08-05 RX ORDER — ASPIRIN/CALCIUM CARB/MAGNESIUM 324 MG
81 TABLET ORAL DAILY
Qty: 0 | Refills: 0 | Status: DISCONTINUED | OUTPATIENT
Start: 2018-08-05 | End: 2018-08-07

## 2018-08-05 RX ADMIN — Medication 81 MILLIGRAM(S): at 11:00

## 2018-08-05 RX ADMIN — ENOXAPARIN SODIUM 40 MILLIGRAM(S): 100 INJECTION SUBCUTANEOUS at 22:17

## 2018-08-05 RX ADMIN — ATORVASTATIN CALCIUM 80 MILLIGRAM(S): 80 TABLET, FILM COATED ORAL at 22:17

## 2018-08-05 NOTE — CONSULT NOTE ADULT - SUBJECTIVE AND OBJECTIVE BOX
VASCULAR SURGERY CONSULT NOTE    HPI:  Mr. James is a 57y Male who presents with left leg weakness/paresis. He has history notable for morbid obesity and chronic lower extremity edema and venous stasis ulcers. He underwent Neurology evaluation without findings of acute stroke; MR of the brain is pending.     Vascular Surgery was consulted to evaluate for a vascular etiology of his symptoms. ***  During his last hospitalization, his weight was around 182 kg (approx 400 lbs), with BMI around 67.     PAST MEDICAL & SURGICAL HISTORY:  Morbid obesity  Osteoarthritis  Diabetes  Asthma  H/O umbilical hernia repair    REVIEW OF SYSTEMS:  10-point review of systems negative except as noted in HPI.     MEDICATIONS  (STANDING):  aspirin  chewable 81 milliGRAM(s) Oral daily  atorvastatin 80 milliGRAM(s) Oral at bedtime  enoxaparin Injectable 40 milliGRAM(s) SubCutaneous every 24 hours  furosemide    Tablet 40 milliGRAM(s) Oral daily    ALLERGIES:  No Known Allergies    SOCIAL HISTORY:  FAMILY HISTORY:  Family history of obesity (Father, Sibling)    PHYSICAL EXAM:  Vital Signs Last 24 Hrs  T(C): 36.2 (05 Aug 2018 07:44), Max: 37.2 (04 Aug 2018 16:08)  T(F): 97.1 (05 Aug 2018 07:44), Max: 99 (04 Aug 2018 16:08)  HR: 92 (05 Aug 2018 07:44) (88 - 107)  BP: 108/53 (05 Aug 2018 07:44) (108/53 - 136/69)  RR: 20 (05 Aug 2018 07:44) (18 - 20)  SpO2: 97% (05 Aug 2018 07:44) (95% - 100%)    General:   Cardiopulm:   Abdominal:   Extrem:  Neuro:   Psych:   Skin:   Vascular:     LABS:                    10.7   8.64  )-----------( 381      ( 05 Aug 2018 06:20 )             35.4   05 Aug 2018 06:20  143    |  103    |  8      ----------------------------<  113    4.6     |  25     |  0.6    Ca    8.2        05 Aug 2018 06:20  Mg     2.1       05 Aug 2018 06:20  PT/INR - ( 04 Aug 2018 09:03 )   PT: 13.30 sec;   INR: 1.23 ratio    PTT - ( 04 Aug 2018 09:03 )  PTT:29.2 sec      IMAGING:  Angiogram, peripheral 07/26/2018: ABORTED. Attempted right leg angiogram via US-guided left femoral access; procedure was terminated secondary to body habitus, inability to establish sheath, and patient discomfort.  Arterial Duplex US 07/24/2018:   High-grade right popliteal artery stenosis. Limited study due to nonvisualization of bilateral tibial arteries. No evidence of significant arterial occlusive disease in left lower extremity in the visualized arteries. VASCULAR SURGERY CONSULT NOTE    HPI:  Mr. James is a 57y Male who presents with left leg weakness/paresis. He has history notable for morbid obesity and chronic lower extremity edema and venous stasis ulcers. He underwent Neurology evaluation without findings of acute stroke; MR of the brain is pending.     Vascular Surgery was consulted to evaluate for a vascular etiology of his symptoms. He says that yesterday he was unable to feel his left leg and could not move it at the foot, knee, or hip level. By the time of exam today, he states that sensation has returned, with only patchy areas of numbness in his leg. He is able to walk to the restroom with assistance this morning. He says that he is able to walk about 10 feet at baseline before his legs start hurting; he uses a cane or walker to help with ambulation.     During his last hospitalization, his weight was around 182 kg (approx 400 lbs), with BMI around 67.     PAST MEDICAL & SURGICAL HISTORY:  Morbid obesity  Osteoarthritis  Diabetes  Asthma  H/O umbilical hernia repair    REVIEW OF SYSTEMS:  10-point review of systems negative except as noted in HPI.     MEDICATIONS  (STANDING):  aspirin  chewable 81 milliGRAM(s) Oral daily  atorvastatin 80 milliGRAM(s) Oral at bedtime  enoxaparin Injectable 40 milliGRAM(s) SubCutaneous every 24 hours  furosemide    Tablet 40 milliGRAM(s) Oral daily    ALLERGIES:  No Known Allergies    SOCIAL HISTORY:  FAMILY HISTORY:  Family history of obesity (Father, Sibling)    PHYSICAL EXAM:  Vital Signs Last 24 Hrs  T(C): 36.2 (05 Aug 2018 07:44), Max: 37.2 (04 Aug 2018 16:08)  T(F): 97.1 (05 Aug 2018 07:44), Max: 99 (04 Aug 2018 16:08)  HR: 92 (05 Aug 2018 07:44) (88 - 107)  BP: 108/53 (05 Aug 2018 07:44) (108/53 - 136/69)  RR: 20 (05 Aug 2018 07:44) (18 - 20)  SpO2: 97% (05 Aug 2018 07:44) (95% - 100%)    General: sitting up comfortably in chair in ED, NAD  Cardiopulm: quiet respiratory effort; mild dyspnea on exertion observed  Abdominal: morbidly obese with large overhanging pannus. Soft, non-tender to exam.  Extrem: dense, woody edema of BLE with skin changes over bilateral calves.   Neuro: Reports diminished sensation over BLE, with stinging and burning around wounds.  Grossly symmetric and intact ROM and strength in BLE from sitting.  Psych: A&Ox4, pleasantly conversant  Skin: bilateral calves and shins have erythema, malodorous serous discharge, venous stasis ulcerations, and peeling dried skin over shallow excoriations  Vascular: pedal pulses not palpable    Bilateral legs washed with betadine and water and dried. Adaptic dressings were applied to ulcerated areas, followed by fluffed kerlix gauze. Ace bandages were then wrapped from foot to knee.     LABS:                    10.7   8.64  )-----------( 381      ( 05 Aug 2018 06:20 )             35.4   05 Aug 2018 06:20  143    |  103    |  8      ----------------------------<  113    4.6     |  25     |  0.6    Ca    8.2        05 Aug 2018 06:20  Mg     2.1       05 Aug 2018 06:20  PT/INR - ( 04 Aug 2018 09:03 )   PT: 13.30 sec;   INR: 1.23 ratio    PTT - ( 04 Aug 2018 09:03 )  PTT:29.2 sec      IMAGING:  Angiogram, peripheral 07/26/2018: ABORTED. Attempted right leg angiogram via US-guided left femoral access; procedure was terminated secondary to body habitus, inability to establish sheath, and patient discomfort.  Arterial Duplex US 07/24/2018:   High-grade right popliteal artery stenosis. Limited study due to nonvisualization of bilateral tibial arteries. No evidence of significant arterial occlusive disease in left lower extremity in the visualized arteries.

## 2018-08-05 NOTE — CONSULT NOTE ADULT - ATTENDING COMMENTS
B/L Venous stasis ulcers. Palpable DP pulses with no signs of ischemia. Recommend Leg elevation and wraps and f/u vascular office for Unna boot therapy.

## 2018-08-05 NOTE — PROGRESS NOTE ADULT - SUBJECTIVE AND OBJECTIVE BOX
SUBJECTIVE:    Patient is a 57y old Male who presents with a chief complaint of inability to move LLE (04 Aug 2018 12:26)    Currently admitted to medicine with the primary diagnosis of CVA (cerebral vascular accident)     Today is hospital day 1d. This morning he is resting comfortably in bed and reports no new issues or overnight events.     PAST MEDICAL & SURGICAL HISTORY  Morbid obesity  Osteoarthritis  Diabetes  Asthma  H/O umbilical hernia repair    SOCIAL HISTORY:  Negative for smoking/alcohol/drug use.     Home Medications:  Home Medications:      ALLERGIES:  No Known Allergies    MEDICATIONS:  STANDING MEDICATIONS  aspirin  chewable 81 milliGRAM(s) Oral daily  atorvastatin 80 milliGRAM(s) Oral at bedtime  enoxaparin Injectable 40 milliGRAM(s) SubCutaneous every 24 hours  furosemide    Tablet 40 milliGRAM(s) Oral daily    PRN MEDICATIONS    VITALS:   Vital Signs Last 24 Hrs  T(C): 36.2 (05 Aug 2018 07:44), Max: 37.2 (04 Aug 2018 16:08)  T(F): 97.1 (05 Aug 2018 07:44), Max: 99 (04 Aug 2018 16:08)  HR: 83 (05 Aug 2018 09:54) (83 - 107)  BP: 121/58 (05 Aug 2018 09:54) (108/53 - 136/69)  BP(mean): --  RR: 18 (05 Aug 2018 09:54) (18 - 20)  SpO2: 97% (05 Aug 2018 09:54) (95% - 99%)  CAPILLARY BLOOD GLUCOSE  104 (05 Aug 2018 11:39)  125 (05 Aug 2018 07:44)          LABS:                        10.7   8.64  )-----------( 381      ( 05 Aug 2018 06:20 )             35.4     08-05    143  |  103  |  8<L>  ----------------------------<  113<H>  4.6   |  25  |  0.6<L>    Ca    8.2<L>      05 Aug 2018 06:20  Mg     2.1     08-05    TPro  6.1  /  Alb  3.3<L>  /  TBili  0.4  /  DBili  x   /  AST  20  /  ALT  13  /  AlkPhos  104  08-04    PT/INR - ( 04 Aug 2018 09:03 )   PT: 13.30 sec;   INR: 1.23 ratio         PTT - ( 04 Aug 2018 09:03 )  PTT:29.2 sec          CARDIAC MARKERS ( 04 Aug 2018 09:03 )  x     / <0.01 ng/mL / x     / x     / x          RADIOLOGY:  < from: CT Head No Cont (08.04.18 @ 09:58) >  1.  Periventricular and subcortical white matter chronic small vessel   ischemic changes and old lacunar infarcts as described above.    2.  No acute mass effect, midline shift or hemorrhage.     3.  If the patient continues to be symptomatic follow-up MRI of the brain   may be helpful for further evaluation.     < end of copied text >    PHYSICAL EXAM:  GEN: No acute distress, morbid obese  LUNGS: Clear to auscultation bilaterally   HEART: S1/S2 present. RRR.   ABD: Soft, non-tender, non-distended. Bowel sounds present  Skin: bilateral calves and shins have erythema, malodorous serous discharge, venous stasis ulcerations, and peeling dried skin over shallow excoriations  NEURO: AAOX3

## 2018-08-05 NOTE — CONSULT NOTE ADULT - ASSESSMENT
Mr. James is a 57-year-old male with history of morbid obesity and chronic lower extremity venous stasis ulcerations. He presents with inability to lift his left leg; contralateral (right) high-grade popliteal stenosis was found on arterial duplex during his prior admission. Vascular Surgery was consulted to help evaluate for a vascular etiology.   He exceeds weight limit restrictions for standard use of the angiogram table; angiography was attempted about two weeks ago in a modified set-up, but even with this, his physical body habitus precluded passage of an arterial sheath and the procedure was aborted.   At this point, he has no signs or symptoms that would suggest ischemia; he does have chronic venous insufficiency and bilateral peripheral arterial disease, but no rest pain, mottling, or acute changes in sensation to suggest sudden rapid progression of his disease.   Would recommend he continue medical therapy for PAD (with aspirin and statin, which he is on). No further interventions from a vascular standpoint. He needs extensive counselling on weight loss and may benefit from a structured weight-program. Mr. James is a 57-year-old male with history of morbid obesity and chronic lower extremity venous stasis ulcerations. He presents with inability to lift his left leg; contralateral (right) high-grade popliteal stenosis was found on arterial duplex during his prior admission. Vascular Surgery was consulted to help evaluate for a vascular etiology.   He exceeds weight limit restrictions for standard use of the angiogram table; angiography was attempted about two weeks ago in a modified set-up, but even with this, his physical body habitus precluded passage of an arterial sheath and the procedure was aborted.   At this point, he has no signs or symptoms that would suggest ischemia; he does have chronic venous insufficiency and bilateral peripheral arterial disease, but no rest pain, mottling, or acute changes in sensation to suggest sudden rapid progression of his disease.   Would recommend he continue medical therapy for PAD (with aspirin and statin, which he is on). Needs ongoing wound care with compression therapy. No further interventions from a vascular standpoint. He needs extensive counselling on weight loss and may benefit from a structured weight-program. Mr. James is a 57-year-old male with history of morbid obesity and chronic lower extremity venous stasis ulcerations.    He exceeds weight limit restrictions for standard use of the angiogram table; angiography was attempted about two weeks ago in a modified set-up, but even with this, his physical body habitus precluded passage of an arterial sheath and the procedure was aborted.   At this point, he has no signs or symptoms that would suggest ischemia; he does have chronic venous insufficiency  Would recommend he continue medical therapy for PAD (with aspirin and statin, which he is on). Needs ongoing wound care with compression therapy. No further interventions from a vascular standpoint. He needs extensive counselling on weight loss and may benefit from a structured weight-program.

## 2018-08-06 DIAGNOSIS — I70.201 UNSPECIFIED ATHEROSCLEROSIS OF NATIVE ARTERIES OF EXTREMITIES, RIGHT LEG: ICD-10-CM

## 2018-08-06 DIAGNOSIS — I50.32 CHRONIC DIASTOLIC (CONGESTIVE) HEART FAILURE: ICD-10-CM

## 2018-08-06 DIAGNOSIS — M19.90 UNSPECIFIED OSTEOARTHRITIS, UNSPECIFIED SITE: ICD-10-CM

## 2018-08-06 DIAGNOSIS — H53.9 UNSPECIFIED VISUAL DISTURBANCE: ICD-10-CM

## 2018-08-06 DIAGNOSIS — I83.218 VARICOSE VEINS OF RIGHT LOWER EXTREMITY WITH BOTH ULCER OF OTHER PART OF LOWER EXTREMITY AND INFLAMMATION: ICD-10-CM

## 2018-08-06 DIAGNOSIS — Z53.8 PROCEDURE AND TREATMENT NOT CARRIED OUT FOR OTHER REASONS: ICD-10-CM

## 2018-08-06 DIAGNOSIS — I83.028 VARICOSE VEINS OF LEFT LOWER EXTREMITY WITH ULCER OTHER PART OF LOWER LEG: ICD-10-CM

## 2018-08-06 DIAGNOSIS — E66.01 MORBID (SEVERE) OBESITY DUE TO EXCESS CALORIES: ICD-10-CM
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
## 2018-08-06 DIAGNOSIS — L97.819 NON-PRESSURE CHRONIC ULCER OF OTHER PART OF RIGHT LOWER LEG WITH UNSPECIFIED SEVERITY: ICD-10-CM

## 2018-08-06 DIAGNOSIS — E11.65 TYPE 2 DIABETES MELLITUS WITH HYPERGLYCEMIA: ICD-10-CM

## 2018-08-06 DIAGNOSIS — L97.829 NON-PRESSURE CHRONIC ULCER OF OTHER PART OF LEFT LOWER LEG WITH UNSPECIFIED SEVERITY: ICD-10-CM
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
## 2018-08-06 DIAGNOSIS — J45.909 UNSPECIFIED ASTHMA, UNCOMPLICATED: ICD-10-CM

## 2018-08-06 PROCEDURE — 99222 1ST HOSP IP/OBS MODERATE 55: CPT

## 2018-08-06 PROCEDURE — 93970 EXTREMITY STUDY: CPT | Mod: 26

## 2018-08-06 RX ORDER — NYSTATIN CREAM 100000 [USP'U]/G
1 CREAM TOPICAL
Qty: 0 | Refills: 0 | Status: DISCONTINUED | OUTPATIENT
Start: 2018-08-06 | End: 2018-08-07

## 2018-08-06 RX ADMIN — ENOXAPARIN SODIUM 40 MILLIGRAM(S): 100 INJECTION SUBCUTANEOUS at 22:02

## 2018-08-06 RX ADMIN — NYSTATIN CREAM 1 APPLICATION(S): 100000 CREAM TOPICAL at 21:39

## 2018-08-06 RX ADMIN — ATORVASTATIN CALCIUM 80 MILLIGRAM(S): 80 TABLET, FILM COATED ORAL at 21:39

## 2018-08-06 RX ADMIN — Medication 81 MILLIGRAM(S): at 12:05

## 2018-08-06 NOTE — CONSULT NOTE ADULT - SUBJECTIVE AND OBJECTIVE BOX
Patient is a 57y old  Male who presents with a chief complaint of inability to move LLE (04 Aug 2018 12:26)    HPI:  56 yo M w/ PMH of DM, morbid obesity, chronic LE venous stasis ulcers presented to ED with inability to move LLE. Patient reports he woke up in bed and was unable to move L leg. Called 911, brought in by EMS. Patient reports have 2 similar episodes to this in past, self-resolved. Denies any other complaints or symptoms (no HA, CP, dizziness, N/V). CT head in ED was negative for acute pathology. Patient was evaluated by Neuro that recommended care in stroke unit. Patient has had multiple admissions for LE swelling/edema/oozing in past. Patient had arterial duplex last admission which showed high grade stenosis of popliteal vein. Vascular took patient for angiogram but were unable to finish procedure because of body habitus. Was suppose to follow up as outpatient. Patient has also had maggots infection of LE that was debrided by Burn in OR.    Patient reports sensation is returning to leg though still has limited movement/strength. In ED received . (04 Aug 2018 12:26)      PAST MEDICAL & SURGICAL HISTORY:  Morbid obesity  Osteoarthritis  Diabetes  Asthma  H/O umbilical hernia repair      Hospital Course: as above- CT head, MRI/MRA negative    TODAY'S SUBJECTIVE & REVIEW OF SYMPTOMS:     Constitutional Weaknes   Cardio WNL   Resp WNL   GI WNL  Heme WNL  Endo WNL  Skin +PVD/venous stasis  MSK WNL  Neuro WNL  Cognitive WNL  Psych WNL      MEDICATIONS  (STANDING):  aspirin  chewable 81 milliGRAM(s) Oral daily  atorvastatin 80 milliGRAM(s) Oral at bedtime  enoxaparin Injectable 40 milliGRAM(s) SubCutaneous every 24 hours  furosemide    Tablet 40 milliGRAM(s) Oral daily  nystatin Powder 1 Application(s) Topical two times a day    MEDICATIONS  (PRN):      FAMILY HISTORY:  Family history of obesity (Father, Sibling)      Allergies    No Known Allergies    Intolerances        SOCIAL HISTORY:    [    ] Etoh  [    ] Smoking  [    ] Substance abuse     Home Environment:  [ x   ] Home Alone  [    ] Lives with Family  [    ] Home Health Aid    Dwelling:  [    ] Apartment  [    ] Private House  [  x  ] Adult Home  [    ] Skilled Nursing Facility      [    ] Short Term  [    ] Long Term  [    ] Stairs                           [    ] Elevator     FUNCTIONAL STATUS PTA: (Check all that apply)  Ambulation: [   x  ]Independent    [    ] Dependent     [    ] Non-Ambulatory  Assistive Device: [    ] SA Cane  [    ]  Q Cane  [ x   ] Walker  [    ]  Wheelchair  ADL : [ x   ] Independent  [    ]  Dependent   INDEPENDENT WITH ROLATOR AT [facility]    Vital Signs Last 24 Hrs  T(C): 36.4 (06 Aug 2018 08:11), Max: 36.9 (05 Aug 2018 19:25)  T(F): 97.5 (06 Aug 2018 08:11), Max: 98.4 (05 Aug 2018 19:25)  HR: 87 (06 Aug 2018 08:11) (87 - 88)  BP: 107/66 (06 Aug 2018 08:11) (107/66 - 120/56)  BP(mean): --  RR: 18 (06 Aug 2018 08:11) (18 - 18)  SpO2: 100% (06 Aug 2018 08:11) (98% - 100%)      PHYSICAL EXAM: Alert & Oriented X3 obese  GENERAL: NAD, well-groomed, well-developed  HEAD:  Atraumatic, Normocephalic  EYES: EOMI, PERRLA, conjunctiva and sclera clear  NECK: Supple, No JVD, Normal thyroid  CHEST/LUNG: Clear  bilaterally; No rales, rhonchi, wheezing, or rubs  HEART: Regular rate and rhythm; No murmurs, rubs, or gallops  ABDOMEN: Soft, Nontender, Nondistended; Bowel sounds present  EXTREMITIES:  BLES Bandaged - 2+ edema LES    NERVOUS SYSTEM:  Cranial Nerves 2-12 intact [ x   ] Abnormal  [    ]  ROM: WFL all extremities [  x  ]  Abnormal [     ]  Motor Strength: WFL all extremities  [    ]  Abnormal [x    ]LLE 3-/5  Sensation: intact to light touch [ x   ] Abnormal [    ]      FUNCTIONAL STATUS:  Bed Mobility: [   ]  Independent [    ]  Supervision [  x  ]  Needs Assistance [  ]  N/A  Transfers: [    ]  Independent [    ]  Supervision [x    ]  Needs Assistance [    ]  N/A    Ambulation:  [    ]  Independent [    ]  Supervision [    ]  Needs Assistance [  x  ]  N/A   ADL:  [    ]   Independent [  x  ] Requires Assistance [    ] N/A       LABS:                        10.7   8.64  )-----------( 381      ( 05 Aug 2018 06:20 )             35.4     08-05    143  |  103  |  8<L>  ----------------------------<  113<H>  4.6   |  25  |  0.6<L>    Ca    8.2<L>      05 Aug 2018 06:20  Mg     2.1     08-05            RADIOLOGY & ADDITIONAL STUDIES:

## 2018-08-06 NOTE — ED ADULT NURSE REASSESSMENT NOTE - NS ED NURSE REASSESS COMMENT FT1
The dressings on BL lower extremities were changed, wounds were cleansed w NS, sterile dressing were applied. Pt tolerated procedure well.

## 2018-08-06 NOTE — CHART NOTE - NSCHARTNOTEFT_GEN_A_CORE
patient away from room at diagnostic study when visited earlier, will reevaluate    appreciated subspecialist f/u and recs; imaging reviewed  PVD issue has appropriate wound care and f/u plan  MRI shows no acute CVA  physiatry eval appreciated- snf vs [facility] with PT as disposition planning  Will anticipate patient for discharge tomorrow with arrangements for close interval outpatient f/u and outpatient w/u recommended  continue with secondary risk prevention of CVA  outpatient EMG/ nerve conduction/ MR imaging per neuro recs

## 2018-08-06 NOTE — CONSULT NOTE ADULT - ASSESSMENT
IMPRESSION: Rehab of gait ab/ LLE weakness/obesity    PRECAUTIONS: [ x   ] Cardiac  [   x ] Respiratory  [    ] Seizures [    ] Contact Isolation  [    ] Droplet Isolation  [    ] Other    Weight Bearing Status:     RECOMMENDATION:    Out of Bed to Chair     DVT/Decubiti Prophylaxis    REHAB PLAN:     [ x    ] Bedside P/T 3-5 times a week   [     ] Bedside O/T  2-3 times a week   [     ] No Rehab Therapy Indicated   [     ]  Speech Therapy   Conditioning/ROM                                 ADL  Bed Mobility                                            Conditioning/ROM  Transfers                                                  Bed Mobility  Sitting /Standing Balance                      Transfers                                        Gait Training                                            Sitting/Standing Balance  Stair Training [   ]Applicable                 Home equipment Eval                                                                     Splinting  [   ] Only      GOALS:   ADL   [x    ]   Independent         Transfers  [  x  ] Independent            Ambulation  [    x ] Independent     [   x  ] With device                            [    ]  CG                                               [    ]  CG                                                    [     ] CG                            [    ] Min A                                          [    ] Min A                                                [     ] Min  A          DISCHARGE PLAN:   [     ]  Good candidate for Intensive Rehabilitation/Hospital based-4A [facility]                                             Will tolerate 3hrs Intensive Rehab Daily                                       [ x     ]  Short Term Rehab in Skilled Nursing Facility                                 vs                                     [ x     ]  Home with Outpatient or VN services  [facility] RESIDENCE                                         [      ]  Possible Candidate for Intensive Hospital based Rehab

## 2018-08-06 NOTE — PROGRESS NOTE ADULT - SUBJECTIVE AND OBJECTIVE BOX
SUBJECTIVE:    Patient is a 57y old Male who presents with a chief complaint of inability to move LLE (04 Aug 2018 12:26)    Currently admitted to medicine with the primary diagnosis of CVA (cerebral vascular accident)     Today is hospital day 2d. This morning he is resting comfortably in bed and reports no new issues or overnight events.     PAST MEDICAL & SURGICAL HISTORY  Morbid obesity  Osteoarthritis  Diabetes  Asthma  H/O umbilical hernia repair    SOCIAL HISTORY:  Negative for smoking/alcohol/drug use.     Home Medications:  Home Medications:      ALLERGIES:  No Known Allergies    MEDICATIONS:  STANDING MEDICATIONS  aspirin  chewable 81 milliGRAM(s) Oral daily  atorvastatin 80 milliGRAM(s) Oral at bedtime  enoxaparin Injectable 40 milliGRAM(s) SubCutaneous every 24 hours  furosemide    Tablet 40 milliGRAM(s) Oral daily  nystatin Powder 1 Application(s) Topical two times a day    PRN MEDICATIONS    VITALS:   Vital Signs Last 24 Hrs  T(C): 36.4 (06 Aug 2018 08:11), Max: 36.9 (05 Aug 2018 19:25)  T(F): 97.5 (06 Aug 2018 08:11), Max: 98.4 (05 Aug 2018 19:25)  HR: 87 (06 Aug 2018 08:11) (87 - 88)  BP: 107/66 (06 Aug 2018 08:11) (107/66 - 120/56)  BP(mean): --  RR: 18 (06 Aug 2018 08:11) (18 - 18)  SpO2: 100% (06 Aug 2018 08:11) (98% - 100%)  CAPILLARY BLOOD GLUCOSE  124 (06 Aug 2018 08:11)    LABS:                        10.7   8.64  )-----------( 381      ( 05 Aug 2018 06:20 )             35.4     08-05    143  |  103  |  8<L>  ----------------------------<  113<H>  4.6   |  25  |  0.6<L>    Ca    8.2<L>      05 Aug 2018 06:20  Mg     2.1     08-05      RADIOLOGY:  < from: MR Angio Head No Cont (08.06.18 @ 14:00) >  Technically limited examination is grossly unremarkable.    If there ispersistent clinical concern for an intracranial vascular   abnormality, CTA of the head is suggested for further evaluation.    < end of copied text >  < from: MR Angio Neck w/ IV Cont (08.06.18 @ 14:00) >  Technically limited examination demonstrates no significant vascular   stenosis.    If there is persistent clinical concern for carotid stenosis, CTA of the   neck is recommended for further evaluation.    < end of copied text >    PHYSICAL EXAM:  GEN: No acute distress  LUNGS: Clear to auscultation bilaterally   HEART: S1/S2 present. RRR.   ABD: Soft, non-tender, non-distended. Bowel sounds present  EXT: NC/NC/NE/2+PP/[place]  NEURO: AAOX3 SUBJECTIVE:    Patient is a 57y old Male who presents with a chief complaint of inability to move LLE (04 Aug 2018 12:26)    Currently admitted to medicine with the primary diagnosis of CVA (cerebral vascular accident)     Today is hospital day 2d. This morning he is resting comfortably in bed and reports no new issues or overnight events.     PAST MEDICAL & SURGICAL HISTORY  Morbid obesity  Osteoarthritis  Diabetes  Asthma  H/O umbilical hernia repair    SOCIAL HISTORY:  Negative for smoking/alcohol/drug use.     Home Medications:  Home Medications:      ALLERGIES:  No Known Allergies    MEDICATIONS:  STANDING MEDICATIONS  aspirin  chewable 81 milliGRAM(s) Oral daily  atorvastatin 80 milliGRAM(s) Oral at bedtime  enoxaparin Injectable 40 milliGRAM(s) SubCutaneous every 24 hours  furosemide    Tablet 40 milliGRAM(s) Oral daily  nystatin Powder 1 Application(s) Topical two times a day    PRN MEDICATIONS    VITALS:   Vital Signs Last 24 Hrs  T(C): 36.4 (06 Aug 2018 08:11), Max: 36.9 (05 Aug 2018 19:25)  T(F): 97.5 (06 Aug 2018 08:11), Max: 98.4 (05 Aug 2018 19:25)  HR: 87 (06 Aug 2018 08:11) (87 - 88)  BP: 107/66 (06 Aug 2018 08:11) (107/66 - 120/56)  BP(mean): --  RR: 18 (06 Aug 2018 08:11) (18 - 18)  SpO2: 100% (06 Aug 2018 08:11) (98% - 100%)  CAPILLARY BLOOD GLUCOSE  124 (06 Aug 2018 08:11)    LABS:                        10.7   8.64  )-----------( 381      ( 05 Aug 2018 06:20 )             35.4     08-05    143  |  103  |  8<L>  ----------------------------<  113<H>  4.6   |  25  |  0.6<L>    Ca    8.2<L>      05 Aug 2018 06:20  Mg     2.1     08-05      RADIOLOGY:  < from: MR Angio Head No Cont (08.06.18 @ 14:00) >  Technically limited examination is grossly unremarkable.    If there ispersistent clinical concern for an intracranial vascular   abnormality, CTA of the head is suggested for further evaluation.    < end of copied text >  < from: MR Angio Neck w/ IV Cont (08.06.18 @ 14:00) >  Technically limited examination demonstrates no significant vascular   stenosis.    If there is persistent clinical concern for carotid stenosis, CTA of the   neck is recommended for further evaluation.    < end of copied text >    PHYSICAL EXAM:  GEN: No acute distress, morbid obese  LUNGS: Clear to auscultation bilaterally   HEART: S1/S2 present. RRR.   ABD: Soft, non-tender, non-distended. Bowel sounds present  Skin: bilateral calves and shins have erythema, malodorous serous discharge, venous stasis ulcerations, and peeling dried skin over shallow excoriations  NEURO: AAOX3

## 2018-08-06 NOTE — CONSULT NOTE ADULT - ASSESSMENT
Assessment / Plan:  58 yo M w/ PMH of DM, morbid obesity, chronic LE venous stasis ulcers presented to ED with inability to move LLE. Since admission, patient has regained limited movement in the LLE. He also has improved sensation. Has had similar symptoms in the past with resolution. PE significant for 3-4/5 motor strength in the LLE; LLE is cool to touch compared to right-side. Patient reports past episodes of slurred speech that self-resolved; did not occur before/during this admission.     Weakness in the LLE; likely vascular vs. possible radiculopathy vs. TIA  - Symptoms since admission improving, as per patient.  - CT head negative in ED; MRI Head (read by self) shows no evidence of stroke; will await official read; f/u MRA read.  - Reports back pain; may consider imaging to r/o radiculopathy.  - Venous Duplex shows no evidence of DVT or phlebitis.  - Arterial Duplex with RIGHT popliteal arterial stenosis; LEFT limb normal; study could not visualize tibial arteries.   - f/u folate, TSH, and B12 as patient reports poorly balanced diet and extreme weight gain in the last 6-12 months; unlikely cause of unilateral neurological symptoms; MCV normal.

## 2018-08-06 NOTE — CONSULT NOTE ADULT - SUBJECTIVE AND OBJECTIVE BOX
FABIAN CLEARY     57y     Male    [id number]                                                           CC:Patient is a 57y old  Male who presents with a chief complaint of inability to move LLE (04 Aug 2018 12:26)    HPI:  56 yo M w/ PMH of DM, morbid obesity, chronic LE venous stasis ulcers presented to ED with inability to move LLE. Patient reports he woke up in bed and was unable to move L leg. Called 911, brought in by EMS. Patient reports have 4 similar episodes to this in past, self-resolved; Reports his mother had similar episodes as well. He also complains of new back pain and large increase in weight over the past 6 months due to diet. Denies any other complaints or symptoms (no HA, CP, dizziness, N/V).     ROS:  Negative except for above.    FAMILY HISTORY:  Family history of obesity (Father, Sibling)    Vital Signs Last 24 Hrs  T(C): 36.4 (06 Aug 2018 08:11), Max: 37 (05 Aug 2018 15:30)  T(F): 97.5 (06 Aug 2018 08:11), Max: 98.6 (05 Aug 2018 15:30)  HR: 87 (06 Aug 2018 08:11) (87 - 94)  BP: 107/66 (06 Aug 2018 08:11) (107/66 - 120/56)  BP(mean): --  RR: 18 (06 Aug 2018 08:11) (18 - 18)  SpO2: 100% (06 Aug 2018 08:11) (97% - 100%)    Physical Exam:  Constitutional: alert and in no acute distress.  Eyes: Sclera and conjunctiva were normal, pupils were equal in size, round, reactive to light, with normal accommodation and extraocular movements were intact.   Back: no costovertebral angle tenderness and no spinal tenderness.    Neuro Exam:  Orientation: oriented to person, oriented to place and oriented to time.   Attention: normal concentrating ability and visual attention was not decreased.   Language: no difficulty naming common objects, no difficulty repeating a phrase, no difficulty writing a sentence, fluency intact, comprehension intact and reading intact.   Fund of knowledge: displays adequate knowledge of personal past history.   Cranial Nerves: visual acuity intact bilaterally, visual fields full to confrontation, pupils equal round and reactive to light, extraocular motion intact, facial sensation intact symmetrically, face symmetrical, hearing was intact bilaterally, tongue and palate midline, head turning and shoulder shrug symmetric and there was no tongue deviation with protrusion.   Motor: muscle tone was normal in all four extremities, muscle strength was normal in all four extremities and normal bulk in all four extremities.   Sensory exam: light touch was intact.   Coordination:. normal gait. balance was intact. there was no past-pointing. no tremor present.   Deep tendon reflexes:   Biceps right 2+. Biceps left 2+.    Triceps right 2+. Triceps left 2+.  LOC  Brachioradialis right 2+. Brachioradialis left 2+.    Patella right 2+. Patella left 2+.    Ankle jerk right 2+. Ankle jerk left 2+.   Plantar responses normal on the right, normal on the left.      NIHSS:     Allergies:  No Known Allergies    MEDICATIONS  (STANDING):  aspirin  chewable 81 milliGRAM(s) Oral daily  atorvastatin 80 milliGRAM(s) Oral at bedtime  enoxaparin Injectable 40 milliGRAM(s) SubCutaneous every 24 hours  furosemide    Tablet 40 milliGRAM(s) Oral daily  nystatin Powder 1 Application(s) Topical two times a day    LABS:                        10.7   8.64  )-----------( 381      ( 05 Aug 2018 06:20 )             35.4     (08-05)  143  |  103  |  8<L>  ----------------------------<  113<H>  4.6   |  25  |  0.6<L>    Ca      8.2<L>      05 Aug 2018 06:20  Mg     2.1            08-05    Hemoglobin A1C, Whole Blood: 7.4 % (07-24 @ 08:47)    [facility]   < from: CT Head No Cont (08.04.18 @ 09:58) >    IMPRESSION:   1.  Periventricular and subcortical white matter chronic small vessel   ischemic changes and old lacunar infarcts as described above.    2.  No acute mass effect, midline shift or hemorrhage.     3.  If the patient continues to be symptomatic follow-up MRI of the brain   may be helpful for further evaluation.     < end of copied text >  	  EEG: Not performed    Echo: LVEF 60-65%  Carotid Doppler: Not performed.  Telemetry: N/A    Assessment / Plan:  56 yo M w/ PMH of DM, morbid obesity, chronic LE venous stasis ulcers presented to ED with inability to move LLE. Since admission, patient has regained limited movement in the LLE. He also has improved sensation.     #) Inability to move LLE r/o CVA  - CT head negative in ED  - Neuro Rx stroke unit  - c/w ASA 81 + high intensity statin for now  - MRI brain and MRA H+N pending  - Echo with Bubble study  - Venous Duplex shows no evidence of DVT or phlebitis  - Lipid profile done July 2018 LDL 83, HDL 32, T cholesterol 140  - F/U Folate, TSH, and B12 FABIAN CLEARY     57y     Male    MRN-[id number]                                                           CC:Patient is a 57y old  Male who presents with a chief complaint of inability to move LLE (04 Aug 2018 12:26)    HPI:  58 yo M w/ PMH of DM, morbid obesity, chronic LE venous stasis ulcers presented to ED with inability to move LLE. Patient reports he woke up in bed and was unable to move L leg. Called 911, brought in by EMS. Patient reports have 4 similar episodes to this in past, self-resolved; Reports his mother had similar episodes as well. He also complains of new back pain and large increase in weight over the past 6 months due to diet. He did make mention of slurring words in the past with self-resolution. Denies any other complaints or symptoms (no HA, CP, dizziness, N/V).     ROS:  Negative except for above.    FAMILY HISTORY:  Family history of obesity (Father, Sibling)    Vital Signs Last 24 Hrs  T(C): 36.4 (06 Aug 2018 08:11), Max: 37 (05 Aug 2018 15:30)  T(F): 97.5 (06 Aug 2018 08:11), Max: 98.6 (05 Aug 2018 15:30)  HR: 87 (06 Aug 2018 08:11) (87 - 94)  BP: 107/66 (06 Aug 2018 08:11) (107/66 - 120/56)  BP(mean): --  RR: 18 (06 Aug 2018 08:11) (18 - 18)  SpO2: 100% (06 Aug 2018 08:11) (97% - 100%)    Physical Exam:  Constitutional: alert and in no acute distress.  Eyes: Sclera and conjunctiva were normal, pupils were equal in size, round, reactive to light, with normal accommodation and extraocular movements were intact.   Back: no costovertebral angle tenderness and no spinal tenderness.  Skin: LE wrapped BL.     Neuro Exam:  Orientation: Ox3  Attention: Normal concentrating ability and visual attention was not decreased.   Fund of knowledge: displays adequate knowledge of personal past history.   Cranial Nerves: visual acuity intact bilaterally, visual fields full to confrontation, pupils equal round and reactive to light, extraocular motion intact, facial sensation intact symmetrically, face symmetrical, hearing was intact bilaterally, tongue and palate midline, head turning and shoulder shrug symmetric and there was no tongue deviation with protrusion.   Extremity: LEFT foot cold to touch more than the right.    Motor: LLE strength 3/5; RLE 5/5, UE normal strength 5/5.  Sensory exam: Pressure and light touch was intact.   Deep tendon reflexes:   Patella right 2+. Patella left 2+.    Allergies:  No Known Allergies    MEDICATIONS  (STANDING):  aspirin  chewable 81 milliGRAM(s) Oral daily  atorvastatin 80 milliGRAM(s) Oral at bedtime  enoxaparin Injectable 40 milliGRAM(s) SubCutaneous every 24 hours  furosemide    Tablet 40 milliGRAM(s) Oral daily  nystatin Powder 1 Application(s) Topical two times a day    LABS:                        10.7   8.64  )-----------( 381      ( 05 Aug 2018 06:20 )             35.4     (08-05)  143  |  103  |  8<L>  ----------------------------<  113<H>  4.6   |  25  |  0.6<L>    Ca      8.2<L>      05 Aug 2018 06:20  Mg     2.1            08-05    Hemoglobin A1C, Whole Blood: 7.4 % (07-24 @ 08:47)    Neuro Imaging:  [facility]:   < from: CT Head No Cont (08.04.18 @ 09:58) >    IMPRESSION:   1.  Periventricular and subcortical white matter chronic small vessel   ischemic changes and old lacunar infarcts as described above.    2.  No acute mass effect, midline shift or hemorrhage.     3.  If the patient continues to be symptomatic follow-up MRI of the brain   may be helpful for further evaluation.     < end of copied text >  	  EEG: Not performed    Echo: LVEF 60-65%  Carotid Doppler: Not performed.  Telemetry: N/A    Assessment / Plan:  58 yo M w/ PMH of DM, morbid obesity, chronic LE venous stasis ulcers presented to ED with inability to move LLE. Since admission, patient has regained limited movement in the LLE. He also has improved sensation.     #) Inability to move LLE r/o CVA  - CT head negative in ED  - Neuro Rx stroke unit  - c/w ASA 81 + high intensity statin for now  - MRI brain and MRA H+N pending  - Echo with Bubble study  - Venous Duplex shows no evidence of DVT or phlebitis  - Lipid profile done July 2018 LDL 83, HDL 32, T cholesterol 140  - F/U Folate, TSH, and B12 FABIAN CLEARY     57y     Male    MRN-[id number]                                                           CC:Patient is a 57y old  Male who presents with a chief complaint of inability to move LLE (04 Aug 2018 12:26)    HPI:  58 yo M w/ PMH of DM, morbid obesity, chronic LE venous stasis ulcers presented to ED with inability to move LLE. Patient reports he woke up in bed and was unable to move L leg. Called 911, brought in by EMS. Patient reports have 4 similar episodes to this in past, self-resolved; Reports his mother had similar episodes as well. He also complains of new back pain and large increase in weight over the past 6 months due to diet. He did make mention of slurring words in the past with self-resolution. Denies any other complaints or symptoms (no HA, CP, dizziness, N/V).     ROS:  Negative except for above.    FAMILY HISTORY:  Family history of obesity (Father, Sibling)    Vital Signs Last 24 Hrs  T(C): 36.4 (06 Aug 2018 08:11), Max: 37 (05 Aug 2018 15:30)  T(F): 97.5 (06 Aug 2018 08:11), Max: 98.6 (05 Aug 2018 15:30)  HR: 87 (06 Aug 2018 08:11) (87 - 94)  BP: 107/66 (06 Aug 2018 08:11) (107/66 - 120/56)  BP(mean): --  RR: 18 (06 Aug 2018 08:11) (18 - 18)  SpO2: 100% (06 Aug 2018 08:11) (97% - 100%)    Physical Exam:  Constitutional: alert and in no acute distress.  Eyes: Sclera and conjunctiva were normal, pupils were equal in size, round, reactive to light, with normal accommodation and extraocular movements were intact.   Back: no costovertebral angle tenderness and no spinal tenderness.  Skin: LE wrapped BL.     Neuro Exam:  Orientation: Ox3  Attention: Normal concentrating ability and visual attention was not decreased.   Fund of knowledge: displays adequate knowledge of personal past history.   Cranial Nerves: visual acuity intact bilaterally, visual fields full to confrontation, pupils equal round and reactive to light, extraocular motion intact, facial sensation intact symmetrically, face symmetrical, hearing was intact bilaterally, tongue and palate midline, head turning and shoulder shrug symmetric and there was no tongue deviation with protrusion.   Extremity: LEFT foot cold to touch more than the right.    Motor: LLE strength 3/5; RLE 5/5, UE normal strength 5/5.  Sensory exam: Pressure and light touch was intact.   Deep tendon reflexes:   Patella right 2+. Patella left 2+.    Allergies:  No Known Allergies    MEDICATIONS  (STANDING):  aspirin  chewable 81 milliGRAM(s) Oral daily  atorvastatin 80 milliGRAM(s) Oral at bedtime  enoxaparin Injectable 40 milliGRAM(s) SubCutaneous every 24 hours  furosemide    Tablet 40 milliGRAM(s) Oral daily  nystatin Powder 1 Application(s) Topical two times a day    LABS:                        10.7   8.64  )-----------( 381      ( 05 Aug 2018 06:20 )             35.4     (08-05)  143  |  103  |  8<L>  ----------------------------<  113<H>  4.6   |  25  |  0.6<L>    Ca      8.2<L>      05 Aug 2018 06:20  Mg     2.1            08-05    Hemoglobin A1C, Whole Blood: 7.4 % (07-24 @ 08:47)    [facility]   < [facility]: CT Head No Cont (08.04.18 @ 09:58) >    IMPRESSION:   1.  Periventricular and subcortical white matter chronic small vessel   ischemic changes and old lacunar infarcts as described above.    2.  No acute mass effect, midline shift or hemorrhage.     3.  If the patient continues to be symptomatic follow-up MRI of the brain   may be helpful for further evaluation.     < end of copied text >  	  EEG: Not performed    Echo: LVEF 60-65%  Carotid Doppler: Not performed.  Telemetry: N/A    Assessment / Plan:  58 yo M w/ PMH of DM, morbid obesity, chronic LE venous stasis ulcers presented to ED with inability to move LLE. Since admission, patient has regained limited movement in the LLE. He also has improved sensation. Has had similar symptoms in the past with resolution. PE significant for 3-4/5 motor strength in the LLE; LLE is cool to touch compared to right-side. Patient reports past episodes of slurred speech that self-resolved; did not occur before/during this admission.     Weakness in the LLE; likely vascular vs. possible radiculopathy vs. TIA  - Symptoms since admission improving, as per patient.  - CT head negative in ED; MRI Head (read by self) shows no evidence of stroke; will await official read  - f/u MRA read.  - Reports back pain; may consider imaging to r/o radiculopathy.  - Venous Duplex shows no evidence of DVT or phlebitis.  - Arterial Duplex with RIGHT popliteal arterial stenosis; LEFT limb normal; study could not visual tibial arteries.   - f/u folate, TSH, and B12 as possible causes as patient reports poorly balanced diet and extreme weight gain in the last 6-12 months; unlikely cause of unilateral neurological symptoms. FABIAN CLEARY     57y     Male    [id number]                                                           CC:Patient is a 57y old  Male who presents with a chief complaint of inability to move LLE (04 Aug 2018 12:26)    HPI:  56 yo M w/ PMH of DM, morbid obesity, chronic LE venous stasis ulcers presented to ED with inability to move LLE. Patient reports he woke up in bed and was unable to move L leg. Called 911, brought in by EMS. Patient reports have 4 similar episodes to this in past, self-resolved; Reports his mother had similar episodes as well. He also complains of new back pain and large increase in weight over the past 6 months due to diet. He did make mention of slurring words in the past with self-resolution. Denies any other complaints or symptoms (no HA, CP, dizziness, N/V).     ROS:  Negative except for above.    FAMILY HISTORY:  Family history of obesity (Father, Sibling)    Vital Signs Last 24 Hrs  T(C): 36.4 (06 Aug 2018 08:11), Max: 37 (05 Aug 2018 15:30)  T(F): 97.5 (06 Aug 2018 08:11), Max: 98.6 (05 Aug 2018 15:30)  HR: 87 (06 Aug 2018 08:11) (87 - 94)  BP: 107/66 (06 Aug 2018 08:11) (107/66 - 120/56)  BP(mean): --  RR: 18 (06 Aug 2018 08:11) (18 - 18)  SpO2: 100% (06 Aug 2018 08:11) (97% - 100%)    Physical Exam:  Constitutional: alert and in no acute distress.  Eyes: Sclera and conjunctiva were normal, pupils were equal in size, round, reactive to light, with normal accommodation and extraocular movements were intact.   Back: no costovertebral angle tenderness and no spinal tenderness.  Skin: LE wrapped BL.     Neuro Exam:  Orientation: Ox3  Attention: Normal concentrating ability and visual attention was not decreased.   Fund of knowledge: displays adequate knowledge of personal past history.   Cranial Nerves: visual acuity intact bilaterally, visual fields full to confrontation, pupils equal round and reactive to light, extraocular motion intact, facial sensation intact symmetrically, face symmetrical, hearing was intact bilaterally, tongue and palate midline, head turning and shoulder shrug symmetric and there was no tongue deviation with protrusion.   Extremity: LEFT foot cold to touch more than the right.    Motor: LLE strength 3/5; RLE 5/5, UE normal strength 5/5.  Sensory exam: Pressure and light touch was intact.   Deep tendon reflexes:   Patella right 2+. Patella left 2+.    Allergies:  No Known Allergies    MEDICATIONS  (STANDING):  aspirin  chewable 81 milliGRAM(s) Oral daily  atorvastatin 80 milliGRAM(s) Oral at bedtime  enoxaparin Injectable 40 milliGRAM(s) SubCutaneous every 24 hours  furosemide    Tablet 40 milliGRAM(s) Oral daily  nystatin Powder 1 Application(s) Topical two times a day    LABS:                        10.7   8.64  )-----------( 381      ( 05 Aug 2018 06:20 )             35.4     (08-05)  143  |  103  |  8<L>  ----------------------------<  113<H>  4.6   |  25  |  0.6<L>    Ca      8.2<L>      05 Aug 2018 06:20  Mg     2.1            08-05    Hemoglobin A1C, Whole Blood: 7.4 % (07-24 @ 08:47)    [facility]   < [facility]: CT Head No Cont (08.04.18 @ 09:58) >    IMPRESSION:   1.  Periventricular and subcortical white matter chronic small vessel   ischemic changes and old lacunar infarcts as described above.    2.  No acute mass effect, midline shift or hemorrhage.     3.  If the patient continues to be symptomatic follow-up MRI of the brain   may be helpful for further evaluation.     < end of copied text >  	  EEG: Not performed    Echo: LVEF 60-65%  Carotid Doppler: Not performed.  Telemetry: N/A

## 2018-08-06 NOTE — ED ADULT NURSE REASSESSMENT NOTE - NS ED NURSE REASSESS COMMENT FT1
Pt is rseting in the chair, was educated about elevating the legs and shifting the weight in order to prevent pressure injuries and venous stasis. Pt refuses to go back to the bed. Vitals were recorded.,

## 2018-08-06 NOTE — CONSULT NOTE ADULT - ATTENDING COMMENTS
Patient seen and examined agree with above except as noted.  Patient still has similar complaints unclear if etiology central more likely peripheral    Plan  1. If MRI brain (-) acute infarct can follow as out patient for EMG/NCS  2. Out patient MRI L/S spine w/o LEIF  3. Call for abnoraml MRI brain results

## 2018-08-07 ENCOUNTER — TRANSCRIPTION ENCOUNTER (OUTPATIENT)
Age: 58
End: 2018-08-07

## 2018-08-07 VITALS
SYSTOLIC BLOOD PRESSURE: 170 MMHG | TEMPERATURE: 100 F | RESPIRATION RATE: 18 BRPM | HEART RATE: 90 BPM | DIASTOLIC BLOOD PRESSURE: 74 MMHG

## 2018-08-07 RX ORDER — ATORVASTATIN CALCIUM 80 MG/1
1 TABLET, FILM COATED ORAL
Qty: 30 | Refills: 0 | OUTPATIENT
Start: 2018-08-07 | End: 2018-09-05

## 2018-08-07 RX ADMIN — NYSTATIN CREAM 1 APPLICATION(S): 100000 CREAM TOPICAL at 05:51

## 2018-08-07 RX ADMIN — Medication 81 MILLIGRAM(S): at 12:02

## 2018-08-07 NOTE — DISCHARGE NOTE ADULT - CARE PLAN
Principal Discharge DX:	Radiculopathy, lumbar region  Goal:	identification of cause and treatment  Assessment and plan of treatment:	- follow-up with Dr. Bey for an MRI of the lumbar spine   - EMG  - weight loss encouraged  Secondary Diagnosis:	Morbid obesity  Goal:	weight loss  Assessment and plan of treatment:	- referral for bariatric surgeon  - follow-up with a nutritionist and dietician   - exercise daily at least 30 minutes a day, incorporate a low calorie diet, low fat, low red meat  Secondary Diagnosis:	Transient cerebral ischemia, unspecified type  Goal:	treatment and resolution  Assessment and plan of treatment:	- most likely symptoms caused by radiculopathy however TIA is on differential, just less likely  - Continue with ASA 81mg- please take this medication every day  - continue with statin every day  - follow-up with Dr. Bey as outpatient for CTA head and neck   - if slurred speech, increased weakness, confusion occurs, please return to the ED Principal Discharge DX:	Left leg weakness  Goal:	identification of cause and treatment  Assessment and plan of treatment:	- follow-up with Dr. Bey for an MRI of the lumbar spine, CTA H&N, EMG/NCS  - weight loss encouraged  -cont physical therapy  Secondary Diagnosis:	Morbid obesity  Goal:	weight loss  Assessment and plan of treatment:	-aggressive weight loss  - referral for bariatric surgeon  - follow-up with a nutritionist and dietician   - exercise daily at least 30 minutes a day, incorporate a low calorie diet, low fat, low red meat  Secondary Diagnosis:	Peripheral vascular disease  Goal:	treatment and resolution  Assessment and plan of treatment:	- f/u with Dr. Herrera as outpt.  -cont Asa, statin Principal Discharge DX:	Left leg weakness  Goal:	identification of cause and treatment  Assessment and plan of treatment:	- follow-up with Dr. Bey for an MRI of the lumbar spine, CT angiogram of the head and neck, and EMG/NCS  - weight loss encouraged  - continue physical therapy  Secondary Diagnosis:	Morbid obesity  Goal:	weight loss  Assessment and plan of treatment:	- aggressive weight loss  - referral for bariatric surgeon  - follow-up with a nutritionist and dietician   - exercise daily at least 30 minutes a day, incorporate a low calorie diet, low fat, low red meat  Secondary Diagnosis:	Peripheral vascular disease  Goal:	treatment and resolution  Assessment and plan of treatment:	- follow-up with vascular surgery, Dr. Herrera, as outpatient.  - follow up with podiatry, Dr. Joe, as outpatient.  - continue Aspiring and Statin

## 2018-08-07 NOTE — PROGRESS NOTE ADULT - SUBJECTIVE AND OBJECTIVE BOX
FABIAN CLEARY    Chief Complaint: Left lower extremity weakness.    Right Handed    HPI:  57 year old gentleman with a PMH of DM, morbid obesity, chronic LE venous stasis ulcers presented to ED with left lower extremity weakness. Patient reports he woke up in bed and was unable to move the left lower extremity. Patient reports have 2 similar episodes to this in the past that self-resolved. He has a history of a lower extremity infection with maggots, which has since been debrided. He is cared for in the stroke unit with improved left lower extremity weakness. MRI brain did not reveal acute ischemic changes.       Relevant PMH:  [] Prior ischemic stroke/TIA  [] Afib  []CAD  []HTN  []DLD  [x]DM []PVD [x]Obesity [x] Sedintary lifestyle []CHF  []BRANDY  []Cancer Hx     Social History: [] Smoking []  Drug Use: []   Alcohol Use:   [] Other:      Possible Location of Stroke:  MRI brain failed to reveal stroke.  Possible Cause of Stroke:  MRI brain failed to reveal stroke.  Relevant Cerebral Imaging:  MRI brain failed to reveal stroke.  Relevant Cervicocerebral Imaging:  MRA head and neck unremarkable.    Relevant blood tests:  Hemoglobin A1C, Whole Blood: 7.4 % (18 @ 08:47)      Relevant cardiac rhythm monitorin hours on telemetry monitoring and no events reported.  Relevant Cardiac Structure:(TTE/VINITA +/-):[x]No intracardiac thrombus/[x] no vegetation/[x]no akynesia/EF:  EF 60-65%    Home Medications:      MEDICATIONS  (STANDING):  aspirin  chewable 81 milliGRAM(s) Oral daily  atorvastatin 80 milliGRAM(s) Oral at bedtime  enoxaparin Injectable 40 milliGRAM(s) SubCutaneous every 24 hours  furosemide    Tablet 40 milliGRAM(s) Oral daily  nystatin Powder 1 Application(s) Topical two times a day      PT/OT/Speech/Rehab/S&Swr: pending    Exam:    Vital Signs Last 24 Hrs  T(C): 36.8 (07 Aug 2018 08:37), Max: 37.2 (07 Aug 2018 02:00)  T(F): 98.3 (07 Aug 2018 08:37), Max: 99 (07 Aug 2018 02:00)  HR: 85 (07 Aug 2018 08:37) (85 - 92)  BP: 129/61 (07 Aug 2018 08:37) (117/56 - 134/58)  BP(mean): --  RR: 16 (07 Aug 2018 08:37) (16 - 16)  SpO2: 98% (06 Aug 2018 23:36) (97% - 98%)    NIHSS      LOC:       1a: 0    1b(Questions):          0 1c(Instructions):     0        Best Gaze:0  Visual:0  Motor:                 RUE:0     RLE:   0  LUE:   0  LLE: 1    FACE:   0  Limb Ataxia:0  Sensory:     0  Language:   0    Dysarthria:     0     Extinction and Inattention:0    NIHSS on admission:     ?           NIHSS today: 1          m-RS:1    Impression:  57 year old gentleman with a PMH of DM, morbid obesity, chronic LE venous stasis ulcers presented to ED with left lower extremity weakness. Patient reports he woke up in bed and was unable to move the left lower extremity. Patient reports have 2 similar episodes to this in the past that self-resolved. He has a history of a lower extremity infection with maggots, which has since been debrided. He is cared for in the stroke unit with improved left lower extremity weakness. MRI brain did not reveal acute ischemic changes.     Suggestion:  PT OT rehab  ASA/statin as per medicine.  Disposition:  May leave the stroke unit, LLE weakness management as per neurology.    We spent more than 60 minutes to review the chart, gather necessary information, evaluate the patient and discuss the case with primary team and counseling the family to their satisfaction.  Kaiden Snider NP Neurovascular/Stroke  [phone] FABIAN CLEARY    Chief Complaint: Left lower extremity weakness.    Right Handed    HPI:  57 year old gentleman with a PMH of DM, morbid obesity, chronic LE venous stasis ulcers presented to ED with left lower extremity weakness. Patient reports he woke up in bed and was unable to move the left lower extremity. Patient reports have 2 similar episodes to this in the past that self-resolved. He has a history of a lower extremity infection with maggots, which has since been debrided. He is cared for in the stroke unit with improved left lower extremity weakness. MRI brain did not reveal acute ischemic changes.       Relevant PMH:  [] Prior ischemic stroke/TIA  [] Afib  []CAD  []HTN  []DLD  [x]DM []PVD [x]Obesity [x] Sedintary lifestyle []CHF  []BRANDY  []Cancer Hx     Social History: [] Smoking []  Drug Use: []   Alcohol Use:   [] Other:      Possible Location of Stroke:  MRI brain failed to reveal stroke.  Possible Cause of Stroke:  MRI brain failed to reveal stroke.  Relevant Cerebral Imaging:  MRI brain failed to reveal stroke.  Relevant Cervicocerebral Imaging:  MRA head and neck unremarkable.    Relevant blood tests:  Hemoglobin A1C, Whole Blood: 7.4 % (18 @ 08:47)      Relevant cardiac rhythm monitorin hours on telemetry monitoring and no events reported.  Relevant Cardiac Structure:(TTE/VINITA +/-):[x]No intracardiac thrombus/[x] no vegetation/[x]no akynesia/EF:  EF 60-65%    Home Medications:      MEDICATIONS  (STANDING):  aspirin  chewable 81 milliGRAM(s) Oral daily  atorvastatin 80 milliGRAM(s) Oral at bedtime  enoxaparin Injectable 40 milliGRAM(s) SubCutaneous every 24 hours  furosemide    Tablet 40 milliGRAM(s) Oral daily  nystatin Powder 1 Application(s) Topical two times a day      PT/OT/Speech/Rehab/S&Swr: pending    Exam:    Vital Signs Last 24 Hrs  T(C): 36.8 (07 Aug 2018 08:37), Max: 37.2 (07 Aug 2018 02:00)  T(F): 98.3 (07 Aug 2018 08:37), Max: 99 (07 Aug 2018 02:00)  HR: 85 (07 Aug 2018 08:37) (85 - 92)  BP: 129/61 (07 Aug 2018 08:37) (117/56 - 134/58)  BP(mean): --  RR: 16 (07 Aug 2018 08:37) (16 - 16)  SpO2: 98% (06 Aug 2018 23:36) (97% - 98%)    NIHSS      LOC:       1a: 0    1b(Questions):          0 1c(Instructions):     0        Best Gaze:0  Visual:0  Motor:                 RUE:0     RLE:   0  LUE:   0  LLE: 1    FACE:   0  Limb Ataxia:0  Sensory:     0  Language:   0    Dysarthria:     0     Extinction and Inattention:0    NIHSS on admission:     ?           NIHSS today: 1          m-RS:1    Impression:  57 year old gentleman with a PMH of DM, morbid obesity, chronic LE venous stasis ulcers presented to ED with left lower extremity weakness. Patient reports he woke up in bed and was unable to move the left lower extremity. Patient reports have 2 similar episodes to this in the past that self-resolved. He has a history of a lower extremity infection with maggots, which has since been debrided. He is cared for in the stroke unit with improved left lower extremity weakness. MRI brain did not reveal acute ischemic changes.     Suggestion:  PT OT rehab  ASA/statin as per medicine.  Disposition:  May leave the stroke unit, LLE weakness management as per neurology.    We spent more than 45 minutes to review the chart, gather necessary information, evaluate the patient and discuss the case with primary team and counseling the family to their satisfaction.  Kaiden Snider NP Neurovascular/Stroke  Toño Mora MD.  [phone]

## 2018-08-07 NOTE — DISCHARGE NOTE ADULT - MEDICATION SUMMARY - MEDICATIONS TO CHANGE
I will SWITCH the dose or number of times a day I take the medications listed below when I get home from the hospital:  None I will SWITCH the dose or number of times a day I take the medications listed below when I get home from the hospital:    atorvastatin 40 mg oral tablet  -- 1 tab(s) by mouth every 24 hours

## 2018-08-07 NOTE — DISCHARGE NOTE ADULT - NS AS DC STROKE ED MATERIALS
Call 911 for Stroke/Prescribed Medications/Stroke Warning Signs and Symptoms/Risk Factors for Stroke/Stroke Education Booklet/Need for Followup After Discharge

## 2018-08-07 NOTE — PHYSICAL THERAPY INITIAL EVALUATION ADULT - ACTIVE RANGE OF MOTION EXAMINATION, REHAB EVAL
L hip 1/2 AROM, L knee WFL,, L ankle minimal DF (pt reports since March), RLE hip flexion 3/4 AROM, knee/ ankle WFL

## 2018-08-07 NOTE — PHYSICAL THERAPY INITIAL EVALUATION ADULT - MANUAL MUSCLE TESTING RESULTS, REHAB EVAL
LLE: hip flexion 2-/5, knee extension 3/5, L ankle 2-/5.  RLE: hip 3-/5, knee extension 4/5, ankle 4/5

## 2018-08-07 NOTE — DISCHARGE NOTE ADULT - PLAN OF CARE
identification of cause and treatment - follow-up with Dr. Bey for an MRI of the lumbar spine   - EMG  - weight loss encouraged weight loss - referral for bariatric surgeon  - follow-up with a nutritionist and dietician   - exercise daily at least 30 minutes a day, incorporate a low calorie diet, low fat, low red meat treatment and resolution - most likely symptoms caused by radiculopathy however TIA is on differential, just less likely  - Continue with ASA 81mg- please take this medication every day  - continue with statin every day  - follow-up with Dr. Bey as outpatient for CTA head and neck   - if slurred speech, increased weakness, confusion occurs, please return to the ED - follow-up with Dr. Bey for an MRI of the lumbar spine, CTA H&N, EMG/NCS  - weight loss encouraged  -cont physical therapy -aggressive weight loss  - referral for bariatric surgeon  - follow-up with a nutritionist and dietician   - exercise daily at least 30 minutes a day, incorporate a low calorie diet, low fat, low red meat - f/u with Dr. Herrera as outpt.  -cont Asa, statin - follow-up with Dr. Bey for an MRI of the lumbar spine, CT angiogram of the head and neck, and EMG/NCS  - weight loss encouraged  - continue physical therapy - aggressive weight loss  - referral for bariatric surgeon  - follow-up with a nutritionist and dietician   - exercise daily at least 30 minutes a day, incorporate a low calorie diet, low fat, low red meat - follow-up with vascular surgery, Dr. Herrera, as outpatient.  - follow up with podiatry, Dr. Joe, as outpatient.  - continue Aspiring and Statin

## 2018-08-07 NOTE — CONSULT NOTE ADULT - SUBJECTIVE AND OBJECTIVE BOX
PODIATRY CONSULT   FABIAN CLEARY is a pleasant well-nourished, well developed 57y Male in no acute distress, alert awake, and oriented to person, place and time.   Patient is a 57y old  Male who presents with a chief complaint of LT LEG NUMBNESS (07 Aug 2018 10:59)    HPI:  56 yo M w/ PMH of DM, morbid obesity, chronic LE venous stasis ulcers presented to ED with inability to move LLE. Patient reports he woke up in bed and was unable to move L leg. Called 911, brought in by EMS. Patient reports have 2 similar episodes to this in past, self-resolved. Denies any other complaints or symptoms (no HA, CP, dizziness, N/V). CT head in ED was negative for acute pathology. Patient was evaluated by Neuro that recommended care in stroke unit. Patient has had multiple admissions for LE swelling/edema/oozing in past. Pt reports the swelling and ulcers in LE started in March 2018. Pt does not follow with any doctor about the venus stasis ulcers. Pt states daily dressing changes at nursing facility where he stays. Pt also complains about tenderness on his feet b/l with swelling present. Patient had arterial duplex last admission which showed high grade stenosis of popliteal vein. Vascular took patient for angiogram but were unable to finish procedure because of body habitus. Was suppose to follow up as outpatient. Patient has also had maggots infection of LE that was debrided by Burn in OR.    Patient reports sensation is returning to leg though still has limited movement/strength. In ED received . (04 Aug 2018 12:26)      CVA  Morbid obesity  Osteoarthritis  Diabetes  Asthma      PMH: CVA  Morbid obesity  Osteoarthritis  Diabetes  Asthma    PSH: H/O umbilical hernia repair  No significant past surgical history    Medication   Allergy: No Known Allergies        Labs:                    O:   Derm: multiple circumferential venus stasis ulcerations lower leg b/l, - hyperkeratotic border, wound base Granular , various sizes  wound size, + edema, + jessica-wound erythema, - purulence, - fluctuance, - tracking/tunneling, - probe to bone. - streaking erythema. - xerosis, + hemosiderin deposits. +pain, - active bleeding. ++ oozing. wound are superficial and limited to dermis layer   Vascular: Dorsalis Pedis and Posterior Tibial pulses nonpalpable  Capillary re-fill time less then 3 seconds digits 1-5 bilateral.  B/L feet warm to touch  Neuro: Protective sensation intact to the level of the digits bilateral. generalized pain on palpation of the feet b/l  MSK: Muscle strength 5/5 all major muscle groups bilateral.        Assessment and Plan:   Pt with venus stasis ulcers b/l  P:  Chart reviewed and Patient evaluated  Discussed diagnosis and treatment with patient  Wound Dressing Orders: B/L LE Daily  Wounds flush with normal saline  Applied Xeroform/ABD/Kerlix/ACE. Apply compression with ACE to the level of patient's comfort    Recommend to f/u with vascular considering A duplex findings  Recommend f/u with Podiatry as u/p. Dr. Joe [address]  Monitor LE ulcers for signs of infections.  Podiatry will follow while in house.   will discuss care plan  with  Attendings

## 2018-08-07 NOTE — DISCHARGE NOTE ADULT - CARE PROVIDER_API CALL
Ketan Bey), Neurology; Neuromuscular Medicine  [address] [place] [address]  [address]  [place], [place] [id number]  Phone: [phone]  Fax: [phone]    Angela Jhaveri), Surgery  [address] [place] [address]  3rd [place] [address]  Phone: [phone]  Fax: [phone] Ketan Bey), Neurology; Neuromuscular Medicine  [address] [facility]  [address]  [place], [place] [id number]  Phone: [phone]  Fax: [phone]    Angela Jhaveri), Surgery  [address] [place] [address]  3rd [place] [address]  Phone: [phone]  Fax: [phone]    Fazal Herrera), Surgery; Vascular Surgery  [address] [facility]  [address]  [place] [address]  Phone: [phone]  Fax: [phone]    Timur Issa), Internal Medicine  [address] [place] [address]  Phone: [phone]  Fax: [phone]

## 2018-08-07 NOTE — DISCHARGE NOTE ADULT - NSTOBACCOHOTLINE_GEN_A_NCS
[facility] Smokers Quitline ([phone]) [facility] Smokers Quitline ([phone])

## 2018-08-07 NOTE — DISCHARGE NOTE ADULT - PATIENT PORTAL LINK FT
You can access the [email or web address] Patient Portal, offered by [facility], by registering with the following website: [email or web address]

## 2018-08-07 NOTE — DISCHARGE NOTE ADULT - PROVIDER TOKENS
TOKEN:'[id number]',TOKEN:'[id number]' TOKEN:'[id number]:'[id number]:'[id number]:'[id number]'

## 2018-08-07 NOTE — DISCHARGE NOTE ADULT - HOSPITAL COURSE
58 yo M with a PMH of DM, morbid obesity, chronic LE venous stasis ulcers presented to ED with inability to move LLE. Patient evaluated by neurology. Likely cause of weakness was narrowed down to vascular vs radiculopathy vs TIA.  CT Head was not indicative of stroke. MRI Head showed no evidence of acute ischemia. MRA Head and Neck were not indicative of vascular abnormality or stenosis. Patient reported back pain which lends diagnosis to probably be 2/2 Radiculopathy vs TIA (patient with severe PAD indicative of atherosclerotic disease also patient with non-compliance to medications ECHO revealed LVEF of 60-65%. Will continue asa and statin.  Lipid profile done July 2018 LDL 83, HDL 32, T cholesterol 140. Hemoglobin A1c was 7.4. Patient will need outpatient MRI of lumbar spine as well as CTA of Head and neck completed. For LE venous stasis ulceration, patient was examined by Vascular surgery and no intervention was indicated at this time. Patient stable for discharge back to [facility]. 56 yo M with a PMH of DM, morbid obesity, chronic LE venous stasis ulcers presented to ED with inability to move LLE. Patient evaluated by neurology. Likely cause of weakness was narrowed down to vascular vs radiculopathy vs TIA. CT Head was not indicative of stroke. MRI Head showed no evidence of acute ischemia. MRA Head and Neck were not indicative of vascular abnormality or stenosis. Patient reported back pain which lends diagnosis to probably be 2/2 Radiculopathy vs TIA (patient with severe PAD indicative of atherosclerotic disease also patient with non-compliance to medications ECHO revealed LVEF of 60-65%. Will continue asa and statin.  Lipid profile done July 2018 LDL 83, HDL 32, T cholesterol 140. Hemoglobin A1c was 7.4. Patient will need outpatient MRI of lumbar spine as well as CTA of Head and neck completed. For LE venous stasis ulceration, patient was examined by Vascular surgery and no intervention was indicated at this time. Patient stable for discharge back to [facility]. 56 yo M with a PMH of DM, morbid obesity, chronic LE venous stasis ulcers presented to ED with LLE weakness. Patient evaluated by neurology. Likely cause of weakness was narrowed down to vascular vs radiculopathy vs TIA. CT Head was not indicative of stroke. MRI Head showed no evidence of acute ischemia. MRA Head and Neck were not indicative of vascular abnormality or stenosis. Patient reported back pain which lends diagnosis to probably be 2/2 Radiculopathy vs TIA (patient with severe PAD indicative of atherosclerotic disease also patient with non-compliance to medications ECHO revealed LVEF of 60-65%. Will continue asa and statin.  Lipid profile done July 2018 LDL 83, HDL 32, T cholesterol 140. Hemoglobin A1c was 7.4. Patient will need outpatient MRI of lumbar spine as well as CTA of Head and neck completed, EMG/NCS. For LE venous stasis ulceration, patient was examined by Vascular surgery and no intervention was indicated at this time. Patient stable for discharge back to [facility].

## 2018-08-07 NOTE — DISCHARGE NOTE ADULT - ADDITIONAL INSTRUCTIONS
follow- up with your neurologist in 1-2 weeks, obtain MRI of the lumbar spine, EMG, CT Angio of the head and neck, follow-up with your PMD in 1-2 weeks and new referral to bariatric surgeon in 1-2 weeks follow- up with your neurologist in 1-2 weeks, obtain MRI of the lumbar spine, EMG, CT Angio of the head and neck, follow-up with your PMD in 1-2 weeks and new referral to bariatric surgeon

## 2018-08-07 NOTE — PROGRESS NOTE ADULT - SUBJECTIVE AND OBJECTIVE BOX
Pt seen and examined independently. No new complaints. Feeling better. Decreased weakness and improved sensation LLE. D/w neuro - stable for d/c with outpt f/u for MRI LS spine, CTA H&N, EMG/NCS. Cleared by PT.    Discharge instructions discussed and patient knows when to seek immediate medical attention.  Patient has proper follow up.  All results discussed and patient aware they may require further work up.  Stressed importance of proper follow up.  Medications prescribed discussed.  All questions and concerns from patient and family addressed. Understanding of instructions verbalized.    Time spent in completing discharge process and coordinating care 45 minutes.

## 2018-08-07 NOTE — DISCHARGE NOTE ADULT - CARE PROVIDERS DIRECT ADDRESSES
,[email or web address]

## 2018-08-10 ENCOUNTER — EMERGENCY (EMERGENCY)
Facility: HOSPITAL | Age: 58
LOS: 0 days | Discharge: NURSING, REHAB OR LONG-TERM CARE | End: 2018-08-10
Attending: EMERGENCY MEDICINE | Admitting: EMERGENCY MEDICINE

## 2018-08-10 VITALS
TEMPERATURE: 97 F | OXYGEN SATURATION: 99 % | HEART RATE: 90 BPM | RESPIRATION RATE: 20 BRPM | SYSTOLIC BLOOD PRESSURE: 110 MMHG | DIASTOLIC BLOOD PRESSURE: 71 MMHG

## 2018-08-10 VITALS
RESPIRATION RATE: 22 BRPM | DIASTOLIC BLOOD PRESSURE: 65 MMHG | HEART RATE: 88 BPM | SYSTOLIC BLOOD PRESSURE: 121 MMHG | TEMPERATURE: 100 F | WEIGHT: 315 LBS | OXYGEN SATURATION: 98 % | HEIGHT: 64 IN

## 2018-08-10 DIAGNOSIS — M54.5 LOW BACK PAIN: ICD-10-CM

## 2018-08-10 DIAGNOSIS — Z79.899 OTHER LONG TERM (CURRENT) DRUG THERAPY: ICD-10-CM

## 2018-08-10 DIAGNOSIS — J45.909 UNSPECIFIED ASTHMA, UNCOMPLICATED: ICD-10-CM

## 2018-08-10 DIAGNOSIS — Z79.84 LONG TERM (CURRENT) USE OF ORAL HYPOGLYCEMIC DRUGS: ICD-10-CM

## 2018-08-10 DIAGNOSIS — G89.29 OTHER CHRONIC PAIN: ICD-10-CM

## 2018-08-10 DIAGNOSIS — Z98.890 OTHER SPECIFIED POSTPROCEDURAL STATES: Chronic | ICD-10-CM

## 2018-08-10 DIAGNOSIS — E11.9 TYPE 2 DIABETES MELLITUS WITHOUT COMPLICATIONS: ICD-10-CM

## 2018-08-10 DIAGNOSIS — M54.9 DORSALGIA, UNSPECIFIED: ICD-10-CM

## 2018-08-10 LAB
ALBUMIN SERPL ELPH-MCNC: 3.3 G/DL — LOW (ref 3.5–5.2)
ALP SERPL-CCNC: 91 U/L — SIGNIFICANT CHANGE UP (ref 30–115)
ALT FLD-CCNC: 13 U/L — SIGNIFICANT CHANGE UP (ref 0–41)
ANION GAP SERPL CALC-SCNC: 15 MMOL/L — HIGH (ref 7–14)
AST SERPL-CCNC: 19 U/L — SIGNIFICANT CHANGE UP (ref 0–41)
BILIRUB SERPL-MCNC: 0.4 MG/DL — SIGNIFICANT CHANGE UP (ref 0.2–1.2)
BUN SERPL-MCNC: 8 MG/DL — LOW (ref 10–20)
CALCIUM SERPL-MCNC: 8.2 MG/DL — LOW (ref 8.5–10.1)
CHLORIDE SERPL-SCNC: 103 MMOL/L — SIGNIFICANT CHANGE UP (ref 98–110)
CO2 SERPL-SCNC: 25 MMOL/L — SIGNIFICANT CHANGE UP (ref 17–32)
CREAT SERPL-MCNC: 0.6 MG/DL — LOW (ref 0.7–1.5)
GLUCOSE SERPL-MCNC: 112 MG/DL — HIGH (ref 70–99)
HCT VFR BLD CALC: 36.8 % — LOW (ref 42–52)
HGB BLD-MCNC: 11.3 G/DL — LOW (ref 14–18)
MCHC RBC-ENTMCNC: 25.7 PG — LOW (ref 27–31)
MCHC RBC-ENTMCNC: 30.7 G/DL — LOW (ref 32–37)
MCV RBC AUTO: 83.8 FL — SIGNIFICANT CHANGE UP (ref 80–94)
NRBC # BLD: 0 /100 WBCS — SIGNIFICANT CHANGE UP (ref 0–0)
PLATELET # BLD AUTO: 401 K/UL — HIGH (ref 130–400)
POTASSIUM SERPL-MCNC: 5.2 MMOL/L — HIGH (ref 3.5–5)
POTASSIUM SERPL-SCNC: 5.2 MMOL/L — HIGH (ref 3.5–5)
PROT SERPL-MCNC: 6.3 G/DL — SIGNIFICANT CHANGE UP (ref 6–8)
RBC # BLD: 4.39 M/UL — LOW (ref 4.7–6.1)
RBC # FLD: 15.2 % — HIGH (ref 11.5–14.5)
SODIUM SERPL-SCNC: 143 MMOL/L — SIGNIFICANT CHANGE UP (ref 135–146)
WBC # BLD: 10.72 K/UL — SIGNIFICANT CHANGE UP (ref 4.8–10.8)
WBC # FLD AUTO: 10.72 K/UL — SIGNIFICANT CHANGE UP (ref 4.8–10.8)

## 2018-08-10 RX ORDER — METHOCARBAMOL 500 MG/1
1000 TABLET, FILM COATED ORAL ONCE
Qty: 0 | Refills: 0 | Status: COMPLETED | OUTPATIENT
Start: 2018-08-10 | End: 2018-08-10

## 2018-08-10 RX ORDER — KETOROLAC TROMETHAMINE 30 MG/ML
30 SYRINGE (ML) INJECTION ONCE
Qty: 0 | Refills: 0 | Status: DISCONTINUED | OUTPATIENT
Start: 2018-08-10 | End: 2018-08-10

## 2018-08-10 RX ORDER — OXYCODONE AND ACETAMINOPHEN 5; 325 MG/1; MG/1
1 TABLET ORAL ONCE
Qty: 0 | Refills: 0 | Status: DISCONTINUED | OUTPATIENT
Start: 2018-08-10 | End: 2018-08-10

## 2018-08-10 RX ADMIN — Medication 30 MILLIGRAM(S): at 21:08

## 2018-08-10 RX ADMIN — OXYCODONE AND ACETAMINOPHEN 1 TABLET(S): 5; 325 TABLET ORAL at 21:09

## 2018-08-10 RX ADMIN — METHOCARBAMOL 1000 MILLIGRAM(S): 500 TABLET, FILM COATED ORAL at 21:09

## 2018-08-10 RX ADMIN — Medication 30 MILLIGRAM(S): at 21:09

## 2018-08-10 NOTE — ED PROVIDER NOTE - ATTENDING CONTRIBUTION TO CARE
57m w hx of asthma, DM, obesity, & chronic b/l LE cellulitis (undergoing outpatient wound care w dressings changed today). Pt reports 2 days of worsening of chronic lower back pain. Pain is sharp, moderate, constant, no radiating, worse w moving]. No trauma, no strain, no pop/tear, no new exercise/activity. No recent dental/surgical procedures, no IVDU. No other injury, no other complaints.    Review of Systems  Constitutional:  No fever or chills.  Eyes:  Negative.   ENMT:  No nasal congestion, discharge, or throat pain.  Cardiac:  No chest pain, syncope, or edema.  Respiratory:  No dyspnea, wheezing, or cough. No hemoptysis.  GI:  No vomiting, diarrhea, or abdominal pain. No melena or hematochezia.  :  No dysuria or hematuria. Normal urine output, no incontinence/retention.   Musculoskeletal:  No joint swelling, or joint pain. +Back pain.  Skin:  No jaundice or lesions. Chronic b/l LE cellulitis undergoing treatment  Neuro:  No headache, loss of sensation, or focal weakness.  No saddle anesthesia. No change in mental status.    Physical Exam  General: Awake, alert, NAD, WDWN, non-toxic appearing, NCAT  Eyes: PERRL, EOMI, no icterus, lids and conjunctivae are normal  ENT: External inspection normal, pink/moist membranes, pharynx normal  CV: S1S2, regular rate and rhythm, no murmur/gallops/rubs, no JVD, 2+ pulses b/l, chronic b/l LE edema, no cords/homans, warm/well-perfused  Respiratory: Normal respiratory rate/effort, no respiratory distress, normal voice, speaking full sentences, lungs clear to auscultation b/l, no wheezing/rales/rhonchi, no retractions, no stridor  Abdomen: Soft abdomen, obese, no tender/distended/guarding/rebound, no CVA tender  Musculoskeletal: FROM all 4 extremities, N/V intact, pelvis stable, no TLS spinal tender/deform/step-offs, no michelle tender/deform  Neck: FROM neck, supple, no meningismus, trachea midline, no JVD, no cspine tender/step-offs  Integumentary: Color normal for race, warm and dry. Chronic b/l LE edema w chronic skin changes, dressing CDI  Neuro: Oriented x3, CN 2-12 grossly intact, normal motor, normal sensory, no saddle anesthesia, normal strength/sensation including distal toes b/l, normal cerebellar  Psych: Oriented x3, mood normal, affect normal    57m w worsening of chronic low back pain. non-focal neuro exam, n/v intact, no trauma. low suspicion of spinal cord/cauda injury. --Analgesia as needed, symptomatic & supportive care, f/u PMD.

## 2018-08-10 NOTE — ED ADULT TRIAGE NOTE - CHIEF COMPLAINT QUOTE
pt brought via primary from [facility] c/olower back pain dpwn left leg  and both legs swollen and draining

## 2018-08-10 NOTE — ED ADULT NURSE NOTE - CHIEF COMPLAINT QUOTE
pt brought via primary from [facility] c/olower back pain dpwn left leg  and both legs swollen and draining

## 2018-08-10 NOTE — ED PROVIDER NOTE - PHYSICAL EXAMINATION
VITAL SIGNS: I have reviewed the initial vital signs.   CONSTITUTIONAL: Awake, alert obese male lying in stretcher. Well-developed; well-nourished; in no distress. Non-toxic appearing.   SKIN: No rash, vesicles/lesion, abrasions or lacerations. No ecchymosis or signs of trauma.   HEAD: Normocephalic; atraumatic.   NECK: Supple; non-tender.   CARD: No chest wall deformity or tenderness. S1, S2 normal; no murmurs, gallops, or rubs. Regular rate and rhythm.  RESP: Good air movement. Lungs CTAB. No crackles, wheezes, rales or rhonchi.  ABD: Soft; non-distended; non-tender. No rebound/guarding/rigidity.   EXT: No bony deformity or tenderness. Normal ROM x 4 extremities. Chronic skin changes to LE changes w/ dressings applied. No midline spinal tenderness.   NEURO: A&Ox3. GCS 15. Normal speech. CN 2-12 intact. Strength 5/5 UE/LE b/l. No sensory deficits. n/v intact UE/LE b/l, pulses symmetrical.   PSYCH: Cooperative, appropriate.

## 2018-08-10 NOTE — ED ADULT NURSE NOTE - NSIMPLEMENTINTERV_GEN_ALL_ED
Implemented All Fall with Harm Risk Interventions:  [place] to call system. Call bell, personal items and telephone within reach. Instruct patient to call for assistance. Room bathroom lighting operational. Non-slip footwear when patient is off stretcher. Physically safe environment: no spills, clutter or unnecessary equipment. Stretcher in lowest position, wheels locked, appropriate side rails in place. Provide visual cue, wrist band, yellow gown, etc. Monitor gait and stability. Monitor for mental status changes and reorient to person, place, and time. Review medications for side effects contributing to fall risk. Reinforce activity limits and safety measures with patient and family. Provide visual clues: red socks.

## 2018-08-10 NOTE — ED PROVIDER NOTE - NS ED ROS FT
Except as documented in HPI, all other ROS negative.   GENERAL: Denies fever/chills, loss of appetite/weight or fatigue.  SKIN: Denies rashes, abrasions, lacerations, ecchymosis, erythema, or edema.  HEAD: Denies headache, dizziness or trauma.  EYES: Denies blurry vision, diplopia, or photophobia.  CARDIAC: Denies chest pain, palpitations, or SOB.   RESPIRATORY: Denies SOB, cough, hemoptysis or wheezing.   GI: Denies abdominal pain, n/v/d.   : Denies hematuria, dysuria or frequency.   MSK: + back pain.   NEURO: Denies paresthesias, tingling, weakness, slurred speech or AMS.

## 2018-08-10 NOTE — ED PROVIDER NOTE - MEDICAL DECISION MAKING DETAILS
57m w worsening of chronic low back pain. n/v intact, nonfocal neuro, no trauma. low suspicion of spinal cord/cauda injury. Labs reviewed. Pt feeling better after analgesia. Pt advised regarding symptomatic/supportive care, importance of PMD f/u, importance of outpatient MRI, and symptoms to prompt ED return.

## 2018-08-10 NOTE — ED PROVIDER NOTE - PROGRESS NOTE DETAILS
Labs reviewed. Pt states he is feeling better now. Will discharge back to [facility]. Pt feeling much better. Results discussed with pt. Instructed pt to f/u with PMD. Signs and symptoms which should prompt return discussed in detail and pt informed they may return to ED at any time. Pt agreeable with plan and comfortable with discharge.

## 2018-08-10 NOTE — ED PROVIDER NOTE - OBJECTIVE STATEMENT
Pt is a 56 y/o Male, PMHX of tinnitus, Asthma, obesity, arthritis, Diabetes, presents to ED w/ back pain. Pt reports back pain started 2 days ago. Pt reports he took 1 Tylenol yesterday for pain with no relief. Denies fever/chills, trauma/injury, urinary/fecal incontinence, prior back sxs or injections, abdominal pain, n/v/d, dysuria, hematuria, numbness, tingling, weakness. During last visit to hospital, was instructed to get an MRI which has not been done.

## 2018-08-16 DIAGNOSIS — Z91.14 PATIENT'S OTHER NONCOMPLIANCE WITH MEDICATION REGIMEN: ICD-10-CM

## 2018-08-16 DIAGNOSIS — E66.01 MORBID (SEVERE) OBESITY DUE TO EXCESS CALORIES: ICD-10-CM

## 2018-08-16 DIAGNOSIS — J45.909 UNSPECIFIED ASTHMA, UNCOMPLICATED: ICD-10-CM

## 2018-08-16 DIAGNOSIS — M54.10 RADICULOPATHY, SITE UNSPECIFIED: ICD-10-CM

## 2018-08-16 DIAGNOSIS — L97.819 NON-PRESSURE CHRONIC ULCER OF OTHER PART OF RIGHT LOWER LEG WITH UNSPECIFIED SEVERITY: ICD-10-CM

## 2018-08-16 DIAGNOSIS — R26.89 OTHER ABNORMALITIES OF GAIT AND MOBILITY: ICD-10-CM

## 2018-08-16 DIAGNOSIS — G45.9 TRANSIENT CEREBRAL ISCHEMIC ATTACK, UNSPECIFIED: ICD-10-CM

## 2018-08-16 DIAGNOSIS — I87.2 VENOUS INSUFFICIENCY (CHRONIC) (PERIPHERAL): ICD-10-CM

## 2018-08-16 DIAGNOSIS — E11.51 TYPE 2 DIABETES MELLITUS WITH DIABETIC PERIPHERAL ANGIOPATHY WITHOUT GANGRENE: ICD-10-CM

## 2018-08-16 DIAGNOSIS — I63.9 CEREBRAL INFARCTION, UNSPECIFIED: ICD-10-CM

## 2018-08-16 DIAGNOSIS — Z79.84 LONG TERM (CURRENT) USE OF ORAL HYPOGLYCEMIC DRUGS: ICD-10-CM

## 2018-08-16 DIAGNOSIS — L97.829 NON-PRESSURE CHRONIC ULCER OF OTHER PART OF LEFT LOWER LEG WITH UNSPECIFIED SEVERITY: ICD-10-CM

## 2018-08-16 DIAGNOSIS — L85.3 XEROSIS CUTIS: ICD-10-CM

## 2018-08-16 DIAGNOSIS — M19.90 UNSPECIFIED OSTEOARTHRITIS, UNSPECIFIED SITE: ICD-10-CM

## 2018-08-16 DIAGNOSIS — Z79.82 LONG TERM (CURRENT) USE OF ASPIRIN: ICD-10-CM

## 2018-08-16 DIAGNOSIS — R29.898 OTHER SYMPTOMS AND SIGNS INVOLVING THE MUSCULOSKELETAL SYSTEM: ICD-10-CM

## 2018-08-20 ENCOUNTER — APPOINTMENT (OUTPATIENT)
Dept: VASCULAR SURGERY | Facility: CLINIC | Age: 58
End: 2018-08-20
Payer: MEDICAID

## 2018-08-20 VITALS
HEIGHT: 65 IN | WEIGHT: 315 LBS | SYSTOLIC BLOOD PRESSURE: 130 MMHG | DIASTOLIC BLOOD PRESSURE: 80 MMHG | BODY MASS INDEX: 52.48 KG/M2

## 2018-08-20 DIAGNOSIS — Z87.891 PERSONAL HISTORY OF NICOTINE DEPENDENCE: ICD-10-CM

## 2018-08-20 DIAGNOSIS — Z86.39 PERSONAL HISTORY OF OTHER ENDOCRINE, NUTRITIONAL AND METABOLIC DISEASE: ICD-10-CM

## 2018-08-20 DIAGNOSIS — E11.628 TYPE 2 DIABETES MELLITUS WITH OTHER SKIN COMPLICATIONS: ICD-10-CM

## 2018-08-20 DIAGNOSIS — Z87.898 PERSONAL HISTORY OF OTHER SPECIFIED CONDITIONS: ICD-10-CM

## 2018-08-20 PROCEDURE — 29580 STRAPPING UNNA BOOT: CPT | Mod: 50

## 2018-08-20 RX ORDER — FUROSEMIDE 40 MG/1
40 TABLET ORAL
Qty: 30 | Refills: 0 | Status: ACTIVE | COMMUNITY
Start: 2018-08-16

## 2018-08-20 RX ORDER — ASPIRIN 81 MG/1
81 TABLET ORAL
Qty: 30 | Refills: 0 | Status: ACTIVE | COMMUNITY
Start: 2018-07-20

## 2018-08-20 RX ORDER — FUROSEMIDE 40 MG/1
40 TABLET ORAL
Refills: 0 | Status: ACTIVE | COMMUNITY

## 2018-08-20 RX ORDER — SIMVASTATIN 20 MG/1
20 TABLET, FILM COATED ORAL
Qty: 30 | Refills: 0 | Status: ACTIVE | COMMUNITY
Start: 2018-06-20

## 2018-08-20 RX ORDER — METFORMIN HYDROCHLORIDE 500 MG/1
500 TABLET, COATED ORAL
Refills: 0 | Status: ACTIVE | COMMUNITY

## 2018-08-20 RX ORDER — ATORVASTATIN CALCIUM 80 MG/1
80 TABLET, FILM COATED ORAL
Refills: 0 | Status: ACTIVE | COMMUNITY

## 2018-08-20 RX ORDER — METFORMIN HYDROCHLORIDE 500 MG/1
500 TABLET, COATED ORAL
Qty: 60 | Refills: 0 | Status: ACTIVE | COMMUNITY
Start: 2018-07-24

## 2018-08-20 RX ORDER — ASPIRIN 81 MG/1
81 TABLET, COATED ORAL
Refills: 0 | Status: ACTIVE | COMMUNITY

## 2018-08-20 RX ORDER — ATORVASTATIN CALCIUM 40 MG/1
40 TABLET, FILM COATED ORAL
Refills: 0 | Status: ACTIVE | COMMUNITY

## 2018-08-20 RX ORDER — NYSTATIN 100000 [USP'U]/G
100000 CREAM TOPICAL
Refills: 0 | Status: ACTIVE | COMMUNITY

## 2018-08-20 RX ORDER — MULTIVITAMIN
CAPSULE ORAL
Refills: 0 | Status: ACTIVE | COMMUNITY

## 2018-08-20 RX ORDER — ATORVASTATIN CALCIUM 40 MG/1
40 TABLET, FILM COATED ORAL
Qty: 30 | Refills: 0 | Status: ACTIVE | COMMUNITY
Start: 2018-07-20

## 2018-08-20 RX ORDER — NYSTATIN 100000 [USP'U]/G
100000 CREAM TOPICAL
Qty: 30 | Refills: 0 | Status: ACTIVE | COMMUNITY
Start: 2018-07-20

## 2018-08-20 RX ORDER — ATORVASTATIN CALCIUM 80 MG/1
80 TABLET, FILM COATED ORAL
Qty: 30 | Refills: 0 | Status: ACTIVE | COMMUNITY
Start: 2018-08-07

## 2018-08-20 RX ORDER — LOPERAMIDE HYDROCHLORIDE 2 MG/1
2 CAPSULE ORAL
Qty: 28 | Refills: 0 | Status: ACTIVE | COMMUNITY
Start: 2018-07-20

## 2018-08-20 RX ORDER — LEVOTHYROXINE SODIUM 0.07 MG/1
75 TABLET ORAL
Qty: 30 | Refills: 0 | Status: ACTIVE | COMMUNITY
Start: 2018-06-20

## 2018-08-22 ENCOUNTER — EMERGENCY (EMERGENCY)
Facility: HOSPITAL | Age: 58
LOS: 0 days | Discharge: NURSING, REHAB OR LONG-TERM CARE | End: 2018-08-22
Attending: EMERGENCY MEDICINE | Admitting: EMERGENCY MEDICINE

## 2018-08-22 VITALS
TEMPERATURE: 100 F | DIASTOLIC BLOOD PRESSURE: 80 MMHG | RESPIRATION RATE: 19 BRPM | SYSTOLIC BLOOD PRESSURE: 131 MMHG | OXYGEN SATURATION: 96 % | HEART RATE: 90 BPM

## 2018-08-22 VITALS
OXYGEN SATURATION: 99 % | WEIGHT: 315 LBS | DIASTOLIC BLOOD PRESSURE: 80 MMHG | RESPIRATION RATE: 20 BRPM | HEART RATE: 82 BPM | TEMPERATURE: 97 F | HEIGHT: 65 IN | SYSTOLIC BLOOD PRESSURE: 145 MMHG

## 2018-08-22 DIAGNOSIS — Z79.82 LONG TERM (CURRENT) USE OF ASPIRIN: ICD-10-CM

## 2018-08-22 DIAGNOSIS — Z98.890 OTHER SPECIFIED POSTPROCEDURAL STATES: Chronic | ICD-10-CM

## 2018-08-22 DIAGNOSIS — M54.5 LOW BACK PAIN: ICD-10-CM

## 2018-08-22 DIAGNOSIS — J45.909 UNSPECIFIED ASTHMA, UNCOMPLICATED: ICD-10-CM

## 2018-08-22 DIAGNOSIS — E11.9 TYPE 2 DIABETES MELLITUS WITHOUT COMPLICATIONS: ICD-10-CM

## 2018-08-22 DIAGNOSIS — Z79.899 OTHER LONG TERM (CURRENT) DRUG THERAPY: ICD-10-CM

## 2018-08-22 DIAGNOSIS — R60.0 LOCALIZED EDEMA: ICD-10-CM

## 2018-08-22 DIAGNOSIS — Z98.890 OTHER SPECIFIED POSTPROCEDURAL STATES: ICD-10-CM

## 2018-08-22 DIAGNOSIS — Z79.84 LONG TERM (CURRENT) USE OF ORAL HYPOGLYCEMIC DRUGS: ICD-10-CM

## 2018-08-22 RX ORDER — OXYCODONE AND ACETAMINOPHEN 5; 325 MG/1; MG/1
2 TABLET ORAL ONCE
Qty: 0 | Refills: 0 | Status: DISCONTINUED | OUTPATIENT
Start: 2018-08-22 | End: 2018-08-22

## 2018-08-22 RX ADMIN — OXYCODONE AND ACETAMINOPHEN 2 TABLET(S): 5; 325 TABLET ORAL at 12:05

## 2018-08-22 NOTE — ED ADULT NURSE NOTE - NSIMPLEMENTINTERV_GEN_ALL_ED
Implemented All Universal Safety Interventions:  [place] to call system. Call bell, personal items and telephone within reach. Instruct patient to call for assistance. Room bathroom lighting operational. Non-slip footwear when patient is off stretcher. Physically safe environment: no spills, clutter or unnecessary equipment. Stretcher in lowest position, wheels locked, appropriate side rails in place.

## 2018-08-22 NOTE — ED PROVIDER NOTE - MEDICAL DECISION MAKING DETAILS
I have personally performed a history and physical exam on this patient and personally directed the management of the patient. Patient presents for evaluation of back pain that is chronic in nature worse over the past weeks to months, he denies any fevers or chills he denies any vomiting, he denies any loss of bladder or bowel control or saddle anesthesia.  he notes that it is improving at this time   On physical exam he is nc/at perrla eomi oropharynx clear cta b/l, rrr s1s2 noted abd-soft  nt n bs +e xt   patient has no ttp of the lumbar spine he has full strength, full sensation, from he is able to ambulate.

## 2018-08-27 ENCOUNTER — APPOINTMENT (OUTPATIENT)
Dept: VASCULAR SURGERY | Facility: CLINIC | Age: 58
End: 2018-08-27
Payer: MEDICAID

## 2018-08-27 DIAGNOSIS — I87.2 VENOUS INSUFFICIENCY (CHRONIC) (PERIPHERAL): ICD-10-CM

## 2018-08-27 PROCEDURE — 29580 STRAPPING UNNA BOOT: CPT | Mod: 50

## 2018-09-05 ENCOUNTER — APPOINTMENT (OUTPATIENT)
Dept: VASCULAR SURGERY | Facility: CLINIC | Age: 58
End: 2018-09-05
Payer: MEDICAID

## 2018-09-05 PROCEDURE — 29580 STRAPPING UNNA BOOT: CPT | Mod: 50

## 2018-09-10 ENCOUNTER — APPOINTMENT (OUTPATIENT)
Dept: VASCULAR SURGERY | Facility: CLINIC | Age: 58
End: 2018-09-10

## 2018-09-11 ENCOUNTER — EMERGENCY (EMERGENCY)
Facility: HOSPITAL | Age: 58
LOS: 0 days | Discharge: NURSING, REHAB OR LONG-TERM CARE | End: 2018-09-11
Attending: EMERGENCY MEDICINE | Admitting: EMERGENCY MEDICINE

## 2018-09-11 VITALS
SYSTOLIC BLOOD PRESSURE: 113 MMHG | WEIGHT: 315 LBS | HEIGHT: 65 IN | OXYGEN SATURATION: 95 % | TEMPERATURE: 97 F | HEART RATE: 81 BPM | RESPIRATION RATE: 18 BRPM | DIASTOLIC BLOOD PRESSURE: 69 MMHG

## 2018-09-11 VITALS — HEART RATE: 84 BPM | RESPIRATION RATE: 16 BRPM | DIASTOLIC BLOOD PRESSURE: 54 MMHG | SYSTOLIC BLOOD PRESSURE: 116 MMHG

## 2018-09-11 DIAGNOSIS — E11.9 TYPE 2 DIABETES MELLITUS WITHOUT COMPLICATIONS: ICD-10-CM

## 2018-09-11 DIAGNOSIS — Z98.890 OTHER SPECIFIED POSTPROCEDURAL STATES: Chronic | ICD-10-CM

## 2018-09-11 DIAGNOSIS — J45.909 UNSPECIFIED ASTHMA, UNCOMPLICATED: ICD-10-CM

## 2018-09-11 DIAGNOSIS — Z98.890 OTHER SPECIFIED POSTPROCEDURAL STATES: ICD-10-CM

## 2018-09-11 DIAGNOSIS — Z79.84 LONG TERM (CURRENT) USE OF ORAL HYPOGLYCEMIC DRUGS: ICD-10-CM

## 2018-09-11 DIAGNOSIS — M54.5 LOW BACK PAIN: ICD-10-CM

## 2018-09-11 DIAGNOSIS — Z79.899 OTHER LONG TERM (CURRENT) DRUG THERAPY: ICD-10-CM

## 2018-09-11 DIAGNOSIS — Z79.82 LONG TERM (CURRENT) USE OF ASPIRIN: ICD-10-CM

## 2018-09-11 DIAGNOSIS — G89.29 OTHER CHRONIC PAIN: ICD-10-CM

## 2018-09-11 LAB
ALBUMIN SERPL ELPH-MCNC: 3.5 G/DL — SIGNIFICANT CHANGE UP (ref 3.5–5.2)
ALP SERPL-CCNC: 112 U/L — SIGNIFICANT CHANGE UP (ref 30–115)
ALT FLD-CCNC: 17 U/L — SIGNIFICANT CHANGE UP (ref 0–41)
ANION GAP SERPL CALC-SCNC: 11 MMOL/L — SIGNIFICANT CHANGE UP (ref 7–14)
AST SERPL-CCNC: 25 U/L — SIGNIFICANT CHANGE UP (ref 0–41)
BASOPHILS # BLD AUTO: 0.01 K/UL — SIGNIFICANT CHANGE UP (ref 0–0.2)
BASOPHILS NFR BLD AUTO: 0.1 % — SIGNIFICANT CHANGE UP (ref 0–1)
BILIRUB SERPL-MCNC: 0.4 MG/DL — SIGNIFICANT CHANGE UP (ref 0.2–1.2)
BUN SERPL-MCNC: 13 MG/DL — SIGNIFICANT CHANGE UP (ref 10–20)
CALCIUM SERPL-MCNC: 8.6 MG/DL — SIGNIFICANT CHANGE UP (ref 8.5–10.1)
CHLORIDE SERPL-SCNC: 102 MMOL/L — SIGNIFICANT CHANGE UP (ref 98–110)
CO2 SERPL-SCNC: 30 MMOL/L — SIGNIFICANT CHANGE UP (ref 17–32)
CREAT SERPL-MCNC: 0.6 MG/DL — LOW (ref 0.7–1.5)
EOSINOPHIL # BLD AUTO: 0.15 K/UL — SIGNIFICANT CHANGE UP (ref 0–0.7)
EOSINOPHIL NFR BLD AUTO: 1.8 % — SIGNIFICANT CHANGE UP (ref 0–8)
GLUCOSE SERPL-MCNC: 154 MG/DL — HIGH (ref 70–99)
HCT VFR BLD CALC: 42 % — SIGNIFICANT CHANGE UP (ref 42–52)
HGB BLD-MCNC: 12.9 G/DL — LOW (ref 14–18)
IMM GRANULOCYTES NFR BLD AUTO: 0.4 % — HIGH (ref 0.1–0.3)
LYMPHOCYTES # BLD AUTO: 1.63 K/UL — SIGNIFICANT CHANGE UP (ref 1.2–3.4)
LYMPHOCYTES # BLD AUTO: 19.8 % — LOW (ref 20.5–51.1)
MCHC RBC-ENTMCNC: 25.5 PG — LOW (ref 27–31)
MCHC RBC-ENTMCNC: 30.7 G/DL — LOW (ref 32–37)
MCV RBC AUTO: 83.2 FL — SIGNIFICANT CHANGE UP (ref 80–94)
MONOCYTES # BLD AUTO: 0.38 K/UL — SIGNIFICANT CHANGE UP (ref 0.1–0.6)
MONOCYTES NFR BLD AUTO: 4.6 % — SIGNIFICANT CHANGE UP (ref 1.7–9.3)
NEUTROPHILS # BLD AUTO: 6.05 K/UL — SIGNIFICANT CHANGE UP (ref 1.4–6.5)
NEUTROPHILS NFR BLD AUTO: 73.3 % — SIGNIFICANT CHANGE UP (ref 42.2–75.2)
PLATELET # BLD AUTO: 294 K/UL — SIGNIFICANT CHANGE UP (ref 130–400)
POTASSIUM SERPL-MCNC: 4.8 MMOL/L — SIGNIFICANT CHANGE UP (ref 3.5–5)
POTASSIUM SERPL-SCNC: 4.8 MMOL/L — SIGNIFICANT CHANGE UP (ref 3.5–5)
PROT SERPL-MCNC: 6.4 G/DL — SIGNIFICANT CHANGE UP (ref 6–8)
RBC # BLD: 5.05 M/UL — SIGNIFICANT CHANGE UP (ref 4.7–6.1)
RBC # FLD: 16.4 % — HIGH (ref 11.5–14.5)
SODIUM SERPL-SCNC: 143 MMOL/L — SIGNIFICANT CHANGE UP (ref 135–146)
WBC # BLD: 8.25 K/UL — SIGNIFICANT CHANGE UP (ref 4.8–10.8)
WBC # FLD AUTO: 8.25 K/UL — SIGNIFICANT CHANGE UP (ref 4.8–10.8)

## 2018-09-11 RX ORDER — IBUPROFEN 200 MG
600 TABLET ORAL ONCE
Qty: 0 | Refills: 0 | Status: COMPLETED | OUTPATIENT
Start: 2018-09-11 | End: 2018-09-11

## 2018-09-11 RX ORDER — OXYCODONE AND ACETAMINOPHEN 5; 325 MG/1; MG/1
1 TABLET ORAL ONCE
Qty: 0 | Refills: 0 | Status: DISCONTINUED | OUTPATIENT
Start: 2018-09-11 | End: 2018-09-11

## 2018-09-11 RX ADMIN — OXYCODONE AND ACETAMINOPHEN 1 TABLET(S): 5; 325 TABLET ORAL at 10:18

## 2018-09-11 RX ADMIN — Medication 600 MILLIGRAM(S): at 10:18

## 2018-09-11 NOTE — ED PROVIDER NOTE - MEDICAL DECISION MAKING DETAILS
Pt with multiple chronic issues including low back pain awaiting MRI c/o exacerbation of typical pain, no bowel/bladder symptoms, no new numbness (has neuropathy), no weakness, one xam has right paralumbar muscle ttp, abd snt neuro intact, medicated with relief, will d/c back to [facility]

## 2018-09-11 NOTE — ED PROVIDER NOTE - OBJECTIVE STATEMENT
57 year old male with known hx of DM, Asthma, morbid obesity, chronic LE edema presents from [facility] for acute on chronic low back pain. As per the patient the pain in his low back started yesterday and has worsened for the past day PTP. Denies any recent falls or trauma. Pain is mid lumbar, radiating down bilateral legs without change in sensation or ability to ambulate. Denies recent fevers, chills, SOB, chest pain,  Symptoms. No Bowel or bladder incontinence.

## 2018-09-11 NOTE — ED ADULT NURSE NOTE - NSIMPLEMENTINTERV_GEN_ALL_ED
Implemented All Fall with Harm Risk Interventions:  [place] to call system. Call bell, personal items and telephone within reach. Instruct patient to call for assistance. Room bathroom lighting operational. Non-slip footwear when patient is off stretcher. Physically safe environment: no spills, clutter or unnecessary equipment. Stretcher in lowest position, wheels locked, appropriate side rails in place. Provide visual cue, wrist band, yellow gown, etc. Monitor gait and stability. Monitor for mental status changes and reorient to person, place, and time. Review medications for side effects contributing to fall risk. Reinforce activity limits and safety measures with patient and family. Provide visual clues: red socks.

## 2018-09-11 NOTE — ED PROVIDER NOTE - ATTENDING CONTRIBUTION TO CARE
Pt with multiple chronic issues including low back pain awaiting MRI c/o exacerbation of typical pain, no bowel/bladder symptoms, no new numbness (has neuropathy), no weakness, one xam has right paralumbar muscle ttp, abd snt neuro intact, medicated with relief, will d/c back to [facility]

## 2018-09-11 NOTE — ED PROVIDER NOTE - NS ED ROS FT
Constitutional: See HPI.  Eyes: No visual changes, eye pain or discharge. No Photophobia  ENMT: No hearing changes, pain, discharge or infections. No neck pain or stiffness. No limited ROM  Cardiac: No SOB or edema. No chest pain with exertion.  Respiratory: No cough or respiratory distress. No hemoptysis. No history of asthma or RAD.  GI: No nausea, vomiting, diarrhea or abdominal pain.  : No dysuria, frequency or burning. No Discharge  MS: See HPI  Neuro: No headache or weakness. No LOC. See HPI  Skin: No skin rash.  Except as documented in the HPI, all other systems are negative.

## 2018-09-11 NOTE — ED PROVIDER NOTE - PHYSICAL EXAMINATION
VITAL SIGNS: I have reviewed nursing notes and confirm.  CONSTITUTIONAL: Well-developed; well-nourished; in no acute distress. Morbidly Obese  SKIN: Skin exam is warm and dry, no acute rash.  HEAD: Normocephalic; atraumatic.  EYES: PERRL, EOM intact; conjunctiva and sclera clear.  ENT: No nasal discharge; airway clear. Throat clear.  NECK: Supple; non tender.  No lymphadenopathy.  CARD: S1, S2 normal; no murmurs, gallops, or rubs. Regular rate and rhythm.  RESP: No wheezes, rales or rhonchi. Diminished breath sounds secondary to body habitus.  ABD: Normal bowel sounds; soft; non-distended; non-tender; no hepatosplenomegaly.  EXT: Normal ROM. No clubbing, cyanosis. Bilateral LE grossly edematous, with chronic wounds not grossly infected on visual inspection  NEURO: Alert, oriented. Grossly unremarkable. No focal deficits.  PSYCH: Cooperative, appropriate.

## 2018-09-11 NOTE — ED ADULT NURSE NOTE - CHIEF COMPLAINT QUOTE
c/o low back pain since yesterday. Denies injury [facility]

## 2018-09-14 ENCOUNTER — EMERGENCY (EMERGENCY)
Facility: HOSPITAL | Age: 58
LOS: 0 days | Discharge: HOME | End: 2018-09-14
Attending: EMERGENCY MEDICINE | Admitting: EMERGENCY MEDICINE

## 2018-09-14 VITALS
SYSTOLIC BLOOD PRESSURE: 110 MMHG | RESPIRATION RATE: 16 BRPM | OXYGEN SATURATION: 96 % | DIASTOLIC BLOOD PRESSURE: 55 MMHG

## 2018-09-14 VITALS
OXYGEN SATURATION: 95 % | TEMPERATURE: 99 F | WEIGHT: 315 LBS | RESPIRATION RATE: 18 BRPM | HEART RATE: 89 BPM | HEIGHT: 65 IN | DIASTOLIC BLOOD PRESSURE: 56 MMHG | SYSTOLIC BLOOD PRESSURE: 117 MMHG

## 2018-09-14 DIAGNOSIS — Z79.84 LONG TERM (CURRENT) USE OF ORAL HYPOGLYCEMIC DRUGS: ICD-10-CM

## 2018-09-14 DIAGNOSIS — Z79.2 LONG TERM (CURRENT) USE OF ANTIBIOTICS: ICD-10-CM

## 2018-09-14 DIAGNOSIS — J45.909 UNSPECIFIED ASTHMA, UNCOMPLICATED: ICD-10-CM

## 2018-09-14 DIAGNOSIS — Z79.82 LONG TERM (CURRENT) USE OF ASPIRIN: ICD-10-CM

## 2018-09-14 DIAGNOSIS — I87.2 VENOUS INSUFFICIENCY (CHRONIC) (PERIPHERAL): ICD-10-CM

## 2018-09-14 DIAGNOSIS — Z98.890 OTHER SPECIFIED POSTPROCEDURAL STATES: Chronic | ICD-10-CM

## 2018-09-14 DIAGNOSIS — Z79.899 OTHER LONG TERM (CURRENT) DRUG THERAPY: ICD-10-CM

## 2018-09-14 DIAGNOSIS — E11.9 TYPE 2 DIABETES MELLITUS WITHOUT COMPLICATIONS: ICD-10-CM

## 2018-09-14 DIAGNOSIS — R60.0 LOCALIZED EDEMA: ICD-10-CM

## 2018-09-14 DIAGNOSIS — Z98.890 OTHER SPECIFIED POSTPROCEDURAL STATES: ICD-10-CM

## 2018-09-14 RX ORDER — HYDROCORTISONE 1 %
1 OINTMENT (GRAM) TOPICAL ONCE
Qty: 0 | Refills: 0 | Status: COMPLETED | OUTPATIENT
Start: 2018-09-14 | End: 2018-09-14

## 2018-09-14 RX ORDER — ACETAMINOPHEN 500 MG
650 TABLET ORAL ONCE
Qty: 0 | Refills: 0 | Status: COMPLETED | OUTPATIENT
Start: 2018-09-14 | End: 2018-09-14

## 2018-09-14 RX ADMIN — Medication 1 APPLICATION(S): at 14:28

## 2018-09-14 RX ADMIN — Medication 650 MILLIGRAM(S): at 15:27

## 2018-09-14 RX ADMIN — Medication 1 TABLET(S): at 13:50

## 2018-09-14 NOTE — ED PROVIDER NOTE - ATTENDING CONTRIBUTION TO CARE
venous stasis changes of LE bilateral. no pus or fluctuance. redness may reflect cellulitis.  pt reports pruritis: may represent contact dermatitis.  po abx and steroid cream ordered.  no adherent bandage recommended.

## 2018-09-14 NOTE — ED PROVIDER NOTE - NS ED ROS FT
Constitutional: See HPI.  Eyes: No visual changes, eye pain or discharge. No Photophobia  ENMT: No hearing changes, pain, discharge or infections. No neck pain or stiffness. No limited ROM  Cardiac: No SOB or edema. No chest pain with exertion.  Respiratory: No cough or respiratory distress. No hemoptysis. No history of asthma or RAD.  GI: No nausea, vomiting, diarrhea or abdominal pain.  : No dysuria, frequency or burning. No Discharge  MS: No myalgia, muscle weakness, joint pain or back pain.  Neuro: No headache or weakness. No LOC.  Skin: See HPI  Except as documented in the HPI, all other systems are negative.

## 2018-09-14 NOTE — ED PROVIDER NOTE - PHYSICAL EXAMINATION
VITAL SIGNS: I have reviewed nursing notes and confirm.  CONSTITUTIONAL: Well-developed; well-nourished; in no acute distress. morbidly obese  SKIN: Skin exam is warm and dry, no acute rash.  HEAD: Normocephalic; atraumatic.  EYES: PERRL, EOM intact; conjunctiva and sclera clear.  ENT: No nasal discharge; airway clear. Throat clear.  NECK: Supple; non tender.  No lymphadenopathy.  CARD: S1, S2 normal; no murmurs, gallops, or rubs. Regular rate and rhythm.  RESP: No wheezes, rales or rhonchi.  ABD: Normal bowel sounds; soft; non-distended; non-tender; no hepatosplenomegaly.  EXT: Normal ROM. No clubbing, cyanosis. Bilateral LE edema with excoriations and wounds, extremely malodorous.  NEURO: Alert, oriented. Grossly unremarkable. No focal deficits.  PSYCH: Cooperative, appropriate.

## 2018-09-14 NOTE — ED PROVIDER NOTE - OBJECTIVE STATEMENT
57 year old male with known hx of DM, Asthma, morbid obesity, chronic LE edema presents from [facility] for complaint of LE edema. As per the patient he has a hx of b/l le edema and was getting his dressings changed today. Nurse was concerned that his wounds looked poor, and sent him in to the ED for evaluation.  Denies recent fevers, chills, SOB, chest pain, GI, or  Symptoms.

## 2018-09-14 NOTE — ED ADULT NURSE NOTE - NSIMPLEMENTINTERV_GEN_ALL_ED
Implemented All Fall with Harm Risk Interventions:  [place] to call system. Call bell, personal items and telephone within reach. Instruct patient to call for assistance. Room bathroom lighting operational. Non-slip footwear when patient is off stretcher. Physically safe environment: no spills, clutter or unnecessary equipment. Stretcher in lowest position, wheels locked, appropriate side rails in place. Provide visual cue, wrist band, yellow gown, etc. Monitor gait and stability. Monitor for mental status changes and reorient to person, place, and time. Review medications for side effects contributing to fall risk. Reinforce activity limits and safety measures with patient and family. Provide visual clues: red socks.

## 2018-09-17 ENCOUNTER — INPATIENT (INPATIENT)
Facility: HOSPITAL | Age: 58
LOS: 3 days | Discharge: HOME HEALTH | End: 2018-09-21
Attending: INTERNAL MEDICINE | Admitting: INTERNAL MEDICINE
Payer: MEDICAID

## 2018-09-17 VITALS
HEART RATE: 110 BPM | SYSTOLIC BLOOD PRESSURE: 107 MMHG | OXYGEN SATURATION: 99 % | RESPIRATION RATE: 20 BRPM | DIASTOLIC BLOOD PRESSURE: 51 MMHG | TEMPERATURE: 101 F | WEIGHT: 315 LBS

## 2018-09-17 DIAGNOSIS — Z98.890 OTHER SPECIFIED POSTPROCEDURAL STATES: Chronic | ICD-10-CM

## 2018-09-17 RX ORDER — METRONIDAZOLE 500 MG
500 TABLET ORAL ONCE
Qty: 0 | Refills: 0 | Status: COMPLETED | OUTPATIENT
Start: 2018-09-17 | End: 2018-09-17

## 2018-09-17 RX ORDER — MORPHINE SULFATE 50 MG/1
4 CAPSULE, EXTENDED RELEASE ORAL ONCE
Qty: 0 | Refills: 0 | Status: DISCONTINUED | OUTPATIENT
Start: 2018-09-17 | End: 2018-09-17

## 2018-09-17 RX ORDER — AZTREONAM 2 G
2000 VIAL (EA) INJECTION ONCE
Qty: 0 | Refills: 0 | Status: COMPLETED | OUTPATIENT
Start: 2018-09-17 | End: 2018-09-17

## 2018-09-17 NOTE — ED PROVIDER NOTE - OBJECTIVE STATEMENT
56 y/o M here with b/l lower leg wounds have been present since March. Pt has been having them taken care of while at [email or web address], but states that he just found out that his wound care was discontinued. Pt last wound care dressing change was last thursday. Pt with h/o DM and hchol.  Used to be a smoker and drinking, but has since quit both. No fever. No n/v/d.  No cp, sob, ap.  Pt also noticed that he has a rash on his body, but is not sure when it started.  Pt did start a 5 day course of Augmentin 4 days ago, but is unsure if the rash started after he started the abx.  No other known allergies.

## 2018-09-17 NOTE — ED PROVIDER NOTE - PHYSICAL EXAMINATION
Physical Examination:   VITAL SIGNS: I have reviewed vital signs.  CONSTITUTIONAL: well appearing, NAD.   SKIN: + macular rash on arms, torso, legs. Not back. Also with weeping, bloody large superficial wounds with raw avulsed skin on b/l LE circumferential from below knee to ankle and b/l feet.    HEAD: Normocephalic; atraumatic.  EYES: PERRL, EOM intact; conjunctiva and sclera clear.  ENT: No nasal discharge; airway clear.  CARD: S1, S2 reg  RESP: CTAB. No wheezes, rales or rhonchi.  ABD: soft; non-distended; non-tender  EXT: Normal ROM.   NEURO: Alert, oriented. Grossly unremarkable. No focal deficits.  PSYCH: Cooperative, appropriate.

## 2018-09-17 NOTE — ED PROVIDER NOTE - NS ED ROS FT
Review of Systems:  · CONSTITUTIONAL: no fever and no chills.  · EYES: no discharge, no irritation, no pain, no redness, and no visual changes.  · ENMT: Ears: no ear pain and no hearing problems. Nose: no nasal congestion and no nasal drainage. Mouth/Throat: no dysphagia, no hoarseness and no throat pain.  · CARDIOVASCULAR: no chest pain  · RESPIRATORY: no cough, and no shortness of breath.  · GASTROINTESTINAL: no abdominal pain, no diarrhea, no nausea and no vomiting.  · GENITOURINARY: no dysuria  · MUSCULOSKELETAL: no back pain, no musculoskeletal pain  · SKIN: +rash and skin ulcers  · NEURO: no loss of consciousness, no headache.  · ROS STATEMENT: all other ROS negative except as per HPI

## 2018-09-17 NOTE — ED PROVIDER NOTE - CARE PLAN
Principal Discharge DX:	Venous stasis ulcers of both lower extremities  Secondary Diagnosis:	Cellulitis  Secondary Diagnosis:	Rash

## 2018-09-17 NOTE — ED ADULT TRIAGE NOTE - CHIEF COMPLAINT QUOTE
Patient BIBA from [facility] for bilateral leg pain and wounds since March, worsening this week. Patient reports worsening pain and increased drainage from wounds.

## 2018-09-17 NOTE — ED PROVIDER NOTE - PROGRESS NOTE DETAILS
d/w Shelby Cochran - will come evaluate Per JOEL Ryan, chronic venous stasis changes with infection. Possibly pseudomonas. Pt to be admitted to medicine and burn to be consulted.  Non surgical at this time.  JOEL Ryan recommends arterial and venous duplexes. d/w Dr. Hector - aware of admission.

## 2018-09-18 LAB
ALBUMIN SERPL ELPH-MCNC: 3.1 G/DL — LOW (ref 3.5–5.2)
ALP SERPL-CCNC: 105 U/L — SIGNIFICANT CHANGE UP (ref 30–115)
ALT FLD-CCNC: 10 U/L — SIGNIFICANT CHANGE UP (ref 0–41)
ANION GAP SERPL CALC-SCNC: 14 MMOL/L — SIGNIFICANT CHANGE UP (ref 7–14)
ANION GAP SERPL CALC-SCNC: 16 MMOL/L — HIGH (ref 7–14)
AST SERPL-CCNC: 9 U/L — SIGNIFICANT CHANGE UP (ref 0–41)
BASE EXCESS BLDV CALC-SCNC: 3.1 MMOL/L — HIGH (ref -2–2)
BASOPHILS # BLD AUTO: 0.02 K/UL — SIGNIFICANT CHANGE UP (ref 0–0.2)
BASOPHILS # BLD AUTO: 0.02 K/UL — SIGNIFICANT CHANGE UP (ref 0–0.2)
BASOPHILS NFR BLD AUTO: 0.2 % — SIGNIFICANT CHANGE UP (ref 0–1)
BASOPHILS NFR BLD AUTO: 0.2 % — SIGNIFICANT CHANGE UP (ref 0–1)
BILIRUB SERPL-MCNC: 0.5 MG/DL — SIGNIFICANT CHANGE UP (ref 0.2–1.2)
BUN SERPL-MCNC: 14 MG/DL — SIGNIFICANT CHANGE UP (ref 10–20)
BUN SERPL-MCNC: 9 MG/DL — LOW (ref 10–20)
C DIFF BY PCR RESULT: NEGATIVE — SIGNIFICANT CHANGE UP
C DIFF TOX GENS STL QL NAA+PROBE: SIGNIFICANT CHANGE UP
CA-I SERPL-SCNC: 1.1 MMOL/L — LOW (ref 1.12–1.3)
CALCIUM SERPL-MCNC: 8.2 MG/DL — LOW (ref 8.5–10.1)
CALCIUM SERPL-MCNC: 8.2 MG/DL — LOW (ref 8.5–10.1)
CHLORIDE SERPL-SCNC: 100 MMOL/L — SIGNIFICANT CHANGE UP (ref 98–110)
CHLORIDE SERPL-SCNC: 102 MMOL/L — SIGNIFICANT CHANGE UP (ref 98–110)
CO2 SERPL-SCNC: 21 MMOL/L — SIGNIFICANT CHANGE UP (ref 17–32)
CO2 SERPL-SCNC: 26 MMOL/L — SIGNIFICANT CHANGE UP (ref 17–32)
CREAT SERPL-MCNC: 0.5 MG/DL — LOW (ref 0.7–1.5)
CREAT SERPL-MCNC: 0.9 MG/DL — SIGNIFICANT CHANGE UP (ref 0.7–1.5)
EOSINOPHIL # BLD AUTO: 0.17 K/UL — SIGNIFICANT CHANGE UP (ref 0–0.7)
EOSINOPHIL # BLD AUTO: 0.2 K/UL — SIGNIFICANT CHANGE UP (ref 0–0.7)
EOSINOPHIL NFR BLD AUTO: 1.4 % — SIGNIFICANT CHANGE UP (ref 0–8)
EOSINOPHIL NFR BLD AUTO: 2.4 % — SIGNIFICANT CHANGE UP (ref 0–8)
GAS PNL BLDV: 138 MMOL/L — SIGNIFICANT CHANGE UP (ref 136–145)
GAS PNL BLDV: SIGNIFICANT CHANGE UP
GLUCOSE BLDC GLUCOMTR-MCNC: 119 MG/DL — HIGH (ref 70–99)
GLUCOSE BLDC GLUCOMTR-MCNC: 138 MG/DL — HIGH (ref 70–99)
GLUCOSE BLDC GLUCOMTR-MCNC: 149 MG/DL — HIGH (ref 70–99)
GLUCOSE BLDC GLUCOMTR-MCNC: 256 MG/DL — HIGH (ref 70–99)
GLUCOSE BLDC GLUCOMTR-MCNC: 91 MG/DL — SIGNIFICANT CHANGE UP (ref 70–99)
GLUCOSE SERPL-MCNC: 105 MG/DL — HIGH (ref 70–99)
GLUCOSE SERPL-MCNC: 197 MG/DL — HIGH (ref 70–99)
HCO3 BLDV-SCNC: 29 MMOL/L — SIGNIFICANT CHANGE UP (ref 22–29)
HCT VFR BLD CALC: 35.2 % — LOW (ref 42–52)
HCT VFR BLD CALC: 37 % — LOW (ref 42–52)
HCT VFR BLDA CALC: 45.4 % — HIGH (ref 34–44)
HGB BLD CALC-MCNC: 14.8 G/DL — SIGNIFICANT CHANGE UP (ref 14–18)
HGB BLD-MCNC: 10.5 G/DL — LOW (ref 14–18)
HGB BLD-MCNC: 11.1 G/DL — LOW (ref 14–18)
IMM GRANULOCYTES NFR BLD AUTO: 0.4 % — HIGH (ref 0.1–0.3)
IMM GRANULOCYTES NFR BLD AUTO: 0.5 % — HIGH (ref 0.1–0.3)
LACTATE BLDV-MCNC: 1.5 MMOL/L — SIGNIFICANT CHANGE UP (ref 0.5–1.6)
LACTATE SERPL-SCNC: 1.9 MMOL/L — SIGNIFICANT CHANGE UP (ref 0.5–2.2)
LYMPHOCYTES # BLD AUTO: 1.34 K/UL — SIGNIFICANT CHANGE UP (ref 1.2–3.4)
LYMPHOCYTES # BLD AUTO: 1.61 K/UL — SIGNIFICANT CHANGE UP (ref 1.2–3.4)
LYMPHOCYTES # BLD AUTO: 13.2 % — LOW (ref 20.5–51.1)
LYMPHOCYTES # BLD AUTO: 16.1 % — LOW (ref 20.5–51.1)
MAGNESIUM SERPL-MCNC: 2.1 MG/DL — SIGNIFICANT CHANGE UP (ref 1.8–2.4)
MCHC RBC-ENTMCNC: 24.8 PG — LOW (ref 27–31)
MCHC RBC-ENTMCNC: 24.8 PG — LOW (ref 27–31)
MCHC RBC-ENTMCNC: 29.8 G/DL — LOW (ref 32–37)
MCHC RBC-ENTMCNC: 30 G/DL — LOW (ref 32–37)
MCV RBC AUTO: 82.6 FL — SIGNIFICANT CHANGE UP (ref 80–94)
MCV RBC AUTO: 83.2 FL — SIGNIFICANT CHANGE UP (ref 80–94)
MONOCYTES # BLD AUTO: 0.36 K/UL — SIGNIFICANT CHANGE UP (ref 0.1–0.6)
MONOCYTES # BLD AUTO: 0.66 K/UL — HIGH (ref 0.1–0.6)
MONOCYTES NFR BLD AUTO: 4.3 % — SIGNIFICANT CHANGE UP (ref 1.7–9.3)
MONOCYTES NFR BLD AUTO: 5.4 % — SIGNIFICANT CHANGE UP (ref 1.7–9.3)
NEUTROPHILS # BLD AUTO: 6.36 K/UL — SIGNIFICANT CHANGE UP (ref 1.4–6.5)
NEUTROPHILS # BLD AUTO: 9.69 K/UL — HIGH (ref 1.4–6.5)
NEUTROPHILS NFR BLD AUTO: 76.6 % — HIGH (ref 42.2–75.2)
NEUTROPHILS NFR BLD AUTO: 79.3 % — HIGH (ref 42.2–75.2)
NRBC # BLD: 0 /100 WBCS — SIGNIFICANT CHANGE UP (ref 0–0)
PCO2 BLDV: 50 MMHG — SIGNIFICANT CHANGE UP (ref 41–51)
PH BLDV: 7.38 — SIGNIFICANT CHANGE UP (ref 7.26–7.43)
PLATELET # BLD AUTO: 274 K/UL — SIGNIFICANT CHANGE UP (ref 130–400)
PLATELET # BLD AUTO: 359 K/UL — SIGNIFICANT CHANGE UP (ref 130–400)
PO2 BLDV: 30 MMHG — SIGNIFICANT CHANGE UP (ref 20–40)
POTASSIUM BLDV-SCNC: 3.9 MMOL/L — SIGNIFICANT CHANGE UP (ref 3.3–5.6)
POTASSIUM SERPL-MCNC: 4 MMOL/L — SIGNIFICANT CHANGE UP (ref 3.5–5)
POTASSIUM SERPL-MCNC: 5.8 MMOL/L — HIGH (ref 3.5–5)
POTASSIUM SERPL-SCNC: 4 MMOL/L — SIGNIFICANT CHANGE UP (ref 3.5–5)
POTASSIUM SERPL-SCNC: 5.8 MMOL/L — HIGH (ref 3.5–5)
PROT SERPL-MCNC: 6 G/DL — SIGNIFICANT CHANGE UP (ref 6–8)
RBC # BLD: 4.23 M/UL — LOW (ref 4.7–6.1)
RBC # BLD: 4.48 M/UL — LOW (ref 4.7–6.1)
RBC # FLD: 16.8 % — HIGH (ref 11.5–14.5)
RBC # FLD: 16.9 % — HIGH (ref 11.5–14.5)
SAO2 % BLDV: 50 % — SIGNIFICANT CHANGE UP
SODIUM SERPL-SCNC: 137 MMOL/L — SIGNIFICANT CHANGE UP (ref 135–146)
SODIUM SERPL-SCNC: 142 MMOL/L — SIGNIFICANT CHANGE UP (ref 135–146)
WBC # BLD: 12.21 K/UL — HIGH (ref 4.8–10.8)
WBC # BLD: 8.31 K/UL — SIGNIFICANT CHANGE UP (ref 4.8–10.8)
WBC # FLD AUTO: 12.21 K/UL — HIGH (ref 4.8–10.8)
WBC # FLD AUTO: 8.31 K/UL — SIGNIFICANT CHANGE UP (ref 4.8–10.8)

## 2018-09-18 PROCEDURE — 93970 EXTREMITY STUDY: CPT | Mod: 26

## 2018-09-18 RX ORDER — METRONIDAZOLE 500 MG
500 TABLET ORAL EVERY 8 HOURS
Qty: 0 | Refills: 0 | Status: DISCONTINUED | OUTPATIENT
Start: 2018-09-18 | End: 2018-09-19

## 2018-09-18 RX ORDER — DEXTROSE 50 % IN WATER 50 %
25 SYRINGE (ML) INTRAVENOUS ONCE
Qty: 0 | Refills: 0 | Status: DISCONTINUED | OUTPATIENT
Start: 2018-09-18 | End: 2018-09-21

## 2018-09-18 RX ORDER — ATORVASTATIN CALCIUM 80 MG/1
80 TABLET, FILM COATED ORAL AT BEDTIME
Qty: 0 | Refills: 0 | Status: DISCONTINUED | OUTPATIENT
Start: 2018-09-18 | End: 2018-09-21

## 2018-09-18 RX ORDER — SODIUM CHLORIDE 9 MG/ML
1000 INJECTION, SOLUTION INTRAVENOUS
Qty: 0 | Refills: 0 | Status: DISCONTINUED | OUTPATIENT
Start: 2018-09-18 | End: 2018-09-19

## 2018-09-18 RX ORDER — NYSTATIN CREAM 100000 [USP'U]/G
1 CREAM TOPICAL
Qty: 0 | Refills: 0 | Status: DISCONTINUED | OUTPATIENT
Start: 2018-09-18 | End: 2018-09-21

## 2018-09-18 RX ORDER — AZTREONAM 2 G
2000 VIAL (EA) INJECTION EVERY 8 HOURS
Qty: 0 | Refills: 0 | Status: DISCONTINUED | OUTPATIENT
Start: 2018-09-18 | End: 2018-09-19

## 2018-09-18 RX ORDER — INFLUENZA VIRUS VACCINE 15; 15; 15; 15 UG/.5ML; UG/.5ML; UG/.5ML; UG/.5ML
0.5 SUSPENSION INTRAMUSCULAR ONCE
Qty: 0 | Refills: 0 | Status: COMPLETED | OUTPATIENT
Start: 2018-09-18 | End: 2018-09-21

## 2018-09-18 RX ORDER — ENOXAPARIN SODIUM 100 MG/ML
40 INJECTION SUBCUTANEOUS EVERY 24 HOURS
Qty: 0 | Refills: 0 | Status: DISCONTINUED | OUTPATIENT
Start: 2018-09-18 | End: 2018-09-21

## 2018-09-18 RX ORDER — SODIUM CHLORIDE 9 MG/ML
1000 INJECTION, SOLUTION INTRAVENOUS
Qty: 0 | Refills: 0 | Status: DISCONTINUED | OUTPATIENT
Start: 2018-09-18 | End: 2018-09-21

## 2018-09-18 RX ORDER — DIPHENHYDRAMINE HCL 50 MG
25 CAPSULE ORAL EVERY 6 HOURS
Qty: 0 | Refills: 0 | Status: DISCONTINUED | OUTPATIENT
Start: 2018-09-18 | End: 2018-09-19

## 2018-09-18 RX ORDER — DEXTROSE 50 % IN WATER 50 %
12.5 SYRINGE (ML) INTRAVENOUS ONCE
Qty: 0 | Refills: 0 | Status: DISCONTINUED | OUTPATIENT
Start: 2018-09-18 | End: 2018-09-21

## 2018-09-18 RX ORDER — ACETAMINOPHEN 500 MG
650 TABLET ORAL ONCE
Qty: 0 | Refills: 0 | Status: COMPLETED | OUTPATIENT
Start: 2018-09-18 | End: 2018-09-18

## 2018-09-18 RX ORDER — SODIUM CHLORIDE 9 MG/ML
1000 INJECTION, SOLUTION INTRAVENOUS ONCE
Qty: 0 | Refills: 0 | Status: COMPLETED | OUTPATIENT
Start: 2018-09-18 | End: 2018-09-18

## 2018-09-18 RX ORDER — INSULIN LISPRO 100/ML
VIAL (ML) SUBCUTANEOUS
Qty: 0 | Refills: 0 | Status: DISCONTINUED | OUTPATIENT
Start: 2018-09-18 | End: 2018-09-21

## 2018-09-18 RX ORDER — DEXTROSE 50 % IN WATER 50 %
15 SYRINGE (ML) INTRAVENOUS ONCE
Qty: 0 | Refills: 0 | Status: DISCONTINUED | OUTPATIENT
Start: 2018-09-18 | End: 2018-09-21

## 2018-09-18 RX ORDER — INSULIN LISPRO 100/ML
16 VIAL (ML) SUBCUTANEOUS
Qty: 0 | Refills: 0 | Status: DISCONTINUED | OUTPATIENT
Start: 2018-09-18 | End: 2018-09-21

## 2018-09-18 RX ORDER — ASPIRIN/CALCIUM CARB/MAGNESIUM 324 MG
81 TABLET ORAL DAILY
Qty: 0 | Refills: 0 | Status: DISCONTINUED | OUTPATIENT
Start: 2018-09-18 | End: 2018-09-21

## 2018-09-18 RX ORDER — INSULIN GLARGINE 100 [IU]/ML
46 INJECTION, SOLUTION SUBCUTANEOUS AT BEDTIME
Qty: 0 | Refills: 0 | Status: DISCONTINUED | OUTPATIENT
Start: 2018-09-18 | End: 2018-09-21

## 2018-09-18 RX ORDER — GLUCAGON INJECTION, SOLUTION 0.5 MG/.1ML
1 INJECTION, SOLUTION SUBCUTANEOUS ONCE
Qty: 0 | Refills: 0 | Status: DISCONTINUED | OUTPATIENT
Start: 2018-09-18 | End: 2018-09-21

## 2018-09-18 RX ADMIN — Medication 100 MILLIGRAM(S): at 02:00

## 2018-09-18 RX ADMIN — Medication 1 TABLET(S): at 12:58

## 2018-09-18 RX ADMIN — Medication 650 MILLIGRAM(S): at 03:38

## 2018-09-18 RX ADMIN — Medication 16 UNIT(S): at 08:57

## 2018-09-18 RX ADMIN — MORPHINE SULFATE 4 MILLIGRAM(S): 50 CAPSULE, EXTENDED RELEASE ORAL at 01:15

## 2018-09-18 RX ADMIN — Medication 100 MILLIGRAM(S): at 01:00

## 2018-09-18 RX ADMIN — Medication 100 MILLIGRAM(S): at 14:48

## 2018-09-18 RX ADMIN — MORPHINE SULFATE 4 MILLIGRAM(S): 50 CAPSULE, EXTENDED RELEASE ORAL at 01:00

## 2018-09-18 RX ADMIN — Medication 2000 MILLIGRAM(S): at 03:00

## 2018-09-18 RX ADMIN — SODIUM CHLORIDE 1000 MILLILITER(S): 9 INJECTION, SOLUTION INTRAVENOUS at 03:33

## 2018-09-18 RX ADMIN — ENOXAPARIN SODIUM 40 MILLIGRAM(S): 100 INJECTION SUBCUTANEOUS at 12:57

## 2018-09-18 RX ADMIN — SODIUM CHLORIDE 100 MILLILITER(S): 9 INJECTION, SOLUTION INTRAVENOUS at 12:58

## 2018-09-18 RX ADMIN — Medication 500 MILLIGRAM(S): at 02:00

## 2018-09-18 RX ADMIN — Medication 81 MILLIGRAM(S): at 12:57

## 2018-09-18 RX ADMIN — Medication 650 MILLIGRAM(S): at 02:58

## 2018-09-18 RX ADMIN — ATORVASTATIN CALCIUM 80 MILLIGRAM(S): 80 TABLET, FILM COATED ORAL at 21:20

## 2018-09-18 NOTE — ED ADULT NURSE NOTE - NSIMPLEMENTINTERV_GEN_ALL_ED
Implemented All Fall with Harm Risk Interventions:  [place] to call system. Call bell, personal items and telephone within reach. Instruct patient to call for assistance. Room bathroom lighting operational. Non-slip footwear when patient is off stretcher. Physically safe environment: no spills, clutter or unnecessary equipment. Stretcher in lowest position, wheels locked, appropriate side rails in place. Provide visual cue, wrist band, yellow gown, etc. Monitor gait and stability. Monitor for mental status changes and reorient to person, place, and time. Review medications for side effects contributing to fall risk. Reinforce activity limits and safety measures with patient and family. Provide visual clues: red socks.

## 2018-09-18 NOTE — H&P ADULT - ASSESSMENT
Admit for sepsis 2/2 superimposed infection on chronic venous stasis ulcers.    While in ED pt was found to have fever of 101.2, with leukocytosis of 12.2K  No Lactic acidosis, no hypotension    # Venous statis ulcers  - Burn evaluation pending  - Check blood cx    # Rash    # DM  - Insulin basal bolus; monitor FSG, target <140    # DLD - c/w atorvastatin    # Hx of CVA  - C/w aspirin & statin    # Hyperkalemia  - Repeat BMP    DVT ppx  CC+DASH diet  OOBTC Admit for sepsis 2/2 superimposed infection on chronic venous stasis ulcers.    While in ED pt was found to have fever of 101.2, with leukocytosis of 12.2K  No Lactic acidosis, no hypotension  Pt treated with Aztreonam & Flagyl    # Superimposed Cellulitis of chronic venous statis ulcers  - Pt seen by Burn PA - concerned for pseudomonas infection, no surgical intervention at this time, attending evaluation pending  - Check blood cx  - F/u b/l foot & Tib-fib Xray  - F/u venous & arterial duplex of b/l Lower ext    # Rash    # DM  - Insulin basal bolus; monitor FSG, target <140    # DLD - c/w atorvastatin    # Hx of CVA  - C/w aspirin & statin    # Hyperkalemia  - Repeat BMP    DVT ppx  CC+DASH diet  CHG bath daily & PRN  OOBTC  Fall risk Admit for sepsis 2/2 superimposed infection on chronic venous stasis ulcers.    While in ED pt was found to have fever of 101.2, with leukocytosis of 12.2K  No Lactic acidosis, no hypotension  Pt treated with Aztreonam & Flagyl    # Superimposed Cellulitis of chronic venous statis ulcers  - Pt seen by Burn PA - concerned for pseudomonas infection, no surgical intervention at this time, attending evaluation pending  - Check blood cx  - F/u b/l foot & Tib-fib Xray  - F/u venous & arterial duplex of b/l Lower ext    # Rash    # DM  - Insulin basal bolus; monitor FSG, target <140  - Continue with Aspirin in view of Diabetes and age > 40 yrs    # DLD - c/w atorvastatin    # Hx of CVA vs TIA (8/2018)  - C/w aspirin & statin    DVT ppx  CC+DASH diet  CHG bath daily & PRN  OOBTC  Fall risk 57/M from [facility], with hx f DM, DLD, chronic venous stasis with hx of stasis ulcers, obesity, CVA, presents to ED with b/l lower leg wounds, present since March 3/2018. Was prescribed augmentin as outpt - though lesions worsened after 4 days of treatment, and pt developed upper ext & abdominal rash. While in ED pt was found to have fever of 101.2, with leukocytosis of 12.2K. No Lactic acidosis, no hypotension. Pt treated with Aztreonam & Flagyl. Admit for sepsis 2/2 superimposed cellulitis on chronic venous stasis ulcers.    # Superimposed Cellulitis of chronic venous statis ulcers  - Pt seen by Burn PA - concerned for pseudomonas infection, no surgical intervention at this time, attending evaluation pending  - Check blood cx  - F/u b/l foot & Tib-fib Xray  - F/u venous & arterial duplex of b/l Lower ext - Possible underlying PVD?  - ID evaluation    # Rash - b/l Upper ext & abdomen  - Likely drug reaction, pt unsure when started - reports prev exposed to Penicillin medication without this type of reaction; no airway involvement  - PRN benadryl for puritis    # Obesity with intermittent wheeze - likely underlying BRANDY or Obesity Hyperventilation syndrome  - Pt had outpt sleep study - never followed up after requested as results were inconclusive  - Outpt f/u for repeat sleep study    # DM  - Insulin basal bolus; monitor FSG, target <140  - Continue with Aspirin in view of Diabetes and age > 40 yrs    # DLD - c/w atorvastatin    # Hx of CVA vs TIA (8/2018)  - C/w aspirin & statin    DVT ppx  CC+DASH diet  CHG bath daily & PRN  OOBTC  Fall risk    Full Code 57/M from [facility], with hx f DM, DLD, chronic venous stasis with hx of stasis ulcers, obesity, CVA, presents to ED with b/l lower leg wounds, present since March 3/2018. Was prescribed augmentin as outpt - though lesions worsened after 4 days of treatment, and pt developed upper ext & abdominal rash. While in ED pt was found to have fever of 101.2, with leukocytosis of 12.2K. No Lactic acidosis, no hypotension. Pt treated with Aztreonam & Flagyl. Admit for sepsis 2/2 superimposed cellulitis on chronic venous stasis ulcers.    # Superimposed Cellulitis of chronic venous statis ulcers  - Pt seen by Burn PA - concerned for pseudomonas infection, no surgical intervention at this time, attending evaluation pending  - Check blood cx  - F/u b/l foot & Tib-fib Xray  - F/u venous & arterial duplex of b/l Lower ext - Possible underlying PVD?  - ID evaluation  - Will continue with Aztreonam & Flagyl  - Check ESR & CRP    # Rash - b/l Upper ext & abdomen  - Likely drug reaction, pt unsure when started - reports prev exposed to Penicillin medication without this type of reaction; no airway involvement  - PRN benadryl for puritis    # Obesity with intermittent wheeze - likely underlying BRANDY or Obesity Hyperventilation syndrome  - Pt had outpt sleep study - never followed up after requested as results were inconclusive  - Outpt f/u for repeat sleep study    # DM  - Insulin basal bolus; monitor FSG, target <140  - Continue with Aspirin in view of Diabetes and age > 40 yrs    # DLD - c/w atorvastatin    # Hx of CVA vs TIA (8/2018)  - C/w aspirin & statin    DVT ppx  CC+DASH diet  CHG bath daily & PRN  OOBTC  Fall risk    Full Code 57/M from [facility], with hx f DM, DLD, chronic venous stasis with hx of stasis ulcers, obesity, CVA, presents to ED with b/l lower leg wounds, present since March 3/2018. Was prescribed augmentin as outpt - though lesions worsened after 4 days of treatment, and pt developed upper ext & abdominal rash. While in ED pt was found to have fever of 101.2, with leukocytosis of 12.2K. No Lactic acidosis, no hypotension. Pt treated with Aztreonam & Flagyl. Admit for sepsis 2/2 superimposed cellulitis on chronic venous stasis ulcers.    # Superimposed Cellulitis of chronic venous statis ulcers  - Pt seen by Burn PA - concerned for pseudomonas infection, no surgical intervention at this time, attending evaluation pending  - Check blood cx  - F/u b/l foot & Tib-fib Xray  - F/u venous & arterial duplex of b/l Lower ext - Possible underlying PVD?  - ID evaluation  - Will continue with Aztreonam & Flagyl  - Check ESR & CRP  - Pt on lasix at home for swelling of lower ext - no crackles on exam & TTE showed no heart failure - will hold for now in view of acute infection  - BP acceptable, no lactic acidosis - pt received 1L fluids; will continue with IV Fluids for now    # Rash - b/l Upper ext & abdomen  - Likely drug reaction, pt unsure when started - reports prev exposed to Penicillin medication without this type of reaction; no airway involvement  - PRN benadryl for puritis    # Obesity with intermittent wheeze - likely underlying BRANDY or Obesity Hyperventilation syndrome  - Pt had outpt sleep study - never followed up after requested as results were inconclusive  - Outpt f/u for repeat sleep study    # DM  - Insulin basal bolus; monitor FSG, target <140  - Continue with Aspirin in view of Diabetes and age > 40 yrs    # DLD - c/w atorvastatin    # Hx of CVA vs TIA (8/2018)  - C/w aspirin & statin    DVT ppx  CC+DASH diet  CHG bath daily & PRN  OOBTC  Fall risk    Full Code 57/M from [facility], with hx f DM, DLD, chronic venous stasis with hx of stasis ulcers, obesity, CVA, presents to ED with b/l lower leg wounds, present since March 3/2018. Was prescribed augmentin as outpt - though lesions worsened after 4 days of treatment, and pt developed upper ext & abdominal rash. While in ED pt was found to have fever of 101.2, with leukocytosis of 12.2K. No Lactic acidosis, no hypotension. Pt treated with Aztreonam & Flagyl. Admit for sepsis 2/2 superimposed cellulitis on chronic venous stasis ulcers.    # Superimposed Cellulitis of chronic venous statis ulcers  - Pt seen by Burn PA - concerned for pseudomonas infection, no surgical intervention at this time, attending evaluation pending  - Check blood cx  - F/u b/l foot & Tib-fib Xray  - F/u venous & arterial duplex of b/l Lower ext - Possible underlying PVD?  - ID evaluation  - Will continue with Aztreonam & Flagyl  - Check ESR & CRP  - Pt on lasix at home for swelling of lower ext - no crackles on exam & TTE showed no heart failure - will hold for now in view of acute infection  - BP acceptable, no lactic acidosis - pt received 1L fluids; will continue with IV Fluids for now    # Rash - b/l Upper ext & abdomen  - Likely drug reaction, pt unsure when started - reports prev exposed to Penicillin medication without this type of reaction; no airway involvement  - PRN benadryl for puritis    # Obesity with intermittent wheeze - likely underlying BRANDY or Obesity Hyperventilation syndrome  - Pt had outpt sleep study - never followed up after requested as results were inconclusive  - Outpt f/u for repeat sleep study    # DM  - Insulin basal bolus; monitor FSG, target <140  - Continue with Aspirin in view of Diabetes and age > 40 yrs    # DLD - c/w atorvastatin    # Hx of CVA vs TIA (8/2018)  - C/w aspirin & statin    # Groin / under flap of fat panus rash / irritiation  - Continue with nystatin cream    Disposition - pt went o [facility] assisted living after passing of his mother as he lived in her house and the house was sold (3/2018). Once medically stable pt is to go back to [facility]. Pt uses walker to ambulate, pt would benefit from PT/Rehab once stable.    DVT ppx  CC+DASH diet  CHG bath daily & PRN  OOBTC  Fall risk    Full Code

## 2018-09-18 NOTE — H&P ADULT - HISTORY OF PRESENT ILLNESS
57/M from [facility], with hx f DM, DLD, chronic venous stasis with hx of stasis ulcers, obesity, CVA, presents to ED with b/l lower leg wounds, present since March 3/2018.     Leg wounds have been being treated while at [facility], though his wound care coverage was discontinued, and last wound care dressing change was last Thursday 9/13/18.  4 days ago he started a course of Augmentin for treatmennt of leg lesions (prescribed for a total of 5 days); he developed a rash on his body, but is not sure when it started.      He denies chest pain, SOB, abdominal pain. 57/M from [facility], with hx f DM, DLD, chronic venous stasis with hx of stasis ulcers, obesity, CVA, presents to ED with b/l lower leg wounds, present since March 3/2018.     Leg wounds have been being treated while at [facility], though his wound care coverage was discontinued, and last wound care dressing change was last Thursday 9/13/18. Wounds are from ankle to below knee on b/l LE, circumferential, a/w clear liquid oozing, a/w redness & pink discoloration, no trauma, denies fever. 4 days ago he started a course of Augmentin for treatmennt of leg lesions (prescribed for a total of 5 days); he developed a rash on his body - b/l upper ext & abdomen a/w puritis - pink in color, no open lesions, no SOB; but he is not sure when it started.      He denies chest pain, SOB, abdominal pain, urinary symptoms, trauma.

## 2018-09-18 NOTE — H&P ADULT - NSHPPHYSICALEXAM_GEN_ALL_CORE
Vital Signs Last 24 Hrs  T(C): 36.7 (18 Sep 2018 07:22), Max: 38.4 (17 Sep 2018 22:33)  T(F): 98.1 (18 Sep 2018 07:22), Max: 101.2 (17 Sep 2018 22:33)  HR: 92 (18 Sep 2018 07:22) (92 - 110)  BP: 128/61 (18 Sep 2018 07:22) (107/51 - 128/61)  RR: 18 (18 Sep 2018 07:22) (18 - 20)  SpO2: 96% (18 Sep 2018 07:22) (96% - 99%) Vital Signs Last 24 Hrs  T(C): 36.7 (18 Sep 2018 07:22), Max: 38.4 (17 Sep 2018 22:33)  T(F): 98.1 (18 Sep 2018 07:22), Max: 101.2 (17 Sep 2018 22:33)  HR: 92 (18 Sep 2018 07:22) (92 - 110)  BP: 128/61 (18 Sep 2018 07:22) (107/51 - 128/61)  RR: 18 (18 Sep 2018 07:22) (18 - 20)  SpO2: 96% (18 Sep 2018 07:22) (96% - 99%)    PHYSICAL EXAM:  GENERAL: NAD, obese, short sentences, no signs of respiratory distress  HEAD:  Atraumatic, Normocephalic  EYES: EOMI, conjunctiva and sclera clear  NECK: large thick neck  CHEST/LUNG: Clear to auscultation bilaterally; No wheeze; No crackles; No accessory muscles used  HEART: Regular rate and rhythm; No murmurs;   ABDOMEN: Soft, Nontender, Nondistended; Bowel sounds present; No guarding  EXTREMITIES:  b/l LE distal from below knee to above ankle with circumferential erythema and clear liquid drainage, foul smelling, no solitary ulcer detected; decreased dital LE pulses b/l right worse than left, b/l feet are warm, non-tender & intact sensation  PSYCH: AAOx3  NEUROLOGY: non-focal  SKIN: b/l UE distal scaling and proximal pink coalesced pigmentation, abdomen with excoriation and mild coalesced area of pink pigmentation

## 2018-09-18 NOTE — ED ADULT NURSE NOTE - OBJECTIVE STATEMENT
pt from [facility], sent in for B/L LE wounds and pain since march. pt has been having dressings changed at [facility]. Pt last wound care was on Thursday. presents to ED with fever.

## 2018-09-18 NOTE — ED ADULT NURSE NOTE - PMH
Asthma    CVA (cerebral vascular accident)    Diabetes    HLD (hyperlipidemia)    Morbid obesity    Osteoarthritis

## 2018-09-18 NOTE — H&P ADULT - ATTENDING COMMENTS
57/M from [facility], with hx f DM, DLD, chronic venous stasis with hx of stasis ulcers, obesity, CVA, presents to ED with b/l lower leg wounds, present since March 3/2018.     Pt seen in ER 13b; had been incontinent    chart reviewed; agree with initial plan    wound care    glycemic control    IV abx    Burn eval    hygeine

## 2018-09-18 NOTE — H&P ADULT - NSHPLABSRESULTS_GEN_ALL_CORE
11.1   12.21 )-----------( 359      ( 17 Sep 2018 23:27 )             37.0       09-17    137  |  100  |  14  ----------------------------<  197<H>  5.8<H>   |  21  |  0.9    Ca    8.2<L>      17 Sep 2018 23:27          Lactate Trend  09-17 @ 23:20 Lactate:1.9    TTE 8/2018:  1. Left ventricular ejection fraction, by visual estimation, is 60 to 65%.

## 2018-09-18 NOTE — H&P ADULT - NSHPSOCIALHISTORY_GEN_ALL_CORE
lives at [facility] assisted living  x-cigar smoker since 1990s - 2 cigars per day (initially 10 cigars per day when he started)  No ETOH no drugs  Uses walker

## 2018-09-18 NOTE — ED ADULT NURSE NOTE - CHIEF COMPLAINT QUOTE
Patient BIBA from [facility] for bilateral leg pain and wounds since March, worsening this week. Patient reports worsening pain and increased drainage from wounds.

## 2018-09-18 NOTE — H&P ADULT - NSHPREVIEWOFSYSTEMS_GEN_ALL_CORE
REVIEW OF SYSTEMS:    CONSTITUTIONAL: No weakness or fevers  EYES/ENT: No visual changes  NECK: No pain or stiffness  RESPIRATORY: SOB when lying flat  CARDIOVASCULAR: No chest pain or palpitations, no lower extremity edema  GASTROINTESTINAL: No abdominal pain. No nausea or vomiting; No diarrhea or constipation. No bloody or black colored stool  GENITOURINARY: No pain with urination, no increased urinary frequency, no dark o r bloody urine  NEUROLOGICAL: No numbness or weakness  SKIN: see hpi

## 2018-09-18 NOTE — H&P ADULT - FAMILY HISTORY
Father  Still living? Unknown  Family history of obesity, Age at diagnosis: Age Unknown     Sibling  Still living? Unknown  Family history of obesity, Age at diagnosis: Age Unknown Father  Still living? Unknown  Family history of obesity, Age at diagnosis: Age Unknown  Family history of heart disease, Age at diagnosis: Age Unknown     Sibling  Still living? No  Family history of obesity, Age at diagnosis: Age Unknown  Family history of heart disease, Age at diagnosis: Age Unknown     Mother  Still living? Unknown  Family history of heart disease, Age at diagnosis: Age Unknown

## 2018-09-19 LAB
ANION GAP SERPL CALC-SCNC: 13 MMOL/L — SIGNIFICANT CHANGE UP (ref 7–14)
APTT BLD: 30.5 SEC — SIGNIFICANT CHANGE UP (ref 27–39.2)
BASOPHILS # BLD AUTO: 0.01 K/UL — SIGNIFICANT CHANGE UP (ref 0–0.2)
BASOPHILS NFR BLD AUTO: 0.1 % — SIGNIFICANT CHANGE UP (ref 0–1)
BUN SERPL-MCNC: 7 MG/DL — LOW (ref 10–20)
CALCIUM SERPL-MCNC: 8.1 MG/DL — LOW (ref 8.5–10.1)
CHLORIDE SERPL-SCNC: 100 MMOL/L — SIGNIFICANT CHANGE UP (ref 98–110)
CO2 SERPL-SCNC: 28 MMOL/L — SIGNIFICANT CHANGE UP (ref 17–32)
CREAT SERPL-MCNC: 0.5 MG/DL — LOW (ref 0.7–1.5)
EOSINOPHIL # BLD AUTO: 0.19 K/UL — SIGNIFICANT CHANGE UP (ref 0–0.7)
EOSINOPHIL NFR BLD AUTO: 2.8 % — SIGNIFICANT CHANGE UP (ref 0–8)
ERYTHROCYTE [SEDIMENTATION RATE] IN BLOOD: 89 MM/HR — HIGH (ref 0–10)
GLUCOSE BLDC GLUCOMTR-MCNC: 101 MG/DL — HIGH (ref 70–99)
GLUCOSE BLDC GLUCOMTR-MCNC: 103 MG/DL — HIGH (ref 70–99)
GLUCOSE BLDC GLUCOMTR-MCNC: 111 MG/DL — HIGH (ref 70–99)
GLUCOSE BLDC GLUCOMTR-MCNC: 112 MG/DL — HIGH (ref 70–99)
GLUCOSE SERPL-MCNC: 105 MG/DL — HIGH (ref 70–99)
HCT VFR BLD CALC: 34.3 % — LOW (ref 42–52)
HGB BLD-MCNC: 10.2 G/DL — LOW (ref 14–18)
IMM GRANULOCYTES NFR BLD AUTO: 0.4 % — HIGH (ref 0.1–0.3)
INR BLD: 1.29 RATIO — SIGNIFICANT CHANGE UP (ref 0.65–1.3)
LYMPHOCYTES # BLD AUTO: 1.08 K/UL — LOW (ref 1.2–3.4)
LYMPHOCYTES # BLD AUTO: 15.9 % — LOW (ref 20.5–51.1)
MAGNESIUM SERPL-MCNC: 2.4 MG/DL — SIGNIFICANT CHANGE UP (ref 1.8–2.4)
MCHC RBC-ENTMCNC: 24.8 PG — LOW (ref 27–31)
MCHC RBC-ENTMCNC: 29.7 G/DL — LOW (ref 32–37)
MCV RBC AUTO: 83.3 FL — SIGNIFICANT CHANGE UP (ref 80–94)
MONOCYTES # BLD AUTO: 0.39 K/UL — SIGNIFICANT CHANGE UP (ref 0.1–0.6)
MONOCYTES NFR BLD AUTO: 5.7 % — SIGNIFICANT CHANGE UP (ref 1.7–9.3)
NEUTROPHILS # BLD AUTO: 5.09 K/UL — SIGNIFICANT CHANGE UP (ref 1.4–6.5)
NEUTROPHILS NFR BLD AUTO: 75.1 % — SIGNIFICANT CHANGE UP (ref 42.2–75.2)
PHOSPHATE SERPL-MCNC: 3.7 MG/DL — SIGNIFICANT CHANGE UP (ref 2.1–4.9)
PLATELET # BLD AUTO: 332 K/UL — SIGNIFICANT CHANGE UP (ref 130–400)
POTASSIUM SERPL-MCNC: 4.4 MMOL/L — SIGNIFICANT CHANGE UP (ref 3.5–5)
POTASSIUM SERPL-SCNC: 4.4 MMOL/L — SIGNIFICANT CHANGE UP (ref 3.5–5)
PROTHROM AB SERPL-ACNC: 13.9 SEC — HIGH (ref 9.95–12.87)
RBC # BLD: 4.12 M/UL — LOW (ref 4.7–6.1)
RBC # FLD: 16.5 % — HIGH (ref 11.5–14.5)
SODIUM SERPL-SCNC: 141 MMOL/L — SIGNIFICANT CHANGE UP (ref 135–146)
WBC # BLD: 6.79 K/UL — SIGNIFICANT CHANGE UP (ref 4.8–10.8)
WBC # FLD AUTO: 6.79 K/UL — SIGNIFICANT CHANGE UP (ref 4.8–10.8)

## 2018-09-19 RX ORDER — CEFEPIME 1 G/1
2000 INJECTION, POWDER, FOR SOLUTION INTRAMUSCULAR; INTRAVENOUS ONCE
Qty: 0 | Refills: 0 | Status: COMPLETED | OUTPATIENT
Start: 2018-09-19 | End: 2018-09-19

## 2018-09-19 RX ORDER — MEROPENEM 1 G/30ML
1000 INJECTION INTRAVENOUS EVERY 8 HOURS
Qty: 0 | Refills: 0 | Status: DISCONTINUED | OUTPATIENT
Start: 2018-09-19 | End: 2018-09-19

## 2018-09-19 RX ORDER — MEROPENEM 1 G/30ML
1000 INJECTION INTRAVENOUS ONCE
Qty: 0 | Refills: 0 | Status: COMPLETED | OUTPATIENT
Start: 2018-09-19 | End: 2018-09-19

## 2018-09-19 RX ORDER — VANCOMYCIN HCL 1 G
VIAL (EA) INTRAVENOUS
Qty: 0 | Refills: 0 | Status: DISCONTINUED | OUTPATIENT
Start: 2018-09-19 | End: 2018-09-19

## 2018-09-19 RX ORDER — VANCOMYCIN HCL 1 G
1250 VIAL (EA) INTRAVENOUS ONCE
Qty: 0 | Refills: 0 | Status: DISCONTINUED | OUTPATIENT
Start: 2018-09-19 | End: 2018-09-19

## 2018-09-19 RX ORDER — VANCOMYCIN HCL 1 G
1250 VIAL (EA) INTRAVENOUS EVERY 12 HOURS
Qty: 0 | Refills: 0 | Status: DISCONTINUED | OUTPATIENT
Start: 2018-09-19 | End: 2018-09-20

## 2018-09-19 RX ORDER — CEFEPIME 1 G/1
2000 INJECTION, POWDER, FOR SOLUTION INTRAMUSCULAR; INTRAVENOUS EVERY 12 HOURS
Qty: 0 | Refills: 0 | Status: DISCONTINUED | OUTPATIENT
Start: 2018-09-20 | End: 2018-09-21

## 2018-09-19 RX ORDER — DIPHENHYDRAMINE HCL 50 MG
50 CAPSULE ORAL EVERY 6 HOURS
Qty: 0 | Refills: 0 | Status: DISCONTINUED | OUTPATIENT
Start: 2018-09-19 | End: 2018-09-20

## 2018-09-19 RX ORDER — CEFEPIME 1 G/1
INJECTION, POWDER, FOR SOLUTION INTRAMUSCULAR; INTRAVENOUS
Qty: 0 | Refills: 0 | Status: DISCONTINUED | OUTPATIENT
Start: 2018-09-19 | End: 2018-09-21

## 2018-09-19 RX ORDER — MEROPENEM 1 G/30ML
INJECTION INTRAVENOUS
Qty: 0 | Refills: 0 | Status: DISCONTINUED | OUTPATIENT
Start: 2018-09-19 | End: 2018-09-19

## 2018-09-19 RX ADMIN — NYSTATIN CREAM 1 APPLICATION(S): 100000 CREAM TOPICAL at 06:04

## 2018-09-19 RX ADMIN — CEFEPIME 100 MILLIGRAM(S): 1 INJECTION, POWDER, FOR SOLUTION INTRAMUSCULAR; INTRAVENOUS at 15:00

## 2018-09-19 RX ADMIN — ATORVASTATIN CALCIUM 80 MILLIGRAM(S): 80 TABLET, FILM COATED ORAL at 21:34

## 2018-09-19 RX ADMIN — Medication 100 MILLIGRAM(S): at 08:24

## 2018-09-19 RX ADMIN — Medication 16 UNIT(S): at 11:42

## 2018-09-19 RX ADMIN — MEROPENEM 100 MILLIGRAM(S): 1 INJECTION INTRAVENOUS at 09:25

## 2018-09-19 RX ADMIN — Medication 50 MILLIGRAM(S): at 18:34

## 2018-09-19 RX ADMIN — Medication 166.67 MILLIGRAM(S): at 20:34

## 2018-09-19 RX ADMIN — Medication 16 UNIT(S): at 17:38

## 2018-09-19 RX ADMIN — Medication 100 MILLIGRAM(S): at 00:01

## 2018-09-19 RX ADMIN — Medication 100 MILLIGRAM(S): at 05:59

## 2018-09-19 RX ADMIN — Medication 100 MILLIGRAM(S): at 15:02

## 2018-09-19 RX ADMIN — MEROPENEM 100 MILLIGRAM(S): 1 INJECTION INTRAVENOUS at 14:00

## 2018-09-19 RX ADMIN — INSULIN GLARGINE 46 UNIT(S): 100 INJECTION, SOLUTION SUBCUTANEOUS at 21:34

## 2018-09-19 RX ADMIN — Medication 1 TABLET(S): at 11:43

## 2018-09-19 RX ADMIN — ENOXAPARIN SODIUM 40 MILLIGRAM(S): 100 INJECTION SUBCUTANEOUS at 12:11

## 2018-09-19 RX ADMIN — Medication 16 UNIT(S): at 09:23

## 2018-09-19 RX ADMIN — INSULIN GLARGINE 46 UNIT(S): 100 INJECTION, SOLUTION SUBCUTANEOUS at 00:07

## 2018-09-19 RX ADMIN — NYSTATIN CREAM 1 APPLICATION(S): 100000 CREAM TOPICAL at 17:38

## 2018-09-19 RX ADMIN — Medication 81 MILLIGRAM(S): at 11:44

## 2018-09-19 RX ADMIN — Medication 100 MILLIGRAM(S): at 01:09

## 2018-09-19 NOTE — PROGRESS NOTE ADULT - SUBJECTIVE AND OBJECTIVE BOX
OVERNIGHT EVENTS: None    Subjective:   pt s/e at bedside. Pt states that a home health care nurse was arranged for wound care however she stated that he was not on her list so he states he was not able to properly care for his LE.     HISTORY OF PRESENTING ILLNESS:   Outpatient Providers	PMD - dr. Issa	    Reason for Admission: superimposed infection on chronic venous stasis ulcers	  History of Present Illness: 	  57/M from [facility], with hx f DM, DLD, chronic venous stasis with hx of stasis ulcers, obesity, CVA, presents to ED with b/l lower leg wounds, present since March 3/2018.     Leg wounds have been being treated while at [facility], though his wound care coverage was discontinued, and last wound care dressing change was last Thursday 9/13/18. Wounds are from ankle to below knee on b/l LE, circumferential, a/w clear liquid oozing, a/w redness & pink discoloration, no trauma, denies fever. 4 days ago he started a course of Augmentin for treatmennt of leg lesions (prescribed for a total of 5 days); he developed a rash on his body - b/l upper ext & abdomen a/w puritis - pink in color, no open lesions, no SOB; but he is not sure when it started.      He denies chest pain, SOB, abdominal pain, urinary symptoms, trauma    Past Medical History:  Asthma    CVA (cerebral vascular accident)    Diabetes    HLD (hyperlipidemia)    Morbid obesity    Osteoarthritis.    Past Surgical History:  H/O umbilical hernia repair.  MEDICATIONS:  STANDING MEDICATIONS  aspirin enteric coated 81 milliGRAM(s) Oral daily  atorvastatin 80 milliGRAM(s) Oral at bedtime  dextrose 5%. 1000 milliLiter(s) IV Continuous <Continuous>  dextrose 50% Injectable 12.5 Gram(s) IV Push once  dextrose 50% Injectable 25 Gram(s) IV Push once  dextrose 50% Injectable 25 Gram(s) IV Push once  enoxaparin Injectable 40 milliGRAM(s) SubCutaneous every 24 hours  influenza   Vaccine 0.5 milliLiter(s) IntraMuscular once  insulin glargine Injectable (LANTUS) 46 Unit(s) SubCutaneous at bedtime  insulin lispro (HumaLOG) corrective regimen sliding scale   SubCutaneous three times a day before meals  insulin lispro Injectable (HumaLOG) 16 Unit(s) SubCutaneous three times a day before meals  meropenem  IVPB      meropenem  IVPB 1000 milliGRAM(s) IV Intermittent every 8 hours  metroNIDAZOLE  IVPB 500 milliGRAM(s) IV Intermittent every 8 hours  multivitamin 1 Tablet(s) Oral daily  nystatin Cream 1 Application(s) Topical two times a day    PRN MEDICATIONS  dextrose 40% Gel 15 Gram(s) Oral once PRN  diphenhydrAMINE   Capsule 25 milliGRAM(s) Oral every 6 hours PRN  glucagon  Injectable 1 milliGRAM(s) IntraMuscular once PRN    VITALS:   T(F): 97.4  HR: 86  BP: 123/60  RR: 18  SpO2: 98%    LABS:                        10.2   6.79  )-----------( 332      ( 19 Sep 2018 07:16 )             34.3     09-19    141  |  100  |  7<L>  ----------------------------<  105<H>  4.4   |  28  |  0.5<L>    Ca    8.1<L>      19 Sep 2018 07:16  Phos  3.7     09-19  Mg     2.4     09-19    TPro  6.0  /  Alb  3.1<L>  /  TBili  0.5  /  DBili  x   /  AST  9   /  ALT  10  /  AlkPhos  105  09-18    PT/INR - ( 19 Sep 2018 07:16 )   PT: 13.90 sec;   INR: 1.29 ratio         PTT - ( 19 Sep 2018 07:16 )  PTT:30.5 sec      Sedimentation Rate, Erythrocyte: 89 mm/Hr <H> (09-19-18 @ 07:16)      Culture - Other (collected 18 Sep 2018 00:16)  Source: .Other Right lower extremity wounds  Preliminary Report (19 Sep 2018 11:09):    Numerous Staphylococcus aureus    Moderate Mixed gram negative rods including Pseudomonas aeruginosa          RADIOLOGY:    PHYSICAL EXAM:  GEN: No acute distress  HEENT:   LUNGS: Clear to auscultation bilaterally   HEART: S1/S2 present. RRR.   ABD: Soft, non-tender, non-distended. Bowel sounds present  EXT:  NEURO: AAOX3 OVERNIGHT EVENTS: None    Subjective:   pt s/e at bedside. Pt states that a home health care nurse was arranged for wound care however she stated that he was not on her list so he states he was not able to properly care for his LE.     HISTORY OF PRESENTING ILLNESS:   Outpatient Providers	PMD - dr. Issa	    Reason for Admission: superimposed infection on chronic venous stasis ulcers	  History of Present Illness: 	  57/M from [facility], with hx f DM, DLD, chronic venous stasis with hx of stasis ulcers, obesity, CVA, presents to ED with b/l lower leg wounds, present since March 3/2018.     Leg wounds have been being treated while at [facility], though his wound care coverage was discontinued, and last wound care dressing change was last Thursday 9/13/18. Wounds are from ankle to below knee on b/l LE, circumferential, a/w clear liquid oozing, a/w redness & pink discoloration, no trauma, denies fever. 4 days ago he started a course of Augmentin for treatmennt of leg lesions (prescribed for a total of 5 days); he developed a rash on his body - b/l upper ext & abdomen a/w puritis - pink in color, no open lesions, no SOB; but he is not sure when it started.      He denies chest pain, SOB, abdominal pain, urinary symptoms, trauma    Past Medical History:  Asthma    CVA (cerebral vascular accident)    Diabetes    HLD (hyperlipidemia)    Morbid obesity    Osteoarthritis.    Past Surgical History:  H/O umbilical hernia repair.  MEDICATIONS:  STANDING MEDICATIONS  aspirin enteric coated 81 milliGRAM(s) Oral daily  atorvastatin 80 milliGRAM(s) Oral at bedtime  dextrose 5%. 1000 milliLiter(s) IV Continuous <Continuous>  dextrose 50% Injectable 12.5 Gram(s) IV Push once  dextrose 50% Injectable 25 Gram(s) IV Push once  dextrose 50% Injectable 25 Gram(s) IV Push once  enoxaparin Injectable 40 milliGRAM(s) SubCutaneous every 24 hours  influenza   Vaccine 0.5 milliLiter(s) IntraMuscular once  insulin glargine Injectable (LANTUS) 46 Unit(s) SubCutaneous at bedtime  insulin lispro (HumaLOG) corrective regimen sliding scale   SubCutaneous three times a day before meals  insulin lispro Injectable (HumaLOG) 16 Unit(s) SubCutaneous three times a day before meals  meropenem  IVPB      meropenem  IVPB 1000 milliGRAM(s) IV Intermittent every 8 hours  metroNIDAZOLE  IVPB 500 milliGRAM(s) IV Intermittent every 8 hours  multivitamin 1 Tablet(s) Oral daily  nystatin Cream 1 Application(s) Topical two times a day    PRN MEDICATIONS  dextrose 40% Gel 15 Gram(s) Oral once PRN  diphenhydrAMINE   Capsule 25 milliGRAM(s) Oral every 6 hours PRN  glucagon  Injectable 1 milliGRAM(s) IntraMuscular once PRN    VITALS:   T(F): 97.4  HR: 86  BP: 123/60  RR: 18  SpO2: 98%    LABS:                        10.2   6.79  )-----------( 332      ( 19 Sep 2018 07:16 )             34.3     09-19    141  |  100  |  7<L>  ----------------------------<  105<H>  4.4   |  28  |  0.5<L>    Ca    8.1<L>      19 Sep 2018 07:16  Phos  3.7     09-19  Mg     2.4     09-19    TPro  6.0  /  Alb  3.1<L>  /  TBili  0.5  /  DBili  x   /  AST  9   /  ALT  10  /  AlkPhos  105  09-18    PT/INR - ( 19 Sep 2018 07:16 )   PT: 13.90 sec;   INR: 1.29 ratio         PTT - ( 19 Sep 2018 07:16 )  PTT:30.5 sec      Sedimentation Rate, Erythrocyte: 89 mm/Hr <H> (09-19-18 @ 07:16)      Culture - Other (collected 18 Sep 2018 00:16)  Source: .Other Right lower extremity wounds  Preliminary Report (19 Sep 2018 11:09):    Numerous Staphylococcus aureus    Moderate Mixed gram negative rods including Pseudomonas aeruginosa          RADIOLOGY:    PHYSICAL EXAM:  GENERAL: No acute distress, well-developed, morbidly obese  HEAD:  Atraumatic, Normocephalic  EYES: EOMI, PERRLA, conjunctiva and sclera clear  NECK: Supple, no lymphadenopathy, no JVD  CHEST/LUNG: CTAB; No wheezes, rales, or rhonchi  HEART: Regular rate and rhythm; No murmurs, rubs, or gallops  ABDOMEN: Soft, non-tender, non-distended; normal bowel sounds, no organomegaly  EXTREMITIES:  multiple circumferential venus stasis ulcerations lower leg b/l, - hyperkeratotic border, wound base granular, wound are superficial and limited to dermis layer, Dorsalis Pedis and Posterior Tibial pulses nonpalpable  NEUROLOGY: A&O x 3, no focal deficits  SKIN: No rashes or lesions

## 2018-09-19 NOTE — CONSULT NOTE ADULT - SUBJECTIVE AND OBJECTIVE BOX
56 yo male with chronic venous stasis ulcers--> c/o pain    PE: Bilateral lower legs--> edema and mild erythema and tenderness with ulcers in various stages of healing

## 2018-09-19 NOTE — CONSULT NOTE ADULT - ASSESSMENT
chronic venous stasis ulcers bilateral lower legs --> haling    rec: soap and water qd, xeroform gauze, kerlex, ace wraps dressing change bid    no surgery needed

## 2018-09-19 NOTE — CONSULT NOTE ADULT - ASSESSMENT
57M    DM  CVA  Recent augmentin course c/b rash    Admitted with worsening LE cellulitis  Sepsis on admission T38.4 P 110 WBC 12  wcx  Numerous Staphylococcus aureus    Moderate Mixed gram negative rods including Pseudomonas aeruginosa    - Cefepime 2g q12h  - Vanc 1.25 q12h  - Please check vanc trough 30 min prior to 4th dose  - F/u staph aureus & Pseud sensi

## 2018-09-19 NOTE — PROGRESS NOTE ADULT - ASSESSMENT
chronic venous stasis ulcers bilateral lower legs -->     burn eval  ID eval   continue iv abx, continmue current treatment  Pt seen by Burn PA - concerned for pseudomonas infection, no surgical intervention at this time, attending evaluation pending  - Check blood cx  - F/u b/l foot & Tib-fib Xray  - F/u venous & arterial duplex of b/l Lower ext - Possible underlying PVD?  - ID evaluation  - Will continue with Aztreonam & Flagyl  - Check ESR & CRP  - Pt on lasix at home for swelling of lower ext - no crackles on exam & TTE showed no heart failure - will hold for now in view of acute infection  - BP acceptable, no lactic acidosis - pt received 1L fluids; will continue with IV Fluids for now    # Rash - b/l Upper ext & abdomen  - Likely drug reaction, pt unsure when started - reports prev exposed to Penicillin medication without this type of reaction; no airway involvement  - PRN benadryl for puritis    # Obesity with intermittent wheeze - likely underlying BRANDY or Obesity Hyperventilation syndrome  - Pt had outpt sleep study - never followed up after requested as results were inconclusive  - Outpt f/u for repeat sleep study    # DM  - Insulin basal bolus; monitor FSG, target <140  - Continue with Aspirin in view of Diabetes and age > 40 yrs    # DLD - c/w atorvastatin    # Hx of CVA vs TIA (8/2018)  - C/w aspirin & statin

## 2018-09-19 NOTE — CONSULT NOTE ADULT - SUBJECTIVE AND OBJECTIVE BOX
EVIN, FABIAN  57y, Male  Allergy: No Known Allergies      HPI:  57/M from [facility], with hx f DM, DLD, chronic venous stasis with hx of stasis ulcers, obesity, CVA, presents to ED with b/l lower leg wounds, present since March 3/2018.     Leg wounds have been being treated while at [facility], though his wound care coverage was discontinued, and last wound care dressing change was last 18. Wounds are from ankle to below knee on b/l LE, circumferential, a/w clear liquid oozing, a/w redness & pink discoloration, no trauma, denies fever. 4 days ago he started a course of Augmentin for treatmennt of leg lesions (prescribed for a total of 5 days); he developed a rash on his body - b/l upper ext & abdomen a/w puritis - pink in color, no open lesions, no SOB; but he is not sure when it started.      He denies chest pain, SOB, abdominal pain, urinary symptoms, trauma. (18 Sep 2018 07:57)    FAMILY HISTORY:  Family history of heart disease (Father, Sibling, Mother): Mom  age 81  Dad  age 50  Sister  age 36  Family history of obesity (Father, Sibling)    PAST MEDICAL & SURGICAL HISTORY:  CVA (cerebral vascular accident)  HLD (hyperlipidemia)  Morbid obesity  Osteoarthritis  Diabetes  Asthma  H/O umbilical hernia repair      ROS negative except as per HPI    VITALS:  T(F): 97.2, Max: 98.3 (18 @ 15:50)  HR: 87  BP: 97/61  RR: 18Vital Signs Last 24 Hrs  T(C): 36.2 (19 Sep 2018 14:17), Max: 36.8 (18 Sep 2018 15:50)  T(F): 97.2 (19 Sep 2018 14:17), Max: 98.3 (18 Sep 2018 15:50)  HR: 87 (19 Sep 2018 14:17) (86 - 89)  BP: 97/61 (19 Sep 2018 14:17) (97/61 - 127/56)  BP(mean): --  RR: 18 (19 Sep 2018 14:17) (18 - 19)  SpO2: 98% (18 Sep 2018 15:50) (98% - 98%)    PHYSICAL EXAM:  Gen: Obese M Awake and alert, non-toxic appearing, NAD  HEENT: NCAT. EOMI. MMM.   CV: RRR  Lungs: CTAB  Abd: Soft. NTND  Extr: b/l LE edema, LLE with streaking erythema on the dorsal foot, chronic ulcers b/l  Skin: erythematous rash on face, UE, torso, LE  Neuro: No focal deficits  Lines: clean      TESTS & MEASUREMENTS:                        10.2   6.79  )-----------( 332      ( 19 Sep 2018 07:16 )             34.3         141  |  100  |  7<L>  ----------------------------<  105<H>  4.4   |  28  |  0.5<L>    Ca    8.1<L>      19 Sep 2018 07:16  Phos  3.7       Mg     2.4         TPro  6.0  /  Alb  3.1<L>  /  TBili  0.5  /  DBili  x   /  AST  9   /  ALT  10  /  AlkPhos  105      LIVER FUNCTIONS - ( 18 Sep 2018 13:38 )  Alb: 3.1 g/dL / Pro: 6.0 g/dL / ALK PHOS: 105 U/L / ALT: 10 U/L / AST: 9 U/L / GGT: x             Culture - Other (collected 18 @ 00:16)  Source: .Other Right lower extremity wounds  Preliminary Report (18 @ 11:09):    Numerous Staphylococcus aureus    Moderate Mixed gram negative rods including Pseudomonas aeruginosa            RADIOLOGY & ADDITIONAL TESTS:    ANTIBIOTICS:  aztreonam  IVPB   100 mL/Hr IV Intermittent (18 @ 02:00)    aztreonam  IVPB   100 mL/Hr IV Intermittent (18 @ 05:59)   100 mL/Hr IV Intermittent (18 @ 00:01)   100 mL/Hr IV Intermittent (18 @ 14:48)    meropenem  IVPB   100 mL/Hr IV Intermittent (18 @ 09:25)    metroNIDAZOLE  IVPB   100 mL/Hr IV Intermittent (18 @ 08:24)   100 mL/Hr IV Intermittent (18 @ 01:09)    metroNIDAZOLE  IVPB   100 mL/Hr IV Intermittent (18 @ 01:00)

## 2018-09-19 NOTE — PROGRESS NOTE ADULT - SUBJECTIVE AND OBJECTIVE BOX
Patient was seen and examined. Spoke with RN. Chart reviewed.    No events overnight.  Vital Signs Last 24 Hrs  T(F): 97.4 (19 Sep 2018 05:16), Max: 98.3 (18 Sep 2018 15:50)  HR: 86 (19 Sep 2018 05:16) (86 - 89)  BP: 123/60 (19 Sep 2018 05:16) (101/46 - 127/56)  SpO2: 98% (18 Sep 2018 15:50) (98% - 98%)  MEDICATIONS  (STANDING):  aspirin enteric coated 81 milliGRAM(s) Oral daily  atorvastatin 80 milliGRAM(s) Oral at bedtime  dextrose 5%. 1000 milliLiter(s) (50 mL/Hr) IV Continuous <Continuous>  dextrose 50% Injectable 12.5 Gram(s) IV Push once  dextrose 50% Injectable 25 Gram(s) IV Push once  dextrose 50% Injectable 25 Gram(s) IV Push once  enoxaparin Injectable 40 milliGRAM(s) SubCutaneous every 24 hours  influenza   Vaccine 0.5 milliLiter(s) IntraMuscular once  insulin glargine Injectable (LANTUS) 46 Unit(s) SubCutaneous at bedtime  insulin lispro (HumaLOG) corrective regimen sliding scale   SubCutaneous three times a day before meals  insulin lispro Injectable (HumaLOG) 16 Unit(s) SubCutaneous three times a day before meals  meropenem  IVPB      meropenem  IVPB 1000 milliGRAM(s) IV Intermittent every 8 hours  metroNIDAZOLE  IVPB 500 milliGRAM(s) IV Intermittent every 8 hours  multivitamin 1 Tablet(s) Oral daily  nystatin Cream 1 Application(s) Topical two times a day    MEDICATIONS  (PRN):  dextrose 40% Gel 15 Gram(s) Oral once PRN Blood Glucose LESS THAN 70 milliGRAM(s)/deciliter  diphenhydrAMINE   Capsule 25 milliGRAM(s) Oral every 6 hours PRN Rash and/or Itching  glucagon  Injectable 1 milliGRAM(s) IntraMuscular once PRN Glucose LESS THAN 70 milligrams/deciliter    Labs:                        10.2   6.79  )-----------( 332      ( 19 Sep 2018 07:16 )             34.3                         10.5   8.31  )-----------( 274      ( 18 Sep 2018 13:38 )             35.2     19 Sep 2018 07:16    141    |  100    |  7      ----------------------------<  105    4.4     |  28     |  0.5    18 Sep 2018 13:38    142    |  102    |  9      ----------------------------<  105    4.0     |  26     |  0.5      Ca    8.1        19 Sep 2018 07:16  Ca    8.2        18 Sep 2018 13:38  Phos  3.7       19 Sep 2018 07:16  Mg     2.4       19 Sep 2018 07:16  Mg     2.1       18 Sep 2018 13:38    TPro  6.0    /  Alb  3.1    /  TBili  0.5    /  DBili  x      /  AST  9      /  ALT  10     /  AlkPhos  105    18 Sep 2018 13:38    PT/INR - ( 19 Sep 2018 07:16 )   PT: 13.90 sec;   INR: 1.29 ratio         PTT - ( 19 Sep 2018 07:16 )  PTT:30.5 sec      Culture - Other (collected 18 Sep 2018 00:16)  Source: .Other Right lower extremity wounds  Preliminary Report (19 Sep 2018 11:09):    Numerous Staphylococcus aureus    Moderate Mixed gram negative rods including Pseudomonas aeruginosa        Radiology:    General: comfortable, NAD  Head:  Normocephalic, atraumatic  ENT:  Mucosa moist, no ulcerations  Neck:  Supple, no JVD,   Resp: CTA B/L  CV: RRR, S1S2,   GI: Soft, NT, bowel sounds  MS: chr stasis changes, rle erythema, open areas,, + peripheral pulses, FROM all 4 extremity

## 2018-09-20 ENCOUNTER — TRANSCRIPTION ENCOUNTER (OUTPATIENT)
Age: 58
End: 2018-09-20

## 2018-09-20 LAB
-  AMIKACIN: SIGNIFICANT CHANGE UP
-  AMPICILLIN/SULBACTAM: SIGNIFICANT CHANGE UP
-  AZTREONAM: SIGNIFICANT CHANGE UP
-  CEFAZOLIN: SIGNIFICANT CHANGE UP
-  CEFEPIME: SIGNIFICANT CHANGE UP
-  CEFTAZIDIME: SIGNIFICANT CHANGE UP
-  CIPROFLOXACIN: SIGNIFICANT CHANGE UP
-  CLINDAMYCIN: SIGNIFICANT CHANGE UP
-  ERYTHROMYCIN: SIGNIFICANT CHANGE UP
-  GENTAMICIN: SIGNIFICANT CHANGE UP
-  GENTAMICIN: SIGNIFICANT CHANGE UP
-  IMIPENEM: SIGNIFICANT CHANGE UP
-  LEVOFLOXACIN: SIGNIFICANT CHANGE UP
-  MEROPENEM: SIGNIFICANT CHANGE UP
-  OXACILLIN: SIGNIFICANT CHANGE UP
-  PENICILLIN: SIGNIFICANT CHANGE UP
-  PIPERACILLIN/TAZOBACTAM: SIGNIFICANT CHANGE UP
-  RIFAMPIN: SIGNIFICANT CHANGE UP
-  TETRACYCLINE: SIGNIFICANT CHANGE UP
-  TOBRAMYCIN: SIGNIFICANT CHANGE UP
-  TRIMETHOPRIM/SULFAMETHOXAZOLE: SIGNIFICANT CHANGE UP
-  VANCOMYCIN: SIGNIFICANT CHANGE UP
ANION GAP SERPL CALC-SCNC: 15 MMOL/L — HIGH (ref 7–14)
BUN SERPL-MCNC: 8 MG/DL — LOW (ref 10–20)
CALCIUM SERPL-MCNC: 7.8 MG/DL — LOW (ref 8.5–10.1)
CHLORIDE SERPL-SCNC: 102 MMOL/L — SIGNIFICANT CHANGE UP (ref 98–110)
CO2 SERPL-SCNC: 25 MMOL/L — SIGNIFICANT CHANGE UP (ref 17–32)
CREAT SERPL-MCNC: 0.6 MG/DL — LOW (ref 0.7–1.5)
CRP SERPL-MCNC: 18.04 MG/DL — HIGH (ref 0–0.4)
CULTURE RESULTS: SIGNIFICANT CHANGE UP
GLUCOSE BLDC GLUCOMTR-MCNC: 110 MG/DL — HIGH (ref 70–99)
GLUCOSE BLDC GLUCOMTR-MCNC: 125 MG/DL — HIGH (ref 70–99)
GLUCOSE BLDC GLUCOMTR-MCNC: 136 MG/DL — HIGH (ref 70–99)
GLUCOSE BLDC GLUCOMTR-MCNC: 205 MG/DL — HIGH (ref 70–99)
GLUCOSE BLDC GLUCOMTR-MCNC: 77 MG/DL — SIGNIFICANT CHANGE UP (ref 70–99)
GLUCOSE BLDC GLUCOMTR-MCNC: 78 MG/DL — SIGNIFICANT CHANGE UP (ref 70–99)
GLUCOSE SERPL-MCNC: 128 MG/DL — HIGH (ref 70–99)
HCT VFR BLD CALC: 33.1 % — LOW (ref 42–52)
HGB BLD-MCNC: 10.1 G/DL — LOW (ref 14–18)
MAGNESIUM SERPL-MCNC: 2.4 MG/DL — SIGNIFICANT CHANGE UP (ref 1.8–2.4)
MCHC RBC-ENTMCNC: 25.3 PG — LOW (ref 27–31)
MCHC RBC-ENTMCNC: 30.5 G/DL — LOW (ref 32–37)
MCV RBC AUTO: 83 FL — SIGNIFICANT CHANGE UP (ref 80–94)
METHOD TYPE: SIGNIFICANT CHANGE UP
METHOD TYPE: SIGNIFICANT CHANGE UP
NRBC # BLD: 0 /100 WBCS — SIGNIFICANT CHANGE UP (ref 0–0)
ORGANISM # SPEC MICROSCOPIC CNT: SIGNIFICANT CHANGE UP
PLATELET # BLD AUTO: 376 K/UL — SIGNIFICANT CHANGE UP (ref 130–400)
POTASSIUM SERPL-MCNC: 4.6 MMOL/L — SIGNIFICANT CHANGE UP (ref 3.5–5)
POTASSIUM SERPL-SCNC: 4.6 MMOL/L — SIGNIFICANT CHANGE UP (ref 3.5–5)
RBC # BLD: 3.99 M/UL — LOW (ref 4.7–6.1)
RBC # FLD: 16.3 % — HIGH (ref 11.5–14.5)
SODIUM SERPL-SCNC: 142 MMOL/L — SIGNIFICANT CHANGE UP (ref 135–146)
SPECIMEN SOURCE: SIGNIFICANT CHANGE UP
WBC # BLD: 6.64 K/UL — SIGNIFICANT CHANGE UP (ref 4.8–10.8)
WBC # FLD AUTO: 6.64 K/UL — SIGNIFICANT CHANGE UP (ref 4.8–10.8)

## 2018-09-20 RX ORDER — DIPHENHYDRAMINE HCL 50 MG
50 CAPSULE ORAL EVERY 6 HOURS
Qty: 0 | Refills: 0 | Status: DISCONTINUED | OUTPATIENT
Start: 2018-09-20 | End: 2018-09-21

## 2018-09-20 RX ORDER — MOXIFLOXACIN HYDROCHLORIDE TABLETS, 400 MG 400 MG/1
1 TABLET, FILM COATED ORAL
Qty: 28 | Refills: 0 | OUTPATIENT
Start: 2018-09-20 | End: 2018-10-03

## 2018-09-20 RX ADMIN — ENOXAPARIN SODIUM 40 MILLIGRAM(S): 100 INJECTION SUBCUTANEOUS at 11:34

## 2018-09-20 RX ADMIN — Medication 1 TABLET(S): at 11:33

## 2018-09-20 RX ADMIN — Medication 50 MILLIGRAM(S): at 17:10

## 2018-09-20 RX ADMIN — Medication 16 UNIT(S): at 08:32

## 2018-09-20 RX ADMIN — Medication 166.67 MILLIGRAM(S): at 07:01

## 2018-09-20 RX ADMIN — Medication 50 MILLIGRAM(S): at 23:44

## 2018-09-20 RX ADMIN — Medication 50 MILLIGRAM(S): at 11:34

## 2018-09-20 RX ADMIN — NYSTATIN CREAM 1 APPLICATION(S): 100000 CREAM TOPICAL at 06:09

## 2018-09-20 RX ADMIN — NYSTATIN CREAM 1 APPLICATION(S): 100000 CREAM TOPICAL at 17:10

## 2018-09-20 RX ADMIN — Medication 4: at 17:11

## 2018-09-20 RX ADMIN — Medication 81 MILLIGRAM(S): at 11:33

## 2018-09-20 RX ADMIN — ATORVASTATIN CALCIUM 80 MILLIGRAM(S): 80 TABLET, FILM COATED ORAL at 21:32

## 2018-09-20 RX ADMIN — Medication 16 UNIT(S): at 17:11

## 2018-09-20 RX ADMIN — CEFEPIME 100 MILLIGRAM(S): 1 INJECTION, POWDER, FOR SOLUTION INTRAMUSCULAR; INTRAVENOUS at 06:09

## 2018-09-20 RX ADMIN — CEFEPIME 100 MILLIGRAM(S): 1 INJECTION, POWDER, FOR SOLUTION INTRAMUSCULAR; INTRAVENOUS at 17:12

## 2018-09-20 NOTE — DISCHARGE NOTE ADULT - HOSPITAL COURSE
57 M from [facility] admitted for sepsis 2/2 superimposed cellulitis on chronic venous stasis ulcers. The pt was treated initially with aztreonam and flagyl, however his coverage was changed to IV cefepime and vancomycin after further evaluation with ID. Wound cx were obtained which grew Numerous Staphylococcus aureus (MSSA), Moderate Mixed gram negative rods including Pseudomonas aeruginosa.   Vancomycin was no longer required due to MSSA and was discontinued. Secondly, the pt presented with a rash after taking augmentin at home. Benadryl was given to resolve the rash. Benadryl was prescribed to be used as needed as an outpatient for further persistence of the rash. The pt was afebrile and stable upon d/c. He was discharged home on PO cipro 500 mg bid and doxy 100 mg bid for 14 days. A home nurse was given instructions for wound care at home.

## 2018-09-20 NOTE — PROGRESS NOTE ADULT - ASSESSMENT
57M    DM  CVA  Recent augmentin course c/b rash    Admitted with worsening LE cellulitis  Sepsis on admission T38.4 P 110 WBC 12  wcx  Numerous Staphylococcus aureus    Moderate Mixed gram negative rods including Pseudomonas aeruginosa    - D/C Vanc  - Continue cefepime 2g q12h while in-house  - On discharge can change to cipro 500mg PO BID and doxy 100mg PO BID to complete 14 day course    Spectra [id number]

## 2018-09-20 NOTE — DISCHARGE NOTE ADULT - MEDICATION SUMMARY - MEDICATIONS TO TAKE
I will START or STAY ON the medications listed below when I get home from the hospital:    aspirin 81 mg oral delayed release tablet  -- 1 tab(s) by mouth once a day  -- Indication: For CAD    metFORMIN 500 mg oral tablet  -- 1 tab(s) by mouth 2 times a day  -- Check with your doctor before becoming pregnant.  Do not drink alcoholic beverages when taking this medication.  It is very important that you take or use this exactly as directed.  Do not skip doses or discontinue unless directed by your doctor.  Obtain medical advice before taking any non-prescription drugs as some may affect the action of this medication.  Take with food or milk.    -- Indication: For DM    atorvastatin 80 mg oral tablet  -- 1 tab(s) by mouth once a day (at bedtime)  -- Indication: For CVA (cerebral vascular accident)    doxycycline hyclate 100 mg oral capsule  -- 1 cap(s) by mouth 2 times a day   -- Avoid prolonged or excessive exposure to direct and/or artificial sunlight while taking this medication.  Do not take this drug if you are pregnant.  Finish all this medication unless otherwise directed by prescriber.  Medication should be taken with plenty of water.    -- Indication: For Cellulitis    nystatin 100,000 units/g topical cream  -- 1 application on skin 2 times a day  -- Indication: For antifungal    Lasix 40 mg oral tablet  -- 1 tab(s) by mouth once a day  -- Indication: For CHF    Cipro 500 mg oral tablet  -- 1 tab(s) by mouth 2 times a day   -- Avoid prolonged or excessive exposure to direct and/or artificial sunlight while taking this medication.  Check with your doctor before becoming pregnant.  Do not take dairy products, antacids, or iron preparations within one hour of this medication.  Finish all this medication unless otherwise directed by prescriber.  Medication should be taken with plenty of water.    -- Indication: For Cellulitis    Daily Multiple Vitamins oral tablet  -- 1 tab(s) by mouth once a day   -- Indication: For multivitamin I will START or STAY ON the medications listed below when I get home from the hospital:    aspirin 81 mg oral delayed release tablet  -- 1 tab(s) by mouth once a day  -- Indication: For CAD    metFORMIN 500 mg oral tablet  -- 1 tab(s) by mouth 2 times a day  -- Check with your doctor before becoming pregnant.  Do not drink alcoholic beverages when taking this medication.  It is very important that you take or use this exactly as directed.  Do not skip doses or discontinue unless directed by your doctor.  Obtain medical advice before taking any non-prescription drugs as some may affect the action of this medication.  Take with food or milk.    -- Indication: For DM    Banophen 50 mg oral capsule  -- 1 cap(s) by mouth every 6 hours, As Needed   -- Indication: For rash and itchiness    atorvastatin 80 mg oral tablet  -- 1 tab(s) by mouth once a day (at bedtime)  -- Indication: For CVA (cerebral vascular accident)    doxycycline hyclate 100 mg oral capsule  -- 1 cap(s) by mouth 2 times a day   -- Avoid prolonged or excessive exposure to direct and/or artificial sunlight while taking this medication.  Do not take this drug if you are pregnant.  Finish all this medication unless otherwise directed by prescriber.  Medication should be taken with plenty of water.    -- Indication: For Cellulitis    nystatin 100,000 units/g topical cream  -- 1 application on skin 2 times a day  -- Indication: For antifungal    Lasix 40 mg oral tablet  -- 1 tab(s) by mouth once a day  -- Indication: For CHF    Cipro 500 mg oral tablet  -- 1 tab(s) by mouth 2 times a day   -- Avoid prolonged or excessive exposure to direct and/or artificial sunlight while taking this medication.  Check with your doctor before becoming pregnant.  Do not take dairy products, antacids, or iron preparations within one hour of this medication.  Finish all this medication unless otherwise directed by prescriber.  Medication should be taken with plenty of water.    -- Indication: For Cellulitis    Daily Multiple Vitamins oral tablet  -- 1 tab(s) by mouth once a day   -- Indication: For multivitamin

## 2018-09-20 NOTE — DISCHARGE NOTE ADULT - ADDITIONAL INSTRUCTIONS
follow-up with your PMD within 2 weeks, follow-up with infectious disease in 2 weeks, follow-up with vascular surgery in 3-4 weeks, follow-up with your podiatrist weekly

## 2018-09-20 NOTE — DISCHARGE NOTE ADULT - CARE PROVIDER_API CALL
Timur Issa), Internal Medicine  [address] [place] [address]  Phone: [phone]  Fax: [phone]    Dilan Villagran), Plastic Surgery  [address] [place] [address]  Phone: [phone]  Fax: [phone]    Ramón Seay), Surgery; Vascular Surgery  [address] [address]  Phone: [phone]  Fax: [phone]    Kristina Pollock), Internal Medicine  [address] [place] [address]  Phone: [phone]  Fax: [phone]

## 2018-09-20 NOTE — DISCHARGE NOTE ADULT - CARE PLAN
Principal Discharge DX:	Cellulitis of right lower extremity  Goal:	resolution Principal Discharge DX:	Cellulitis of lower extremity, unspecified laterality  Goal:	resolution  Assessment and plan of treatment:	superimposed infection of lower extremities 2/2 chronic venous stasis b/l  - cipro 500 mg twice a day and doxycycline 100 mg twice a day oral for 14 days  - clean with soap and water daily, xeroform gauze, kerlex and ace twice a day  - wound cx grew MSSA and pseudomonas aeruginosa  - if high fevers, altered mentation, lethargy and pain out of proportion/pus from wounds occur with streaking redness, please return to the ED for further evaluation  Secondary Diagnosis:	Chronic venous stasis dermatitis of both lower extremities  Goal:	management  Assessment and plan of treatment:	- compression stockings as tolerated, with leg elevation  - follow-up with vascular surgery outpatient  - ambulate as tolerated  - weightloss regimen  - follow-up with podiatry weekly

## 2018-09-20 NOTE — DISCHARGE NOTE ADULT - CARE PROVIDERS DIRECT ADDRESSES
[email or web address],DirectAddress_Unknown

## 2018-09-20 NOTE — DISCHARGE NOTE ADULT - PATIENT PORTAL LINK FT
You can access the [facility] Patient Portal, offered by [facility], by registering with the following website: [email or web address]

## 2018-09-20 NOTE — PROGRESS NOTE ADULT - ASSESSMENT
57 M from [facility] admitted for sepsis 2/2 superimposed cellulitis on chronic venous stasis ulcers    1. Sepsis 2/2 to cellulitis 2/2 chronic venous statis ulcers  - change abx coverage to cover pseudomonas infection  - burn: no surgical intervention at this time, soap and water qd, xeroform gauze, kerlex, ace wraps dressing change bid  - blood cx: Culture Results: Numerous Staphylococcus aureus, Moderate Mixed gram negative rods including Pseudomonas aeruginosa (09.18.18 @ 00:16)  - f/u sensitivities    - ID recs appreciated: change shawn to cefepime 2 g q 12 and include Vanc for MRSA coverage 1.25 q 12  - duplex negative for DVT, calf veins not visualized  - arterial duplex: high-grade R pop stenosis 7/24/18    - pt evaluated by vascular sx in the past, angiogram not possible due to body habitus. Recommended elevated and wrap of RLE, with possible unna boot. f/u vascular sx OP  - b/l foot & Tib-fib Xray: no osseous changes seen  - lasix held on admission    2. Rash - b/l Upper ext & abdomen  - Likely drug reaction, pt unsure when started - reports prev exposed to Penicillin medication without this type of reaction; no airway involvement  - benadryl 50 q 8 for puritis    3. Obesity with intermittent wheeze - likely underlying BRANDY or Obesity Hyperventilation syndrome  - Pt had outpt sleep study - never followed up after requested as results were inconclusive  - Outpt f/u for repeat sleep study    4. DM  - Insulin basal bolus; monitor FSG, target <140    5. Hx of CVA vs TIA (8/2018)  - C/w aspirin & statin    6. Groin / under flap of fat panus rash / irritiation  - Continue with nystatin cream    7. PT/Rehab  - uses walker to ambulate    Disposition - [facility] once stable  Full code   DVT/GI ppx 57 M from [facility] admitted for sepsis 2/2 superimposed cellulitis on chronic venous stasis ulcers    1. Sepsis 2/2 to cellulitis 2/2 chronic venous statis ulcers  - change abx coverage to cover pseudomonas infection  - burn: no surgical intervention at this time, soap and water qd, xeroform gauze, kerlex, ace wraps dressing change bid  - blood cx: Culture Results: Numerous Staphylococcus aureus (MSSA), Moderate Mixed gram negative rods including Pseudomonas aeruginosa (09.18.18 @ 00:16)  - resistant to penicillin and erythromycin  - ID recs appreciated: change shawn to cefepime 2 g q 12 and include Vanc for MRSA coverage 1.25 q 12  - vanc d/c'd: 9/20  - duplex negative for DVT, calf veins not visualized  - arterial duplex: high-grade R pop stenosis 7/24/18    - pt evaluated by vascular sx in the past, angiogram not possible due to body habitus. Recommended elevated and wrap of RLE, with possible unna boot. f/u vascular sx OP  - b/l foot & Tib-fib Xray: no osseous changes seen  - lasix held on admission    2. Rash - b/l Upper ext & abdomen  - Likely drug reaction, pt unsure when started - reports prev exposed to Penicillin medication without this type of reaction; no airway involvement  - benadryl 50 q 8 for puritis    3. Obesity with intermittent wheeze - likely underlying BRANDY or Obesity Hyperventilation syndrome  - Pt had outpt sleep study - never followed up after requested as results were inconclusive  - Outpt f/u for repeat sleep study    4. DM  - Insulin basal bolus; monitor FSG, target <140    5. Hx of CVA vs TIA (8/2018)  - C/w aspirin & statin    6. Groin / under flap of fat panus rash / irritiation  - Continue with nystatin cream    7. PT/Rehab  - uses walker to ambulate    Disposition - [facility] once stable  Full code   DVT/GI ppx

## 2018-09-20 NOTE — DISCHARGE NOTE ADULT - MEDICATION SUMMARY - MEDICATIONS TO STOP TAKING
I will STOP taking the medications listed below when I get home from the hospital:    amoxicillin-clavulanate 875 mg-125 mg oral tablet  -- 1 tab(s) by mouth 2 times a day   -- Finish all this medication unless otherwise directed by prescriber.  Take with food or milk.

## 2018-09-20 NOTE — DISCHARGE NOTE ADULT - PLAN OF CARE
resolution management - compression stockings as tolerated, with leg elevation  - follow-up with vascular surgery outpatient  - ambulate as tolerated  - weightloss regimen  - follow-up with podiatry weekly superimposed infection of lower extremities 2/2 chronic venous stasis b/l  - cipro 500 mg twice a day and doxycycline 100 mg twice a day oral for 14 days  - clean with soap and water daily, xeroform gauze, kerlex and ace twice a day  - wound cx grew MSSA and pseudomonas aeruginosa  - if high fevers, altered mentation, lethargy and pain out of proportion/pus from wounds occur with streaking redness, please return to the ED for further evaluation

## 2018-09-20 NOTE — PROGRESS NOTE ADULT - SUBJECTIVE AND OBJECTIVE BOX
OVERNIGHT EVENTS: None- afebrile o/n    Subjective:  pt s/e at bedside. C/o systemic itchiness accompanies by a rash on his forearms, abdomen, cheeks. The pt has been receiving benadryl with some relief. The pt c/o SOB at rest which is new. Checking pulse ox  HISTORY OF PRESENTING ILLNESS:   Outpatient Providers	PMD - dr. Issa	    Reason for Admission: superimposed infection on chronic venous stasis ulcers	  History of Present Illness: 	  57/M from [facility], with hx f DM, DLD, chronic venous stasis with hx of stasis ulcers, obesity, CVA, presents to ED with b/l lower leg wounds, present since March 3/2018.     Leg wounds have been being treated while at [facility], though his wound care coverage was discontinued, and last wound care dressing change was last Thursday 9/13/18. Wounds are from ankle to below knee on b/l LE, circumferential, a/w clear liquid oozing, a/w redness & pink discoloration, no trauma, denies fever. 4 days ago he started a course of Augmentin for treatmennt of leg lesions (prescribed for a total of 5 days); he developed a rash on his body - b/l upper ext & abdomen a/w puritis - pink in color, no open lesions, no SOB; but he is not sure when it started.      He denies chest pain, SOB, abdominal pain, urinary symptoms, trauma    Past Medical History:  Asthma    CVA (cerebral vascular accident)    Diabetes    HLD (hyperlipidemia)    Morbid obesity    Osteoarthritis.    Past Surgical History:  H/O umbilical hernia repair.  MEDICATIONS:  STANDING MEDICATIONS  aspirin enteric coated 81 milliGRAM(s) Oral daily  atorvastatin 80 milliGRAM(s) Oral at bedtime  cefepime   IVPB      cefepime   IVPB 2000 milliGRAM(s) IV Intermittent every 12 hours  dextrose 5%. 1000 milliLiter(s) IV Continuous <Continuous>  dextrose 50% Injectable 12.5 Gram(s) IV Push once  dextrose 50% Injectable 25 Gram(s) IV Push once  dextrose 50% Injectable 25 Gram(s) IV Push once  enoxaparin Injectable 40 milliGRAM(s) SubCutaneous every 24 hours  influenza   Vaccine 0.5 milliLiter(s) IntraMuscular once  insulin glargine Injectable (LANTUS) 46 Unit(s) SubCutaneous at bedtime  insulin lispro (HumaLOG) corrective regimen sliding scale   SubCutaneous three times a day before meals  insulin lispro Injectable (HumaLOG) 16 Unit(s) SubCutaneous three times a day before meals  multivitamin 1 Tablet(s) Oral daily  nystatin Cream 1 Application(s) Topical two times a day  vancomycin  IVPB 1250 milliGRAM(s) IV Intermittent every 12 hours    PRN MEDICATIONS  dextrose 40% Gel 15 Gram(s) Oral once PRN  diphenhydrAMINE   Capsule 50 milliGRAM(s) Oral every 6 hours PRN  glucagon  Injectable 1 milliGRAM(s) IntraMuscular once PRN    VITALS:   T(F): 98.4  HR: 81  BP: 144/64  RR: 18  SpO2: 97%    LABS:                        10.2   6.79  )-----------( 332      ( 19 Sep 2018 07:16 )             34.3     09-19    141  |  100  |  7<L>  ----------------------------<  105<H>  4.4   |  28  |  0.5<L>    Ca    8.1<L>      19 Sep 2018 07:16  Phos  3.7     09-19  Mg     2.4     09-19    TPro  6.0  /  Alb  3.1<L>  /  TBili  0.5  /  DBili  x   /  AST  9   /  ALT  10  /  AlkPhos  105  09-18    PT/INR - ( 19 Sep 2018 07:16 )   PT: 13.90 sec;   INR: 1.29 ratio         PTT - ( 19 Sep 2018 07:16 )  PTT:30.5 sec          Culture - Blood (collected 18 Sep 2018 13:38)  Source: .Blood None  Preliminary Report (20 Sep 2018 01:02):    No growth to date.    Culture - Other (collected 18 Sep 2018 00:16)  Source: .Other Right lower extremity wounds  Preliminary Report (19 Sep 2018 11:09):    Numerous Staphylococcus aureus    Moderate Mixed gram negative rods including Pseudomonas aeruginosa          RADIOLOGY:    PHYSICAL EXAM:    GENERAL: No acute distress, well-developed, morbidly obese  HEAD:  Atraumatic, Normocephalic  EYES: EOMI, PERRLA, conjunctiva and sclera clear  NECK: Supple, no lymphadenopathy, no JVD  CHEST/LUNG: CTAB; No wheezes, rales, or rhonchi  HEART: Regular rate and rhythm; No murmurs, rubs, or gallops  ABDOMEN: Soft, non-tender, non-distended; normal bowel sounds, no organomegaly  EXTREMITIES:  multiple circumferential venus stasis ulcerations lower leg b/l, - hyperkeratotic border, wound base granular, wound are superficial and limited to dermis layer, Dorsalis Pedis and Posterior Tibial pulses nonpalpable  NEUROLOGY: A&O x 3, no focal deficits  SKIN: No rashes or lesions

## 2018-09-20 NOTE — PROGRESS NOTE ADULT - ASSESSMENT
57 M from [facility] admitted for sepsis 2/2 superimposed cellulitis on chronic venous stasis ulcers    1. Sepsis 2/2 to cellulitis 2/2 chronic venous statis ulcers  - change abx coverage to cover pseudomonas infection  - burn: no surgical intervention at this time, soap and water qd, xeroform gauze, kerlex, ace wraps dressing change bid  - blood cx: Culture Results: Numerous Staphylococcus aureus, Moderate Mixed gram negative rods including Pseudomonas aeruginosa (09.18.18 @ 00:16)  - f/u sensitivities    - ID recs appreciated: change shawn to cefepime 2 g q 12 and include Vanc for MRSA coverage 1.25 q 12  - duplex negative for DVT, calf veins not visualized  - arterial duplex: high-grade R pop stenosis 7/24/18    - pt evaluated by vascular sx in the past, angiogram not possible due to body habitus. Recommended elevated and wrap of RLE, with possible unna boot. f/u vascular sx OP  - b/l foot & Tib-fib Xray: no osseous changes seen  - lasix held on admission    2. Rash - b/l Upper ext & abdomen  - Likely drug reaction, pt unsure when started - reports prev exposed to Penicillin medication without this type of reaction; no airway involvement  - benadryl 50 q 8 for puritis    3. Obesity with intermittent wheeze - likely underlying BRANDY or Obesity Hyperventilation syndrome  - Pt had outpt sleep study - never followed up after requested as results were inconclusive  - Outpt f/u for repeat sleep study    4. DM  - Insulin basal bolus; monitor FSG, target <140    5. Hx of CVA vs TIA (8/2018)  - C/w aspirin & statin    6. Groin / under flap of fat panus rash / irritiation  - Continue with nystatin cream    7. PT/Rehab  - uses walker to ambulate    Disposition - [facility] once stable  Full code   DVT/GI ppx

## 2018-09-20 NOTE — PROGRESS NOTE ADULT - SUBJECTIVE AND OBJECTIVE BOX
Patient was seen and examined. Spoke with RN. Chart reviewed.    No events overnight.  Vital Signs Last 24 Hrs  T(F): 98.4 (20 Sep 2018 05:33), Max: 98.7 (19 Sep 2018 19:58)  HR: 81 (20 Sep 2018 05:33) (81 - 91)  BP: 144/64 (20 Sep 2018 05:33) (97/61 - 144/64)  SpO2: 93% (20 Sep 2018 09:29) (93% - 97%)  MEDICATIONS  (STANDING):  aspirin enteric coated 81 milliGRAM(s) Oral daily  atorvastatin 80 milliGRAM(s) Oral at bedtime  cefepime   IVPB 2000 milliGRAM(s) IV Intermittent every 12 hours  cefepime   IVPB      dextrose 5%. 1000 milliLiter(s) (50 mL/Hr) IV Continuous <Continuous>  dextrose 50% Injectable 12.5 Gram(s) IV Push once  dextrose 50% Injectable 25 Gram(s) IV Push once  dextrose 50% Injectable 25 Gram(s) IV Push once  diphenhydrAMINE   Capsule 50 milliGRAM(s) Oral every 6 hours  enoxaparin Injectable 40 milliGRAM(s) SubCutaneous every 24 hours  influenza   Vaccine 0.5 milliLiter(s) IntraMuscular once  insulin glargine Injectable (LANTUS) 46 Unit(s) SubCutaneous at bedtime  insulin lispro (HumaLOG) corrective regimen sliding scale   SubCutaneous three times a day before meals  insulin lispro Injectable (HumaLOG) 16 Unit(s) SubCutaneous three times a day before meals  multivitamin 1 Tablet(s) Oral daily  nystatin Cream 1 Application(s) Topical two times a day  vancomycin  IVPB 1250 milliGRAM(s) IV Intermittent every 12 hours    MEDICATIONS  (PRN):  dextrose 40% Gel 15 Gram(s) Oral once PRN Blood Glucose LESS THAN 70 milliGRAM(s)/deciliter  glucagon  Injectable 1 milliGRAM(s) IntraMuscular once PRN Glucose LESS THAN 70 milligrams/deciliter    Labs:                        10.1   6.64  )-----------( 376      ( 20 Sep 2018 07:50 )             33.1                         10.2   6.79  )-----------( 332      ( 19 Sep 2018 07:16 )             34.3     20 Sep 2018 07:50    142    |  102    |  8      ----------------------------<  128    4.6     |  25     |  0.6    19 Sep 2018 07:16    141    |  100    |  7      ----------------------------<  105    4.4     |  28     |  0.5      Ca    7.8        20 Sep 2018 07:50  Ca    8.1        19 Sep 2018 07:16  Phos  3.7       19 Sep 2018 07:16  Mg     2.4       20 Sep 2018 07:50  Mg     2.4       19 Sep 2018 07:16    TPro  6.0    /  Alb  3.1    /  TBili  0.5    /  DBili  x      /  AST  9      /  ALT  10     /  AlkPhos  105    18 Sep 2018 13:38    PT/INR - ( 19 Sep 2018 07:16 )   PT: 13.90 sec;   INR: 1.29 ratio         PTT - ( 19 Sep 2018 07:16 )  PTT:30.5 sec      Culture - Blood (collected 18 Sep 2018 13:38)  Source: .Blood None  Preliminary Report (20 Sep 2018 01:02):    No growth to date.    Culture - Other (collected 18 Sep 2018 00:16)  Source: .Other Right lower extremity wounds  Final Report (20 Sep 2018 11:39):    Numerous Staphylococcus aureus    Moderate Mixed gram negative rods including    Moderate Pseudomonas aeruginosa  Organism: Staphylococcus aureus  Pseudomonas aeruginosa (20 Sep 2018 11:39)  Organism: Pseudomonas aeruginosa (20 Sep 2018 11:39)  Organism: Staphylococcus aureus (20 Sep 2018 11:39)        Radiology:    General: comfortable, NAD  Neurology: A&Ox3, nonfocal  Head:  Normocephalic, atraumatic  ENT:  Mucosa moist, no ulcerations  Neck:  Supple, no JVD,   Skin: no breakdowns (as per RN)  Resp: CTA B/L  CV: RRR, S1S2,   GI: Soft, NT, bowel sounds  MS: No edema, + peripheral pulses, FROM all 4 extremity

## 2018-09-20 NOTE — PROGRESS NOTE ADULT - SUBJECTIVE AND OBJECTIVE BOX
EVIN, FABIAN  57y, Male      OVERNIGHT EVENTS:  afebrile  wcx with MSSA and S pseudo    ROS negative except as per above    VITALS:  T(F): 98.4, Max: 98.7 (09-19-18 @ 19:58)  HR: 81  BP: 144/64  RR: 18Vital Signs Last 24 Hrs  T(C): 36.9 (20 Sep 2018 05:33), Max: 37.1 (19 Sep 2018 19:58)  T(F): 98.4 (20 Sep 2018 05:33), Max: 98.7 (19 Sep 2018 19:58)  HR: 81 (20 Sep 2018 05:33) (81 - 91)  BP: 144/64 (20 Sep 2018 05:33) (97/61 - 144/64)  BP(mean): --  RR: 18 (20 Sep 2018 05:33) (18 - 18)  SpO2: 93% (20 Sep 2018 09:29) (93% - 97%)    PHYSICAL EXAM:  Gen: Obese M Awake and alert, non-toxic appearing, NAD  HEENT: NCAT. EOMI. MMM.   CV: RRR  Lungs: CTAB  Abd: Soft. NTND  Extr: b/l LE edema, LLE with streaking erythema on the dorsal foot, chronic ulcers b/l  Skin: erythematous rash on face, UE, torso, LE  Neuro: No focal deficits  Lines: clean      TESTS & MEASUREMENTS:                        10.1   6.64  )-----------( 376      ( 20 Sep 2018 07:50 )             33.1     09-20    142  |  102  |  8<L>  ----------------------------<  128<H>  4.6   |  25  |  0.6<L>    Ca    7.8<L>      20 Sep 2018 07:50  Phos  3.7     09-19  Mg     2.4     09-20          Culture - Blood (collected 09-18-18 @ 13:38)  Source: .Blood None  Preliminary Report (09-20-18 @ 01:02):    No growth to date.    Culture - Other (collected 09-18-18 @ 00:16)  Source: .Other Right lower extremity wounds  Final Report (09-20-18 @ 11:39):    Numerous Staphylococcus aureus    Moderate Mixed gram negative rods including    Moderate Pseudomonas aeruginosa  Organism: Staphylococcus aureus  Pseudomonas aeruginosa (09-20-18 @ 11:39)  Organism: Pseudomonas aeruginosa (09-20-18 @ 11:39)      -  Amikacin: S <=8      -  Aztreonam: S <=4      -  Cefepime: S <=2      -  Ceftazidime: S 4      -  Ciprofloxacin: S <=0.5      -  Gentamicin: S 2      -  Imipenem: S <=1      -  Levofloxacin: S <=1      -  Meropenem: S <=1      -  Piperacillin/Tazobactam: S <=8      -  Tobramycin: S <=2      Method Type: HALI  Organism: Staphylococcus aureus (09-20-18 @ 11:39)      -  Ampicillin/Sulbactam: S <=8/4      -  Cefazolin: S <=4      -  Clindamycin: R <=0.25 This isolate is presumed to be clindamycin resistant based on detection of inducible resistance. Clindamycin may still be effective in some patients.      -  Erythromycin: R >4      -  Gentamicin: S 2 Should not be used as monotherapy      -  Oxacillin: S <=0.25      -  Penicillin: R 2      -  RIF- Rifampin: S <=1 Should not be used as monotherapy      -  Tetra/Doxy: S <=1      -  Trimethoprim/Sulfamethoxazole: S <=0.5/9.5      -  Vancomycin: S 2      Method Type: HALI          RADIOLOGY & ADDITIONAL TESTS:    ANTIBIOTICS:  aztreonam  IVPB   100 mL/Hr IV Intermittent (09-18-18 @ 02:00)    aztreonam  IVPB   100 mL/Hr IV Intermittent (09-19-18 @ 05:59)   100 mL/Hr IV Intermittent (09-19-18 @ 00:01)   100 mL/Hr IV Intermittent (09-18-18 @ 14:48)    cefepime   IVPB   100 mL/Hr IV Intermittent (09-19-18 @ 15:00)    cefepime   IVPB   100 mL/Hr IV Intermittent (09-20-18 @ 06:09)    meropenem  IVPB   100 mL/Hr IV Intermittent (09-19-18 @ 09:25)    meropenem  IVPB   100 mL/Hr IV Intermittent (09-19-18 @ 14:00)    metroNIDAZOLE  IVPB   100 mL/Hr IV Intermittent (09-19-18 @ 15:02)   100 mL/Hr IV Intermittent (09-19-18 @ 08:24)   100 mL/Hr IV Intermittent (09-19-18 @ 01:09)    metroNIDAZOLE  IVPB   100 mL/Hr IV Intermittent (09-18-18 @ 01:00)    vancomycin  IVPB   166.67 mL/Hr IV Intermittent (09-20-18 @ 07:01)   166.67 mL/Hr IV Intermittent (09-19-18 @ 20:34)        cefepime   IVPB 2000 milliGRAM(s) IV Intermittent every 12 hours  cefepime   IVPB      vancomycin  IVPB 1250 milliGRAM(s) IV Intermittent every 12 hours

## 2018-09-21 ENCOUNTER — INBOUND DOCUMENT (OUTPATIENT)
Age: 58
End: 2018-09-21

## 2018-09-21 VITALS
RESPIRATION RATE: 19 BRPM | TEMPERATURE: 98 F | HEART RATE: 81 BPM | SYSTOLIC BLOOD PRESSURE: 154 MMHG | DIASTOLIC BLOOD PRESSURE: 81 MMHG

## 2018-09-21 PROBLEM — I63.9 CEREBRAL INFARCTION, UNSPECIFIED: Chronic | Status: ACTIVE | Noted: 2018-09-18

## 2018-09-21 PROBLEM — E78.5 HYPERLIPIDEMIA, UNSPECIFIED: Chronic | Status: ACTIVE | Noted: 2018-09-18

## 2018-09-21 LAB
ANION GAP SERPL CALC-SCNC: 13 MMOL/L — SIGNIFICANT CHANGE UP (ref 7–14)
BUN SERPL-MCNC: 8 MG/DL — LOW (ref 10–20)
CALCIUM SERPL-MCNC: 8 MG/DL — LOW (ref 8.5–10.1)
CHLORIDE SERPL-SCNC: 103 MMOL/L — SIGNIFICANT CHANGE UP (ref 98–110)
CO2 SERPL-SCNC: 26 MMOL/L — SIGNIFICANT CHANGE UP (ref 17–32)
CREAT SERPL-MCNC: 0.5 MG/DL — LOW (ref 0.7–1.5)
GLUCOSE BLDC GLUCOMTR-MCNC: 104 MG/DL — HIGH (ref 70–99)
GLUCOSE BLDC GLUCOMTR-MCNC: 109 MG/DL — HIGH (ref 70–99)
GLUCOSE BLDC GLUCOMTR-MCNC: 116 MG/DL — HIGH (ref 70–99)
GLUCOSE BLDC GLUCOMTR-MCNC: 117 MG/DL — HIGH (ref 70–99)
GLUCOSE SERPL-MCNC: 97 MG/DL — SIGNIFICANT CHANGE UP (ref 70–99)
HCT VFR BLD CALC: 35 % — LOW (ref 42–52)
HGB BLD-MCNC: 10.3 G/DL — LOW (ref 14–18)
MCHC RBC-ENTMCNC: 24.3 PG — LOW (ref 27–31)
MCHC RBC-ENTMCNC: 29.4 G/DL — LOW (ref 32–37)
MCV RBC AUTO: 82.7 FL — SIGNIFICANT CHANGE UP (ref 80–94)
NRBC # BLD: 0 /100 WBCS — SIGNIFICANT CHANGE UP (ref 0–0)
PLATELET # BLD AUTO: 405 K/UL — HIGH (ref 130–400)
POTASSIUM SERPL-MCNC: 4.7 MMOL/L — SIGNIFICANT CHANGE UP (ref 3.5–5)
POTASSIUM SERPL-SCNC: 4.7 MMOL/L — SIGNIFICANT CHANGE UP (ref 3.5–5)
RBC # BLD: 4.23 M/UL — LOW (ref 4.7–6.1)
RBC # FLD: 16.5 % — HIGH (ref 11.5–14.5)
SODIUM SERPL-SCNC: 142 MMOL/L — SIGNIFICANT CHANGE UP (ref 135–146)
WBC # BLD: 6.38 K/UL — SIGNIFICANT CHANGE UP (ref 4.8–10.8)
WBC # FLD AUTO: 6.38 K/UL — SIGNIFICANT CHANGE UP (ref 4.8–10.8)

## 2018-09-21 RX ORDER — DIPHENHYDRAMINE HCL 50 MG
1 CAPSULE ORAL
Qty: 56 | Refills: 0 | OUTPATIENT
Start: 2018-09-21 | End: 2018-10-04

## 2018-09-21 RX ADMIN — Medication 1 TABLET(S): at 11:33

## 2018-09-21 RX ADMIN — ENOXAPARIN SODIUM 40 MILLIGRAM(S): 100 INJECTION SUBCUTANEOUS at 11:33

## 2018-09-21 RX ADMIN — INFLUENZA VIRUS VACCINE 0.5 MILLILITER(S): 15; 15; 15; 15 SUSPENSION INTRAMUSCULAR at 13:46

## 2018-09-21 RX ADMIN — Medication 50 MILLIGRAM(S): at 05:01

## 2018-09-21 RX ADMIN — Medication 50 MILLIGRAM(S): at 11:33

## 2018-09-21 RX ADMIN — INSULIN GLARGINE 46 UNIT(S): 100 INJECTION, SOLUTION SUBCUTANEOUS at 02:03

## 2018-09-21 RX ADMIN — CEFEPIME 100 MILLIGRAM(S): 1 INJECTION, POWDER, FOR SOLUTION INTRAMUSCULAR; INTRAVENOUS at 05:01

## 2018-09-21 RX ADMIN — NYSTATIN CREAM 1 APPLICATION(S): 100000 CREAM TOPICAL at 04:59

## 2018-09-21 RX ADMIN — Medication 81 MILLIGRAM(S): at 11:34

## 2018-09-21 NOTE — PROGRESS NOTE ADULT - ASSESSMENT
57M    DM  CVA  Recent augmentin course c/b rash    Admitted with worsening LE cellulitis  Sepsis on admission T38.4 P 110 WBC 12  wcx  Numerous Staphylococcus aureus    Moderate Mixed gram negative rods including Pseudomonas aeruginosa      - Continue cefepime 2g q12h while in-house  - On discharge can change to cipro 500mg PO BID and doxy 100mg PO BID to complete 14 day course    Spectra [id number]

## 2018-09-21 NOTE — PROGRESS NOTE ADULT - REASON FOR ADMISSION
superimposed infection on chronic venous stasis ulcers

## 2018-09-21 NOTE — PROGRESS NOTE ADULT - SUBJECTIVE AND OBJECTIVE BOX
EVIN, FABIAN  57y, Male      OVERNIGHT EVENTS:  no acute events overnight    ROS negative except as per above    VITALS:  T(F): 98.1, Max: 98.1 (09-21-18 @ 05:38)  HR: 81  BP: 154/81  RR: 19Vital Signs Last 24 Hrs  T(C): 36.7 (21 Sep 2018 05:38), Max: 36.7 (21 Sep 2018 05:38)  T(F): 98.1 (21 Sep 2018 05:38), Max: 98.1 (21 Sep 2018 05:38)  HR: 81 (21 Sep 2018 05:38) (81 - 90)  BP: 154/81 (21 Sep 2018 05:38) (153/68 - 171/58)  BP(mean): --  RR: 19 (21 Sep 2018 05:38) (18 - 20)  SpO2: 96% (20 Sep 2018 19:53) (96% - 96%)    PHYSICAL EXAM:  Gen: Obese M Awake and alert, non-toxic appearing, NAD  HEENT: NCAT. EOMI. MMM.   CV: RRR  Lungs: CTAB  Abd: Soft. NTND  Extr: b/l LE edema, LLE with streaking erythema on the dorsal foot, chronic ulcers b/l  Skin: erythematous rash on face, UE, torso, LE  Neuro: No focal deficits  Lines: clean    TESTS & MEASUREMENTS:                        10.3   6.38  )-----------( 405      ( 21 Sep 2018 06:20 )             35.0     09-21    142  |  103  |  8<L>  ----------------------------<  97  4.7   |  26  |  0.5<L>    Ca    8.0<L>      21 Sep 2018 06:20  Mg     2.4     09-20          Culture - Blood (collected 09-18-18 @ 13:38)  Source: .Blood None  Preliminary Report (09-20-18 @ 01:02):    No growth to date.    Culture - Other (collected 09-18-18 @ 00:16)  Source: .Other Right lower extremity wounds  Final Report (09-20-18 @ 11:39):    Numerous Staphylococcus aureus    Moderate Mixed gram negative rods including    Moderate Pseudomonas aeruginosa  Organism: Staphylococcus aureus  Pseudomonas aeruginosa (09-20-18 @ 11:39)  Organism: Pseudomonas aeruginosa (09-20-18 @ 11:39)      -  Amikacin: S <=8      -  Aztreonam: S <=4      -  Cefepime: S <=2      -  Ceftazidime: S 4      -  Ciprofloxacin: S <=0.5      -  Gentamicin: S 2      -  Imipenem: S <=1      -  Levofloxacin: S <=1      -  Meropenem: S <=1      -  Piperacillin/Tazobactam: S <=8      -  Tobramycin: S <=2      Method Type: HALI  Organism: Staphylococcus aureus (09-20-18 @ 11:39)      -  Ampicillin/Sulbactam: S <=8/4      -  Cefazolin: S <=4      -  Clindamycin: R <=0.25 This isolate is presumed to be clindamycin resistant based on detection of inducible resistance. Clindamycin may still be effective in some patients.      -  Erythromycin: R >4      -  Gentamicin: S 2 Should not be used as monotherapy      -  Oxacillin: S <=0.25      -  Penicillin: R 2      -  RIF- Rifampin: S <=1 Should not be used as monotherapy      -  Tetra/Doxy: S <=1      -  Trimethoprim/Sulfamethoxazole: S <=0.5/9.5      -  Vancomycin: S 2      Method Type: HALI          RADIOLOGY & ADDITIONAL TESTS:    ANTIBIOTICS:  aztreonam  IVPB   100 mL/Hr IV Intermittent (09-18-18 @ 02:00)    aztreonam  IVPB   100 mL/Hr IV Intermittent (09-19-18 @ 05:59)   100 mL/Hr IV Intermittent (09-19-18 @ 00:01)   100 mL/Hr IV Intermittent (09-18-18 @ 14:48)    cefepime   IVPB   100 mL/Hr IV Intermittent (09-19-18 @ 15:00)    cefepime   IVPB   100 mL/Hr IV Intermittent (09-21-18 @ 05:01)   100 mL/Hr IV Intermittent (09-20-18 @ 17:12)   100 mL/Hr IV Intermittent (09-20-18 @ 06:09)    meropenem  IVPB   100 mL/Hr IV Intermittent (09-19-18 @ 09:25)    meropenem  IVPB   100 mL/Hr IV Intermittent (09-19-18 @ 14:00)    metroNIDAZOLE  IVPB   100 mL/Hr IV Intermittent (09-19-18 @ 15:02)   100 mL/Hr IV Intermittent (09-19-18 @ 08:24)   100 mL/Hr IV Intermittent (09-19-18 @ 01:09)    metroNIDAZOLE  IVPB   100 mL/Hr IV Intermittent (09-18-18 @ 01:00)    vancomycin  IVPB   166.67 mL/Hr IV Intermittent (09-20-18 @ 07:01)   166.67 mL/Hr IV Intermittent (09-19-18 @ 20:34)        cefepime   IVPB 2000 milliGRAM(s) IV Intermittent every 12 hours  cefepime   IVPB

## 2018-09-21 NOTE — PROGRESS NOTE ADULT - SUBJECTIVE AND OBJECTIVE BOX
Patient was seen and examined. Spoke with RN. Chart reviewed.    No events overnight.  Vital Signs Last 24 Hrs  T(F): 98.1 (21 Sep 2018 05:38), Max: 98.1 (21 Sep 2018 05:38)  HR: 81 (21 Sep 2018 05:38) (81 - 90)  BP: 154/81 (21 Sep 2018 05:38) (153/68 - 171/58)  SpO2: 96% (20 Sep 2018 19:53) (96% - 96%)  MEDICATIONS  (STANDING):  aspirin enteric coated 81 milliGRAM(s) Oral daily  atorvastatin 80 milliGRAM(s) Oral at bedtime  cefepime   IVPB 2000 milliGRAM(s) IV Intermittent every 12 hours  cefepime   IVPB      dextrose 5%. 1000 milliLiter(s) (50 mL/Hr) IV Continuous <Continuous>  dextrose 50% Injectable 12.5 Gram(s) IV Push once  dextrose 50% Injectable 25 Gram(s) IV Push once  dextrose 50% Injectable 25 Gram(s) IV Push once  diphenhydrAMINE   Capsule 50 milliGRAM(s) Oral every 6 hours  enoxaparin Injectable 40 milliGRAM(s) SubCutaneous every 24 hours  influenza   Vaccine 0.5 milliLiter(s) IntraMuscular once  insulin glargine Injectable (LANTUS) 46 Unit(s) SubCutaneous at bedtime  insulin lispro (HumaLOG) corrective regimen sliding scale   SubCutaneous three times a day before meals  insulin lispro Injectable (HumaLOG) 16 Unit(s) SubCutaneous three times a day before meals  multivitamin 1 Tablet(s) Oral daily  nystatin Cream 1 Application(s) Topical two times a day    MEDICATIONS  (PRN):  dextrose 40% Gel 15 Gram(s) Oral once PRN Blood Glucose LESS THAN 70 milliGRAM(s)/deciliter  glucagon  Injectable 1 milliGRAM(s) IntraMuscular once PRN Glucose LESS THAN 70 milligrams/deciliter    Labs:                        10.3   6.38  )-----------( 405      ( 21 Sep 2018 06:20 )             35.0                         10.1   6.64  )-----------( 376      ( 20 Sep 2018 07:50 )             33.1     21 Sep 2018 06:20    142    |  103    |  8      ----------------------------<  97     4.7     |  26     |  0.5    20 Sep 2018 07:50    142    |  102    |  8      ----------------------------<  128    4.6     |  25     |  0.6      Ca    8.0        21 Sep 2018 06:20  Ca    7.8        20 Sep 2018 07:50  Mg     2.4       20 Sep 2018 07:50            Culture - Blood (collected 18 Sep 2018 13:38)  Source: .Blood None  Preliminary Report (20 Sep 2018 01:02):    No growth to date.        Radiology:    General: comfortable, NAD  Neurology: A&Ox3, nonfocal  Head:  Normocephalic, atraumatic  ENT:  Mucosa moist, no ulcerations  Neck:  Supple, no JVD,   Skin: no breakdowns (as per RN)  Resp: CTA B/L  CV: RRR, S1S2,   GI: Soft, NT, bowel sounds  MS: bilateral ulcers/dressing+ peripheral pulses, FROM all 4 extremity

## 2018-09-21 NOTE — PROGRESS NOTE ADULT - ASSESSMENT
Admitted with worsening LE cellulitis  Sepsis on admission T38.4 P 110 WBC 12  wcx  Numerous Staphylococcus aureus    Moderate Mixed gram negative rods including Pseudomonas aeruginosa    - D/C Vanc  - Continue cefepime 2g q12h while in-house  podiatry fu

## 2018-09-24 LAB
CULTURE RESULTS: SIGNIFICANT CHANGE UP
SPECIMEN SOURCE: SIGNIFICANT CHANGE UP

## 2018-09-25 ENCOUNTER — EMERGENCY (EMERGENCY)
Facility: HOSPITAL | Age: 58
LOS: 0 days | Discharge: HOME | End: 2018-09-26
Attending: EMERGENCY MEDICINE | Admitting: EMERGENCY MEDICINE
Payer: MEDICAID

## 2018-09-25 VITALS
TEMPERATURE: 100 F | HEIGHT: 65 IN | DIASTOLIC BLOOD PRESSURE: 58 MMHG | HEART RATE: 90 BPM | OXYGEN SATURATION: 99 % | RESPIRATION RATE: 20 BRPM | WEIGHT: 315 LBS | SYSTOLIC BLOOD PRESSURE: 100 MMHG

## 2018-09-25 DIAGNOSIS — Z79.899 OTHER LONG TERM (CURRENT) DRUG THERAPY: ICD-10-CM

## 2018-09-25 DIAGNOSIS — I10 ESSENTIAL (PRIMARY) HYPERTENSION: ICD-10-CM

## 2018-09-25 DIAGNOSIS — E11.9 TYPE 2 DIABETES MELLITUS WITHOUT COMPLICATIONS: ICD-10-CM

## 2018-09-25 DIAGNOSIS — M79.662 PAIN IN LEFT LOWER LEG: ICD-10-CM

## 2018-09-25 DIAGNOSIS — Z98.890 OTHER SPECIFIED POSTPROCEDURAL STATES: Chronic | ICD-10-CM

## 2018-09-25 DIAGNOSIS — I87.8 OTHER SPECIFIED DISORDERS OF VEINS: ICD-10-CM

## 2018-09-25 DIAGNOSIS — J45.909 UNSPECIFIED ASTHMA, UNCOMPLICATED: ICD-10-CM

## 2018-09-25 DIAGNOSIS — Z79.84 LONG TERM (CURRENT) USE OF ORAL HYPOGLYCEMIC DRUGS: ICD-10-CM

## 2018-09-25 DIAGNOSIS — Z79.82 LONG TERM (CURRENT) USE OF ASPIRIN: ICD-10-CM

## 2018-09-25 DIAGNOSIS — R06.02 SHORTNESS OF BREATH: ICD-10-CM

## 2018-09-25 DIAGNOSIS — M79.669 PAIN IN UNSPECIFIED LOWER LEG: ICD-10-CM

## 2018-09-25 DIAGNOSIS — Z98.890 OTHER SPECIFIED POSTPROCEDURAL STATES: ICD-10-CM

## 2018-09-26 VITALS
HEART RATE: 92 BPM | SYSTOLIC BLOOD PRESSURE: 110 MMHG | OXYGEN SATURATION: 98 % | DIASTOLIC BLOOD PRESSURE: 60 MMHG | RESPIRATION RATE: 20 BRPM | TEMPERATURE: 98 F

## 2018-09-26 DIAGNOSIS — K59.00 CONSTIPATION, UNSPECIFIED: ICD-10-CM

## 2018-09-26 DIAGNOSIS — I83.028 VARICOSE VEINS OF LEFT LOWER EXTREMITY WITH ULCER OTHER PART OF LOWER LEG: ICD-10-CM

## 2018-09-26 DIAGNOSIS — L97.829 NON-PRESSURE CHRONIC ULCER OF OTHER PART OF LEFT LOWER LEG WITH UNSPECIFIED SEVERITY: ICD-10-CM

## 2018-09-26 DIAGNOSIS — L27.0 GENERALIZED SKIN ERUPTION DUE TO DRUGS AND MEDICAMENTS TAKEN INTERNALLY: ICD-10-CM

## 2018-09-26 DIAGNOSIS — E66.01 MORBID (SEVERE) OBESITY DUE TO EXCESS CALORIES: ICD-10-CM

## 2018-09-26 DIAGNOSIS — E78.5 HYPERLIPIDEMIA, UNSPECIFIED: ICD-10-CM

## 2018-09-26 DIAGNOSIS — L03.119 CELLULITIS OF UNSPECIFIED PART OF LIMB: ICD-10-CM

## 2018-09-26 DIAGNOSIS — J45.909 UNSPECIFIED ASTHMA, UNCOMPLICATED: ICD-10-CM

## 2018-09-26 DIAGNOSIS — T36.0X5A ADVERSE EFFECT OF PENICILLINS, INITIAL ENCOUNTER: ICD-10-CM

## 2018-09-26 DIAGNOSIS — I83.018 VARICOSE VEINS OF RIGHT LOWER EXTREMITY WITH ULCER OTHER PART OF LOWER LEG: ICD-10-CM
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
## 2018-09-26 DIAGNOSIS — R50.9 FEVER, UNSPECIFIED: ICD-10-CM

## 2018-09-26 DIAGNOSIS — L97.819 NON-PRESSURE CHRONIC ULCER OF OTHER PART OF RIGHT LOWER LEG WITH UNSPECIFIED SEVERITY: ICD-10-CM

## 2018-09-26 DIAGNOSIS — E11.51 TYPE 2 DIABETES MELLITUS WITH DIABETIC PERIPHERAL ANGIOPATHY WITHOUT GANGRENE: ICD-10-CM

## 2018-09-26 DIAGNOSIS — I87.2 VENOUS INSUFFICIENCY (CHRONIC) (PERIPHERAL): ICD-10-CM

## 2018-09-26 DIAGNOSIS — A41.9 SEPSIS, UNSPECIFIED ORGANISM: ICD-10-CM

## 2018-09-26 LAB
ANION GAP SERPL CALC-SCNC: 14 MMOL/L — SIGNIFICANT CHANGE UP (ref 7–14)
BUN SERPL-MCNC: 15 MG/DL — SIGNIFICANT CHANGE UP (ref 10–20)
CALCIUM SERPL-MCNC: 8.7 MG/DL — SIGNIFICANT CHANGE UP (ref 8.5–10.1)
CHLORIDE SERPL-SCNC: 100 MMOL/L — SIGNIFICANT CHANGE UP (ref 98–110)
CO2 SERPL-SCNC: 26 MMOL/L — SIGNIFICANT CHANGE UP (ref 17–32)
CREAT SERPL-MCNC: 0.6 MG/DL — LOW (ref 0.7–1.5)
D DIMER BLD IA.RAPID-MCNC: 277 NG/ML DDU — HIGH (ref 0–230)
GLUCOSE SERPL-MCNC: 183 MG/DL — HIGH (ref 70–99)
HCT VFR BLD CALC: 36.1 % — LOW (ref 42–52)
HGB BLD-MCNC: 11 G/DL — LOW (ref 14–18)
MCHC RBC-ENTMCNC: 25.3 PG — LOW (ref 27–31)
MCHC RBC-ENTMCNC: 30.5 G/DL — LOW (ref 32–37)
MCV RBC AUTO: 83 FL — SIGNIFICANT CHANGE UP (ref 80–94)
NRBC # BLD: 0 /100 WBCS — SIGNIFICANT CHANGE UP (ref 0–0)
PLATELET # BLD AUTO: 420 K/UL — HIGH (ref 130–400)
POTASSIUM SERPL-MCNC: 4.3 MMOL/L — SIGNIFICANT CHANGE UP (ref 3.5–5)
POTASSIUM SERPL-SCNC: 4.3 MMOL/L — SIGNIFICANT CHANGE UP (ref 3.5–5)
RBC # BLD: 4.35 M/UL — LOW (ref 4.7–6.1)
RBC # FLD: 16.5 % — HIGH (ref 11.5–14.5)
SODIUM SERPL-SCNC: 140 MMOL/L — SIGNIFICANT CHANGE UP (ref 135–146)
TROPONIN T SERPL-MCNC: <0.01 NG/ML — SIGNIFICANT CHANGE UP
TROPONIN T SERPL-MCNC: <0.01 NG/ML — SIGNIFICANT CHANGE UP
WBC # BLD: 8.91 K/UL — SIGNIFICANT CHANGE UP (ref 4.8–10.8)
WBC # FLD AUTO: 8.91 K/UL — SIGNIFICANT CHANGE UP (ref 4.8–10.8)

## 2018-09-26 PROCEDURE — 93970 EXTREMITY STUDY: CPT | Mod: 26

## 2018-09-26 NOTE — ED ADULT NURSE NOTE - CHPI ED NUR SYMPTOMS NEG
no pain/no fever/no chills/no dizziness/no decreased eating/drinking/no weakness/no nausea/no tingling/no vomiting

## 2018-09-26 NOTE — ED PROVIDER NOTE - MEDICAL DECISION MAKING DETAILS
Pt was signed out to me by Dr. Rodriguez at 0310.  All workup prior to this time was initiated by that provider.  LE swelling, pending CTA due to elevated ddimer. Plan to review labs and imaging and reassess. Pt was signed out to me by Dr. Rodriguez at 0310.  All workup prior to this time was initiated by that provider.  LE swelling, dyspnea pending CTA due to elevated ddimer. Plan to review labs and imaging and reassess.

## 2018-09-26 NOTE — ED PROVIDER NOTE - PROGRESS NOTE DETAILS
D dimer mildly positive. Ordered CTA chest. Case endorsed to Dr. Zavala pending CTA chest, re-eval, dispo. Pt signed out to Dr. Alberts Pt s/o to me by Dr. Zavala, will follow up duplex, repeat troponin, reassess and dispo. Pt resting comfortably this AM.  Denies any SOB.  States he feels much better and was nervous when his leg started hurting prompting visit to ED.  CTA negative for PE or acute pulmonary findings. Duplex negative for PE.  Troponin repeated and also negative. Pt reports feeling well to go home.  Advised close follow up for reevaluation and pt agrees. Return precautions advised.

## 2018-09-26 NOTE — ED PROVIDER NOTE - CARE PLAN
Principal Discharge DX:	Leg pain  Secondary Diagnosis:	Chronic venous stasis  Secondary Diagnosis:	Shortness of breath

## 2018-09-26 NOTE — ED ADULT NURSE NOTE - NSIMPLEMENTINTERV_GEN_ALL_ED
Implemented All Fall Risk Interventions:  [place] to call system. Call bell, personal items and telephone within reach. Instruct patient to call for assistance. Room bathroom lighting operational. Non-slip footwear when patient is off stretcher. Physically safe environment: no spills, clutter or unnecessary equipment. Stretcher in lowest position, wheels locked, appropriate side rails in place. Provide visual cue, wrist band, yellow gown, etc. Monitor gait and stability. Monitor for mental status changes and reorient to person, place, and time. Review medications for side effects contributing to fall risk. Reinforce activity limits and safety measures with patient and family.

## 2018-09-26 NOTE — ED PROVIDER NOTE - ATTENDING CONTRIBUTION TO CARE
57 year old male, pmhx DM, DLD, chronic venous stasis with hx of stasis ulcers, obesity, CVA, chronic bilateral leg wounds, presenting with dyspnea and left lower extremity swelling over the past 1-2 days. States he is primarily bedbound at baseline. Denies having dyspnea like this before. Denies fevers, headache, vision changes, weakness/numbness, confusion, URI symptoms, neck pain, chest pain, back pain, cough, palpitations, nausea, vomiting, abdominal pain, diarrhea, constipation, blood in stool/dark stools, urinary symptoms, penile discharge/testicular pain, rash, recent travel or sick contacts.    Vital Signs: I have reviewed the initial vital signs.  Constitutional: NAD, well-nourished, appears stated age, no acute distress.  HEENT: Airway patent, moist MM, no erythema/swelling/deformity of oral structures. EOMI, PERRLA.  CV: regular rate, regular rhythm, well-perfused extremities, 2+ b/l DP and radial pulses equal.  Lungs: BCTA, no increased WOB.  ABD: NTND, no guarding or rebound, no pulsatile mass, no hernias.   MSK: Neck supple, nontender, nl ROM, no stepoff. Chest nontender. Back nontender in TLS spine or to b/l bony structures or flanks. Ext nontender, nl rom, no deformity.   INTEG: Skin warm, dry, no rash. (+) chronic venous stasis with chronic bilateral leg wounds  NEURO: A&Ox3, CN II-XII intact, normal strength 5/5 all 4 ext, nl sensation throughout, normal speech and coordination.  PSYCH: Calm, cooperative, normal affect and interaction.    Will check labs, D dimer since patient is bed bound at baseline. Patient otherwise not tachycardic or hypoxic. Will attempt d dimer first, labs, EKG, cxr, re-eval.

## 2018-09-26 NOTE — ED PROVIDER NOTE - PHYSICAL EXAMINATION
VITAL SIGNS: I have reviewed nursing notes and confirm.  CONSTITUTIONAL: Well-developed; well-nourished; in no acute distress.   SKIN:  skin exam is warm and dry, no acute rash.    HEAD: Normocephalic; atraumatic.  EYES:  conjunctiva and sclera clear.  ENT: No nasal discharge; airway clear.  CARD: S1, S2 normal; no murmurs, gallops, or rubs. Regular rate and rhythm.   RESP: No wheezes, rales or rhonchi.  ABD: Normal bowel sounds; soft; non-distended; non-tender  EXT: Normal ROM.  No clubbing, cyanosis or edema. TTP Left calf, pedal pulses present, Ulcerations to lower extremities, wrapped in dressings  NEURO: Alert, oriented, grossly unremarkable

## 2018-09-26 NOTE — ED PROVIDER NOTE - OBJECTIVE STATEMENT
Pt is a 58y/o DM, CVA, HTN, Venous stasis ulcers presents today c/o left calf pain and SOB that began today. Pt was recently hospitalized for cellulitis surrounding wounds to lower extremities. Pt denies fever, chills, weakness, CP, n/v/d, abd pain.

## 2018-10-08 ENCOUNTER — APPOINTMENT (OUTPATIENT)
Dept: VASCULAR SURGERY | Facility: CLINIC | Age: 58
End: 2018-10-08
Payer: MEDICAID

## 2018-10-08 PROCEDURE — 99212 OFFICE O/P EST SF 10 MIN: CPT

## 2018-10-08 RX ORDER — BISMUTH TRIBROMOPH/PETROLATUM 5"X9"
BANDAGE TOPICAL
Refills: 0 | Status: ACTIVE | COMMUNITY

## 2018-10-08 RX ORDER — DOXYCYCLINE HYCLATE 100 MG/1
100 CAPSULE ORAL
Qty: 28 | Refills: 0 | Status: ACTIVE | COMMUNITY
Start: 2018-09-20

## 2018-10-08 RX ORDER — CIPROFLOXACIN HYDROCHLORIDE 500 MG/1
500 TABLET, FILM COATED ORAL
Qty: 28 | Refills: 0 | Status: ACTIVE | COMMUNITY
Start: 2018-09-20

## 2018-10-08 RX ORDER — AMOXICILLIN AND CLAVULANATE POTASSIUM 875; 125 MG/1; MG/1
875-125 TABLET, COATED ORAL
Qty: 10 | Refills: 0 | Status: ACTIVE | COMMUNITY
Start: 2018-09-14

## 2018-10-08 RX ORDER — LOPERAMIDE HYDROCHLORIDE 1 MG/7.5ML
1 SOLUTION ORAL
Refills: 0 | Status: ACTIVE | COMMUNITY

## 2018-10-30 ENCOUNTER — APPOINTMENT (OUTPATIENT)
Dept: PODIATRY | Facility: CLINIC | Age: 58
End: 2018-10-30

## 2018-11-19 ENCOUNTER — APPOINTMENT (OUTPATIENT)
Dept: PODIATRY | Facility: CLINIC | Age: 58
End: 2018-11-19
Payer: MEDICAID

## 2018-11-19 ENCOUNTER — OUTPATIENT (OUTPATIENT)
Dept: OUTPATIENT SERVICES | Facility: HOSPITAL | Age: 58
LOS: 1 days | Discharge: HOME | End: 2018-11-19

## 2018-11-19 VITALS
HEART RATE: 85 BPM | SYSTOLIC BLOOD PRESSURE: 139 MMHG | BODY MASS INDEX: 52.48 KG/M2 | HEIGHT: 65 IN | DIASTOLIC BLOOD PRESSURE: 72 MMHG | WEIGHT: 315 LBS

## 2018-11-19 DIAGNOSIS — Z98.890 OTHER SPECIFIED POSTPROCEDURAL STATES: Chronic | ICD-10-CM

## 2018-11-19 PROCEDURE — 29580 STRAPPING UNNA BOOT: CPT | Mod: 50

## 2018-11-26 ENCOUNTER — OUTPATIENT (OUTPATIENT)
Dept: OUTPATIENT SERVICES | Facility: HOSPITAL | Age: 58
LOS: 1 days | Discharge: HOME | End: 2018-11-26

## 2018-11-26 ENCOUNTER — APPOINTMENT (OUTPATIENT)
Dept: PODIATRY | Facility: CLINIC | Age: 58
End: 2018-11-26
Payer: MEDICAID

## 2018-11-26 ENCOUNTER — OTHER (OUTPATIENT)
Age: 58
End: 2018-11-26

## 2018-11-26 VITALS
HEIGHT: 65 IN | DIASTOLIC BLOOD PRESSURE: 84 MMHG | BODY MASS INDEX: 52.48 KG/M2 | SYSTOLIC BLOOD PRESSURE: 141 MMHG | WEIGHT: 315 LBS | HEART RATE: 88 BPM

## 2018-11-26 DIAGNOSIS — R60.0 LOCALIZED EDEMA: ICD-10-CM

## 2018-11-26 DIAGNOSIS — Z98.890 OTHER SPECIFIED POSTPROCEDURAL STATES: Chronic | ICD-10-CM

## 2018-11-26 DIAGNOSIS — E11.9 TYPE 2 DIABETES MELLITUS W/OUT COMPLICATIONS: ICD-10-CM

## 2018-11-26 PROCEDURE — 99213 OFFICE O/P EST LOW 20 MIN: CPT

## 2018-11-27 DIAGNOSIS — E11.9 TYPE 2 DIABETES MELLITUS WITHOUT COMPLICATIONS: ICD-10-CM

## 2018-11-27 DIAGNOSIS — R60.0 LOCALIZED EDEMA: ICD-10-CM

## 2018-11-27 DIAGNOSIS — I83.009 VARICOSE VEINS OF UNSPECIFIED LOWER EXTREMITY WITH ULCER OF UNSPECIFIED SITE: ICD-10-CM

## 2018-12-03 ENCOUNTER — APPOINTMENT (OUTPATIENT)
Dept: VASCULAR SURGERY | Facility: CLINIC | Age: 58
End: 2018-12-03
Payer: MEDICAID

## 2018-12-03 PROCEDURE — 29580 STRAPPING UNNA BOOT: CPT | Mod: 50

## 2018-12-04 NOTE — ED ADULT NURSE NOTE - NSSISCREENINGQ1_ED_A_ED
Progress Notes by Emani Callahan RN, BSN at 07/09/18 10:35 AM     Author:  Emani Callahan RN, BSN Service:  (none) Author Type:  Registered Nurse     Filed:  07/09/18 10:35 AM Encounter Date:  7/9/2018 Status:  Signed     :  Emani Callahan RN, BSN (Registered Nurse)            [employer] message sent[NA1.1T]   Labs and medications ordered[NA1.1M]       Revision History        User Key Date/Time User Provider Type Action    > NA1.1 07/09/18 10:35 AM Emani Callahan RN, BSN Registered Nurse Sign    M - Manual, T - Template            
No

## 2018-12-10 ENCOUNTER — APPOINTMENT (OUTPATIENT)
Dept: VASCULAR SURGERY | Facility: CLINIC | Age: 58
End: 2018-12-10
Payer: MEDICAID

## 2018-12-10 DIAGNOSIS — L97.909 VARICOSE VEINS OF UNSPECIFIED LOWER EXTREMITY WITH ULCER OF UNSPECIFIED SITE: ICD-10-CM

## 2018-12-10 DIAGNOSIS — I83.009 VARICOSE VEINS OF UNSPECIFIED LOWER EXTREMITY WITH ULCER OF UNSPECIFIED SITE: ICD-10-CM

## 2018-12-10 PROCEDURE — 29580 STRAPPING UNNA BOOT: CPT | Mod: 50

## 2018-12-17 ENCOUNTER — APPOINTMENT (OUTPATIENT)
Dept: VASCULAR SURGERY | Facility: CLINIC | Age: 58
End: 2018-12-17
Payer: MEDICAID

## 2018-12-17 PROCEDURE — 29580 STRAPPING UNNA BOOT: CPT | Mod: RT

## 2018-12-24 ENCOUNTER — APPOINTMENT (OUTPATIENT)
Dept: VASCULAR SURGERY | Facility: CLINIC | Age: 58
End: 2018-12-24
Payer: MEDICAID

## 2018-12-24 PROCEDURE — 29580 STRAPPING UNNA BOOT: CPT | Mod: RT

## 2018-12-31 ENCOUNTER — APPOINTMENT (OUTPATIENT)
Dept: VASCULAR SURGERY | Facility: CLINIC | Age: 58
End: 2018-12-31
Payer: MEDICAID

## 2018-12-31 DIAGNOSIS — L97.919 VARICOSE VEINS OF RIGHT LOWER EXTREMITY WITH ULCER OF UNSPECIFIED SITE: ICD-10-CM

## 2018-12-31 DIAGNOSIS — I83.019 VARICOSE VEINS OF RIGHT LOWER EXTREMITY WITH ULCER OF UNSPECIFIED SITE: ICD-10-CM

## 2018-12-31 PROCEDURE — 29580 STRAPPING UNNA BOOT: CPT | Mod: RT

## 2019-01-07 ENCOUNTER — APPOINTMENT (OUTPATIENT)
Dept: VASCULAR SURGERY | Facility: CLINIC | Age: 59
End: 2019-01-07
Payer: MEDICAID

## 2019-01-07 DIAGNOSIS — L97.329 VARICOSE VEINS OF LEFT LOWER EXTREMITY WITH ULCER OF ANKLE: ICD-10-CM

## 2019-01-07 DIAGNOSIS — I83.023 VARICOSE VEINS OF LEFT LOWER EXTREMITY WITH ULCER OF ANKLE: ICD-10-CM

## 2019-01-07 PROCEDURE — 29580 STRAPPING UNNA BOOT: CPT | Mod: LT

## 2019-01-07 NOTE — REVIEW OF SYSTEMS
[Feeling Tired] : feeling tired [Limb Swelling] : limb swelling [Skin Wound] : skin wound [Negative] : Heme/Lymph

## 2019-01-07 NOTE — HISTORY OF PRESENT ILLNESS
[FreeTextEntry1] : The patient is a 58-year-old gentleman who present for followup. Last week he had UNNA boot therapy to his right lower extremity stasis ulcers with good results and have healed.

## 2019-01-07 NOTE — ASSESSMENT
[FreeTextEntry1] : The patient is a 58 year-old male with bilateral venous stasis ulcers. The patient responded well with Unna boots. I would like to replace his Unna boots to the left lower extremity and ace bandage to the right lower extremity and see him back in my office in one week's time.

## 2019-01-11 ENCOUNTER — EMERGENCY (EMERGENCY)
Facility: HOSPITAL | Age: 59
LOS: 0 days | Discharge: HOME | End: 2019-01-11
Attending: EMERGENCY MEDICINE | Admitting: EMERGENCY MEDICINE

## 2019-01-11 VITALS
HEART RATE: 79 BPM | DIASTOLIC BLOOD PRESSURE: 67 MMHG | SYSTOLIC BLOOD PRESSURE: 144 MMHG | RESPIRATION RATE: 20 BRPM | WEIGHT: 315 LBS | TEMPERATURE: 98 F | OXYGEN SATURATION: 99 %
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
## 2019-01-11 DIAGNOSIS — Z79.82 LONG TERM (CURRENT) USE OF ASPIRIN: ICD-10-CM

## 2019-01-11 DIAGNOSIS — Z86.73 PERSONAL HISTORY OF TRANSIENT ISCHEMIC ATTACK (TIA), AND CEREBRAL INFARCTION WITHOUT RESIDUAL DEFICITS: ICD-10-CM

## 2019-01-11 DIAGNOSIS — E11.9 TYPE 2 DIABETES MELLITUS WITHOUT COMPLICATIONS: ICD-10-CM

## 2019-01-11 DIAGNOSIS — L89.313 PRESSURE ULCER OF RIGHT BUTTOCK, STAGE 3: ICD-10-CM

## 2019-01-11 DIAGNOSIS — S80.02XA CONTUSION OF LEFT KNEE, INITIAL ENCOUNTER: ICD-10-CM

## 2019-01-11 DIAGNOSIS — Z98.890 OTHER SPECIFIED POSTPROCEDURAL STATES: Chronic | ICD-10-CM

## 2019-01-11 DIAGNOSIS — J45.909 UNSPECIFIED ASTHMA, UNCOMPLICATED: ICD-10-CM

## 2019-01-11 DIAGNOSIS — E78.5 HYPERLIPIDEMIA, UNSPECIFIED: ICD-10-CM

## 2019-01-11 DIAGNOSIS — Z79.2 LONG TERM (CURRENT) USE OF ANTIBIOTICS: ICD-10-CM

## 2019-01-11 DIAGNOSIS — M25.562 PAIN IN LEFT KNEE: ICD-10-CM
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
## 2019-01-11 NOTE — ED PROVIDER NOTE - CARE PROVIDER_API CALL
Boyd Smallwood (MD), Orthopaedic Surgery  [facility] [place] [address]  Phone: [phone]  Fax: [phone]

## 2019-01-11 NOTE — ED PROVIDER NOTE - OBJECTIVE STATEMENT
fell out of bed tonight landing on left knee, Able to Wt bear since fall. C/o pain and swelling. No back pain, no neck pain, no chest or abdominal pain. No LOC

## 2019-01-11 NOTE — ED ADULT NURSE NOTE - NSIMPLEMENTINTERV_GEN_ALL_ED
Implemented All Fall with Harm Risk Interventions:  [place] to call system. Call bell, personal items and telephone within reach. Instruct patient to call for assistance. Room bathroom lighting operational. Non-slip footwear when patient is off stretcher. Physically safe environment: no spills, clutter or unnecessary equipment. Stretcher in lowest position, wheels locked, appropriate side rails in place. Provide visual cue, wrist band, yellow gown, etc. Monitor gait and stability. Monitor for mental status changes and reorient to person, place, and time. Review medications for side effects contributing to fall risk. Reinforce activity limits and safety measures with patient and family. Provide visual clues: red socks.

## 2019-01-11 NOTE — ED ADULT NURSE NOTE - CHIEF COMPLAINT QUOTE
YOJANA from [facility] residence s/p fall from bed, c/o left knee pain

## 2019-01-11 NOTE — ED ADULT TRIAGE NOTE - RESPIRATORY RATE (BREATHS/MIN)
"Chief Complaint:    Chief Complaint   Patient presents with   • Cough     x 3 months wakes her up at night   • Otalgia       History of Present Illness:    Father present. He reports child has cough x 3 months, worse when she lays down. Has some nasal symptoms. Has had intermittent subjective fever. She possibly has had right ear pain. Reports child had cough when she was treated with Amoxil on 4/2/18 which did not help cough at all.      Review of Systems:    Constitutional: See HPI.  Eyes: Negative for pain, redness, and discharge.  ENT: See HPI.  Respiratory: See HPI.  Cardiovascular: Negative for chest pain and leg swelling.   Gastrointestinal: Negative for abdominal pain, nausea, vomiting, diarrhea, constipation, blood in stool, and melena.   Genitourinary: No complaints.   Musculoskeletal: Negative for myalgias, neck pain, and back pain.   Skin: Negative for rash and itching.   Neurological: Negative for dizziness, tingling, tremors, sensory change, speech change, focal weakness, seizures, loss of consciousness, and headaches.   Endo: Negative for polydipsia.   Heme: Does not bruise/bleed easily.         Past Medical History:    History reviewed. No pertinent past medical history.    Past Surgical History:    History reviewed. No pertinent surgical history.    Social History:       Social History     Other Topics Concern   • Not on file     Social History Narrative   • No narrative on file     Family History:    History reviewed. No pertinent family history.    Medications:    No current outpatient prescriptions on file prior to visit.     No current facility-administered medications on file prior to visit.      Allergies:    No Known Allergies      Vitals:    Vitals:    05/07/18 1445   Pulse: 134   Resp: 32   Temp: 36.7 °C (98.1 °F)   SpO2: 95%   Weight: 15.4 kg (34 lb)   Height: 0.94 m (3' 1\")       Physical Exam:    Constitutional: Vital signs reviewed. Appears well-developed and well-nourished. Occl cough. " No acute distress.   Eyes: Sclera white, conjunctivae clear.   ENT: Discharge in both nares. Right TM is pink. External ears normal. External auditory canals normal without discharge. Left TM translucent and non-bulging. Hearing normal. Lips/teeth are normal. Oral mucosa pink and moist. Posterior pharynx: WNL.  Neck: Neck supple.   Cardiovascular: Regular rate and rhythm. No murmur.  Pulmonary/Chest: Respirations non-labored. Clear to auscultation bilaterally.  Lymph: Cervical nodes without tenderness or enlargement.  Musculoskeletal: Normal gait. No muscular atrophy or weakness.  Neurological: Alert. Muscle tone normal.   Skin: No rashes or lesions. Warm, dry, normal turgor.  Psychiatric: Behavior is normal.      Assessment / Plan:    1. Acute respiratory infection  - azithromycin (ZITHROMAX) 200 MG/5ML Recon Susp; 4 ML ON DAY 1, 2 ML ON DAYS 2-5.  Dispense: 15 mL; Refill: 0    2. Cough  - prednisoLONE (PRELONE) 15 MG/5ML Syrup; 5 ML BY MOUTH ONCE A DAY X 5 DAYS.  Dispense: 30 mL; Refill: 0      Discussed with him DDX and management options.    Agreeable to treat for infection and allergies due to duration of symptoms.    Agreeable to medications prescribed.    Follow-up with PCP or urgent care if getting worse or not better with above.   20

## 2019-01-11 NOTE — ED PROVIDER NOTE - CARDIAC, MLM
Normal rate, regular rhythm.  Heart sounds S1, S2.  No murmurs, rubs or gallops. Improving, likely related to infection

## 2019-01-11 NOTE — ED PROVIDER NOTE - FAMILY HISTORY
Father  Still living? Unknown  Family history of obesity, Age at diagnosis: Age Unknown  Family history of heart disease, Age at diagnosis: Age Unknown     Sibling  Still living? No  Family history of obesity, Age at diagnosis: Age Unknown  Family history of heart disease, Age at diagnosis: Age Unknown     Mother  Still living? Unknown  Family history of heart disease, Age at diagnosis: Age Unknown

## 2019-01-11 NOTE — ED PROVIDER NOTE - ATTENDING CONTRIBUTION TO CARE
I was present for and supervised the key and critical aspects of the procedures performed during the care of the patient. Patient presents for evaluation of knee pain after rolling of his bed he fell and landed on a flexed knee pain is moderate and throbbing in nature worse with movement he sustained no head injury, no loc and denies any other pain he denies any headache, neck pain, shortness of breath fevers or chills he denies any back pain or abdominal pain  On physical exam he is nc/at perrla eomi oropharynx clear cta b/l, rrr s1s2 noted abd-soft nt nd bs+ ext from mild ttp of the right knee with no effussion noted no redness pedal pulses 2+=, patient has venodyne dressing in place   we obtained an xray which reveals no large fracture patient at baseline amublatory status

## 2019-01-11 NOTE — ED ADULT TRIAGE NOTE - CHIEF COMPLAINT QUOTE
YOJANA from [facility] residence s/p fall from bed, c/o left knee pain

## 2019-01-11 NOTE — ED PROVIDER NOTE - MEDICAL DECISION MAKING DETAILS
I was present for and supervised the key and critical aspects of the procedures performed during the care of the patient. we obtained an xray which reveals no large fracture patient at baseline amublatory status with walker I will discharge at this time

## 2019-01-14 ENCOUNTER — APPOINTMENT (OUTPATIENT)
Dept: VASCULAR SURGERY | Facility: CLINIC | Age: 59
End: 2019-01-14
Payer: MEDICAID

## 2019-01-14 DIAGNOSIS — L03.115 CELLULITIS OF RIGHT LOWER LIMB: ICD-10-CM

## 2019-01-14 PROCEDURE — 99212 OFFICE O/P EST SF 10 MIN: CPT

## 2019-01-14 RX ORDER — LEVOFLOXACIN 500 MG/1
500 TABLET, FILM COATED ORAL DAILY
Qty: 10 | Refills: 0 | Status: ACTIVE | COMMUNITY
Start: 2019-01-14 | End: 1900-01-01

## 2019-01-14 NOTE — ASSESSMENT
[FreeTextEntry1] : The patient is a 58 year-old male with right leg cellulitis. I am prescribing him Levaquin to be taken for 10 days and recommend daily dressing with Silvadene to the wound and ace bandage for compression. He will follow up in 2 weeks time.

## 2019-01-14 NOTE — HISTORY OF PRESENT ILLNESS
[FreeTextEntry1] : The patient is a 58-year-old gentleman who present for followup. Last week he had UNNA boot therapy to his right lower extremity , now with erythema, edema and green discharge on the dressings and medial wound. Denies any fever, chills.

## 2019-01-17 ENCOUNTER — INPATIENT (INPATIENT)
Facility: HOSPITAL | Age: 59
LOS: 3 days | Discharge: NURSING, REHAB OR LONG-TERM CARE | End: 2019-01-21
Attending: INTERNAL MEDICINE | Admitting: INTERNAL MEDICINE
Payer: MEDICAID

## 2019-01-17 VITALS
TEMPERATURE: 97 F | HEART RATE: 91 BPM | SYSTOLIC BLOOD PRESSURE: 139 MMHG | OXYGEN SATURATION: 97 % | DIASTOLIC BLOOD PRESSURE: 78 MMHG | RESPIRATION RATE: 18 BRPM

## 2019-01-17 DIAGNOSIS — L03.115 CELLULITIS OF RIGHT LOWER LIMB: ICD-10-CM

## 2019-01-17 DIAGNOSIS — E78.5 HYPERLIPIDEMIA, UNSPECIFIED: ICD-10-CM

## 2019-01-17 DIAGNOSIS — Z98.890 OTHER SPECIFIED POSTPROCEDURAL STATES: Chronic | ICD-10-CM

## 2019-01-17 DIAGNOSIS — J45.909 UNSPECIFIED ASTHMA, UNCOMPLICATED: ICD-10-CM

## 2019-01-17 DIAGNOSIS — E11.9 TYPE 2 DIABETES MELLITUS WITHOUT COMPLICATIONS: ICD-10-CM

## 2019-01-17 LAB
ALBUMIN SERPL ELPH-MCNC: 3.5 G/DL — SIGNIFICANT CHANGE UP (ref 3.5–5.2)
ALP SERPL-CCNC: 114 U/L — SIGNIFICANT CHANGE UP (ref 30–115)
ALT FLD-CCNC: 11 U/L — SIGNIFICANT CHANGE UP (ref 0–41)
ANION GAP SERPL CALC-SCNC: 10 MMOL/L — SIGNIFICANT CHANGE UP (ref 7–14)
AST SERPL-CCNC: 9 U/L — SIGNIFICANT CHANGE UP (ref 0–41)
BASOPHILS # BLD AUTO: 0.02 K/UL — SIGNIFICANT CHANGE UP (ref 0–0.2)
BASOPHILS NFR BLD AUTO: 0.2 % — SIGNIFICANT CHANGE UP (ref 0–1)
BILIRUB SERPL-MCNC: 0.4 MG/DL — SIGNIFICANT CHANGE UP (ref 0.2–1.2)
BUN SERPL-MCNC: 10 MG/DL — SIGNIFICANT CHANGE UP (ref 10–20)
CALCIUM SERPL-MCNC: 8.7 MG/DL — SIGNIFICANT CHANGE UP (ref 8.5–10.1)
CHLORIDE SERPL-SCNC: 99 MMOL/L — SIGNIFICANT CHANGE UP (ref 98–110)
CO2 SERPL-SCNC: 30 MMOL/L — SIGNIFICANT CHANGE UP (ref 17–32)
CREAT SERPL-MCNC: 0.6 MG/DL — LOW (ref 0.7–1.5)
EOSINOPHIL # BLD AUTO: 0.16 K/UL — SIGNIFICANT CHANGE UP (ref 0–0.7)
EOSINOPHIL NFR BLD AUTO: 1.7 % — SIGNIFICANT CHANGE UP (ref 0–8)
GLUCOSE BLDC GLUCOMTR-MCNC: 186 MG/DL — HIGH (ref 70–99)
GLUCOSE BLDC GLUCOMTR-MCNC: 186 MG/DL — HIGH (ref 70–99)
GLUCOSE SERPL-MCNC: 194 MG/DL — HIGH (ref 70–99)
HCT VFR BLD CALC: 38 % — LOW (ref 42–52)
HGB BLD-MCNC: 11.8 G/DL — LOW (ref 14–18)
IMM GRANULOCYTES NFR BLD AUTO: 0.3 % — SIGNIFICANT CHANGE UP (ref 0.1–0.3)
LACTATE SERPL-SCNC: 1.8 MMOL/L — SIGNIFICANT CHANGE UP (ref 0.5–2.2)
LYMPHOCYTES # BLD AUTO: 1.68 K/UL — SIGNIFICANT CHANGE UP (ref 1.2–3.4)
LYMPHOCYTES # BLD AUTO: 17.8 % — LOW (ref 20.5–51.1)
MCHC RBC-ENTMCNC: 26.2 PG — LOW (ref 27–31)
MCHC RBC-ENTMCNC: 31.1 G/DL — LOW (ref 32–37)
MCV RBC AUTO: 84.4 FL — SIGNIFICANT CHANGE UP (ref 80–94)
MONOCYTES # BLD AUTO: 0.61 K/UL — HIGH (ref 0.1–0.6)
MONOCYTES NFR BLD AUTO: 6.5 % — SIGNIFICANT CHANGE UP (ref 1.7–9.3)
NEUTROPHILS # BLD AUTO: 6.94 K/UL — HIGH (ref 1.4–6.5)
NEUTROPHILS NFR BLD AUTO: 73.5 % — SIGNIFICANT CHANGE UP (ref 42.2–75.2)
NRBC # BLD: 0 /100 WBCS — SIGNIFICANT CHANGE UP (ref 0–0)
PLATELET # BLD AUTO: 354 K/UL — SIGNIFICANT CHANGE UP (ref 130–400)
POTASSIUM SERPL-MCNC: 4.4 MMOL/L — SIGNIFICANT CHANGE UP (ref 3.5–5)
POTASSIUM SERPL-SCNC: 4.4 MMOL/L — SIGNIFICANT CHANGE UP (ref 3.5–5)
PROT SERPL-MCNC: 6.3 G/DL — SIGNIFICANT CHANGE UP (ref 6–8)
RBC # BLD: 4.5 M/UL — LOW (ref 4.7–6.1)
RBC # FLD: 14.7 % — HIGH (ref 11.5–14.5)
SODIUM SERPL-SCNC: 139 MMOL/L — SIGNIFICANT CHANGE UP (ref 135–146)
WBC # BLD: 9.44 K/UL — SIGNIFICANT CHANGE UP (ref 4.8–10.8)
WBC # FLD AUTO: 9.44 K/UL — SIGNIFICANT CHANGE UP (ref 4.8–10.8)

## 2019-01-17 RX ORDER — ATORVASTATIN CALCIUM 80 MG/1
40 TABLET, FILM COATED ORAL AT BEDTIME
Qty: 0 | Refills: 0 | Status: DISCONTINUED | OUTPATIENT
Start: 2019-01-17 | End: 2019-01-21

## 2019-01-17 RX ORDER — AMPICILLIN SODIUM AND SULBACTAM SODIUM 250; 125 MG/ML; MG/ML
3 INJECTION, POWDER, FOR SUSPENSION INTRAMUSCULAR; INTRAVENOUS ONCE
Qty: 0 | Refills: 0 | Status: COMPLETED | OUTPATIENT
Start: 2019-01-17 | End: 2019-01-17

## 2019-01-17 RX ORDER — AMPICILLIN SODIUM AND SULBACTAM SODIUM 250; 125 MG/ML; MG/ML
3 INJECTION, POWDER, FOR SUSPENSION INTRAMUSCULAR; INTRAVENOUS EVERY 6 HOURS
Qty: 0 | Refills: 0 | Status: DISCONTINUED | OUTPATIENT
Start: 2019-01-17 | End: 2019-01-18

## 2019-01-17 RX ORDER — HEPARIN SODIUM 5000 [USP'U]/ML
5000 INJECTION INTRAVENOUS; SUBCUTANEOUS EVERY 8 HOURS
Qty: 0 | Refills: 0 | Status: DISCONTINUED | OUTPATIENT
Start: 2019-01-17 | End: 2019-01-21

## 2019-01-17 RX ORDER — VANCOMYCIN HCL 1 G
1000 VIAL (EA) INTRAVENOUS ONCE
Qty: 0 | Refills: 0 | Status: COMPLETED | OUTPATIENT
Start: 2019-01-17 | End: 2019-01-17

## 2019-01-17 RX ORDER — FUROSEMIDE 40 MG
40 TABLET ORAL DAILY
Qty: 0 | Refills: 0 | Status: DISCONTINUED | OUTPATIENT
Start: 2019-01-17 | End: 2019-01-21

## 2019-01-17 RX ORDER — METFORMIN HYDROCHLORIDE 850 MG/1
500 TABLET ORAL
Qty: 0 | Refills: 0 | Status: DISCONTINUED | OUTPATIENT
Start: 2019-01-17 | End: 2019-01-21

## 2019-01-17 RX ADMIN — Medication 250 MILLIGRAM(S): at 15:56

## 2019-01-17 RX ADMIN — HEPARIN SODIUM 5000 UNIT(S): 5000 INJECTION INTRAVENOUS; SUBCUTANEOUS at 22:35

## 2019-01-17 RX ADMIN — AMPICILLIN SODIUM AND SULBACTAM SODIUM 200 GRAM(S): 250; 125 INJECTION, POWDER, FOR SUSPENSION INTRAMUSCULAR; INTRAVENOUS at 23:43

## 2019-01-17 RX ADMIN — AMPICILLIN SODIUM AND SULBACTAM SODIUM 200 GRAM(S): 250; 125 INJECTION, POWDER, FOR SUSPENSION INTRAMUSCULAR; INTRAVENOUS at 15:56

## 2019-01-17 NOTE — ED PROVIDER NOTE - OBJECTIVE STATEMENT
57 y/o male presents with right lower leg redness and pain x 1 week. patient with worsening symptoms. patient currently being treated with levaquin . patint denies any fever,chills. patient with hx of PVD and diabetes. patient denies any fall or trauma to leg. patient denies any chest pain, sob

## 2019-01-17 NOTE — H&P ADULT - NSHPLABSRESULTS_GEN_ALL_CORE
ICU Vital Signs Last 24 Hrs  T(C): 36.2 (17 Jan 2019 11:35), Max: 36.3 (17 Jan 2019 11:27)  T(F): 97.1 (17 Jan 2019 11:35), Max: 97.4 (17 Jan 2019 11:27)  HR: 91 (17 Jan 2019 11:35) (91 - 91)  BP: 137/78 (17 Jan 2019 11:35) (137/78 - 139/78)  BP(mean): --  ABP: --  ABP(mean): --  RR: 18 (17 Jan 2019 11:35) (18 - 18)  SpO2: 97% (17 Jan 2019 11:35) (97% - 97%)                          11.8   9.44  )-----------( 354      ( 17 Jan 2019 12:41 )             38.0       01-17    139  |  99  |  10  ----------------------------<  194<H>  4.4   |  30  |  0.6<L>    Ca    8.7      17 Jan 2019 12:41    TPro  6.3  /  Alb  3.5  /  TBili  0.4  /  DBili  x   /  AST  9   /  ALT  11  /  AlkPhos  114  01-17                      Lactate Trend  01-17 @ 12:41 Lactate:1.8             CAPILLARY BLOOD GLUCOSE

## 2019-01-17 NOTE — H&P ADULT - NSHPREVIEWOFSYSTEMS_GEN_ALL_CORE
REVIEW OF SYSTEMS:    CONSTITUTIONAL: No weakness, fevers or chills  EYES/ENT: No visual changes;  No vertigo or throat pain   NECK: No pain or stiffness  RESPIRATORY: No cough, wheezing, hemoptysis; No shortness of breath  CARDIOVASCULAR: No chest pain or palpitations  GASTROINTESTINAL: No abdominal or epigastric pain. No nausea, vomiting, or hematemesis; No diarrhea or constipation. No melena or hematochezia.  GENITOURINARY: No dysuria, frequency or hematuria  NEUROLOGICAL: No numbness or weakness  SKIN: cellulitis of lower extremities rednesss, kin excoriation

## 2019-01-17 NOTE — ED PROVIDER NOTE - ATTENDING CONTRIBUTION TO CARE
57 y/o male presents with right lower leg redness on/off for 10 mo, over last week symptoms got worse, now extremity is red, swollen, tender and has foul smell. Pt is currently being treated with levaquin. No fever/chills, CP/SOB, abdominal pain. No trauma. On exam, pt in NAD, AAOx3, lungs CTA B/L, CV S1S2 regular, abdomen soft/NT/ND/(+)BS, ext (+) raw, red, tender leg with foul smell. Will do labs, Abx and admit.

## 2019-01-17 NOTE — ED ADULT TRIAGE NOTE - CHIEF COMPLAINT QUOTE
pt arrived from [facility]. "My nurse was changing my dressing on my right lower leg today, she said it felt very warm and looked red, it itches very bad but it doesn't hurt"

## 2019-01-17 NOTE — H&P ADULT - ASSESSMENT
· Chief Complaint: The patient is a 58y Male complaining of injury, lower leg.	  · HPI Objective Statement: 59 y/o male presents with right lower leg redness and pain x 1 week. patient with worsening symptoms. patient currently being treated with levaquin . patint denies any fever,chills. patient with hx of PVD and diabetes. patient denies any fall or trauma to leg. patient denies any chest pain, sob

## 2019-01-17 NOTE — H&P ADULT - HISTORY OF PRESENT ILLNESS
· Chief Complaint: The patient is a 58y Male complaining of injury, lower leg.	  · HPI Objective Statement: 57 y/o male presents with right lower leg redness and pain x 1 week. patient with worsening symptoms. patient currently being treated with levaquin . patint denies any fever,chills. patient with hx of PVD and diabetes. patient denies any fall or trauma to leg. patient denies any chest pain, sob

## 2019-01-17 NOTE — ED ADULT NURSE NOTE - CHIEF COMPLAINT QUOTE
pt arrived from [facility]. "My nurse was changing my dressing on my right lower leg today, she said it felt very warm and looked red, it itches very bad but it doesn't hurt"
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
## 2019-01-17 NOTE — ED PROVIDER NOTE - PHYSICAL EXAMINATION
skin: right lower extremity +distal third warmpth, weeping , erythematous without lymphangtiis. good peripheral pulses  , no crepitation

## 2019-01-17 NOTE — ED ADULT NURSE NOTE - NSIMPLEMENTINTERV_GEN_ALL_ED
Implemented All Fall with Harm Risk Interventions:  [place] to call system. Call bell, personal items and telephone within reach. Instruct patient to call for assistance. Room bathroom lighting operational. Non-slip footwear when patient is off stretcher. Physically safe environment: no spills, clutter or unnecessary equipment. Stretcher in lowest position, wheels locked, appropriate side rails in place. Provide visual cue, wrist band, yellow gown, etc. Monitor gait and stability. Monitor for mental status changes and reorient to person, place, and time. Review medications for side effects contributing to fall risk. Reinforce activity limits and safety measures with patient and family. Provide visual clues: red socks.

## 2019-01-17 NOTE — H&P ADULT - NSHPPHYSICALEXAM_GEN_ALL_CORE
GENERAL:  59y/o Male NAD, morbidily obese, resting comfortably with sob just by going laying down to sitting.  HEAD:  Atraumatic, Normocephalic  EYES: EOMI, PERRLA, conjunctiva and sclera clear  NECK: Supple, No JVD, no cervical lymphadenopathy, non-tender  CHEST/LUNG: Clear to auscultation bilaterally; No wheeze, rhonchi, or rales  HEART: Regular rate and rhythm; S1&S2  ABDOMEN: Soft, Nontender, Nondistended x 4 quadrants; Bowel sounds present  EXTREMITIES:   Peripheral Pulses Present, No clubbing, no cyanosis, or no edema, no calf tenderness  PSYCH: AAOx3, cooperative, appropriate  NEUROLOGY: WNL  SKIN: WNL

## 2019-01-17 NOTE — ED PROVIDER NOTE - MEDICAL DECISION MAKING DETAILS
59 y/o male presents with right lower leg redness on/off for 10 mo, over last week symptoms got worse, now extremity is red, swollen, tender and has foul smell. Pt is currently being treated with levaquin. No fever/chills, CP/SOB, abdominal pain. No trauma. On exam, pt in NAD, AAOx3, lungs CTA B/L, CV S1S2 regular, abdomen soft/NT/ND/(+)BS, ext (+) raw, red, tender leg with foul smell. Will admit for IV abx.

## 2019-01-18 DIAGNOSIS — I83.019 VARICOSE VEINS OF RIGHT LOWER EXTREMITY WITH ULCER OF UNSPECIFIED SITE: ICD-10-CM

## 2019-01-18 LAB
GLUCOSE BLDC GLUCOMTR-MCNC: 128 MG/DL — HIGH (ref 70–99)
GLUCOSE BLDC GLUCOMTR-MCNC: 132 MG/DL — HIGH (ref 70–99)
GLUCOSE BLDC GLUCOMTR-MCNC: 192 MG/DL — HIGH (ref 70–99)
GLUCOSE BLDC GLUCOMTR-MCNC: 215 MG/DL — HIGH (ref 70–99)

## 2019-01-18 PROCEDURE — 99221 1ST HOSP IP/OBS SF/LOW 40: CPT | Mod: NC

## 2019-01-18 RX ADMIN — Medication 40 MILLIGRAM(S): at 05:12

## 2019-01-18 RX ADMIN — HEPARIN SODIUM 5000 UNIT(S): 5000 INJECTION INTRAVENOUS; SUBCUTANEOUS at 05:12

## 2019-01-18 RX ADMIN — HEPARIN SODIUM 5000 UNIT(S): 5000 INJECTION INTRAVENOUS; SUBCUTANEOUS at 14:53

## 2019-01-18 RX ADMIN — ATORVASTATIN CALCIUM 40 MILLIGRAM(S): 80 TABLET, FILM COATED ORAL at 21:10

## 2019-01-18 RX ADMIN — METFORMIN HYDROCHLORIDE 500 MILLIGRAM(S): 850 TABLET ORAL at 05:11

## 2019-01-18 RX ADMIN — Medication 1 TABLET(S): at 11:10

## 2019-01-18 RX ADMIN — Medication 1 APPLICATION(S): at 18:37

## 2019-01-18 RX ADMIN — AMPICILLIN SODIUM AND SULBACTAM SODIUM 200 GRAM(S): 250; 125 INJECTION, POWDER, FOR SUSPENSION INTRAMUSCULAR; INTRAVENOUS at 05:24

## 2019-01-18 RX ADMIN — HEPARIN SODIUM 5000 UNIT(S): 5000 INJECTION INTRAVENOUS; SUBCUTANEOUS at 21:10

## 2019-01-18 RX ADMIN — METFORMIN HYDROCHLORIDE 500 MILLIGRAM(S): 850 TABLET ORAL at 17:58

## 2019-01-18 NOTE — CONSULT NOTE ADULT - SUBJECTIVE AND OBJECTIVE BOX
[facility] 58yMalePatient is a 58y old  Male who presents with a chief complaint of cellulitis lower leg/chronic venous stasis (2019 15:19)      Patient has history of:  No Known Allergies      Adult/Nursing Home residence    PMH - venous stasis     FSH - not relevant     ROS - not contributory         PHYSICAL EXAM  T(F): 97.1 (19 @ 05:22), Max: 97.8 (19 @ 16:25)  HR: 93 (19 @ 05:22) (80 - 97)  BP: 129/73 (19 @ 05:22) (100/50 - 139/78)  RR: 16 (19 @ 05:22) (16 - 18)  SpO2: 98% (19 @ 16:25) (97% - 98%)  Daily Height in cm: 165.1 (2019 17:38)    Daily Weight in k.5 (2019 17:38)    awake and alert   HEENT: normal, no nuchal rigidity  Cor: RSR Nl S1 S2  Lungs: Decreased breath sounds at bases  Abdomen: Nontender, Nl BS, obese  Ext: venous stasis with superficial ulcer - erythema     LAB & RADIOLOGIC RESULTS:                        11.8   9.44  )-----------( 354      ( 2019 12:41 )             38.0             139  |  99  |  10  ----------------------------<  194<H>  4.4   |  30  |  0.6<L>    Ca    8.7      2019 12:41    TPro  6.3  /  Alb  3.5  /  TBili  0.4  /  DBili  x   /  AST  9   /  ALT  11  /  AlkPhos  114      Sodium, Serum: 139 mmol/L (19 @ 12:41     Creatinine, Serum: 0.6 mg/dL (19 @ 12:41)  eGFR if Non African American: 112 mL/min/1.73M2 (19 @ 12:41)  eGFR if African American: 130 mL/min/1.73M2 (19 @ 12:41)    LIVER FUNCTIONS - ( 2019 12:41 )  Alb: 3.5 g/dL / Pro: 6.3 g/dL / ALK PHOS: 114 U/L / ALT: 11 U/L / AST: 9 U/L / GGT: x             Patient Profile Adult [MARK Lombardo] (19 @ 17:47)  Outpatient Clinical Summary - Medical  Group [O. Provider] (19 @ 15:35)  H&P Adult [PMihir Taye] (19 @ 15:19)  ED Provider Note [SHERRY Jasso] (19 @ 13:48)  ED ADULT Triage Note [SMihir Mccall] (19 @ 11:35)  Outpatient Clinical Summary - Medical  Group [O. Provider] (19 @ 11:23)  ED Provider Note [ANDRADE Osborne] (19 @ 22:10)  ED ADULT Nurse Note [BRANDY Cho] (19 @ 21:41)  ED ADULT Triage Note [BRANDY Maherfe] (19 @ 21:39)  Outpatient Clinical Summary - Medical  Group [O. Provider] (19 @ 21:20)  ED Provider Note [MARK Grullon J. Compitello, J. Romito-Sinan] (18 @ 01:59)  Outpatient Clinical Summary - Medical  Group [O. Provider] (18 @ 00:12)  ED ADULT Triage Note [I. Prepose] (18 @ 22:38)  Progress Notes - Care Coordination [C. Provider] (18 @ 15:19)  Progress Note Adult-Infectious Disease Attending [JASON Pollock] (18 @ 13:19)  Progress Note Adult-Internal Medicine Attending [PRINCESS Forrest] (18 @ 12:41)  Progress Notes - Care Coordination [C. Provider] (18 @ 09:56)  Progress Notes - Care Coordination [C. Provider] (18 @ 08:38)  Progress Notes - Care Coordination [C. Provider] (18 @ 16:26)  Discharge Note Adult [DORIE Ferrara, PRINCESS Forrest] (18 @ 15:15)  Progress Notes - Care Coordination [C. Provider] (18 @ 15:04)  Progress Notes - Care Coordination [C. Provider] (18 @ 14:33)  Progress Note Adult-Infectious Disease Attending [JASON Pollock] (18 @ 13:38)  Progress Note Adult-Internal Medicine Attending [PRINCESS Forrest] (18 @ 12:46)  Progress Note Adult-Internal Medicine Resident [YIN Álvarez] (18 @ 09:14)  Progress Notes - Care Coordination [C. Provider] (18 @ 15:09)  Consult Note Adult-Infectious Disease Attending [JASON Pollock] (18 @ 14:36)  Progress Note Adult-Internal Medicine Resident [YIN Álvarez] (18 @ 13:46)  Progress Note Adult-Internal Medicine Attending [PRINCESS Forrest] (18 @ 12:18)  Care Coordination Assessment [C. Provider] (18 @ 12:10)  Consult Note Adult-Burn Attending [ANDRADE Villagran] (18 @ 00:14)  Patient Profile Adult [ANDRADE Wade] (18 @ 19:15)  Progress Notes - Care Coordination [C. Provider] (18 @ 14:40)  H&P Adult [PRINCESS Hernandez] (18 @ 07:57)  Outpatient Clinical Summary - Medical  Group [O. Provider] (18 @ 03:04)  ED ADULT Nurse Reassessment Note [JASON Okeefe] (18 @ 02:00)  ED ADULT Nurse Note [DORIE Pacheco] (18 @ 01:18)  Outpatient Clinical Summary - Medical  Group [O. Provider] (18 @ 00:09)  ED Provider Note [ALEXUS Zavala] (18 @ 23:27)  ED ADULT Triage Note [ALEXUS Loja] (18 @ 22:33)  Outpatient Clinical Summary - Medical  Group [O. Provider] (09-14-18 @ 21:05)  ED ADULT Nurse Note [ANDRADE Jones] (18 @ 14:45)  ED Provider Note [DOMENICO Agustin] (18 @ 12:22)  ED ADULT Triage Note [RONN Valle] (18 @ 11:46)  ED Provider Note [DOMENICO Rios] (18 @ 09:42)  ED ADULT Nurse Note [ANDRADE Platt] (18 @ 09:34)  ED ADULT Triage Note [DORIE Weaver] (18 @ 09:30)  Outpatient Clinical Summary - Medical  Group [O. Provider] (18 @ 09:20)  ED ADULT Nurse Reassessment Note [HEMAL Nguyen] (18 @ 12:57)  ED Provider Note [ANDRADE Osborne] (18 @ 12:01)  ED ADULT Nurse Note [HEMAL Nguyen] (18 @ 11:47)  ED ADULT Triage Note [RONN Valle] (18 @ 11:42)  Outpatient Clinical Summary - Medical  Group [O. Provider] (18 @ 11:35)  ED Provider Note [BRANDY Hill] (08-10-18 @ 21:34)  ED ADULT Nurse Note [HEMAL Neville] (08-10-18 @ 18:49)  ED ADULT Triage Note [JASON De Santiago] (08-10-18 @ 18:43)  Outpatient Clinical Summary - Medical  Group [O. Provider] (08-10-18 @ 18:34)  Progress Note Adult-Hospitalist Attending [JASON Truong] (18 @ 12:53)  Consult Note Adult-Podiatry Resident [KETAN Conn] (18 @ 12:53)  Progress Note Adult-Neurology NP/Attending [ANDRADE Lance] (18 @ 11:22)  Progress Notes - Care Coordination [C. Provider] (18 @ 11:00)  Discharge Note Adult [A. GlatmanYIN E. Eismont, I. Duka, MICHELLE Astorga] (18 @ 10:59)  Physical Therapy Initial Evaluation Adult [ALEXUS Jain] (18 @ 10:02)  Patient Profile Adult [JEEVAN Gross] (18 @ 02:41)  ED ADULT Nurse Reassessment Note [KESHAWN Baeza] (18 @ 23:37)  ED ADULT Nurse Reassessment Note [KESHAWN Baeza] (18 @ 21:44)  Chart Note-Event Note Attending [JASON Barreto] (18 @ 19:13)  Consult Note Adult-Physiatry Attending [JEEVAN Araujo] (18 @ 16:23)  Progress Note Adult-Internal Medicine Resident [DORIE Jin] (18 @ 16:14)  Consult Note Adult-Neurology Resident/Attending [THIEN Thomas] (18 @ 15:27)  Progress Note Adult-Internal Medicine Resident [DORIE Jin] (18 @ 14:26)  Consult Note Adult-Vascular Surgery Fellow/Attending [BRANDY Momin] (18 @ 09:40)  Consult Note Adult-Neurology Attending [MARK Bey] (18 @ 13:25)  H&P Adult [KESHAWN Ortiz] (18 @ 12:26)  Outpatient Clinical Summary - Medical  Group [O. Provider] (18 @ 11:39)  ED ADULT Nurse Note [KESHAWN Erazo] (18 @ 08:01)  ED ADULT Triage Note [ALEXUS De La Fuente] (18 @ 07:15)  Outpatient Clinical Summary - Medical  Group [O. Provider] (18 @ 07:13)  Progress Note Adult-Hospitalist Attending [ANDRADE Tang] (18 @ 16:03)  Progress Notes - Care Coordination [C. Provider] (18 @ 11:09)  Progress Note Adult-Hospitalist Attending [ANDRADE Tang] (18 @ 15:49)  Progress Notes - Care Coordination [C. Provider] (18 @ 15:01)  Progress Notes - Care Coordination [C. Provider] (18 @ 11:45)  Dietitian Initial Evaluation Adult [ANDRADE Pena] (18 @ 10:54)  Progress Note Adult-Hospitalist Attending [ANDRADE Tang] (18 @ 11:38)  Progress Notes - Care Coordination [C. Provider] (18 @ 14:55)  Progress Note Adult-Hospitalist Attending [ANDRADE Tang] (18 @ 13:26)  Progress Note Adult-Internal Medicine Resident [JASON Mccabe] (18 @ 08:18)  Progress Notes - Care Coordination [C. Provider] (18 @ 15:13)  Chart Note-Event Note Vascular  PA/Attending [BRANDY Mason] (18 @ 13:31)  Progress Note Adult-Internal Medicine Resident [JASON Mccabe] (18 @ 13:09)  Progress Note Adult-Hospitalist Attending [ANDRADE Tang] (18 @ 13:04)  Progress Notes - Care Coordination [C. Provider] (18 @ 12:25)  Progress Note Adult-Anesthesia Attending [MARK Ramirez] (18 @ 11:02)  Chart Note-Event Note POST OP C PA [MARK Ferrara] (18 @ 21:14)  Brief Operative Note [BRANDY Momin] (18 @ 17:48)  Chart Note-anesthesia Attending [MARK Ramirez] (18 @ 17:33)  Pre-Anesthesia Evaluation Adult [MARK Ramirez] (18 @ 15:08)  Progress Note Adult-Hospitalist Attending [ANDRADE Tang] (18 @ 13:47)  Progress Note Adult-Internal Medicine Resident [JASON Mccabe] (18 @ 13:32)  Progress Notes - Care Coordination [C. Provider] (18 @ 11:13)  Pre-op Checklist [XIAO Royal] (18 @ 06:33)  Consult Note Adult-Vascular Surgery PA/Attending [BRANDY Stuart] (18 @ 22:08)  Progress Note Adult-Hospitalist Attending [MARK Skinner] (18 @ 13:18)  Progress Notes - Care Coordination [C. Provider] (18 @ 11:19)  Progress Note Adult-Podiatry Resident/Attending [SHERRY Hernandez] (18 @ 10:33)  Care Coordination Assessment [C. Provider] (18 @ 12:44)  Progress Notes - Care Coordination [C. Provider] (18 @ 12:36)  Progress Notes - Care Coordination [C. Provider] (18 @ 12:35)  Discharge Note Adult [YIN Young, MICHELLE Barrera, ANDRADE Tang, ANDRADE Steele, BRANDY Nobles] (18 @ 11:59)  Progress Note Adult-Hospitalist Attending [ANDRADE Steele] (18 @ 11:16)  Consult Note Adult-Podiatry Resident/Attending [SHERRY Hernandez] (18 @ 11:00)  Consult Note Adult-Infectious Disease Attending [JASON Merino] (18 @ 09:04)  Patient Profile Adult [YIN Ghoshcarmelo] (18 @ 18:37)  H&P Adult [ANDRADE Steele] (18 @ 15:47)  ED Provider Note [HEMAL Lares] (18 @ 11:34)  ED ADULT Triage Note [RONN Valle] (18 @ 11:28)  ED ADULT Nurse Note [ANDRADE Platt] (18 @ 11:24)  Progress Notes - Care Coordination [C. Provider] (18 @ 11:13)  Progress Notes - Care Coordination [C. Provider] (18 @ 15:20)  Progress Note Adult-Hospitalist Attending [BRANDY Hector] (18 @ 15:18)  Progress Notes - Care Coordination [C. Provider] (18 @ 10:37)  Progress Note Adult-Internal Medicine Attending [JASON Romano] (18 @ 12:37)  Progress Notes - Care Coordination [C. Provider] (18 @ 11:48)  Chart Note-Event Note f/u RD [ANDRADE Pena] (18 @ 14:16)  Progress Notes - Care Coordination [C. Provider] (18 @ 14:10)  Progress Note Adult-Internal Medicine Attending [JASON Romano] (18 @ 12:14)  Progress Note Adult-Internal Medicine Resident [DORIE Flanagan] (18 @ 11:28)  Consult Note Adult-Ophthalmology Attending [HEMAL Troncoso] (18 @ 12:50)  Progress Notes - Care Coordination [C. Provider] (18 @ 10:44)  Progress Note Adult-Internal Medicine Attending [DOMENICO Rodrigues] (18 @ 10:18)  Consult Note Adult-Neurology Attending [MARK Bey] (07-15-18 @ 14:30)  Progress Note Adult-Internal Medicine Attending [THIEN Park] (07-15-18 @ 12:33)  Chart Note-Event Note Resident [MARK Cisneros] (07-15-18 @ 00:15)  Chart Note-Event Note Resident [MARK Blyane] (18 @ 23:18)  Provider Contact Note (Other) [HEMAL Preston] (18 @ 21:25)  Progress Note Adult-Internal Medicine Resident [HEMAL Cruz] (18 @ 13:36)  Progress Note Adult-Internal Medicine Attending [THIEN Park] (18 @ 13:18)  Progress Note Adult-Internal Medicine Resident [HEMAL Cruz] (18 @ 16:47)  Progress Notes - Care Coordination [C. Provider] (18 @ 15:25)  Progress Note Adult-Internal Medicine Attending [JASON Romano] (18 @ 13:47)  Progress Notes - Care Coordination [C. Provider] (18 @ 10:26)  Progress Note Adult-Internal Medicine Resident [HEMAL Cruz] (18 @ 15:52)  Progress Note Adult-Internal Medicine Attending [JASON Romano] (18 @ 12:56)  Progress Notes - Care Coordination [C. Provider] (18 @ 12:25)  Progress Notes - Care Coordination [C. Provider] (18 @ 09:31)  Progress Note Adult-Internal Medicine Resident [HEMAL Cruz] (18 @ 14:22)  Progress Note Adult-Internal Medicine Attending [JASON Romano] (18 @ 13:04)  Progress Notes - Care Coordination [C. Provider] (18 @ 11:42)  Dietitian Initial Evaluation Adult [ANDRADE Pena] (18 @ 10:13)  Progress Notes - Care Coordination [C. Provider] (18 @ 09:18)  Progress Notes - Care Coordination [C. Provider] (07-10-18 @ 15:59)  Progress Notes - Care Coordination [C. Provider] (07-10-18 @ 14:19)  Progress Note Adult-Internal Medicine Resident/Attending [HEMAL Raines] (07-10-18 @ 14:04)  Discharge Note Adult [DORIE Singh, DOMENICO Ervin, HEMAL Cruz, BRANDY Hector] (07-10-18 @ 11:49)  Progress Notes - Care Coordination [C. Provider] (07-10-18 @ 09:58)  Progress Note Adult-Infectious Disease Attending [JASON Pollock] (07-10-18 @ 09:09)  Progress Note Adult-Internal Medicine Resident [HEMAL Cruz] (18 @ 15:31)  Care Coordination Assessment [C. Provider] (18 @ 14:56)  Progress Note Adult-Hospitalist Attending [JASON Romano] (18 @ 13:04)  Progress Notes - Care Coordination [C. Provider] (18 @ 10:20)  Progress Note Adult-Internal Medicine Resident [HEMAL Cruz] (18 @ 17:42)  Progress Note Adult-Hospitalist Attending [JASON Romano] (18 @ 16:35)  Progress Note Adult-Hospitalist Attending [ANDRADE Maria] (18 @ 14:24)  Physical Therapy Initial Evaluation Adult [ANDRADE Carranza] (18 @ 08:54)  Progress Note Adult-Hospitalist Attending [ANDRADE Maria] (18 @ 17:00)  Consult Note Adult-Physiatry Attending [KETAN Beckman] (18 @ 14:11)  Progress Notes - Care Coordination [C. Provider] (18 @ 12:46)  Provider Contact Note (Other) [MICHELLE Barnhart] (18 @ 10:38)  Progress Note Adult-Internal Medicine Resident [HEMAL Cruz] (18 @ 10:00)  Progress Note Adult-Infectious Disease Attending [JASON Pollock] (18 @ 08:31)  Consult Note Adult-Burn Attending [ANDRADE Villagran] (18 @ 17:30)  Progress Notes - Care Coordination [C. Provider] (18 @ 15:46)  Progress Note Adult-Internal Medicine Resident [HEMAL Cruz] (18 @ 15:00)  Progress Notes - Care Coordination [C. Provider] (18 @ 11:05)  Consult Note Adult-Infectious Disease Resident/Attending [ALEXUS Gallardo] (18 @ 10:31)  Progress Notes - Care Coordination [C. Provider] (18 @ 20:23)  Patient Profile Adult [JASON Gray] (18 @ 17:38)  H&P Adult [ANDRADE RONN Maria] (18 @ 16:50)  ED Provider Note [DORIE Winslow] (18 @ 14:17)  ED ADULT Nurse Note [LEOBARDO Tirado] (18 @ 13:46)  ED ADULT Triage Note [JASON Carvalho] (18 @ 12:54)  Progress Notes - Care Coordination [C. Provider] (18 @ 12:38)  Progress Notes - Care Coordination [C. Provider] (18 @ 12:27)  Progress Note Adult-Internal Medicine Resident/Attending [THIEN Skinner] (18 @ 11:07)  Progress Notes - Care Coordination [C. Provider] (18 @ 15:12)  Care Coordination Assessment [C. Provider] (18 @ 15:06)  Progress Note Adult-Internal Medicine Resident/Attending [THIEN Skinner] (18 @ 10:59)  Progress Note Adult-Infectious Disease Attending [PRINCESS Chong] (18 @ 07:35)  Progress Notes - Care Coordination [C. Provider] (18 @ 17:04)  Progress Notes - Care Coordination [C. Provider] (18 @ 16:28)  Discharge Note Adult [MARK Serrano, P. Tiffani, THIEN Park] (18 @ 14:50)  Progress Note Adult-Infectious Disease Attending [PRINCESS Chong] (18 @ 12:09)  Progress Note Adult-Internal Medicine Resident [MARK Serrano] (18 @ 09:37)  Progress Note Adult-Surgery Resident [PRINCESS Carballo S. Cohn] (18 @ 05:15)  Consult Note Adult-Burn Attending [ANDRADE Villagran] (18 @ 23:10)  H&P Adult [KAYLENE. Tiffani] (18 @ 14:41)  Consult Note Adult-Infectious Disease Attending [PRINCESS Chong] (18 @ 14:37)  Provider Contact Note (Other) [JESSICAMihir Ibarra] (18 @ 11:56)  Chart Note-Event Note Resident [ANDRADE Oneal] (18 @ 11:34)  Consult Note Adult-Surgery Resident/Attending [BRANDY Ramirez] (18 @ 10:17)  Patient Profile Adult [RONN Ibarra] (18 @ 10:04)  ED Provider Note [THIEN Wong] (18 @ 22:25)  ED ADULT Nurse Note [ANDRADE Moya] (18 @ 22:02)  ED ADULT Triage Note [DOMENICO Rodas] (18 @ 20:50)

## 2019-01-18 NOTE — CONSULT NOTE ADULT - SUBJECTIVE AND OBJECTIVE BOX
PODIATRY CONSULT   FABIAN CLEARY is a 58y old  Male who presents with a chief complaint of cellulitis lower leg/chronic venous stasis (18 Jan 2019 06:30)    HPI:  · Chief Complaint: The patient is a 58y Male complaining of injury, lower leg.	  · HPI Objective Statement: 57 y/o male presents with right lower leg redness and pain x 1 week. patient with worsening symptoms. patient currently being treated with levaquin . patint denies any fever,chills. patient with hx of PVD and diabetes. patient denies any fall or trauma to leg. patient denies any chest pain, sob (17 Jan 2019 15:19)    CELLULITIS OF LEG, RIGHT  CVA (cerebral vascular accident)  HLD (hyperlipidemia)  Morbid obesity  Osteoarthritis  Diabetes  Asthma      PMH: CELLULITIS OF LEG, RIGHT  CVA (cerebral vascular accident)  HLD (hyperlipidemia)  Morbid obesity  Osteoarthritis  Diabetes  Asthma    PSH: H/O umbilical hernia repair  No significant past surgical history    Medication   Allergy: No Known Allergies        Labs:                        11.8   9.44  )-----------( 354      ( 17 Jan 2019 12:41 )             38.0       01-17    139  |  99  |  10  ----------------------------<  194<H>  4.4   |  30  |  0.6<L>    Ca    8.7      17 Jan 2019 12:41    TPro  6.3  /  Alb  3.5  /  TBili  0.4  /  DBili  x   /  AST  9   /  ALT  11  /  AlkPhos  114  01-17                Assessment:   Venus stasis ulcer B/L LE, circumferential around the ankles and dorsal feet, limited to dermis, with erythema, warmth, pain. Oozing. Hemosiderin deposit B/L ankles.   P:  Chart reviewed and Patient evaluated  Discussed diagnosis and treatment with patient  WOund Care Orders: Please clean with water and soap betamethasone Cream to the base, Xeroform/ABD/Kerlix/ACE with compression to pt tolerance -Daily B/L LE.   f/u  ID recommendations for ABx   will discuss care plan  with  Attending

## 2019-01-18 NOTE — CONSULT NOTE ADULT - PROBLEM SELECTOR RECOMMENDATION 9
wt loss  NO Abx  Local care   pain mx   Follow up with Vascular surgery - Pt has an appt - NEEDS UNABOOTS BACK ON !!  recall if needed

## 2019-01-19 ENCOUNTER — TRANSCRIPTION ENCOUNTER (OUTPATIENT)
Age: 59
End: 2019-01-19

## 2019-01-19 LAB
GLUCOSE BLDC GLUCOMTR-MCNC: 128 MG/DL — HIGH (ref 70–99)
GLUCOSE BLDC GLUCOMTR-MCNC: 143 MG/DL — HIGH (ref 70–99)
GLUCOSE BLDC GLUCOMTR-MCNC: 193 MG/DL — HIGH (ref 70–99)
GLUCOSE BLDC GLUCOMTR-MCNC: 208 MG/DL — HIGH (ref 70–99)

## 2019-01-19 RX ORDER — FUROSEMIDE 40 MG
1 TABLET ORAL
Qty: 0 | Refills: 0 | COMMUNITY
Start: 2019-01-19

## 2019-01-19 RX ORDER — IPRATROPIUM/ALBUTEROL SULFATE 18-103MCG
3 AEROSOL WITH ADAPTER (GRAM) INHALATION ONCE
Qty: 0 | Refills: 0 | Status: DISCONTINUED | OUTPATIENT
Start: 2019-01-19 | End: 2019-01-21

## 2019-01-19 RX ORDER — METFORMIN HYDROCHLORIDE 850 MG/1
1 TABLET ORAL
Qty: 0 | Refills: 0 | COMMUNITY
Start: 2019-01-19

## 2019-01-19 RX ORDER — ATORVASTATIN CALCIUM 80 MG/1
1 TABLET, FILM COATED ORAL
Qty: 0 | Refills: 0 | COMMUNITY
Start: 2019-01-19

## 2019-01-19 RX ADMIN — METFORMIN HYDROCHLORIDE 500 MILLIGRAM(S): 850 TABLET ORAL at 05:46

## 2019-01-19 RX ADMIN — ATORVASTATIN CALCIUM 40 MILLIGRAM(S): 80 TABLET, FILM COATED ORAL at 21:41

## 2019-01-19 RX ADMIN — HEPARIN SODIUM 5000 UNIT(S): 5000 INJECTION INTRAVENOUS; SUBCUTANEOUS at 13:44

## 2019-01-19 RX ADMIN — METFORMIN HYDROCHLORIDE 500 MILLIGRAM(S): 850 TABLET ORAL at 18:04

## 2019-01-19 RX ADMIN — Medication 1 TABLET(S): at 11:14

## 2019-01-19 RX ADMIN — Medication 1 APPLICATION(S): at 11:13

## 2019-01-19 RX ADMIN — HEPARIN SODIUM 5000 UNIT(S): 5000 INJECTION INTRAVENOUS; SUBCUTANEOUS at 21:41

## 2019-01-19 RX ADMIN — HEPARIN SODIUM 5000 UNIT(S): 5000 INJECTION INTRAVENOUS; SUBCUTANEOUS at 05:47

## 2019-01-19 NOTE — DISCHARGE NOTE ADULT - CARE PLAN
Goal:	to improve redness  Assessment and plan of treatment:	follow up w vascular 1 week Principal Discharge DX:	Venous stasis ulcers of both lower extremities  Goal:	to improve redness  Assessment and plan of treatment:	follow up w vascular 1 week

## 2019-01-19 NOTE — DISCHARGE NOTE ADULT - MEDICATION SUMMARY - MEDICATIONS TO TAKE
I will START or STAY ON the medications listed below when I get home from the hospital:    aspirin 81 mg oral delayed release tablet  -- 1 tab(s) by mouth once a day  -- Indication: For HLD (hyperlipidemia)    metFORMIN 500 mg oral tablet  -- 1 tab(s) by mouth 2 times a day  -- Indication: For Diabetes    Banophen 50 mg oral capsule  -- 1 cap(s) by mouth every 6 hours, As Needed   -- Indication: For Home meds    atorvastatin 40 mg oral tablet  -- 1 tab(s) by mouth once a day (at bedtime)  -- Indication: For HLD (hyperlipidemia)    betamethasone valerate 0.1% topical cream  -- 1 application on skin once a day  -- Indication: For Dermatitis    furosemide 40 mg oral tablet  -- 1 tab(s) by mouth once a day  -- Indication: For edema    Multiple Vitamins oral tablet  -- 1 tab(s) by mouth once a day  -- Indication: For supplement I will START or STAY ON the medications listed below when I get home from the hospital:    aspirin 81 mg oral delayed release tablet  -- 1 tab(s) by mouth once a day  -- Indication: For HLD (hyperlipidemia)    metFORMIN 500 mg oral tablet  -- 1 tab(s) by mouth 2 times a day  -- Indication: For Diabetes    Banophen 50 mg oral capsule  -- 1 cap(s) by mouth every 6 hours, As Needed   -- Indication: For Home meds    atorvastatin 80 mg oral tablet  -- 1 tab(s) by mouth once a day  -- Indication: For HLD (hyperlipidemia)    betamethasone valerate 0.1% topical cream  -- 1 application on skin once a day  -- Indication: For Dermatitis    furosemide 40 mg oral tablet  -- 1 tab(s) by mouth once a day  -- Indication: For edema

## 2019-01-19 NOTE — DISCHARGE NOTE ADULT - PATIENT PORTAL LINK FT
You can access the [facility], offered by [facility], by registering with the following website: [email or web address]

## 2019-01-19 NOTE — DISCHARGE NOTE ADULT - INSTRUCTIONS
Wash B/L LE with soap and water. Apply betamethasone cream  &xeroform/ABD/Kerlix/ACE with compression to pt. tolerance daily.

## 2019-01-19 NOTE — DISCHARGE NOTE ADULT - MEDICATION SUMMARY - MEDICATIONS TO STOP TAKING
I will STOP taking the medications listed below when I get home from the hospital:  None I will STOP taking the medications listed below when I get home from the hospital:    Daily Multiple Vitamins oral tablet  -- 1 tab(s) by mouth once a day

## 2019-01-20 LAB
GLUCOSE BLDC GLUCOMTR-MCNC: 117 MG/DL — HIGH (ref 70–99)
GLUCOSE BLDC GLUCOMTR-MCNC: 123 MG/DL — HIGH (ref 70–99)
GLUCOSE BLDC GLUCOMTR-MCNC: 129 MG/DL — HIGH (ref 70–99)
GLUCOSE BLDC GLUCOMTR-MCNC: 147 MG/DL — HIGH (ref 70–99)

## 2019-01-20 RX ADMIN — HEPARIN SODIUM 5000 UNIT(S): 5000 INJECTION INTRAVENOUS; SUBCUTANEOUS at 05:19

## 2019-01-20 RX ADMIN — METFORMIN HYDROCHLORIDE 500 MILLIGRAM(S): 850 TABLET ORAL at 05:19

## 2019-01-20 RX ADMIN — HEPARIN SODIUM 5000 UNIT(S): 5000 INJECTION INTRAVENOUS; SUBCUTANEOUS at 13:04

## 2019-01-20 RX ADMIN — Medication 1 APPLICATION(S): at 15:24

## 2019-01-20 RX ADMIN — HEPARIN SODIUM 5000 UNIT(S): 5000 INJECTION INTRAVENOUS; SUBCUTANEOUS at 22:03

## 2019-01-20 RX ADMIN — METFORMIN HYDROCHLORIDE 500 MILLIGRAM(S): 850 TABLET ORAL at 17:07

## 2019-01-20 RX ADMIN — Medication 1 TABLET(S): at 11:42

## 2019-01-20 RX ADMIN — ATORVASTATIN CALCIUM 40 MILLIGRAM(S): 80 TABLET, FILM COATED ORAL at 22:03

## 2019-01-21 VITALS
TEMPERATURE: 99 F | DIASTOLIC BLOOD PRESSURE: 60 MMHG | HEART RATE: 82 BPM | RESPIRATION RATE: 16 BRPM | SYSTOLIC BLOOD PRESSURE: 131 MMHG

## 2019-01-21 LAB
GLUCOSE BLDC GLUCOMTR-MCNC: 129 MG/DL — HIGH (ref 70–99)
GLUCOSE BLDC GLUCOMTR-MCNC: 142 MG/DL — HIGH (ref 70–99)

## 2019-01-21 RX ADMIN — Medication 1 TABLET(S): at 11:24

## 2019-01-21 RX ADMIN — HEPARIN SODIUM 5000 UNIT(S): 5000 INJECTION INTRAVENOUS; SUBCUTANEOUS at 13:17

## 2019-01-21 RX ADMIN — HEPARIN SODIUM 5000 UNIT(S): 5000 INJECTION INTRAVENOUS; SUBCUTANEOUS at 07:01

## 2019-01-21 RX ADMIN — METFORMIN HYDROCHLORIDE 500 MILLIGRAM(S): 850 TABLET ORAL at 07:01

## 2019-01-21 NOTE — CHART NOTE - NSCHARTNOTEFT_GEN_A_CORE
Called by attending.  Ok to discharge patient.  Continue with current medications.  Outpatient follow up with PMD in 1-2 weeks.

## 2019-01-28 ENCOUNTER — APPOINTMENT (OUTPATIENT)
Dept: VASCULAR SURGERY | Facility: CLINIC | Age: 59
End: 2019-01-28
Payer: MEDICAID

## 2019-01-28 DIAGNOSIS — I87.2 VENOUS INSUFFICIENCY (CHRONIC) (PERIPHERAL): ICD-10-CM

## 2019-01-28 PROCEDURE — 99212 OFFICE O/P EST SF 10 MIN: CPT

## 2019-01-29 NOTE — HISTORY OF PRESENT ILLNESS
[FreeTextEntry1] : The patient is a 58 year-old gentleman who present for followup. He had lower extremity cellulitis and was on antibiotics and compliant with daily wound care and compression therapy.

## 2019-02-01 ENCOUNTER — OUTPATIENT (OUTPATIENT)
Dept: OUTPATIENT SERVICES | Facility: HOSPITAL | Age: 59
LOS: 1 days | End: 2019-02-01
Payer: MEDICAID

## 2019-02-01 DIAGNOSIS — Z98.890 OTHER SPECIFIED POSTPROCEDURAL STATES: Chronic | ICD-10-CM

## 2019-02-01 PROCEDURE — G9001: CPT

## 2019-02-04 DIAGNOSIS — Z71.3 DIETARY COUNSELING AND SURVEILLANCE: ICD-10-CM

## 2019-02-04 DIAGNOSIS — J45.909 UNSPECIFIED ASTHMA, UNCOMPLICATED: ICD-10-CM

## 2019-02-04 DIAGNOSIS — Z79.84 LONG TERM (CURRENT) USE OF ORAL HYPOGLYCEMIC DRUGS: ICD-10-CM

## 2019-02-04 DIAGNOSIS — L97.311 NON-PRESSURE CHRONIC ULCER OF RIGHT ANKLE LIMITED TO BREAKDOWN OF SKIN: ICD-10-CM

## 2019-02-04 DIAGNOSIS — E66.01 MORBID (SEVERE) OBESITY DUE TO EXCESS CALORIES: ICD-10-CM

## 2019-02-04 DIAGNOSIS — Z86.19 PERSONAL HISTORY OF OTHER INFECTIOUS AND PARASITIC DISEASES: ICD-10-CM

## 2019-02-04 DIAGNOSIS — Z79.82 LONG TERM (CURRENT) USE OF ASPIRIN: ICD-10-CM

## 2019-02-04 DIAGNOSIS — E11.51 TYPE 2 DIABETES MELLITUS WITH DIABETIC PERIPHERAL ANGIOPATHY WITHOUT GANGRENE: ICD-10-CM

## 2019-02-04 DIAGNOSIS — E78.5 HYPERLIPIDEMIA, UNSPECIFIED: ICD-10-CM

## 2019-02-04 DIAGNOSIS — M19.90 UNSPECIFIED OSTEOARTHRITIS, UNSPECIFIED SITE: ICD-10-CM

## 2019-02-04 DIAGNOSIS — L89.312 PRESSURE ULCER OF RIGHT BUTTOCK, STAGE 2: ICD-10-CM

## 2019-02-04 DIAGNOSIS — L97.321 NON-PRESSURE CHRONIC ULCER OF LEFT ANKLE LIMITED TO BREAKDOWN OF SKIN: ICD-10-CM

## 2019-02-04 DIAGNOSIS — I87.2 VENOUS INSUFFICIENCY (CHRONIC) (PERIPHERAL): ICD-10-CM

## 2019-02-04 DIAGNOSIS — L97.511 NON-PRESSURE CHRONIC ULCER OF OTHER PART OF RIGHT FOOT LIMITED TO BREAKDOWN OF SKIN: ICD-10-CM

## 2019-02-04 DIAGNOSIS — L97.521 NON-PRESSURE CHRONIC ULCER OF OTHER PART OF LEFT FOOT LIMITED TO BREAKDOWN OF SKIN: ICD-10-CM

## 2019-02-04 DIAGNOSIS — Z86.73 PERSONAL HISTORY OF TRANSIENT ISCHEMIC ATTACK (TIA), AND CEREBRAL INFARCTION WITHOUT RESIDUAL DEFICITS: ICD-10-CM

## 2019-02-04 DIAGNOSIS — L03.115 CELLULITIS OF RIGHT LOWER LIMB: ICD-10-CM

## 2019-02-04 DIAGNOSIS — Z82.49 FAMILY HISTORY OF ISCHEMIC HEART DISEASE AND OTHER DISEASES OF THE CIRCULATORY SYSTEM: ICD-10-CM

## 2019-02-09 ENCOUNTER — EMERGENCY (EMERGENCY)
Facility: HOSPITAL | Age: 59
LOS: 0 days | Discharge: NURSING, REHAB OR LONG-TERM CARE | End: 2019-02-10
Attending: EMERGENCY MEDICINE | Admitting: EMERGENCY MEDICINE

## 2019-02-09 VITALS
OXYGEN SATURATION: 93 % | DIASTOLIC BLOOD PRESSURE: 57 MMHG | RESPIRATION RATE: 19 BRPM | TEMPERATURE: 99 F | HEART RATE: 95 BPM | SYSTOLIC BLOOD PRESSURE: 116 MMHG

## 2019-02-09 VITALS
WEIGHT: 315 LBS | SYSTOLIC BLOOD PRESSURE: 142 MMHG | OXYGEN SATURATION: 96 % | HEIGHT: 60 IN | HEART RATE: 94 BPM | DIASTOLIC BLOOD PRESSURE: 66 MMHG | TEMPERATURE: 98 F

## 2019-02-09 DIAGNOSIS — Z87.39 PERSONAL HISTORY OF OTHER DISEASES OF THE MUSCULOSKELETAL SYSTEM AND CONNECTIVE TISSUE: ICD-10-CM

## 2019-02-09 DIAGNOSIS — J45.909 UNSPECIFIED ASTHMA, UNCOMPLICATED: ICD-10-CM

## 2019-02-09 DIAGNOSIS — E66.01 MORBID (SEVERE) OBESITY DUE TO EXCESS CALORIES: ICD-10-CM

## 2019-02-09 DIAGNOSIS — R60.0 LOCALIZED EDEMA: ICD-10-CM

## 2019-02-09 DIAGNOSIS — Z86.73 PERSONAL HISTORY OF TRANSIENT ISCHEMIC ATTACK (TIA), AND CEREBRAL INFARCTION WITHOUT RESIDUAL DEFICITS: ICD-10-CM

## 2019-02-09 DIAGNOSIS — Z79.82 LONG TERM (CURRENT) USE OF ASPIRIN: ICD-10-CM

## 2019-02-09 DIAGNOSIS — R05 COUGH: ICD-10-CM

## 2019-02-09 DIAGNOSIS — E78.5 HYPERLIPIDEMIA, UNSPECIFIED: ICD-10-CM

## 2019-02-09 DIAGNOSIS — R06.02 SHORTNESS OF BREATH: ICD-10-CM

## 2019-02-09 DIAGNOSIS — Z79.84 LONG TERM (CURRENT) USE OF ORAL HYPOGLYCEMIC DRUGS: ICD-10-CM

## 2019-02-09 DIAGNOSIS — E11.9 TYPE 2 DIABETES MELLITUS WITHOUT COMPLICATIONS: ICD-10-CM

## 2019-02-09 DIAGNOSIS — Z98.890 OTHER SPECIFIED POSTPROCEDURAL STATES: Chronic | ICD-10-CM

## 2019-02-09 DIAGNOSIS — I87.8 OTHER SPECIFIED DISORDERS OF VEINS: ICD-10-CM

## 2019-02-09 LAB
ALBUMIN SERPL ELPH-MCNC: 3.6 G/DL — SIGNIFICANT CHANGE UP (ref 3.5–5.2)
ALP SERPL-CCNC: 126 U/L — HIGH (ref 30–115)
ALT FLD-CCNC: 22 U/L — SIGNIFICANT CHANGE UP (ref 0–41)
ANION GAP SERPL CALC-SCNC: 11 MMOL/L — SIGNIFICANT CHANGE UP (ref 7–14)
APTT BLD: 26.6 SEC — LOW (ref 27–39.2)
AST SERPL-CCNC: 12 U/L — SIGNIFICANT CHANGE UP (ref 0–41)
BASE EXCESS BLDV CALC-SCNC: 4 MMOL/L — HIGH (ref -2–2)
BASOPHILS # BLD AUTO: 0.03 K/UL — SIGNIFICANT CHANGE UP (ref 0–0.2)
BASOPHILS NFR BLD AUTO: 0.4 % — SIGNIFICANT CHANGE UP (ref 0–1)
BILIRUB SERPL-MCNC: 0.2 MG/DL — SIGNIFICANT CHANGE UP (ref 0.2–1.2)
BUN SERPL-MCNC: 18 MG/DL — SIGNIFICANT CHANGE UP (ref 10–20)
CA-I SERPL-SCNC: 1.14 MMOL/L — SIGNIFICANT CHANGE UP (ref 1.12–1.3)
CALCIUM SERPL-MCNC: 8.3 MG/DL — LOW (ref 8.5–10.1)
CHLORIDE SERPL-SCNC: 101 MMOL/L — SIGNIFICANT CHANGE UP (ref 98–110)
CO2 SERPL-SCNC: 31 MMOL/L — SIGNIFICANT CHANGE UP (ref 17–32)
CREAT SERPL-MCNC: 0.7 MG/DL — SIGNIFICANT CHANGE UP (ref 0.7–1.5)
EOSINOPHIL # BLD AUTO: 0.13 K/UL — SIGNIFICANT CHANGE UP (ref 0–0.7)
EOSINOPHIL NFR BLD AUTO: 1.5 % — SIGNIFICANT CHANGE UP (ref 0–8)
GAS PNL BLDV: 140 MMOL/L — SIGNIFICANT CHANGE UP (ref 136–145)
GAS PNL BLDV: SIGNIFICANT CHANGE UP
GLUCOSE SERPL-MCNC: 289 MG/DL — HIGH (ref 70–99)
HCO3 BLDV-SCNC: 33 MMOL/L — HIGH (ref 22–29)
HCT VFR BLD CALC: 38.7 % — LOW (ref 42–52)
HCT VFR BLDA CALC: 52.6 % — HIGH (ref 34–44)
HGB BLD CALC-MCNC: 17.2 G/DL — SIGNIFICANT CHANGE UP (ref 14–18)
HGB BLD-MCNC: 11.5 G/DL — LOW (ref 14–18)
HOROWITZ INDEX BLDV+IHG-RTO: 21 — SIGNIFICANT CHANGE UP
IMM GRANULOCYTES NFR BLD AUTO: 0.5 % — HIGH (ref 0.1–0.3)
INR BLD: 0.91 RATIO — SIGNIFICANT CHANGE UP (ref 0.65–1.3)
LACTATE BLDV-MCNC: 2.5 MMOL/L — HIGH (ref 0.5–1.6)
LYMPHOCYTES # BLD AUTO: 2.04 K/UL — SIGNIFICANT CHANGE UP (ref 1.2–3.4)
LYMPHOCYTES # BLD AUTO: 23.9 % — SIGNIFICANT CHANGE UP (ref 20.5–51.1)
MAGNESIUM SERPL-MCNC: 1.9 MG/DL — SIGNIFICANT CHANGE UP (ref 1.8–2.4)
MCHC RBC-ENTMCNC: 25.8 PG — LOW (ref 27–31)
MCHC RBC-ENTMCNC: 29.7 G/DL — LOW (ref 32–37)
MCV RBC AUTO: 86.8 FL — SIGNIFICANT CHANGE UP (ref 80–94)
MONOCYTES # BLD AUTO: 0.52 K/UL — SIGNIFICANT CHANGE UP (ref 0.1–0.6)
MONOCYTES NFR BLD AUTO: 6.1 % — SIGNIFICANT CHANGE UP (ref 1.7–9.3)
NEUTROPHILS # BLD AUTO: 5.79 K/UL — SIGNIFICANT CHANGE UP (ref 1.4–6.5)
NEUTROPHILS NFR BLD AUTO: 67.6 % — SIGNIFICANT CHANGE UP (ref 42.2–75.2)
NRBC # BLD: 0 /100 WBCS — SIGNIFICANT CHANGE UP (ref 0–0)
NT-PROBNP SERPL-SCNC: 334 PG/ML — HIGH (ref 0–300)
PCO2 BLDV: 66 MMHG — HIGH (ref 41–51)
PH BLDV: 7.31 — SIGNIFICANT CHANGE UP (ref 7.26–7.43)
PLATELET # BLD AUTO: 285 K/UL — SIGNIFICANT CHANGE UP (ref 130–400)
PO2 BLDV: 40 MMHG — SIGNIFICANT CHANGE UP (ref 20–40)
POTASSIUM BLDV-SCNC: 4 MMOL/L — SIGNIFICANT CHANGE UP (ref 3.3–5.6)
POTASSIUM SERPL-MCNC: 4.3 MMOL/L — SIGNIFICANT CHANGE UP (ref 3.5–5)
POTASSIUM SERPL-SCNC: 4.3 MMOL/L — SIGNIFICANT CHANGE UP (ref 3.5–5)
PROT SERPL-MCNC: 6 G/DL — SIGNIFICANT CHANGE UP (ref 6–8)
PROTHROM AB SERPL-ACNC: 10.5 SEC — SIGNIFICANT CHANGE UP (ref 9.95–12.87)
RBC # BLD: 4.46 M/UL — LOW (ref 4.7–6.1)
RBC # FLD: 15.8 % — HIGH (ref 11.5–14.5)
SAO2 % BLDV: 69 % — SIGNIFICANT CHANGE UP
SODIUM SERPL-SCNC: 143 MMOL/L — SIGNIFICANT CHANGE UP (ref 135–146)
TROPONIN T SERPL-MCNC: <0.01 NG/ML — SIGNIFICANT CHANGE UP
WBC # BLD: 8.55 K/UL — SIGNIFICANT CHANGE UP (ref 4.8–10.8)
WBC # FLD AUTO: 8.55 K/UL — SIGNIFICANT CHANGE UP (ref 4.8–10.8)

## 2019-02-09 RX ORDER — IPRATROPIUM/ALBUTEROL SULFATE 18-103MCG
3 AEROSOL WITH ADAPTER (GRAM) INHALATION ONCE
Qty: 0 | Refills: 0 | Status: COMPLETED | OUTPATIENT
Start: 2019-02-09 | End: 2019-02-09

## 2019-02-09 RX ADMIN — Medication 3 MILLILITER(S): at 22:06

## 2019-02-09 NOTE — ED PROVIDER NOTE - NS ED ROS FT
Constitutional: no fever, chills, no recent weight loss, change in appetite or malaise  Eyes: no redness/discharge/pain/vision changes  ENT: no rhinorrhea/ear pain/sore throat  Cardiac: No chest pain. + chronic edema   Respiratory: see HPI  GI: No nausea, vomiting, diarrhea or abdominal pain.  : No dysuria, frequency, urgency or hematuria  MS: no pain to back or extremities, no loss of ROM, no weakness  Neuro: No headache or weakness. No LOC.  Skin: No skin rash.  Endocrine: No history of thyroid disease or diabetes.  Except as documented in the HPI, all other systems are negative.

## 2019-02-09 NOTE — ED PROVIDER NOTE - ATTENDING CONTRIBUTION TO CARE
58y male above PMH sent from [facility] for eval, every night pt gets into bed and lays flat before rolling onto his right side to sleep, this evening prior to rolling over felt as if he were choking/unable to breathe which reminded him of an asthma attack he had at age 17 prompting visit as that attack was very scary for him, denies any dyspnea while reclined on his side, has chronic leg edema which is actually improved, no fever, no change in occasional cough, no CP, pt has never had a sleep study before but does report daytime sleepiness, on exam vital signs appreciated, obese, pt sat 93-94% on RA, unlabored resps, conj pink cor reg lungs cta with good air entry, abd snt legs with 2+ b/l LE edema with chronic venous stasis changes, calves nontender neuro nonfocal, suspect sleep apnea/obesity hypoventilation with anxiety, will d/c to f/u with pulmonary. Patient counseled regarding conditions which should prompt return.

## 2019-02-09 NOTE — ED PROVIDER NOTE - NSFOLLOWUPINSTRUCTIONS_ED_ALL_ED_FT
Shortness of breath    Please follow up with pulmonary as outpatient.     Shortness of breath means you have trouble breathing and could indicate a medical problem. Causes include lung diseases, heart disease, low amount of red blood cells (anemia), poor physical fitness, being overweight, smoking, etc. Your health care provider may not be able to find a cause for your shortness of breath after your exam. In this case, it is important to have a follow-up exam with your primary care physician as instructed. If medicines were prescribed, take them as directed for the full length of time directed. Refrain from tobacco products.    SEEK IMMEDIATE MEDICAL CARE IF YOU HAVE THE FOLLOWING SYMPTOMS: worsening shortness of breath, chest pain, back pain, abdominal pain, fever, coughing up blood, lightheadedness/dizziness.

## 2019-02-09 NOTE — ED PROVIDER NOTE - CARE PROVIDER_API CALL
Marcello Portillo)  Critical Care Medicine; Internal Medicine; Pulmonary Disease; Sleep Medicine  [address] [place] [address]  Phone: [phone]  Fax: [phone]  Follow Up Time:

## 2019-02-09 NOTE — ED PROVIDER NOTE - MEDICAL DECISION MAKING DETAILS
58y male above PMH sent from [facility] for eval, every night pt gets into bed and lays flat before rolling onto his right side to sleep, this evening prior to rolling over felt as if he were choking/unable to breathe which reminded him of an asthma attack he had at age 17 prompting visit as that attack was very scary for him, denies any dyspnea while reclined on his side, has chronic leg edema which is actually improved, no fever, no change in occasional cough, no CP, pt has never had a sleep study before but does report daytime sleepiness, on exam vital signs appreciated, obese, pt sat 93-94% on RA, unlabored resps, conj pink cor reg lungs cta with good air entry, abd snt legs with 2+ b/l LE edema with chronic venous stasis changes, calves nontender neuro nonfocal, suspect sleep apnea/obesity hypoventilation with anxiety, will d/c to f/u with pulmonary. Patient counseled regarding conditions which should prompt return.

## 2019-02-09 NOTE — ED PROVIDER NOTE - PHYSICAL EXAMINATION
CONSTITUTIONAL: Well-appearing; well-nourished; in no apparent distress. morbid obses male.   EYES: PERRL; EOM intact.   CARDIOVASCULAR: Normal S1, S2; no murmurs, rubs, or gallops.   RESPIRATORY: Normal chest excursion with respiration; breath sounds clear and equal bilaterally; no wheezes, rhonchi, or rales.  GI/: Normal bowel sounds; non-distended; non-tender; no palpable organomegaly.   MS: No evidence of trauma or deformity. + chronic non-pitting edema. + stasis changes.   SKIN: Normal for age and race; warm; dry; good turgor; no apparent lesions or exudate.   NEURO/PSYCH: A & O x 4; grossly unremarkable.

## 2019-02-09 NOTE — ED ADULT NURSE NOTE - NSIMPLEMENTINTERV_GEN_ALL_ED
Implemented All Fall with Harm Risk Interventions:  [place] to call system. Call bell, personal items and telephone within reach. Instruct patient to call for assistance. Room bathroom lighting operational. Non-slip footwear when patient is off stretcher. Physically safe environment: no spills, clutter or unnecessary equipment. Stretcher in lowest position, wheels locked, appropriate side rails in place. Provide visual cue, wrist band, yellow gown, etc. Monitor gait and stability. Monitor for mental status changes and reorient to person, place, and time. Review medications for side effects contributing to fall risk. Reinforce activity limits and safety measures with patient and family. Provide visual clues: red socks. Implemented All Fall Risk Interventions:  [place] to call system. Call bell, personal items and telephone within reach. Instruct patient to call for assistance. Room bathroom lighting operational. Non-slip footwear when patient is off stretcher. Physically safe environment: no spills, clutter or unnecessary equipment. Stretcher in lowest position, wheels locked, appropriate side rails in place. Provide visual cue, wrist band, yellow gown, etc. Monitor gait and stability. Monitor for mental status changes and reorient to person, place, and time. Review medications for side effects contributing to fall risk. Reinforce activity limits and safety measures with patient and family.

## 2019-02-09 NOTE — ED PROVIDER NOTE - OBJECTIVE STATEMENT
57 yo male hx of HLD/CVA/DM/Asthma/Morbid obesity present c/o SOB tonight. as per patient, he was trying to lie down and started feeling SOB. Felt like having an asthma attack so he called EMS. patient reported similar episode happened 2 weeks ago. Denies fever/chill/chest pain/abd pain/n/v/d/urinary sxs.

## 2019-02-10 ENCOUNTER — EMERGENCY (EMERGENCY)
Facility: HOSPITAL | Age: 59
LOS: 0 days | Discharge: NURSING, REHAB OR LONG-TERM CARE | End: 2019-02-11
Attending: EMERGENCY MEDICINE | Admitting: EMERGENCY MEDICINE

## 2019-02-10 VITALS
SYSTOLIC BLOOD PRESSURE: 150 MMHG | HEART RATE: 97 BPM | DIASTOLIC BLOOD PRESSURE: 88 MMHG | RESPIRATION RATE: 18 BRPM | TEMPERATURE: 97 F | OXYGEN SATURATION: 98 %

## 2019-02-10 DIAGNOSIS — Z79.82 LONG TERM (CURRENT) USE OF ASPIRIN: ICD-10-CM

## 2019-02-10 DIAGNOSIS — E78.5 HYPERLIPIDEMIA, UNSPECIFIED: ICD-10-CM

## 2019-02-10 DIAGNOSIS — J45.909 UNSPECIFIED ASTHMA, UNCOMPLICATED: ICD-10-CM

## 2019-02-10 DIAGNOSIS — Z98.890 OTHER SPECIFIED POSTPROCEDURAL STATES: ICD-10-CM

## 2019-02-10 DIAGNOSIS — Z79.899 OTHER LONG TERM (CURRENT) DRUG THERAPY: ICD-10-CM

## 2019-02-10 DIAGNOSIS — E11.9 TYPE 2 DIABETES MELLITUS WITHOUT COMPLICATIONS: ICD-10-CM

## 2019-02-10 DIAGNOSIS — R60.0 LOCALIZED EDEMA: ICD-10-CM

## 2019-02-10 DIAGNOSIS — Z98.890 OTHER SPECIFIED POSTPROCEDURAL STATES: Chronic | ICD-10-CM

## 2019-02-10 DIAGNOSIS — Z86.73 PERSONAL HISTORY OF TRANSIENT ISCHEMIC ATTACK (TIA), AND CEREBRAL INFARCTION WITHOUT RESIDUAL DEFICITS: ICD-10-CM

## 2019-02-10 DIAGNOSIS — R06.02 SHORTNESS OF BREATH: ICD-10-CM

## 2019-02-10 DIAGNOSIS — Z79.84 LONG TERM (CURRENT) USE OF ORAL HYPOGLYCEMIC DRUGS: ICD-10-CM

## 2019-02-10 DIAGNOSIS — F41.9 ANXIETY DISORDER, UNSPECIFIED: ICD-10-CM

## 2019-02-11 ENCOUNTER — INPATIENT (INPATIENT)
Facility: HOSPITAL | Age: 59
LOS: 6 days | Discharge: NURSING, REHAB OR LONG-TERM CARE | End: 2019-02-18
Attending: INTERNAL MEDICINE | Admitting: INTERNAL MEDICINE
Payer: MEDICAID

## 2019-02-11 VITALS
HEIGHT: 65 IN | SYSTOLIC BLOOD PRESSURE: 135 MMHG | HEART RATE: 107 BPM | WEIGHT: 315 LBS | DIASTOLIC BLOOD PRESSURE: 71 MMHG | TEMPERATURE: 101 F | OXYGEN SATURATION: 94 % | RESPIRATION RATE: 22 BRPM

## 2019-02-11 VITALS
TEMPERATURE: 98 F | RESPIRATION RATE: 18 BRPM | DIASTOLIC BLOOD PRESSURE: 58 MMHG | SYSTOLIC BLOOD PRESSURE: 124 MMHG | OXYGEN SATURATION: 96 % | HEART RATE: 97 BPM

## 2019-02-11 DIAGNOSIS — Z98.890 OTHER SPECIFIED POSTPROCEDURAL STATES: Chronic | ICD-10-CM

## 2019-02-11 LAB
ALBUMIN SERPL ELPH-MCNC: 3.5 G/DL — SIGNIFICANT CHANGE UP (ref 3.5–5.2)
ALBUMIN SERPL ELPH-MCNC: 3.8 G/DL — SIGNIFICANT CHANGE UP (ref 3.5–5.2)
ALLERGY+IMMUNOLOGY DIAG STUDY NOTE: SIGNIFICANT CHANGE UP
ALP SERPL-CCNC: 110 U/L — SIGNIFICANT CHANGE UP (ref 30–115)
ALP SERPL-CCNC: 118 U/L — HIGH (ref 30–115)
ALT FLD-CCNC: 17 U/L — SIGNIFICANT CHANGE UP (ref 0–41)
ALT FLD-CCNC: 21 U/L — SIGNIFICANT CHANGE UP (ref 0–41)
ANION GAP SERPL CALC-SCNC: 11 MMOL/L — SIGNIFICANT CHANGE UP (ref 7–14)
ANION GAP SERPL CALC-SCNC: 14 MMOL/L — SIGNIFICANT CHANGE UP (ref 7–14)
APTT BLD: 27.3 SEC — SIGNIFICANT CHANGE UP (ref 27–39.2)
AST SERPL-CCNC: 11 U/L — SIGNIFICANT CHANGE UP (ref 0–41)
AST SERPL-CCNC: 8 U/L — SIGNIFICANT CHANGE UP (ref 0–41)
BASE EXCESS BLDV CALC-SCNC: 6 MMOL/L — HIGH (ref -2–2)
BASOPHILS # BLD AUTO: 0.01 K/UL — SIGNIFICANT CHANGE UP (ref 0–0.2)
BASOPHILS # BLD AUTO: 0.02 K/UL — SIGNIFICANT CHANGE UP (ref 0–0.2)
BASOPHILS NFR BLD AUTO: 0.1 % — SIGNIFICANT CHANGE UP (ref 0–1)
BASOPHILS NFR BLD AUTO: 0.3 % — SIGNIFICANT CHANGE UP (ref 0–1)
BILIRUB SERPL-MCNC: 0.3 MG/DL — SIGNIFICANT CHANGE UP (ref 0.2–1.2)
BILIRUB SERPL-MCNC: 0.6 MG/DL — SIGNIFICANT CHANGE UP (ref 0.2–1.2)
BUN SERPL-MCNC: 16 MG/DL — SIGNIFICANT CHANGE UP (ref 10–20)
BUN SERPL-MCNC: 16 MG/DL — SIGNIFICANT CHANGE UP (ref 10–20)
CA-I SERPL-SCNC: 1.19 MMOL/L — SIGNIFICANT CHANGE UP (ref 1.12–1.3)
CALCIUM SERPL-MCNC: 8.2 MG/DL — LOW (ref 8.5–10.1)
CALCIUM SERPL-MCNC: 9.2 MG/DL — SIGNIFICANT CHANGE UP (ref 8.5–10.1)
CHLORIDE SERPL-SCNC: 100 MMOL/L — SIGNIFICANT CHANGE UP (ref 98–110)
CHLORIDE SERPL-SCNC: 96 MMOL/L — LOW (ref 98–110)
CO2 SERPL-SCNC: 29 MMOL/L — SIGNIFICANT CHANGE UP (ref 17–32)
CO2 SERPL-SCNC: 29 MMOL/L — SIGNIFICANT CHANGE UP (ref 17–32)
CREAT SERPL-MCNC: 0.6 MG/DL — LOW (ref 0.7–1.5)
CREAT SERPL-MCNC: 0.7 MG/DL — SIGNIFICANT CHANGE UP (ref 0.7–1.5)
EOSINOPHIL # BLD AUTO: 0.05 K/UL — SIGNIFICANT CHANGE UP (ref 0–0.7)
EOSINOPHIL # BLD AUTO: 0.19 K/UL — SIGNIFICANT CHANGE UP (ref 0–0.7)
EOSINOPHIL NFR BLD AUTO: 0.6 % — SIGNIFICANT CHANGE UP (ref 0–8)
EOSINOPHIL NFR BLD AUTO: 2.6 % — SIGNIFICANT CHANGE UP (ref 0–8)
FLU A RESULT: NEGATIVE — SIGNIFICANT CHANGE UP
FLU A RESULT: NEGATIVE — SIGNIFICANT CHANGE UP
FLUAV AG NPH QL: NEGATIVE — SIGNIFICANT CHANGE UP
FLUBV AG NPH QL: NEGATIVE — SIGNIFICANT CHANGE UP
GAS PNL BLDV: 141 MMOL/L — SIGNIFICANT CHANGE UP (ref 136–145)
GAS PNL BLDV: SIGNIFICANT CHANGE UP
GLUCOSE SERPL-MCNC: 191 MG/DL — HIGH (ref 70–99)
GLUCOSE SERPL-MCNC: 198 MG/DL — HIGH (ref 70–99)
HCO3 BLDV-SCNC: 32 MMOL/L — HIGH (ref 22–29)
HCT VFR BLD CALC: 38.4 % — LOW (ref 42–52)
HCT VFR BLD CALC: 42.2 % — SIGNIFICANT CHANGE UP (ref 42–52)
HCT VFR BLDA CALC: 38.6 % — SIGNIFICANT CHANGE UP (ref 34–44)
HGB BLD CALC-MCNC: 12.6 G/DL — LOW (ref 14–18)
HGB BLD-MCNC: 12 G/DL — LOW (ref 14–18)
HGB BLD-MCNC: 12.7 G/DL — LOW (ref 14–18)
IMM GRANULOCYTES NFR BLD AUTO: 0.4 % — HIGH (ref 0.1–0.3)
IMM GRANULOCYTES NFR BLD AUTO: 0.4 % — HIGH (ref 0.1–0.3)
INR BLD: 0.99 RATIO — SIGNIFICANT CHANGE UP (ref 0.65–1.3)
LACTATE BLDV-MCNC: 1.8 MMOL/L — HIGH (ref 0.5–1.6)
LACTATE SERPL-SCNC: 2.7 MMOL/L — HIGH (ref 0.5–2.2)
LYMPHOCYTES # BLD AUTO: 0.73 K/UL — LOW (ref 1.2–3.4)
LYMPHOCYTES # BLD AUTO: 1.66 K/UL — SIGNIFICANT CHANGE UP (ref 1.2–3.4)
LYMPHOCYTES # BLD AUTO: 22.6 % — SIGNIFICANT CHANGE UP (ref 20.5–51.1)
LYMPHOCYTES # BLD AUTO: 8.9 % — LOW (ref 20.5–51.1)
MAGNESIUM SERPL-MCNC: 1.8 MG/DL — SIGNIFICANT CHANGE UP (ref 1.8–2.4)
MCHC RBC-ENTMCNC: 25.9 PG — LOW (ref 27–31)
MCHC RBC-ENTMCNC: 26.2 PG — LOW (ref 27–31)
MCHC RBC-ENTMCNC: 30.1 G/DL — LOW (ref 32–37)
MCHC RBC-ENTMCNC: 31.3 G/DL — LOW (ref 32–37)
MCV RBC AUTO: 83.8 FL — SIGNIFICANT CHANGE UP (ref 80–94)
MCV RBC AUTO: 86.1 FL — SIGNIFICANT CHANGE UP (ref 80–94)
MONOCYTES # BLD AUTO: 0.33 K/UL — SIGNIFICANT CHANGE UP (ref 0.1–0.6)
MONOCYTES # BLD AUTO: 0.45 K/UL — SIGNIFICANT CHANGE UP (ref 0.1–0.6)
MONOCYTES NFR BLD AUTO: 4 % — SIGNIFICANT CHANGE UP (ref 1.7–9.3)
MONOCYTES NFR BLD AUTO: 6.1 % — SIGNIFICANT CHANGE UP (ref 1.7–9.3)
NEUTROPHILS # BLD AUTO: 4.99 K/UL — SIGNIFICANT CHANGE UP (ref 1.4–6.5)
NEUTROPHILS # BLD AUTO: 7.09 K/UL — HIGH (ref 1.4–6.5)
NEUTROPHILS NFR BLD AUTO: 68 % — SIGNIFICANT CHANGE UP (ref 42.2–75.2)
NEUTROPHILS NFR BLD AUTO: 86 % — HIGH (ref 42.2–75.2)
NRBC # BLD: 0 /100 WBCS — SIGNIFICANT CHANGE UP (ref 0–0)
NRBC # BLD: 0 /100 WBCS — SIGNIFICANT CHANGE UP (ref 0–0)
NT-PROBNP SERPL-SCNC: 231 PG/ML — SIGNIFICANT CHANGE UP (ref 0–300)
PCO2 BLDV: 54 MMHG — HIGH (ref 41–51)
PH BLDV: 7.38 — SIGNIFICANT CHANGE UP (ref 7.26–7.43)
PLATELET # BLD AUTO: 250 K/UL — SIGNIFICANT CHANGE UP (ref 130–400)
PLATELET # BLD AUTO: 268 K/UL — SIGNIFICANT CHANGE UP (ref 130–400)
PO2 BLDV: 49 MMHG — HIGH (ref 20–40)
POTASSIUM BLDV-SCNC: 4.1 MMOL/L — SIGNIFICANT CHANGE UP (ref 3.3–5.6)
POTASSIUM SERPL-MCNC: 4.2 MMOL/L — SIGNIFICANT CHANGE UP (ref 3.5–5)
POTASSIUM SERPL-MCNC: 4.6 MMOL/L — SIGNIFICANT CHANGE UP (ref 3.5–5)
POTASSIUM SERPL-SCNC: 4.2 MMOL/L — SIGNIFICANT CHANGE UP (ref 3.5–5)
POTASSIUM SERPL-SCNC: 4.6 MMOL/L — SIGNIFICANT CHANGE UP (ref 3.5–5)
PROT SERPL-MCNC: 5.9 G/DL — LOW (ref 6–8)
PROT SERPL-MCNC: 6.3 G/DL — SIGNIFICANT CHANGE UP (ref 6–8)
PROTHROM AB SERPL-ACNC: 11.4 SEC — SIGNIFICANT CHANGE UP (ref 9.95–12.87)
RBC # BLD: 4.58 M/UL — LOW (ref 4.7–6.1)
RBC # BLD: 4.9 M/UL — SIGNIFICANT CHANGE UP (ref 4.7–6.1)
RBC # FLD: 15.7 % — HIGH (ref 11.5–14.5)
RBC # FLD: 15.9 % — HIGH (ref 11.5–14.5)
RSV RESULT: NEGATIVE — SIGNIFICANT CHANGE UP
RSV RNA RESP QL NAA+PROBE: NEGATIVE — SIGNIFICANT CHANGE UP
SAO2 % BLDV: 82 % — SIGNIFICANT CHANGE UP
SODIUM SERPL-SCNC: 139 MMOL/L — SIGNIFICANT CHANGE UP (ref 135–146)
SODIUM SERPL-SCNC: 140 MMOL/L — SIGNIFICANT CHANGE UP (ref 135–146)
TROPONIN T SERPL-MCNC: <0.01 NG/ML — SIGNIFICANT CHANGE UP
TROPONIN T SERPL-MCNC: <0.01 NG/ML — SIGNIFICANT CHANGE UP
TYPE + AB SCN PNL BLD: SIGNIFICANT CHANGE UP
WBC # BLD: 7.34 K/UL — SIGNIFICANT CHANGE UP (ref 4.8–10.8)
WBC # BLD: 8.24 K/UL — SIGNIFICANT CHANGE UP (ref 4.8–10.8)
WBC # FLD AUTO: 7.34 K/UL — SIGNIFICANT CHANGE UP (ref 4.8–10.8)
WBC # FLD AUTO: 8.24 K/UL — SIGNIFICANT CHANGE UP (ref 4.8–10.8)

## 2019-02-11 RX ORDER — ACETAMINOPHEN 500 MG
650 TABLET ORAL ONCE
Qty: 0 | Refills: 0 | Status: COMPLETED | OUTPATIENT
Start: 2019-02-11 | End: 2019-02-11

## 2019-02-11 RX ADMIN — Medication 650 MILLIGRAM(S): at 22:33

## 2019-02-11 RX ADMIN — Medication 650 MILLIGRAM(S): at 20:29

## 2019-02-11 NOTE — ED ADULT NURSE NOTE - NS ED NOTE  TALK SOMEONE YN
No
-No hx of COPD or obstructive airway disease  -Physical exam findings  -S/p IV Solumedrol   -Start DuoNebs   -Continue to monitor symptoms.

## 2019-02-11 NOTE — ED PROVIDER NOTE - NS ED ROS FT
Constitutional: No fevers/chills.    Head: No injury, headache.    Eyes:  No eye pain or discharge. No injury.    ENMT:  No pain, discharge or infections. No neck pain or stiffness. No loss ROM.    Cardiac:  No chest pain,(+) SOB or edema.    Respiratory:  No cough or respiratory distress.     GI:  No nausea, vomiting, diarrhea or abdominal pain. No change in PO intake.    :  No frequency, urgency or burning. No change in urine output.    MS:  No leg pain, leg swelling, myalgia, muscle weakness, joint pain or back pain. No loss ROM.    Neuro:  No dizziness or weakness.  No LOC.    Skin:  No skin rash.

## 2019-02-11 NOTE — ED PROVIDER NOTE - CLINICAL SUMMARY MEDICAL DECISION MAKING FREE TEXT BOX
pt p/w  stroke like symptoms-  stroke code called -  ct hea dnegative for  bleed,  unable to obtain CTA 2/2 pts weight   -  dw neuro  and intensivist - admitted to  stroke unit- asa given

## 2019-02-11 NOTE — ED PROVIDER NOTE - CRITICAL CARE PROVIDED
interpretation of diagnostic studies/documentation/additional history taking/consultation with other physicians/telephone consultation with the patient's family/direct patient care (not related to procedure)

## 2019-02-11 NOTE — ED PROVIDER NOTE - MEDICAL DECISION MAKING DETAILS
Pt in ER with c/o feeling SOB and anxious when lying flat - no symptoms when on his side or if sleeping in a recliner.  no cp.  pt seen in ER yesterday for same as well.  labs, cxr, bnp ok.  chest ct done today for repeat visit with persistent symptoms.  Pt is morbidly obese, will likely need sleep study as outpt to eval for BRANDY.  to d/c pt home, pt to f/u with pulm as outpt.  told to sleep in recliner if it makes him more comfortable.  pt told to return to ER for any new/concerning symptoms.  pt understands and agrees with plan.

## 2019-02-11 NOTE — ED PROVIDER NOTE - ATTENDING CONTRIBUTION TO CARE
57 y/o male with h/o morbid obesity, dm, cva, hld in ER with c/o SOB when lying flat.  pt states no symptoms when lying on his side or if he sleeps in a recliner.  no CP.  no f/c.  no coughing.  no ha/dizziness/loc.  no abd pain.  + chronic LE edema, no change from prior.  pt was seen at [facility] for same yesterday, trop and bnp neg, pt was d/c'd home for pulm f/u as outpt. pt had echo 8/2018 with EF 60-65%.  PE - nad, nc/at, eomi, perrl, op - clear, cta b/l, no w/r/r, rrr, abd - obese, nt, nabs, + le edema, chronic skin changes (baseline per pt), A&O x 3, no focal neuro deficits.  -check labs, cxr, ct chest, re-eval.

## 2019-02-11 NOTE — ED ADULT NURSE NOTE - OBJECTIVE STATEMENT
pt c/o sob since this evening when laying down. pt denies fever/n /v/d. pt denies chest pain. pt has hx of copd with no home 02.

## 2019-02-11 NOTE — ED PROVIDER NOTE - OBJECTIVE STATEMENT
57 yo M with PMHx of DM, HTN, HLA, CVA with no residual deficits, chronic venous stasis and CAD presents to the ED c/o acute onset of left vision loss and left lower extremity weakness. Pt noted symptoms around 4 pm when he wasn't able to get out of chair and was unable to see out of left eye. Pt denies other complaints. Pt was in hospital 2 days ago for asthma exacerbation. Pt denies fever, chills, nausea, vomiting, abdominal pain, diarrhea, headache, dizziness, chest pain, SOB, back pain, LOC, trauma, urinary symptoms, cough, calf pain/swelling, recent travel, recent surgery.

## 2019-02-11 NOTE — ED PROVIDER NOTE - ATTENDING CONTRIBUTION TO CARE
I personally evaluated the patient. I reviewed the Resident’s or Physician Assistant’s note (as assigned above), and agree with the findings and plan except as documented in my note.  58yM pmhx DM on metformin,  HLD  , asa    p/w   left leg weakness nad numbness a./w  left eye  vision loss -  pt noticed these symptoms  at 4pm when he went to get out of chair -   eye and leg  at same time -   Pt  says he  first sat down at 330pm and was fine.  Pt  says he had similar  symptom s in the past and had stroke workup which was all negative - of note pt admitted 2 days ago overnight for asthma  SOB - per patient no  new meds started.  Pt  found to be febrile in ED  PE: alert  BMI>40 cvs rrr reps cta  perrl eomi,  unable to   see from left eye central as well as peripheral,   unable to raise  both eyebrows,  inconsistent  left facial  asymmetry  with  smiling,  abd nontender   decreases sensation  lue  lle,   pt  unable to  hold his  left leg against  gravity -  of not  patient was able to  move himself from  ems stretcher  to  ed stretcher -   moves  both legs when he repositions himself in bed.     Stroke code called 729 while on ems stretcher -

## 2019-02-11 NOTE — ED PROVIDER NOTE - CARE PROVIDER_API CALL
Marcello Portillo)  Critical Care Medicine; Internal Medicine; Pulmonary Disease; Sleep Medicine  [address] [place] [address]  Phone: [phone]  Fax: [phone]  Follow Up Time: 1-3 Days

## 2019-02-11 NOTE — ED PROVIDER NOTE - PHYSICAL EXAMINATION
VITAL SIGNS: I have reviewed nursing notes and confirm.  CONSTITUTIONAL: Well-developed; well-nourished; in no acute distress.  SKIN: Skin exam is warm and dry, no acute rash.  HEAD: Normocephalic; atraumatic.  EYES: PERRL, EOM intact; conjunctiva and sclera clear.  ENT: No nasal discharge; airway clear.   NECK: Supple; non tender.  CARD: S1, S2 normal; no murmurs, gallops, or rubs. Regular rate and rhythm.  RESP: No wheezes, rales or rhonchi. Speaking in full sentences.   ABD: Normal bowel sounds; soft; non-distended; non-tender; No rebound or guarding. No CVA tenderness.  EXT: Normal ROM besides LLE 2/2 weakness. (+) 2+ B/L LE edema.   NEURO: Alert, oriented. (+) loss of central and peripheral vision to left eye-otherwise CN II-XII intact. (+) LLE weakness and decreased sensation. Otherwise neuro intact.

## 2019-02-11 NOTE — ED PROVIDER NOTE - OBJECTIVE STATEMENT
59 yo male with PMHx HLD, CVA, DM, asthma presents for SOB when laying flat. [place] and had blood work which was unremarkable and was sent home with pulmonary follow up. Patient states he is no longer SOB at this time. He states he is SOB when he lays down flat. Patient states when he lays down he gets very anxious because when he was 17 years he had an asthma attack and had to be taken to ED for evaluation which he states was very scary for him. Patient has chronic LE edema and patient states its at his baseline size. (+) mild congestion. Patient denies fevers, chest pain, abdominal pain, nausea, vomiting, diarrhea, constipation, dizziness, weakness, diaphoresis, increased leg pain/swelling, urinary sx, cough, changes in PO intake, changes in urine output, changes in mental status.

## 2019-02-11 NOTE — ED PROVIDER NOTE - PROGRESS NOTE DETAILS
Pt seen by me on ED arrival -  stroke code called at that alessio e-       d/w neuro   -  CTA - no tpa  - admit to tele

## 2019-02-11 NOTE — ED PROVIDER NOTE - NS ED ROS FT
Review of Systems  Constitutional:  No fever, chills, malaise, generalized weakness.  Eyes:  No eye pain, or discharge. (+) left visual loss  ENMT:  No hearing changes, pain, or discharge. No nasal congestion, discharge, or bleeding. No throat pain, swelling, or difficulty swallowing.  Cardiac:  No chest pain, palpitations, syncope, or edema.  Respiratory:  No dyspnea, orthopnea, stridor, wheezing, or cough. No hemoptysis.  GI:  No nausea, vomiting, diarrhea, or abdominal pain. No melena or hematochezia.  :  No dysuria, hematuria, frequency, or burning.   MS:  No myalgia,  joint swelling, joint pain, or back pain.  Skin:  No skin rash, pruritis, jaundice, or lesions.  Neuro:  No headache, dizziness, loss of sensation.  No change in mental status. (+) left lower extremity weakness, left eye visual loss  Endocrine: (+)DM.

## 2019-02-12 LAB
ALBUMIN SERPL ELPH-MCNC: 3.5 G/DL — SIGNIFICANT CHANGE UP (ref 3.5–5.2)
ALP SERPL-CCNC: 114 U/L — SIGNIFICANT CHANGE UP (ref 30–115)
ALT FLD-CCNC: 16 U/L — SIGNIFICANT CHANGE UP (ref 0–41)
ANION GAP SERPL CALC-SCNC: 9 MMOL/L — SIGNIFICANT CHANGE UP (ref 7–14)
AST SERPL-CCNC: 13 U/L — SIGNIFICANT CHANGE UP (ref 0–41)
BILIRUB SERPL-MCNC: 0.6 MG/DL — SIGNIFICANT CHANGE UP (ref 0.2–1.2)
BUN SERPL-MCNC: 14 MG/DL — SIGNIFICANT CHANGE UP (ref 10–20)
CALCIUM SERPL-MCNC: 7.9 MG/DL — LOW (ref 8.5–10.1)
CHLORIDE SERPL-SCNC: 101 MMOL/L — SIGNIFICANT CHANGE UP (ref 98–110)
CHOLEST SERPL-MCNC: 146 MG/DL — SIGNIFICANT CHANGE UP (ref 100–200)
CO2 SERPL-SCNC: 31 MMOL/L — SIGNIFICANT CHANGE UP (ref 17–32)
CREAT SERPL-MCNC: 0.6 MG/DL — LOW (ref 0.7–1.5)
ESTIMATED AVERAGE GLUCOSE: 177 MG/DL — HIGH (ref 68–114)
GLUCOSE BLDC GLUCOMTR-MCNC: 142 MG/DL — HIGH (ref 70–99)
GLUCOSE BLDC GLUCOMTR-MCNC: 146 MG/DL — HIGH (ref 70–99)
GLUCOSE BLDC GLUCOMTR-MCNC: 146 MG/DL — HIGH (ref 70–99)
GLUCOSE BLDC GLUCOMTR-MCNC: 164 MG/DL — HIGH (ref 70–99)
GLUCOSE SERPL-MCNC: 173 MG/DL — HIGH (ref 70–99)
HBA1C BLD-MCNC: 7.8 % — HIGH (ref 4–5.6)
HCT VFR BLD CALC: 42.2 % — SIGNIFICANT CHANGE UP (ref 42–52)
HDLC SERPL-MCNC: 35 MG/DL — LOW
HGB BLD-MCNC: 12.8 G/DL — LOW (ref 14–18)
LIPID PNL WITH DIRECT LDL SERPL: 99 MG/DL — SIGNIFICANT CHANGE UP (ref 4–129)
MAGNESIUM SERPL-MCNC: 1.9 MG/DL — SIGNIFICANT CHANGE UP (ref 1.8–2.4)
MCHC RBC-ENTMCNC: 26.1 PG — LOW (ref 27–31)
MCHC RBC-ENTMCNC: 30.3 G/DL — LOW (ref 32–37)
MCV RBC AUTO: 85.9 FL — SIGNIFICANT CHANGE UP (ref 80–94)
NRBC # BLD: 0 /100 WBCS — SIGNIFICANT CHANGE UP (ref 0–0)
PHOSPHATE SERPL-MCNC: 4.2 MG/DL — SIGNIFICANT CHANGE UP (ref 2.1–4.9)
PLATELET # BLD AUTO: 215 K/UL — SIGNIFICANT CHANGE UP (ref 130–400)
POTASSIUM SERPL-MCNC: 4.2 MMOL/L — SIGNIFICANT CHANGE UP (ref 3.5–5)
POTASSIUM SERPL-SCNC: 4.2 MMOL/L — SIGNIFICANT CHANGE UP (ref 3.5–5)
PROT SERPL-MCNC: 6 G/DL — SIGNIFICANT CHANGE UP (ref 6–8)
RBC # BLD: 4.91 M/UL — SIGNIFICANT CHANGE UP (ref 4.7–6.1)
RBC # FLD: 15.9 % — HIGH (ref 11.5–14.5)
SODIUM SERPL-SCNC: 141 MMOL/L — SIGNIFICANT CHANGE UP (ref 135–146)
TOTAL CHOLESTEROL/HDL RATIO MEASUREMENT: 4.2 RATIO — SIGNIFICANT CHANGE UP (ref 4–5.5)
TRIGL SERPL-MCNC: 119 MG/DL — SIGNIFICANT CHANGE UP (ref 10–149)
WBC # BLD: 7.21 K/UL — SIGNIFICANT CHANGE UP (ref 4.8–10.8)
WBC # FLD AUTO: 7.21 K/UL — SIGNIFICANT CHANGE UP (ref 4.8–10.8)

## 2019-02-12 PROCEDURE — 93880 EXTRACRANIAL BILAT STUDY: CPT | Mod: 26

## 2019-02-12 RX ORDER — ACETAMINOPHEN 500 MG
650 TABLET ORAL EVERY 6 HOURS
Qty: 0 | Refills: 0 | Status: DISCONTINUED | OUTPATIENT
Start: 2019-02-12 | End: 2019-02-18

## 2019-02-12 RX ORDER — ENOXAPARIN SODIUM 100 MG/ML
40 INJECTION SUBCUTANEOUS ONCE
Qty: 0 | Refills: 0 | Status: COMPLETED | OUTPATIENT
Start: 2019-02-12 | End: 2019-02-12

## 2019-02-12 RX ORDER — ATORVASTATIN CALCIUM 80 MG/1
80 TABLET, FILM COATED ORAL AT BEDTIME
Qty: 0 | Refills: 0 | Status: DISCONTINUED | OUTPATIENT
Start: 2019-02-12 | End: 2019-02-18

## 2019-02-12 RX ORDER — ASPIRIN/CALCIUM CARB/MAGNESIUM 324 MG
325 TABLET ORAL DAILY
Qty: 0 | Refills: 0 | Status: DISCONTINUED | OUTPATIENT
Start: 2019-02-12 | End: 2019-02-13

## 2019-02-12 RX ORDER — FUROSEMIDE 40 MG
40 TABLET ORAL DAILY
Qty: 0 | Refills: 0 | Status: DISCONTINUED | OUTPATIENT
Start: 2019-02-12 | End: 2019-02-18

## 2019-02-12 RX ORDER — PANTOPRAZOLE SODIUM 20 MG/1
40 TABLET, DELAYED RELEASE ORAL
Qty: 0 | Refills: 0 | Status: DISCONTINUED | OUTPATIENT
Start: 2019-02-12 | End: 2019-02-18

## 2019-02-12 RX ORDER — CHLORHEXIDINE GLUCONATE 213 G/1000ML
1 SOLUTION TOPICAL
Qty: 0 | Refills: 0 | Status: DISCONTINUED | OUTPATIENT
Start: 2019-02-12 | End: 2019-02-18

## 2019-02-12 RX ADMIN — PANTOPRAZOLE SODIUM 40 MILLIGRAM(S): 20 TABLET, DELAYED RELEASE ORAL at 06:15

## 2019-02-12 RX ADMIN — ENOXAPARIN SODIUM 40 MILLIGRAM(S): 100 INJECTION SUBCUTANEOUS at 06:16

## 2019-02-12 RX ADMIN — Medication 1 APPLICATION(S): at 06:34

## 2019-02-12 RX ADMIN — Medication 325 MILLIGRAM(S): at 14:04

## 2019-02-12 RX ADMIN — ATORVASTATIN CALCIUM 80 MILLIGRAM(S): 80 TABLET, FILM COATED ORAL at 22:06

## 2019-02-12 NOTE — CONSULT NOTE ADULT - SUBJECTIVE AND OBJECTIVE BOX
Neurology Consultation note    Name  FABIAN CLEARY    HPI:      Interval History:        Vital Signs Last 24 Hrs  T(C): 36.7 (12 Feb 2019 08:37), Max: 38.6 (11 Feb 2019 19:27)  T(F): 98.1 (12 Feb 2019 08:37), Max: 101.5 (11 Feb 2019 19:27)  HR: 99 (12 Feb 2019 08:37) (85 - 107)  BP: 117/65 (12 Feb 2019 08:37) (117/65 - 136/64)  BP(mean): --  RR: 20 (12 Feb 2019 08:37) (18 - 22)  SpO2: 100% (12 Feb 2019 08:37) (94% - 100%)    Neurological Exam:   Mental status: Awake, alert and oriented x3.  Recent and remote memory intact.  Naming, repetition and comprehension intact.  Attention/concentration intact.  No dysarthria, no aphasia.  Fund of knowledge appropriate.    Cranial nerves: Pupils equally round and reactive to light, visual fields full, no nystagmus, extraocular muscles intact, V1 through V3 intact bilaterally and symmetric, face symmetric, hearing intact to finger rub, palate elevation symmetric, tongue was midline.  Motor:  MRC grading 5/5 b/l UE/LE.   strength 5/5.  Normal tone and bulk.  No abnormal movements.    Sensation: Intact to light touch, proprioception, and pinprick.   Coordination: No dysmetria on finger-to-nose and heel-to-shin.  No dysdiadokinesia.  Reflexes: 2+ in bilateral UE/LE, downgoing toes bilaterally. (-) [place].  Gait: Narrow and steady. No ataxia.  Romberg negative    Medications  acetaminophen   Tablet .. 650 milliGRAM(s) Oral every 6 hours PRN  aspirin enteric coated 325 milliGRAM(s) Oral daily  atorvastatin 80 milliGRAM(s) Oral at bedtime  chlorhexidine 4% Liquid 1 Application(s) Topical <User Schedule>  furosemide    Tablet 40 milliGRAM(s) Oral daily  pantoprazole    Tablet 40 milliGRAM(s) Oral before breakfast      Lab  02-12    141  |  101  |  14  ----------------------------<  173<H>  4.2   |  31  |  0.6<L>    Ca    7.9<L>      12 Feb 2019 07:20  Phos  4.2     02-12  Mg     1.9     02-12    TPro  6.0  /  Alb  3.5  /  TBili  0.6  /  DBili  x   /  AST  13  /  ALT  16  /  AlkPhos  114  02-12                          12.8   7.21  )-----------( 215      ( 12 Feb 2019 07:20 )             42.2     LIVER FUNCTIONS - ( 12 Feb 2019 07:20 )  Alb: 3.5 g/dL / Pro: 6.0 g/dL / ALK PHOS: 114 U/L / ALT: 16 U/L / AST: 13 U/L / GGT: x             1.9  1.8        Radiology  CT Head No Cont:   EXAM:  CT BRAIN            PROCEDURE DATE:  02/11/2019            INTERPRETATION:  Clinical History / Reason for exam: Stroke code/leg   weakness.    Technique: Multiple contiguous axial CT images of the head were obtained    from the base of the skull to the vertex without administration of   intravenous contrast. Coronal and sagittal reformats were obtained.    Comparison: CT head August 4, 2018.    Findings:    The ventricles, basal cisterns and sulcal pattern are within normal   limits for the patient's stated age.      Scattered areas of periventricular hypodensity, consistent with chronic   microvascular ischemic changes.    Grey-white differentiation is preserved.    There is no acute mass effect, midline shift or intracranial hemorrhage.      The calvarium is intact.    The visualized paranasal sinuses and bilateral mastoid complexes are   unremarkable. The globes and orbits are unremarkable.    Impression:     No evidence of acute intracranial hemorrhage or mass effect.         Dr. Bakari Jaimes discussed preliminary findings with YOANA SHARIF PA   on 2/11/2019 7:52 PM with readback.            Assessment:  PATIENT IS A   Plan: Neurology Consultation note    Name  FABIAN CLEARY    HPI: PATIENT IS A 59 YO MAN WITH HX OF DM, MORBID OBESITY, ? CVA IN THE PAST WHO PRESENTS WITH LLE WEAKNESS AND NUMBNESS AND COMPLETE VISUAL LOSS LEFT EYE. PATIENT REPORTS HE WAS SITTING AT HOME WHEN HE WENT TO GET UP BUT COULDNT STAND ON L LEG.  HE FELL BACKWARD. AT THE SAME TIME HE EXPERIENCED COMPLETE BLACKNESS IN THE LEFT EYE. STATES THAT HE HAD THIS BEFORE W/ HIS R EYE AFTER HIS MOTHER PASSED.   PT REPORTED SYMPTOMS STARTED AT 3:30 PM.  NOT TPA CANDIDATE AS OUT OF WINDOW BY THE TIME HE REACHED ER. ED STAFF REPORTED THAT HE BLINKED TO THREAT ON THE LEFT SIDE DESPITE STATING HE COULD NOT SEE LIGHT. NOT A FIELD CUT. NO ABN OD.   PATIENT NOT ON ASA AT HOME          Vital Signs Last 24 Hrs  T(C): 36.7 (12 Feb 2019 08:37), Max: 38.6 (11 Feb 2019 19:27)  T(F): 98.1 (12 Feb 2019 08:37), Max: 101.5 (11 Feb 2019 19:27)  HR: 99 (12 Feb 2019 08:37) (85 - 107)  BP: 117/65 (12 Feb 2019 08:37) (117/65 - 136/64)  BP(mean): --  RR: 20 (12 Feb 2019 08:37) (18 - 22)  SpO2: 100% (12 Feb 2019 08:37) (94% - 100%)    Neurological Exam:   Mental status: Awake, alert and oriented x3.  Recent and remote memory intact.  Naming, repetition and comprehension intact.  Attention/concentration intact.  No dysarthria, no aphasia.  Fund of knowledge appropriate.    Cranial nerves: Pupils equally round and reactive to light, WITH COMPLETE VISUAL LOSS OS +BTT BILATERALLY,  no nystagmus, extraocular muscles intact, V1 through V3 intact bilaterally and symmetric, face symmetric, hearing intact to finger rub, palate elevation symmetric, tongue was midline.  Motor:  4+/5 PROX LUE, TRACE LLE  Sensation: LACK OF SENSATION TO ALL MODALITIES ENTIRE LLE    Medications  acetaminophen   Tablet .. 650 milliGRAM(s) Oral every 6 hours PRN  aspirin enteric coated 325 milliGRAM(s) Oral daily  atorvastatin 80 milliGRAM(s) Oral at bedtime  chlorhexidine 4% Liquid 1 Application(s) Topical <User Schedule>  furosemide    Tablet 40 milliGRAM(s) Oral daily  pantoprazole    Tablet 40 milliGRAM(s) Oral before breakfast      Lab  02-12    141  |  101  |  14  ----------------------------<  173<H>  4.2   |  31  |  0.6<L>    Ca    7.9<L>      12 Feb 2019 07:20  Phos  4.2     02-12  Mg     1.9     02-12    TPro  6.0  /  Alb  3.5  /  TBili  0.6  /  DBili  x   /  AST  13  /  ALT  16  /  AlkPhos  114  02-12                          12.8   7.21  )-----------( 215      ( 12 Feb 2019 07:20 )             42.2     LIVER FUNCTIONS - ( 12 Feb 2019 07:20 )  Alb: 3.5 g/dL / Pro: 6.0 g/dL / ALK PHOS: 114 U/L / ALT: 16 U/L / AST: 13 U/L / GGT: x             1.9  1.8        Radiology  CT Head No Cont:   EXAM:  CT BRAIN            PROCEDURE DATE:  02/11/2019            INTERPRETATION:  Clinical History / Reason for exam: Stroke code/leg   weakness.    Technique: Multiple contiguous axial CT images of the head were obtained    from the base of the skull to the vertex without administration of   intravenous contrast. Coronal and sagittal reformats were obtained.    Comparison: CT head August 4, 2018.    Findings:    The ventricles, basal cisterns and sulcal pattern are within normal   limits for the patient's stated age.      Scattered areas of periventricular hypodensity, consistent with chronic   microvascular ischemic changes.    Grey-white differentiation is preserved.    There is no acute mass effect, midline shift or intracranial hemorrhage.      The calvarium is intact.    The visualized paranasal sinuses and bilateral mastoid complexes are   unremarkable. The globes and orbits are unremarkable.    Impression:     No evidence of acute intracranial hemorrhage or mass effect.         Dr. Bakari Jaimes discussed preliminary findings with YOANA SHARIF PA   on 2/11/2019 7:52 PM with readback.            Assessment:  PATIENT IS A 59 YO MAN WITH THE AFOREMENTIONED HX WHO PRESENTS WITH COMPLETE VISION LOSS OS (NOT FIELD CUT, MONOCULAR VISION LOSS) AND L HP/HS. THIS DOES NOT LOCALIZE ANATOMICALLY AS A CRAO 2/2 L CAROTID DISEASE WOULD NOT CAUSE IPSILATERAL MOTOR AND SENSORY COMPLAINTS. IN ADDITION PATIENT REPORTS COMPLETE VISION LOSS IN THE LEFT EYE BUT DOES BLINK TO THREAT.   SUSPECTS OME EMBELLISHMENT ON EXISTING COMPLAINTS    Plan:  ADMIT TO CCU TELE  CONT Q 4 NEURO CHECKS FOR NOW  MRI BRAIN AND MRA H/N IF NO CI  2D ECHO  CONT ASA AND LIPITOR  PLEASE CALL WITH ANY QUESTIONS

## 2019-02-12 NOTE — CONSULT NOTE ADULT - ASSESSMENT
Patient is a 57yo male from adult home ([facility] residence) w/ PMH of DM, HLD, Asthma who presents with CC of L leg weakness/numbness and L eye vision loss that at 4pm. Stroke code. CT head w/o contrast negative. CTA was not done due to machine issues.    Discussed with Dr. Graves. Pt will be admitted to ICU to r/o CVA     r/o CVA  - get CTA when available. possible MRI/MRA  - carotid doppler  - EKG in am   - lipid panel   - f/u lytes  - ASA 325mg, High intensity statin  - Neurology consult    Fever   - Pt developed fever (max temp 101.6). No leukocytosis.  - Cellulitis noted on b/l LE. Pt reports taking abx (unknown) at his adult home  - Blood, Urine culture ordered.   - ID consult Patient is a 59yo male from adult home ([facility]) w/ PMH of DM, HLD, Asthma who presents with CC of L leg weakness/numbness and L eye vision loss that at 4pm. Stroke code. CT head w/o contrast negative. CTA was not done due to machine issues.    Discussed with Dr. Graves. Pt will be admitted to ICU to r/o CVA     r/o CVA  - get CTA when available. possible MRI/MRA  - carotid doppler  - EKG in am   - lipid panel   - f/u lytes  - ASA 325mg, High intensity statin  - Neurology consult  - Ophthalmology consult for L eye vision loss    Fever   - Pt developed fever (max temp 101.6). No leukocytosis.  - Cellulitis noted on b/l LE. Pt reports taking abx (unknown) at his adult home  - f/u Blood, Urine cultures   - If cultures are positive, ID consult Patient is a 59yo male from adult home ([facility]) w/ PMH of DM, HLD, Asthma who presents with CC of L leg weakness/numbness and L eye vision loss that at 4pm. Stroke code. CT head w/o contrast negative. CTA was not done due to machine issues.    Discussed with Dr. Graves. Pt will be admitted to ICU to r/o CVA     r/o CVA  - get CTA when available. possible MRI/MRA  - carotid doppler  - EKG in am   - lipid panel   - f/u lytes  - ASA 325mg, High intensity statin  - Neurology consult  - Ophthalmology consult for L eye vision loss    Fever   - Pt developed fever (max temp 101.6). No leukocytosis.  - Cellulitis noted on b/l LE. Pt reports taking abx (unknown) at his adult home  - f/u Blood, Urine cultures   - If cultures are positive, ID consult    Morbid Obesity-  Probable BRANDY.

## 2019-02-12 NOTE — CONSULT NOTE ADULT - SUBJECTIVE AND OBJECTIVE BOX
HPI: Patient is a 59yo male w/ PMH of DM, HLD, Asthma who presents with CC of L leg weakness/numbness and L eye vision loss that at 4pm. Both symptoms started simultaneously. Pt reports similar symptom in the past when he received stroke workup which was negative. Pt was recently admitted overnight for asthma exacerbation 2 days ago.    Pt  found to be febrile in ED  PE: alert  BMI>40 cvs rrr reps cta  perrl eomi,  unable to   see from left eye central as well as peripheral,   unable to raise  both eyebrows,  inconsistent  left facial  asymmetry  with  smiling,  abd nontender   decreases sensation  lue  lle,   pt  unable to  hold his  left leg against  gravity -  of not  patient was able to  move himself from  ems stretcher  to  ed stretcher -   moves  both legs when he repositions himself in bed.     Stroke code called 729 while on ems stretcher        PAST MEDICAL & SURGICAL HISTORY:  CVA (cerebral vascular accident)  HLD (hyperlipidemia)  Morbid obesity  Osteoarthritis  Diabetes  Asthma  H/O umbilical hernia repair    Allergies    No Known Allergies    Intolerances      FAMILY HISTORY:  Family history of heart disease (Father, Sibling, Mother): Mom  age 81  Dad  age 50  Sister  age 36  Family history of obesity (Father, Sibling)    Home Medications:  atorvastatin 80 mg oral tablet: 1 tab(s) orally once a day (2019 12:35)  furosemide 40 mg oral tablet: 1 tab(s) orally once a day (2019 09:08)  metFORMIN 500 mg oral tablet: 1 tab(s) orally 2 times a day (2019 09:08)    Occupation:  Alochol: Denied  Smoking: Denied  Drug Use: Denied  Marital Status:         ROS: as in HPI; All other systems reviewed are negative    ICU Vital Signs Last 24 Hrs  T(C): 36.3 (2019 22:45), Max: 38.6 (2019 19:27)  T(F): 97.3 (2019 22:45), Max: 101.5 (2019 19:27)  HR: 97 (2019 22:45) (88 - 107)  BP: 126/64 (2019 22:45) (124/58 - 143/84)  BP(mean): --  ABP: --  ABP(mean): --  RR: 18 (2019 22:45) (18 - 22)  SpO2: 96% (2019 22:45) (94% - 99%)        Physical Examination:    General: No acute distress.  Alert, oriented, interactive, nonfocal    HEENT: Pupils equal, reactive to light.  Symmetric.    PULM: Clear to auscultation bilaterally, no significant sputum production    CVS: Regular rate and rhythm, no murmurs, rubs, or gallops    ABD: Soft, nondistended, nontender, normoactive bowel sounds, no masses    EXT: No edema, nontender    SKIN: Warm and well perfused, no rashes noted.            I&O's Detail        LABS:                        12.7   8.24  )-----------( 250      ( 2019 20:58 )             42.2     2019 20:58    140    |  100    |  16     ----------------------------<  198    4.2     |  29     |  0.7      Ca    8.2        2019 20:58  Mg     1.8       2019 20:58    TPro  5.9    /  Alb  3.5    /  TBili  0.6    /  DBili  x      /  AST  8      /  ALT  17     /  AlkPhos  110    2019 20:58  Amylase x     lipase x          CARDIAC MARKERS ( 2019 20:58 )  x     / <0.01 ng/mL / x     / x     / x      CARDIAC MARKERS ( 10 Feb 2019 23:50 )  x     / <0.01 ng/mL / x     / x     / x          CAPILLARY BLOOD GLUCOSE  180 (2019 20:52)      POCT Blood Glucose.: 184 mg/dL (2019 19:34)    PT/INR - ( 2019 20:58 )   PT: 11.40 sec;   INR: 0.99 ratio         PTT - ( 2019 20:58 )  PTT:27.3 sec    Culture    Lactate, Blood: 2.7 mmol/L (19 @ 20:58)      MEDICATIONS  (STANDING):    MEDICATIONS  (PRN):        RADIOLOGY:  < from: CT Head No Cont (19 @ 19:45) >  Impression:     No evidence of acute intracranial hemorrhage or mass effect.     < end of copied text >        < from: CT Angio Chest w/ IV Cont (02.11.19 @ 05:19) >  IMPRESSION:         No CT evidence of acute central pulmonary embolus or acute thoracic   pathology.    < end of copied text > HPI: Patient is a 59yo male from adult home ([facility] residence) w/ PMH of DM, HLD, Asthma who presents with CC of L leg weakness/numbness and L eye vision loss that at 4pm. Both symptoms started simultaneously. Pt denies any improvements from the beginning. Pt reports having the same symptoms in 2018 when he received stroke workup which was negative. Pt did not receive any tpa. CT w/o contrast showed any bleeding or acute ischemia. Pt was recently admitted overnight for asthma exacerbation 2 days ago. Pt reports having b/l LE extremity cellulitis, was getting abx at his adult home. Pt found to be febrile in ED. Pt denies any HA, CP, SOB, cough, abd pain, N/V/D, dizziness.      PAST MEDICAL & SURGICAL HISTORY:  CVA (cerebral vascular accident)  HLD (hyperlipidemia)  Morbid obesity  Osteoarthritis  Diabetes  Asthma  H/O umbilical hernia repair      Allergies  No Known Allergies      FAMILY HISTORY:  Family history of heart disease (Father, Sibling, Mother): Mom  age 81  Dad  age 50  Sister  age 36  Family history of obesity (Father, Sibling)      Home Medications:  atorvastatin 80 mg oral tablet: 1 tab(s) orally once a day (2019 12:35)  furosemide 40 mg oral tablet: 1 tab(s) orally once a day (2019 09:08)  metFORMIN 500 mg oral tablet: 1 tab(s) orally 2 times a day (2019 09:08)      Occupation:  Alochol: Denied  Smoking: Denied  Drug Use: Denied  Marital Status:       ROS: as in HPI; All other systems reviewed are negative    ICU Vital Signs Last 24 Hrs  T(C): 36.3 (2019 22:45), Max: 38.6 (2019 19:27)  T(F): 97.3 (2019 22:45), Max: 101.5 (2019 19:27)  HR: 97 (2019 22:45) (88 - 107)  BP: 126/64 (2019 22:45) (124/58 - 143/84)  RR: 18 (2019 22:45) (18 - 22)  SpO2: 96% (2019 22:45) (94% - 99%)        Physical Examination:    General: No acute distress.  Alert, oriented, interactive, nonfocal    HEENT: Pupils equal, reactive to light.  Symmetric.    PULM: Clear to auscultation bilaterally, no significant sputum production    CVS: Regular rate and rhythm, no murmurs, rubs, or gallops    ABD: Soft, nondistended, nontender, normoactive bowel sounds, no masses    EXT: No edema, nontender    SKIN: + Cellulitis noted in b/l LE. Tenderness noted upon palpation on right LE, but none on left LE.     Neuro: + Sensory deficit on Left leg. + Complete vision loss in Left eye. CN III-XII intact. Pupillary reflex intact. No facial droop. Strength 5/5 in UE, 2/5 in LE (Pt reports this LE weakness as his baseline). Pt unable to hold his both legs against gravity. Finger to nose intact. No pronator drift.           I&O's Detail      LABS:                        12.7   8.24  )-----------( 250      ( 2019 20:58 )             42.2     2019 20:58    140    |  100    |  16     ----------------------------<  198    4.2     |  29     |  0.7      Ca    8.2        2019 20:58  Mg     1.8       2019 20:58    TPro  5.9    /  Alb  3.5    /  TBili  0.6    /  DBili  x      /  AST  8      /  ALT  17     /  AlkPhos  110    2019 20:58  Amylase x     lipase x          CARDIAC MARKERS ( 2019 20:58 )  x     / <0.01 ng/mL / x     / x     / x      CARDIAC MARKERS ( 10 Feb 2019 23:50 )  x     / <0.01 ng/mL / x     / x     / x          CAPILLARY BLOOD GLUCOSE  180 (2019 20:52)      POCT Blood Glucose.: 184 mg/dL (2019 19:34)    PT/INR - ( 2019 20:58 )   PT: 11.40 sec;   INR: 0.99 ratio         PTT - ( 2019 20:58 )  PTT:27.3 sec    Culture    Lactate, Blood: 2.7 mmol/L (19 @ 20:58)      MEDICATIONS  (STANDING):  atorvastatin 80 milliGRAM(s) Oral at bedtime  betamethasone valerate 0.1% Cream 1 Application(s) Topical Once  furosemide    Tablet 40 milliGRAM(s) Oral daily    MEDICATIONS  (PRN):      RADIOLOGY:  < from: CT Head No Cont (19 @ 19:45) >  Impression:     No evidence of acute intracranial hemorrhage or mass effect.     < end of copied text >        < from: CT Angio Chest w/ IV Cont (19 @ 05:19) >  IMPRESSION:         No CT evidence of acute central pulmonary embolus or acute thoracic   pathology.    < end of copied text > HPI: Patient is a 57yo male from adult home ([facility] residence) w/ PMH of DM, HLD, Asthma who presents with CC of L leg weakness/numbness and L eye vision loss that at 4pm. Both symptoms started simultaneously. Pt denies any improvements from the beginning. Pt reports having the same symptoms in 2018 when he received stroke workup which was negative. Pt did not receive any tpa. CT w/o contrast showed any bleeding or acute ischemia. Pt was recently admitted overnight for asthma exacerbation 2 days ago. Pt reports having b/l LE extremity cellulitis, was getting abx at his adult home. Pt found to be febrile in ED. Pt denies any HA, CP, SOB, cough, abd pain, N/V/D, dizziness.      PAST MEDICAL & SURGICAL HISTORY:  CVA (cerebral vascular accident)  HLD (hyperlipidemia)  Morbid obesity  Osteoarthritis  Diabetes  Asthma  H/O umbilical hernia repair      Allergies  No Known Allergies      FAMILY HISTORY:  Family history of heart disease (Father, Sibling, Mother): Mom  age 81  Dad  age 50  Sister  age 36  Family history of obesity (Father, Sibling)      Home Medications:  atorvastatin 80 mg oral tablet: 1 tab(s) orally once a day (2019 12:35)  furosemide 40 mg oral tablet: 1 tab(s) orally once a day (2019 09:08)  metFORMIN 500 mg oral tablet: 1 tab(s) orally 2 times a day (2019 09:08)      Occupation:  Alochol: Denied  Smoking: Ex Cigar smoker. quit 3 years ago.  Drug Use: Denied  Marital Status:       ROS: as in HPI; All other systems reviewed are negative    ICU Vital Signs Last 24 Hrs  T(C): 36.3 (2019 22:45), Max: 38.6 (2019 19:27)  T(F): 97.3 (2019 22:45), Max: 101.5 (2019 19:27)  HR: 97 (2019 22:45) (88 - 107)  BP: 126/64 (2019 22:45) (124/58 - 143/84)  RR: 18 (2019 22:45) (18 - 22)  SpO2: 96% (2019 22:45) (94% - 99%)        Physical Examination:    General: No acute distress.  Alert, oriented, interactive, nonfocal    HEENT: Pupils equal, reactive to light.  Symmetric.    PULM: Clear to auscultation bilaterally, no significant sputum production    CVS: Regular rate and rhythm, no murmurs, rubs, or gallops    ABD: Soft, nondistended, nontender, normoactive bowel sounds, no masses    EXT: No edema, nontender    SKIN: + Cellulitis noted in b/l LE. Tenderness noted upon palpation on right LE, but none on left LE.     Neuro: + Sensory deficit on Left leg. + Complete vision loss in Left eye. CN III-XII intact. Pupillary reflex intact. No facial droop. Strength 5/5 in UE, 2/5 in LE (Pt reports this LE weakness as his baseline). Pt unable to hold his both legs against gravity. Finger to nose intact. No pronator drift.           I&O's Detail      LABS:                        12.7   8.24  )-----------( 250      ( 2019 20:58 )             42.2     2019 20:58    140    |  100    |  16     ----------------------------<  198    4.2     |  29     |  0.7      Ca    8.2        2019 20:58  Mg     1.8       2019 20:58    TPro  5.9    /  Alb  3.5    /  TBili  0.6    /  DBili  x      /  AST  8      /  ALT  17     /  AlkPhos  110    2019 20:58  Amylase x     lipase x          CARDIAC MARKERS ( 2019 20:58 )  x     / <0.01 ng/mL / x     / x     / x      CARDIAC MARKERS ( 10 Feb 2019 23:50 )  x     / <0.01 ng/mL / x     / x     / x          CAPILLARY BLOOD GLUCOSE  180 (2019 20:52)      POCT Blood Glucose.: 184 mg/dL (2019 19:34)    PT/INR - ( 2019 20:58 )   PT: 11.40 sec;   INR: 0.99 ratio         PTT - ( 2019 20:58 )  PTT:27.3 sec    Culture    Lactate, Blood: 2.7 mmol/L (19 @ 20:58)      MEDICATIONS  (STANDING):  atorvastatin 80 milliGRAM(s) Oral at bedtime  betamethasone valerate 0.1% Cream 1 Application(s) Topical Once  furosemide    Tablet 40 milliGRAM(s) Oral daily    MEDICATIONS  (PRN):      RADIOLOGY:  < from: CT Head No Cont (19 @ 19:45) >  Impression:     No evidence of acute intracranial hemorrhage or mass effect.     < end of copied text >    Cxray- no infiltrate.    < from: CT Angio Chest w/ IV Cont (19 @ 05:19) >  IMPRESSION:         No CT evidence of acute central pulmonary embolus or acute thoracic   pathology.    < end of copied text >    EKG- Sinus tachycardia, LAD

## 2019-02-12 NOTE — PROGRESS NOTE ADULT - SUBJECTIVE AND OBJECTIVE BOX
HPI: PATIENT IS A 59 YO MAN WITH HX OF DM, MORBID OBESITY, ? CVA IN THE PAST WHO PRESENTS WITH LLE WEAKNESS AND NUMBNESS AND COMPLETE VISUAL LOSS LEFT EYE. PATIENT REPORTS HE WAS SITTING AT HOME WHEN HE WENT TO GET UP BUT COULDNT STAND ON L LEG.  HE FELL BACKWARD. AT THE SAME TIME HE EXPERIENCED COMPLETE BLACKNESS IN THE LEFT EYE. STATES THAT HE HAD THIS BEFORE W/ HIS R EYE AFTER HIS MOTHER PASSED.   PT REPORTED SYMPTOMS STARTED AT 3:30 PM.  NOT TPA CANDIDATE AS OUT OF WINDOW BY THE TIME HE REACHED ER. ED STAFF REPORTED THAT HE BLINKED TO THREAT ON THE LEFT SIDE DESPITE STATING HE COULD NOT SEE LIGHT. NOT A FIELD CUT. NO ABN OD.   PATIENT NOT ON ASA AT HOME          Vital Signs Last 24 Hrs  T(C): 36.7 (12 Feb 2019 08:37), Max: 38.6 (11 Feb 2019 19:27)  T(F): 98.1 (12 Feb 2019 08:37), Max: 101.5 (11 Feb 2019 19:27)  HR: 99 (12 Feb 2019 08:37) (85 - 107)  BP: 117/65 (12 Feb 2019 08:37) (117/65 - 136/64)  BP(mean): --  RR: 20 (12 Feb 2019 08:37) (18 - 22)  SpO2: 100% (12 Feb 2019 08:37) (94% - 100%)    Neurological Exam:   Mental status: Awake, alert and oriented x3.  Recent and remote memory intact.  Naming, repetition and comprehension intact.  Attention/concentration intact.  No dysarthria, no aphasia.  Fund of knowledge appropriate.    Cranial nerves: Pupils equally round and reactive to light, WITH COMPLETE VISUAL LOSS OS +BTT BILATERALLY,  no nystagmus, extraocular muscles intact, V1 through V3 intact bilaterally and symmetric, face symmetric, hearing intact to finger rub, palate elevation symmetric, tongue was midline.  Motor:  4+/5 PROX LUE, TRACE LLE  Sensation: LACK OF SENSATION TO ALL MODALITIES ENTIRE LLE    Medications  acetaminophen   Tablet .. 650 milliGRAM(s) Oral every 6 hours PRN  aspirin enteric coated 325 milliGRAM(s) Oral daily  atorvastatin 80 milliGRAM(s) Oral at bedtime  chlorhexidine 4% Liquid 1 Application(s) Topical <User Schedule>  furosemide    Tablet 40 milliGRAM(s) Oral daily  pantoprazole    Tablet 40 milliGRAM(s) Oral before breakfast      Lab  02-12    141  |  101  |  14  ----------------------------<  173<H>  4.2   |  31  |  0.6<L>    Ca    7.9<L>      12 Feb 2019 07:20  Phos  4.2     02-12  Mg     1.9     02-12    TPro  6.0  /  Alb  3.5  /  TBili  0.6  /  DBili  x   /  AST  13  /  ALT  16  /  AlkPhos  114  02-12                          12.8   7.21  )-----------( 215      ( 12 Feb 2019 07:20 )             42.2     LIVER FUNCTIONS - ( 12 Feb 2019 07:20 )  Alb: 3.5 g/dL / Pro: 6.0 g/dL / ALK PHOS: 114 U/L / ALT: 16 U/L / AST: 13 U/L / GGT: x             1.9  1.8        Radiology  CT Head No Cont:   EXAM:  CT BRAIN            PROCEDURE DATE:  02/11/2019            INTERPRETATION:  Clinical History / Reason for exam: Stroke code/leg   weakness.    Technique: Multiple contiguous axial CT images of the head were obtained    from the base of the skull to the vertex without administration of   intravenous contrast. Coronal and sagittal reformats were obtained.    Comparison: CT head August 4, 2018.    Findings:    The ventricles, basal cisterns and sulcal pattern are within normal   limits for the patient's stated age.      Scattered areas of periventricular hypodensity, consistent with chronic   microvascular ischemic changes.    Grey-white differentiation is preserved.    There is no acute mass effect, midline shift or intracranial hemorrhage.      The calvarium is intact.    The visualized paranasal sinuses and bilateral mastoid complexes are   unremarkable. The globes and orbits are unremarkable.    Impression:     No evidence of acute intracranial hemorrhage or mass effect.         Dr. Bakari Jaimes discussed preliminary findings with YOANA SHARIF PA   on 2/11/2019 7:52 PM with readback.            Assessment:  PATIENT IS A 59 YO MAN WITH THE AFOREMENTIONED HX WHO PRESENTS WITH COMPLETE VISION LOSS OS (NOT FIELD CUT, MONOCULAR VISION LOSS) AND L HP/HS. THIS DOES NOT LOCALIZE ANATOMICALLY AS A CRAO 2/2 L CAROTID DISEASE WOULD NOT CAUSE IPSILATERAL MOTOR AND SENSORY COMPLAINTS. IN ADDITION PATIENT REPORTS COMPLETE VISION LOSS IN THE LEFT EYE BUT DOES BLINK TO THREAT.   SUSPECTS OME EMBELLISHMENT ON EXISTING COMPLAINTS    Plan:  ADMIT TO CCU TELE  CONT Q 4 NEURO CHECKS FOR NOW  MRI BRAIN AND MRA H/N IF NO CI  2D ECHO  CONT ASA AND LIPITOR

## 2019-02-13 LAB
GLUCOSE BLDC GLUCOMTR-MCNC: 140 MG/DL — HIGH (ref 70–99)
GLUCOSE BLDC GLUCOMTR-MCNC: 198 MG/DL — HIGH (ref 70–99)

## 2019-02-13 RX ORDER — ASPIRIN/CALCIUM CARB/MAGNESIUM 324 MG
81 TABLET ORAL DAILY
Qty: 0 | Refills: 0 | Status: DISCONTINUED | OUTPATIENT
Start: 2019-02-13 | End: 2019-02-18

## 2019-02-13 RX ORDER — ONDANSETRON 8 MG/1
4 TABLET, FILM COATED ORAL EVERY 6 HOURS
Qty: 0 | Refills: 0 | Status: DISCONTINUED | OUTPATIENT
Start: 2019-02-13 | End: 2019-02-18

## 2019-02-13 RX ADMIN — Medication 81 MILLIGRAM(S): at 11:04

## 2019-02-13 RX ADMIN — PANTOPRAZOLE SODIUM 40 MILLIGRAM(S): 20 TABLET, DELAYED RELEASE ORAL at 08:37

## 2019-02-13 RX ADMIN — ONDANSETRON 4 MILLIGRAM(S): 8 TABLET, FILM COATED ORAL at 03:46

## 2019-02-13 RX ADMIN — ATORVASTATIN CALCIUM 80 MILLIGRAM(S): 80 TABLET, FILM COATED ORAL at 22:03

## 2019-02-13 RX ADMIN — CHLORHEXIDINE GLUCONATE 1 APPLICATION(S): 213 SOLUTION TOPICAL at 08:36

## 2019-02-13 NOTE — CONSULT NOTE ADULT - SUBJECTIVE AND OBJECTIVE BOX
Patient is a 58y old  Male who presents with a chief complaint of Left LE sensory loss and Left eye vision loss (13 Feb 2019 08:02)      HPI:  57 YO MAN WITH HX OF DM, MORBID OBESITY,  CVA IN THE PAST WHO PRESENTS WITH LLE WEAKNESS AND NUMBNESS AND COMPLETE VISUAL LOSS LEFT EYE. PATIENT REPORTS HE WAS SITTING AT HOME WHEN HE WENT TO GET UP BUT COULDNT STAND ON L LEG.  HE FELL BACKWARD. AT THE SAME TIME HE EXPERIENCED COMPLETE BLACKNESS IN THE LEFT EYE. STATES THAT HE HAD THIS BEFORE W/ HIS R EYE AFTER HIS MOTHER PASSED. (13 Feb 2019 08:02)  no TPA because was out of the window     PAST MEDICAL & SURGICAL HISTORY:  CVA (cerebral vascular accident)  HLD (hyperlipidemia)  Morbid obesity  Osteoarthritis  Diabetes  Asthma  H/O umbilical hernia repair    Allergies    No Known Allergies    Intolerances      Family history : no cardiovscular family history   Home Medications:  atorvastatin 80 mg oral tablet: 1 tab(s) orally once a day (12 Feb 2019 01:49)  furosemide 40 mg oral tablet: 1 tab(s) orally once a day (12 Feb 2019 01:49)  metFORMIN 500 mg oral tablet: 1 tab(s) orally 2 times a day (12 Feb 2019 01:49)    Occupation:  Alochol: Denied  Smoking: ex  Drug Use: Denied  Marital Status:         ROS: as in HPI; All other systems reviewed are negative    ICU Vital Signs Last 24 Hrs  T(C): 36.7 (13 Feb 2019 07:00), Max: 37.3 (12 Feb 2019 20:27)  T(F): 98.1 (13 Feb 2019 07:00), Max: 99.2 (12 Feb 2019 20:27)  HR: 82 (13 Feb 2019 09:35) (80 - 99)  BP: 140/64 (13 Feb 2019 09:35) (117/60 - 153/77)  BP(mean): 92 (13 Feb 2019 09:35) (89 - 107)  ABP: --  ABP(mean): --  RR: 20 (13 Feb 2019 09:35) (18 - 24)  SpO2: 97% (13 Feb 2019 09:35) (96% - 99%)        Physical Examination:    General: No acute distress.  Alert, oriented, interactive, nonfocal    HEENT: Pupils equal, reactive to light.  Symmetric.    PULM: Clear to auscultation bilaterally, no significant sputum production    CVS: Regular rate and rhythm, no murmurs, rubs, or gallops    ABD: Soft, nondistended, nontender, normoactive bowel sounds, no masses    EXT: b/l redness , swelling     SKIN: Warm and well perfused, no rashes noted.    Neurology : 5/5 upper ext with 0/5 lower ext with left vision problem    Musculoskeletal : move all extremety     Lymphatic system: no Palpable node               I&O's Detail    13 Feb 2019 07:01  -  13 Feb 2019 10:13  --------------------------------------------------------  IN:    Oral Fluid: 280 mL  Total IN: 280 mL    OUT:  Total OUT: 0 mL    Total NET: 280 mL            LABS:                        12.8   7.21  )-----------( 215      ( 12 Feb 2019 07:20 )             42.2     12 Feb 2019 07:20    141    |  101    |  14     ----------------------------<  173    4.2     |  31     |  0.6      Ca    7.9        12 Feb 2019 07:20  Phos  4.2       12 Feb 2019 07:20  Mg     1.9       12 Feb 2019 07:20        CARDIAC MARKERS ( 11 Feb 2019 20:58 )  x     / <0.01 ng/mL / x     / x     / x          CAPILLARY BLOOD GLUCOSE  180 (11 Feb 2019 20:52)      POCT Blood Glucose.: 198 mg/dL (13 Feb 2019 09:46)    PT/INR - ( 11 Feb 2019 20:58 )   PT: 11.40 sec;   INR: 0.99 ratio         PTT - ( 11 Feb 2019 20:58 )  PTT:27.3 sec    Culture    Lactate, Blood: 2.7 mmol/L (02-11-19 @ 20:58)      MEDICATIONS  (STANDING):  aspirin enteric coated 325 milliGRAM(s) Oral daily  atorvastatin 80 milliGRAM(s) Oral at bedtime  chlorhexidine 4% Liquid 1 Application(s) Topical <User Schedule>  furosemide    Tablet 40 milliGRAM(s) Oral daily  pantoprazole    Tablet 40 milliGRAM(s) Oral before breakfast    MEDICATIONS  (PRN):  acetaminophen   Tablet .. 650 milliGRAM(s) Oral every 6 hours PRN Temp greater or equal to 38C (100.4F), Mild Pain (1 - 3)  ondansetron Injectable 4 milliGRAM(s) IV Push every 6 hours PRN Nausea and/or Vomiting        RADIOLOGY: ***     CXR:  TLC:  OG:  ET tube:      blunting left lower costophrenic dagmar     ECHO:

## 2019-02-13 NOTE — PROGRESS NOTE ADULT - SUBJECTIVE AND OBJECTIVE BOX
Neurology Follow up note    Name  FABIAN CLEARY    HPI:  59 YO MAN WITH HX OF DM, MORBID OBESITY,  CVA IN THE PAST WHO PRESENTS WITH LLE WEAKNESS AND NUMBNESS AND COMPLETE VISUAL LOSS LEFT EYE. PATIENT REPORTS HE WAS SITTING AT HOME WHEN HE WENT TO GET UP BUT COULDNT STAND ON L LEG.  HE FELL BACKWARD. AT THE SAME TIME HE EXPERIENCED COMPLETE BLACKNESS IN THE LEFT EYE. STATES THAT HE HAD THIS BEFORE W/ HIS R EYE AFTER HIS MOTHER PASSED. (13 Feb 2019 08:02)      Interval History:    PATIENT RPORTS RESOLUTION OF LLE WEAKNESS BUT STILL REPORTS VISUAL LOSS  AWAITING MRI    Vital Signs Last 24 Hrs  T(C): 36.7 (13 Feb 2019 07:00), Max: 37.3 (12 Feb 2019 20:27)  T(F): 98.1 (13 Feb 2019 07:00), Max: 99.2 (12 Feb 2019 20:27)  HR: 82 (13 Feb 2019 09:35) (80 - 99)  BP: 140/64 (13 Feb 2019 09:35) (129/62 - 153/77)  BP(mean): 92 (13 Feb 2019 09:35) (89 - 107)  RR: 20 (13 Feb 2019 09:35) (18 - 24)  SpO2: 97% (13 Feb 2019 09:35) (96% - 99%)    Neurological Exam:   Mental status: Awake, alert and oriented x3.  Recent and remote memory intact.  Naming, repetition and comprehension intact.  Attention/concentration intact.  No dysarthria, no aphasia.  Fund of knowledge appropriate.    Cranial nerves: Pupils equally round and reactive to light, WITH COMPLETE VISUAL LOSS OS +BTT BILATERALLY,  no nystagmus, extraocular muscles intact, V1 through V3 intact bilaterally and symmetric, face symmetric, hearing intact to finger rub, palate elevation symmetric, tongue was midline.  Motor:  4+/5 PROX LUE, 4/5 LLE  SENS INTACT        Medications  acetaminophen   Tablet .. 650 milliGRAM(s) Oral every 6 hours PRN  aspirin  chewable 81 milliGRAM(s) Oral daily  atorvastatin 80 milliGRAM(s) Oral at bedtime  chlorhexidine 4% Liquid 1 Application(s) Topical <User Schedule>  furosemide    Tablet 40 milliGRAM(s) Oral daily  LORazepam   Injectable 2 milliGRAM(s) IV Push once  ondansetron Injectable 4 milliGRAM(s) IV Push every 6 hours PRN  pantoprazole    Tablet 40 milliGRAM(s) Oral before breakfast      Lab  02-12    141  |  101  |  14  ----------------------------<  173<H>  4.2   |  31  |  0.6<L>    Ca    7.9<L>      12 Feb 2019 07:20  Phos  4.2     02-12  Mg     1.9     02-12    TPro  6.0  /  Alb  3.5  /  TBili  0.6  /  DBili  x   /  AST  13  /  ALT  16  /  AlkPhos  114  02-12                          12.8   7.21  )-----------( 215      ( 12 Feb 2019 07:20 )             42.2     LIVER FUNCTIONS - ( 12 Feb 2019 07:20 )  Alb: 3.5 g/dL / Pro: 6.0 g/dL / ALK PHOS: 114 U/L / ALT: 16 U/L / AST: 13 U/L / GGT: x                   Radiology      Assessment: 59 YO MAN WITH LEFT SIDED VISION LOSS AND WEAKNESS  R/O CVA    Plan:  MRI BRAIN  MRA H/N  CONT ASA AND LIPITOR  CAN DOWNGRADE FROM ICU  WILL FOLLOW

## 2019-02-13 NOTE — H&P ADULT - NSHPPHYSICALEXAM_GEN_ALL_CORE
T(C): 36.7 (02-13-19 @ 07:00), Max: 37.3 (02-12-19 @ 20:27)  HR: 89 (02-13-19 @ 05:35) (80 - 99)  BP: 153/77 (02-13-19 @ 05:35) (117/60 - 153/77)  RR: 18 (02-13-19 @ 07:00) (18 - 24)  SpO2: 97% (02-13-19 @ 05:35) (97% - 100%)    PHYSICAL EXAM:  GENERAL: NAD, well-developed , obese  NECK: Supple, No JVD  CHEST/LUNG: Clear to auscultation bilaterally; No wheeze  HEART: Regular rate and rhythm; No murmurs, rubs, or gallops  ABDOMEN: Soft, Nontender, Nondistended; Bowel sounds present  EXTREMITIES:  2+ Peripheral Pulses, No clubbing, cyanosis, or edema  Neuro: LLE: 3/5 , left vision loss , loss of sensation LLE

## 2019-02-13 NOTE — H&P ADULT - ASSESSMENT
59 YO man with PMH of DM , MORBID OBESITY,  CVA presenting for left sided hemiparesis and sensory loss , along with decreased vision in the Le eye       1)Left hemisensory loss/hemiparesis    R/O ischemic CVA     CT head NC negative for bleed     -->Pending MRI/MRA brain   -->Keep on Asp 81 and high intensity statin     2)Bilateral LE cellulitis ?? VS chronic ulcer stasis     No fever at the moment , no leukocytosis     Consider starting antibiotics     3)DM II     Hold metformin     Start Lantus if FS>180

## 2019-02-13 NOTE — H&P ADULT - HISTORY OF PRESENT ILLNESS
57 YO MAN WITH HX OF DM, MORBID OBESITY,  CVA IN THE PAST WHO PRESENTS WITH LLE WEAKNESS AND NUMBNESS AND COMPLETE VISUAL LOSS LEFT EYE. PATIENT REPORTS HE WAS SITTING AT HOME WHEN HE WENT TO GET UP BUT COULDNT STAND ON L LEG.  HE FELL BACKWARD. AT THE SAME TIME HE EXPERIENCED COMPLETE BLACKNESS IN THE LEFT EYE. STATES THAT HE HAD THIS BEFORE W/ HIS R EYE AFTER HIS MOTHER PASSED.

## 2019-02-13 NOTE — PROGRESS NOTE ADULT - GASTROINTESTINAL
I spoke to Mr. Murcia.  He is doing well. Reports his incision are no longer bleeding and his wife has tightened the ace wrap.  I answered all questions to his satisfaction.  I encouraged him to call me with any questions or concerns prior to his follow up appointment next week. He acknowledged understanding and appreciated the call.   
Soft, non-tender, no hepatosplenomegaly, normal bowel sounds
Soft, non-tender, no hepatosplenomegaly, normal bowel sounds

## 2019-02-13 NOTE — H&P ADULT - NSHPLABSRESULTS_GEN_ALL_CORE
12.8   7.21  )-----------( 215      ( 12 Feb 2019 07:20 )             42.2       02-12    141  |  101  |  14  ----------------------------<  173<H>  4.2   |  31  |  0.6<L>    Ca    7.9<L>      12 Feb 2019 07:20  Phos  4.2     02-12  Mg     1.9     02-12    TPro  6.0  /  Alb  3.5  /  TBili  0.6  /  DBili  x   /  AST  13  /  ALT  16  /  AlkPhos  114  02-12                  PT/INR - ( 11 Feb 2019 20:58 )   PT: 11.40 sec;   INR: 0.99 ratio         PTT - ( 11 Feb 2019 20:58 )  PTT:27.3 sec    Lactate Trend  02-11 @ 20:58 Lactate:2.7       CARDIAC MARKERS ( 11 Feb 2019 20:58 )  x     / <0.01 ng/mL / x     / x     / x            CAPILLARY BLOOD GLUCOSE  180 (11 Feb 2019 20:52)      POCT Blood Glucose.: 164 mg/dL (12 Feb 2019 20:50)        < from: CT Head No Cont (02.11.19 @ 19:45) >    No evidence of acute intracranial hemorrhage or mass effect.     < end of copied text >

## 2019-02-13 NOTE — CONSULT NOTE ADULT - ASSESSMENT
IMPRESSION:  stroke ?/   cellulitis   BRANDY   [facility]   morbid obesity       PLAN:    CNS: as per neurology ok to downgrade to floor     HEENT: oral care     PULMONARY: keep pox > 92 5   need outpatient sleep apnea     CARDIOVASCULAR: echo     GI: GI prophylaxis.  Feeding     RENAL: follow lytes     INFECTIOUS DISEASE:  Id consult to evaluate leg doubt cellulitis   HEMATOLOGICAL:  DVT prophylaxis.    ENDOCRINE:  Follow up FS.  Insulin protocol if needed.    MUSCULOSKELETAL:        CRITICAL CARE TIME SPENT: ***

## 2019-02-14 DIAGNOSIS — I87.2 VENOUS INSUFFICIENCY (CHRONIC) (PERIPHERAL): ICD-10-CM

## 2019-02-14 LAB
ANION GAP SERPL CALC-SCNC: 9 MMOL/L — SIGNIFICANT CHANGE UP (ref 7–14)
BUN SERPL-MCNC: 10 MG/DL — SIGNIFICANT CHANGE UP (ref 10–20)
CALCIUM SERPL-MCNC: 7.3 MG/DL — LOW (ref 8.5–10.1)
CHLORIDE SERPL-SCNC: 98 MMOL/L — SIGNIFICANT CHANGE UP (ref 98–110)
CO2 SERPL-SCNC: 31 MMOL/L — SIGNIFICANT CHANGE UP (ref 17–32)
CREAT SERPL-MCNC: 0.6 MG/DL — LOW (ref 0.7–1.5)
GLUCOSE BLDC GLUCOMTR-MCNC: 143 MG/DL — HIGH (ref 70–99)
GLUCOSE BLDC GLUCOMTR-MCNC: 161 MG/DL — HIGH (ref 70–99)
GLUCOSE BLDC GLUCOMTR-MCNC: 162 MG/DL — HIGH (ref 70–99)
GLUCOSE BLDC GLUCOMTR-MCNC: 204 MG/DL — HIGH (ref 70–99)
GLUCOSE SERPL-MCNC: 152 MG/DL — HIGH (ref 70–99)
HCT VFR BLD CALC: 41.4 % — LOW (ref 42–52)
HCV AB S/CO SERPL IA: 0.06 S/CO — SIGNIFICANT CHANGE UP
HCV AB SERPL-IMP: SIGNIFICANT CHANGE UP
HGB BLD-MCNC: 12.2 G/DL — LOW (ref 14–18)
MCHC RBC-ENTMCNC: 25.7 PG — LOW (ref 27–31)
MCHC RBC-ENTMCNC: 29.5 G/DL — LOW (ref 32–37)
MCV RBC AUTO: 87.3 FL — SIGNIFICANT CHANGE UP (ref 80–94)
NRBC # BLD: 0 /100 WBCS — SIGNIFICANT CHANGE UP (ref 0–0)
PLATELET # BLD AUTO: 258 K/UL — SIGNIFICANT CHANGE UP (ref 130–400)
POTASSIUM SERPL-MCNC: 3.8 MMOL/L — SIGNIFICANT CHANGE UP (ref 3.5–5)
POTASSIUM SERPL-SCNC: 3.8 MMOL/L — SIGNIFICANT CHANGE UP (ref 3.5–5)
RBC # BLD: 4.74 M/UL — SIGNIFICANT CHANGE UP (ref 4.7–6.1)
RBC # FLD: 15.9 % — HIGH (ref 11.5–14.5)
SODIUM SERPL-SCNC: 138 MMOL/L — SIGNIFICANT CHANGE UP (ref 135–146)
WBC # BLD: 5.82 K/UL — SIGNIFICANT CHANGE UP (ref 4.8–10.8)
WBC # FLD AUTO: 5.82 K/UL — SIGNIFICANT CHANGE UP (ref 4.8–10.8)

## 2019-02-14 RX ORDER — MAGNESIUM HYDROXIDE 400 MG/1
30 TABLET, CHEWABLE ORAL DAILY
Qty: 0 | Refills: 0 | Status: DISCONTINUED | OUTPATIENT
Start: 2019-02-14 | End: 2019-02-18

## 2019-02-14 RX ADMIN — PANTOPRAZOLE SODIUM 40 MILLIGRAM(S): 20 TABLET, DELAYED RELEASE ORAL at 07:42

## 2019-02-14 RX ADMIN — ATORVASTATIN CALCIUM 80 MILLIGRAM(S): 80 TABLET, FILM COATED ORAL at 22:25

## 2019-02-14 RX ADMIN — Medication 81 MILLIGRAM(S): at 11:13

## 2019-02-14 RX ADMIN — MAGNESIUM HYDROXIDE 30 MILLILITER(S): 400 TABLET, CHEWABLE ORAL at 22:25

## 2019-02-14 NOTE — CONSULT NOTE ADULT - SUBJECTIVE AND OBJECTIVE BOX
HPI: 57 YO MAN WITH HX OF DM, MORBID OBESITY,  CVA IN THE PAST WHO PRESENTS WITH LLE WEAKNESS AND NUMBNESS AND COMPLETE VISUAL LOSS LEFT EYE. PATIENT REPORTS HE WAS SITTING AT HOME WHEN HE WENT TO GET UP BUT COULDNT STAND ON L LEG.  HE FELL BACKWARD. AT THE SAME TIME HE EXPERIENCED COMPLETE BLACKNESS IN THE LEFT EYE. STATES THAT HE HAD THIS BEFORE W/ HIS R EYE AFTER HIS MOTHER PASSED.      PTN  REFERRED TO ACUTE  REHAB  FOR  EVAL AND  TX   PAST MEDICAL & SURGICAL HISTORY:  CVA (cerebral vascular accident)  HLD (hyperlipidemia)  Morbid obesity  Osteoarthritis  Diabetes  Asthma  H/O umbilical hernia repair      Hospital Course:    TODAY'S SUBJECTIVE & REVIEW OF SYMPTOMS:     Constitutional WNL   Cardio WNL   Resp WNL   GI WNL  Heme WNL  Endo WNL  Skin WNL  MSK WNL  Neuro WNL  Cognitive WNL  Psych WNL      MEDICATIONS  (STANDING):  aspirin  chewable 81 milliGRAM(s) Oral daily  atorvastatin 80 milliGRAM(s) Oral at bedtime  chlorhexidine 4% Liquid 1 Application(s) Topical <User Schedule>  furosemide    Tablet 40 milliGRAM(s) Oral daily  LORazepam   Injectable 2 milliGRAM(s) IV Push once  pantoprazole    Tablet 40 milliGRAM(s) Oral before breakfast    MEDICATIONS  (PRN):  acetaminophen   Tablet .. 650 milliGRAM(s) Oral every 6 hours PRN Temp greater or equal to 38C (100.4F), Mild Pain (1 - 3)  ondansetron Injectable 4 milliGRAM(s) IV Push every 6 hours PRN Nausea and/or Vomiting      FAMILY HISTORY:  Family history of heart disease (Father, Sibling, Mother): Mom  age 81  Dad  age 50  Sister  age 36  Family history of obesity (Father, Sibling)      Allergies    No Known Allergies    Intolerances        SOCIAL HISTORY:    [  ] Etoh  [  ] Smoking  [  ] Substance abuse     Home Environment:  [  ] Home Alone  [  ] Lives with Family  [  ] Home Health Aid    Dwelling:  [  ] Apartment  [  ] Private House  [ xx ] Adult Home  [  ] Skilled Nursing Facility      [  ] Short Term  [  ] Long Term  [  ] Stairs       Elevator [  ]    FUNCTIONAL STATUS PTA: (Check all that apply)  Ambulation: [  x ]Independent    [  ] Dependent     [  ] Non-Ambulatory  Assistive Device: [  ] SA Cane  [  ]  Q Cane  [ x] Walker  [  ]  Wheelchair  ADL : [ x ] Independent  [  ]  Dependent       Vital Signs Last 24 Hrs  T(C): 35.9 (2019 15:01), Max: 36.8 (2019 23:19)  T(F): 96.6 (2019 15:01), Max: 98.2 (2019 23:19)  HR: 77 (2019 07:14) (77 - 89)  BP: 134/62 (2019 07:14) (112/84 - 151/74)  BP(mean): 89 (2019 07:14) (89 - 106)  RR: 18 (2019 15:01) (18 - 18)  SpO2: 98% (2019 22:00) (98% - 98%)      PHYSICAL EXAM: Alert & Oriented X3  GENERAL: NAD, well-groomed, well-developed  HEAD:  Atraumatic, Normocephalic  EYES: EOMI, PERRLA, conjunctiva and sclera clear  NECK: Supple, No JVD, Normal thyroid  CHEST/LUNG: Clear to percussion bilaterally; No rales, rhonchi, wheezing, or rubs  HEART: Regular rate and rhythm; No murmurs, rubs, or gallops  ABDOMEN: Soft, Nontender, Nondistended; Bowel sounds present  EXTREMITIES:  2+ Peripheral Pulses, No clubbing, cyanosis, or edema    NERVOUS SYSTEM:  Cranial Nerves 2-12 intact [ x ] Abnormal  [  ]  ROM: WFL all extremities [ x ]  Abnormal [  ]  Motor Strength: WFL all extremities  [ x]  Abnormal [  ]  Sensation: intact to light touch [ x ] Abnormal [  ]  Reflexes: Symmetric [  x]  Abnormal [  ]    FUNCTIONAL STATUS:  Bed Mobility: Independent [  ]  Supervision [ x ]  Needs Assistance [  ]  N/A [  ]  Transfers: Independent [  ]  Supervision [ x ]  Needs Assistance [  ]  N/A [  ]   Ambulation: Independent [  ]  Supervision [x  ]  Needs Assistance [  ]  N/A [  ]  ADL: Independent [  x] Requires Assistance [  ] N/A [  ]  SEE PT/OT IE NOTES    LABS:                        12.2   5.82  )-----------( 258      ( 2019 06:15 )             41.4     02-14    138  |  98  |  10  ----------------------------<  152<H>  3.8   |  31  |  0.6<L>    Ca    7.3<L>      2019 06:15            RADIOLOGY & ADDITIONAL STUDIES:    Assesment:

## 2019-02-14 NOTE — CONSULT NOTE ADULT - ASSESSMENT
IMPRESSION: Rehab of 58 y.o  m  rehab  for  GD polyneuropathy      PRECAUTIONS: [  ] Cardiac  [  ] Respiratory  [  ] Seizures [  ] Contact Isolation  [  ] Droplet Isolation  [ FALL ] Other    Weight Bearing Status:     RECOMMENDATION:    Out of Bed to Chair     DVT/Decubiti Prophylaxis    REHAB PLAN:     [  xx ] Bedside P/T 3-5 times a week   [   ]   Bedside O/T  2-3 times a week             [   ] No Rehab Therapy Indicated                   [   ]  Speech Therapy   Conditioning/ROM                                    ADL  Bed Mobility                                               Conditioning/ROM  Transfers                                                     Bed Mobility  Sitting /Standing Balance                         Transfers                                        Gait Training                                               Sitting/Standing Balance  Stair Training [   ]Applicable                    Home equipment Eval                                                                        Splinting  [   ] Only      GOALS:   ADL   [   ]   Independent                    Transfers  [   ] Independent                          Ambulation  [  x ] Independent     [   x ] With device RW                            [   ]  CG                                                         [   ]  CG                                                                  [   ] CG                            [    ] Min A                                                   [   ] Min A                                                              [   ] Min  A          DISCHARGE PLAN:   [   ]  Good candidate for Intensive Rehabilitation/Hospital based-4A [facility]                                             Will tolerate 3hrs Intensive Rehab Daily                                       [    ]  Short Term Rehab in Skilled Nursing Facility                                       [  xx ]  Home with Outpatient or VN services                                         [    ]  Possible Candidate for Intensive Hospital based Rehab

## 2019-02-14 NOTE — CONSULT NOTE ADULT - SUBJECTIVE AND OBJECTIVE BOX
[facility] 58[facility]Patient is a 58y old  Male who presents with a chief complaint of Left LE sensory loss and Left eye vision loss (2019 12:54)      Patient has history of:  No Known Allergies      Adult/Nursing Home residence    DM  HF  HTN  CABG  HLD  ESRD on Hemodialysis      Patient treated with:                        PHYSICAL EXAM  T(F): 98.2 (19 @ 23:19), Max: 98.2 (19 @ 23:19)  HR: 89 (19 @ 22:00) (82 - 90)  BP: 112/84 (19 @ 22:00) (112/84 - 142/65)  RR: 18 (19 @ 23:19) (18 - 22)  SpO2: 98% (19 @ 22:00) (96% - 98%)  Daily     Daily Weight in k (2019 07:00)  HEENT: normal, no nuchal rigidity  Cor: RSR Nl S1 S2  Lungs: clear  Decreased breath sounds at bases    Abdomen: Nontender, Nl BS,     Ext: No clubbing,cyanosis or edema    LAB & RADIOLOGIC RESULTS:                        12.8   7.21  )-----------( 215      ( 2019 07:20 )             42.2         02-    141  |  101  |  14  ----------------------------<  173<H>  4.2   |  31  |  0.6<L>    Ca    7.9<L>      2019 07:20  Phos  4.2     02-  Mg     1.9     -    TPro  6.0  /  Alb  3.5  /  TBili  0.6  /  DBili  x   /  AST  13  /  ALT  16  /  AlkPhos  114  02-12      Hyponatremia         Hypernatremia        Lactic Acidosis       Alkalosis  Acidosis           Creatinine  Acute Kidney Injury  CKD 4  CKD 5    LIVER FUNCTIONS - ( 2019 07:20 )  Alb: 3.5 g/dL / Pro: 6.0 g/dL / ALK PHOS: 114 U/L / ALT: 16 U/L / AST: 13 U/L / GGT: x             Progress Note Adult-Neurology Attending [DORIE Dye] (19 @ 12:54)  Consult Note Adult-Critical Care Attending [HEMAL Petit] (19 @ 10:12)  H&P Adult [HELENAMihir AbelardoBRANDY wheeler] (19 @ 08:02)  Progress Note Adult-Internal Medicine Attending [BRANDY Wick] (19 @ 12:40)  Consult Note Adult-Neurology Attending [DORIE Dye] (19 @ 08:45)  Patient Profile Adult [MARK Ribera] (19 @ 07:08)  Outpatient Clinical Summary - Medical  Group [O. Provider] (19 @ 02:52)  Consult Note Adult-Critical Care PA/Attending [HEMAL Queen] (19 @ 01:21)  Stroke Code Note [MICHELLE Nation] (19 @ 20:52)  ED ADULT Triage Note [MARK Acosta] (19 @ 19:27)  Outpatient Clinical Summary - Medical  Group [O. Provider] (19 @ 19:22)  ED ADULT Nurse Note [BRANDY Parikh] (19 @ 01:45)  ED Provider Note [BRANDY Diaz] (19 @ 01:29)  ED ADULT Triage Note [MAIA Baires] (02-10-19 @ 22:06)  Outpatient Clinical Summary - Medical  Group [O. Provider] (02-10-19 @ 22:05)  ED Provider Note [KESHAWN Rios] (19 @ 23:55)  ED ADULT Nurse Note [BRANDY Csatillo] (19 @ 22:20)  ED ADULT Triage Note [PRINCESS Middleton] (19 @ 21:22)  Outpatient Clinical Summary - Medical  Group [O. Provider] (19 @ 21:21)  Progress Notes - Care Coordination [C. Provider] (19 @ 13:57)  Progress Note Adult-Internal Medicine Attending [BRANDY Wick] (19 @ 11:24)  Chart Note-Event Note PA [BRANDY Mcmullen] (19 @ 10:29)  Progress Notes - Care Coordination [C. Provider] (19 @ 10:29)  Progress Note Adult-Internal Medicine Attending [BRANDY Wick] (19 @ 10:46)  Progress Notes - Care Coordination [C. Provider] (19 @ 09:44)  Discharge Note Adult [MICHELLE Vargas, BRANDY Wick] (19 @ 09:07)  Consult Note Adult-Podiatry Resident [KETAN Conn] (19 @ 16:54)  Consult Note Adult-Infectious Disease Attending [JASON Merino] (19 @ 06:30)  Patient Profile Adult [MARK Lombardo] (19 @ 17:47)  Outpatient Clinical Summary - Medical  Group [O. Provider] (19 @ 15:35)  H&P Adult [BRANDY Prather] (19 @ 15:19)  ED Provider Note [SHERRY Jasso] (19 @ 13:48)  ED ADULT Triage Note [BRANDY Mccall] (19 @ 11:35)  Outpatient Clinical Summary - Medical  Group [O. Provider] (19 @ 11:23)  ED Provider Note [ANDRADE Osborne] (19 @ 22:10)  ED ADULT Nurse Note [BRANDY Cho] (19 @ 21:41)  ED ADULT Triage Note [BRANDY SanchezBuchanan] (19 @ 21:39)  Outpatient Clinical Summary - Medical  Group [O. Provider] (19 @ 21:20)  ED Provider Note [MARK Grullon J. Compitello, J. RomitoClaude] (18 @ 01:59)  Outpatient Clinical Summary - Medical  Group [O. Provider] (18 @ 00:12)  ED ADULT Triage Note [I. Prepose] (18 @ 22:38)  Progress Notes - Care Coordination [C. Provider] (18 @ 15:19)  Progress Note Adult-Infectious Disease Attending [JASON Pollock] (18 @ 13:19)  Progress Note Adult-Internal Medicine Attending [PRINCESS Forrest] (18 @ 12:41)  Progress Notes - Care Coordination [C. Provider] (18 @ 09:56)  Progress Notes - Care Coordination [C. Provider] (18 @ 08:38)  Progress Notes - Care Coordination [C. Provider] (18 @ 16:26)  Discharge Note Adult [DORIE Ferrara, PRINCESS Forrest] (18 @ 15:15)  Progress Notes - Care Coordination [C. Provider] (18 @ 15:04)  Progress Notes - Care Coordination [C. Provider] (18 @ 14:33)  Progress Note Adult-Infectious Disease Attending [JASON Pollock] (18 @ 13:38)  Progress Note Adult-Internal Medicine Attending [PRINCESS Forrest] (18 @ 12:46)  Progress Note Adult-Internal Medicine Resident [YIN Álvarez] (18 @ 09:14)  Progress Notes - Care Coordination [C. Provider] (18 @ 15:09)  Consult Note Adult-Infectious Disease Attending [JASON Pollock] (18 @ 14:36)  Progress Note Adult-Internal Medicine Resident [YIN Álvarez] (18 @ 13:46)  Progress Note Adult-Internal Medicine Attending [PRINCESS Forrest] (18 @ 12:18)  Care Coordination Assessment [C. Provider] (18 @ 12:10)  Consult Note Adult-Burn Attending [ANDRADE Villagran] (18 @ 00:14)  Patient Profile Adult [ANDRADE Wade] (18 @ 19:15)  Progress Notes - Care Coordination [C. Provider] (18 @ 14:40)  H&P Adult [PRINCESS Hernandez] (18 @ 07:57)  Outpatient Clinical Summary - Medical  Group [O. Provider] (18 @ 03:04)  ED ADULT Nurse Reassessment Note [JASON Okeefe] (18 @ 02:00)  ED ADULT Nurse Note [DORIE Pacheco] (18 @ 01:18)  Outpatient Clinical Summary - Medical  Group [O. Provider] (18 @ 00:09)  ED Provider Note [ALEXUS Zavala] (18 @ 23:27)  ED ADULT Triage Note [ALEXUS Loja] (18 @ 22:33)  Outpatient Clinical Summary - Medical  Group [O. Provider] (18 @ 21:05)  ED ADULT Nurse Note [ANDRADE Jones] (18 @ 14:45)  ED Provider Note [DOMENICO Agustin] (18 @ 12:22)  ED ADULT Triage Note [RONN Valle] (18 @ 11:46)  ED Provider Note [DOMENICO Rios] (18 @ 09:42)  ED ADULT Nurse Note [ANDRADE Platt] (18 @ 09:34)  ED ADULT Triage Note [DORIE Weaver] (18 @ 09:30)  Outpatient Clinical Summary - Medical  Group [O. Provider] (18 @ 09:20)  ED ADULT Nurse Reassessment Note [HEMAL Nguyen] (18 @ 12:57)  ED Provider Note [ANDRADE Osborne] (18 @ 12:01)  ED ADULT Nurse Note [HEMAL Nguyen] (18 @ 11:47)  ED ADULT Triage Note [RONN Valle] (18 @ 11:42)  Outpatient Clinical Summary - Medical  Group [O. Provider] (18 @ 11:35)  ED Provider Note [BRANDY Hill] (08-10-18 @ 21:34)  ED ADULT Nurse Note [HEMAL Neville] (08-10-18 @ 18:49)  ED ADULT Triage Note [JASON De Santiago] (08-10-18 @ 18:43)  Outpatient Clinical Summary - Medical  Group [O. Provider] (08-10-18 @ 18:34)  Progress Note Adult-Hospitalist Attending [JASON Truong] (18 @ 12:53)  Consult Note Adult-Podiatry Resident [KETAN Conn] (18 @ 12:53)  Progress Note Adult-Neurology NP/Attending [ANDRADE Lance] (18 @ 11:22)  Progress Notes - Care Coordination [C. Provider] (18 @ 11:00)  Discharge Note Adult [YIN Stuart E. Eismont, I. Duka, J. Pearson] (18 @ 10:59)  Physical Therapy Initial Evaluation Adult [ALEXUS Jain] (18 @ 10:02)  Patient Profile Adult [JEEVAN Gross] (18 @ 02:41)  ED ADULT Nurse Reassessment Note [KESHAWN Baeza] (18 @ 23:37)  ED ADULT Nurse Reassessment Note [KESHAWN Baeza] (18 @ 21:44)  Chart Note-Event Note Attending [JASON Barreto] (18 @ 19:13)  Consult Note Adult-Physiatry Attending [JEEVAN Araujo] (18 @ 16:23)  Progress Note Adult-Internal Medicine Resident [DORIE Jin] (18 @ 16:14)  Consult Note Adult-Neurology Resident/Attending [THIEN Thomas] (18 @ 15:27)  Progress Note Adult-Internal Medicine Resident [DORIE Jin] (18 @ 14:26)  Consult Note Adult-Vascular Surgery Fellow/Attending [BRANDY Momin] (18 @ 09:40)  Consult Note Adult-Neurology Attending [MARK Bey] (18 @ 13:25)  H&P Adult [KSEHAWN Ortiz] (18 @ 12:26)  Outpatient Clinical Summary - Medical  Group [O. Provider] (18 @ 11:39)  ED ADULT Nurse Note [KESHAWN Erazo] (18 @ 08:01)  ED ADULT Triage Note [ALEXUS De La Fuente] (18 @ 07:15)  Outpatient Clinical Summary - Medical  Group [O. Provider] (18 @ 07:13)  Progress Note Adult-Hospitalist Attending [ANDRADE Tang] (18 @ 16:03)  Progress Notes - Care Coordination [C. Provider] (18 @ 11:09)  Progress Note Adult-Hospitalist Attending [ANDRADE Tang] (18 @ 15:49)  Progress Notes - Care Coordination [C. Provider] (18 @ 15:01)  Progress Notes - Care Coordination [C. Provider] (18 @ 11:45)  Dietitian Initial Evaluation Adult [ANDRADE Pena] (18 @ 10:54)  Progress Note Adult-Hospitalist Attending [ANDRADE Tang] (18 @ 11:38)  Progress Notes - Care Coordination [C. Provider] (18 @ 14:55)  Progress Note Adult-Hospitalist Attending [ANDRADE Tang] (18 @ 13:26)  Progress Note Adult-Internal Medicine Resident [JASON Mccabe] (18 @ 08:18)  Progress Notes - Care Coordination [C. Provider] (18 @ 15:13)  Chart Note-Event Note Vascular  PA/Attending [BRANDY Mason] (18 @ 13:31)  Progress Note Adult-Internal Medicine Resident [JASON Mccabe] (18 @ 13:09)  Progress Note Adult-Hospitalist Attending [ANDRADE Tang] (18 @ 13:04)  Progress Notes - Care Coordination [C. Provider] (18 @ 12:25)  Progress Note Adult-Anesthesia Attending [MARK Ramirez] (18 @ 11:02)  Chart Note-Event Note POST OP C PA [MARK Ferrara] (18 @ 21:14)  Brief Operative Note [BRANDY Momin] (18 @ 17:48)  Chart Note-anesthesia Attending [MARK Ramirez] (18 @ 17:33)  Pre-Anesthesia Evaluation Adult [MARK Ramirez] (18 @ 15:08)  Progress Note Adult-Hospitalist Attending [ANDRADE Tang] (18 @ 13:47)  Progress Note Adult-Internal Medicine Resident [JASON Mccabe] (18 @ 13:32)  Progress Notes - Care Coordination [C. Provider] (18 @ 11:13)  Pre-op Checklist [XIAO Royal] (18 @ 06:33)  Consult Note Adult-Vascular Surgery PA/Attending [BRANDY Stuart] (18 @ 22:08)  Progress Note Adult-Hospitalist Attending [MARK Skinner] (18 @ 13:18)  Progress Notes - Care Coordination [C. Provider] (18 @ 11:19)  Progress Note Adult-Podiatry Resident/Attending [SHERRY Hernandez] (18 @ 10:33)  Care Coordination Assessment [C. Provider] (18 @ 12:44)  Progress Notes - Care Coordination [C. Provider] (18 @ 12:36)  Progress Notes - Care Coordination [C. Provider] (18 @ 12:35)  Discharge Note Adult [YIN Young, MICHELLE Barrera, ANDRADE Tang, BRANDY Irwin] (18 @ 11:59)  Progress Note Adult-Hospitalist Attending [ANDRADE Steele] (18 @ 11:16)  Consult Note Adult-Podiatry Resident/Attending [SHERRY Hernandez] (18 @ 11:00)  Consult Note Adult-Infectious Disease Attending [JASON Merino] (18 @ 09:04)  Patient Profile Adult [YIN Walton] (18 @ 18:37)  H&P Adult [ANDRADE Steele] (18 @ 15:47)  ED Provider Note [HEMAL Lares] (18 @ 11:34)  ED ADULT Triage Note [RONN Valle] (18 @ 11:28)  ED ADULT Nurse Note [ANDRADE Platt] (18 @ 11:24)  Progress Notes - Care Coordination [C. Provider] (18 @ 11:13)  Progress Notes - Care Coordination [C. Provider] (18 @ 15:20)  Progress Note Adult-Hospitalist Attending [BRANDY Hector] (18 @ 15:18)  Progress Notes - Care Coordination [C. Provider] (18 @ 10:37)  Progress Note Adult-Internal Medicine Attending [JASON Romano] (18 @ 12:37)  Progress Notes - Care Coordination [C. Provider] (18 @ 11:48)  Chart Note-Event Note f/u RD [ANDRADE Pena] (18 @ 14:16)  Progress Notes - Care Coordination [C. Provider] (18 @ 14:10)  Progress Note Adult-Internal Medicine Attending [JASON Romano] (18 @ 12:14)  Progress Note Adult-Internal Medicine Resident [DORIE Flanagan] (18 @ 11:28)  Consult Note Adult-Ophthalmology Attending [HEMAL Troncoso] (18 @ 12:50)  Progress Notes - Care Coordination [C. Provider] (18 @ 10:44)  Progress Note Adult-Internal Medicine Attending [DOMENICO Rodrigues] (18 @ 10:18)  Consult Note Adult-Neurology Attending [MARK Bey] (07-15-18 @ 14:30)  Progress Note Adult-Internal Medicine Attending [THIEN Park] (07-15-18 @ 12:33)  Chart Note-Event Note Resident [MARK Cisneros] (07-15-18 @ 00:15)  Chart Note-Event Note Resident [MARK Cisneros] (18 @ 23:18)  Provider Contact Note (Other) [HEMAL Preston] (18 @ 21:25)  Progress Note Adult-Internal Medicine Resident [HEMAL Cruz] (18 @ 13:36)  Progress Note Adult-Internal Medicine Attending [THIEN Park] (18 @ 13:18)  Progress Note Adult-Internal Medicine Resident [HEMAL Cruz] (18 @ 16:47)  Progress Notes - Care Coordination [C. Provider] (18 @ 15:25)  Progress Note Adult-Internal Medicine Attending [JASON Romano] (18 @ 13:47)  Progress Notes - Care Coordination [C. Provider] (18 @ 10:26)  Progress Note Adult-Internal Medicine Resident [HEMAL Cruz] (18 @ 15:52)  Progress Note Adult-Internal Medicine Attending [JASON Romano] (18 @ 12:56)  Progress Notes - Care Coordination [C. Provider] (18 @ 12:25)  Progress Notes - Care Coordination [C. Provider] (18 @ 09:31)  Progress Note Adult-Internal Medicine Resident [HEMAL Cruz] (18 @ 14:22)  Progress Note Adult-Internal Medicine Attending [JASON Romano] (18 @ 13:04)  Progress Notes - Care Coordination [C. Provider] (18 @ 11:42)  Dietitian Initial Evaluation Adult [ANDRADE Pena] (18 @ 10:13)  Progress Notes - Care Coordination [C. Provider] (18 @ 09:18)  Progress Notes - Care Coordination [C. Provider] (07-10-18 @ 15:59)  Progress Notes - Care Coordination [C. Provider] (07-10-18 @ 14:19)  Progress Note Adult-Internal Medicine Resident/Attending [HEMAL Raines] (07-10-18 @ 14:04)  Discharge Note Adult [DORIE Singh, DOMENICO Ervin, HEMAL Cruz, BRANDY Hector] (07-10-18 @ 11:49)  Progress Notes - Care Coordination [C. Provider] (07-10-18 @ 09:58)  Progress Note Adult-Infectious Disease Attending [JASON Pollock] (07-10-18 @ 09:09)  Progress Note Adult-Internal Medicine Resident [HEMAL Cruz] (18 @ 15:31)  Care Coordination Assessment [C. Provider] (18 @ 14:56)  Progress Note Adult-Hospitalist Attending [JASON Romano] (18 @ 13:04)  Progress Notes - Care Coordination [C. Provider] (18 @ 10:20)  Progress Note Adult-Internal Medicine Resident [HEMAL Cruz] (18 @ 17:42)  Progress Note Adult-Hospitalist Attending [JASON Romano] (18 @ 16:35)  Progress Note Adult-Hospitalist Attending [ANDRADE Maria] (18 @ 14:24)  Physical Therapy Initial Evaluation Adult [ANDRADE Carranza] (18 @ 08:54)  Progress Note Adult-Hospitalist Attending [ANDRADE Maria] (18 @ 17:00)  Consult Note Adult-Physiatry Attending [KETAN Beckman] (18 @ 14:11)  Progress Notes - Care Coordination [C. Provider] (18 @ 12:46)  Provider Contact Note (Other) [MICHELLE Barnhart] (18 @ 10:38)  Progress Note Adult-Internal Medicine Resident [HEMAL Cruz] (18 @ 10:00)  Progress Note Adult-Infectious Disease Attending [AJSON Pollock] (18 @ 08:31)  Consult Note Adult-Burn Attending [ANDRADE Villagran] (18 @ 17:30)  Progress Notes - Care Coordination [C. Provider] (18 @ 15:46)  Progress Note Adult-Internal Medicine Resident [HEMAL Cruz] (18 @ 15:00)  Progress Notes - Care Coordination [C. Provider] (18 @ 11:05)  Consult Note Adult-Infectious Disease Resident/Attending [ALEXUS Gallardo] (18 @ 10:31)  Progress Notes - Care Coordination [C. Provider] (18 @ 20:23)  Patient Profile Adult [JASON Gray] (18 @ 17:38)  H&P Adult [RONN Lancaster] (18 @ 16:50)  ED Provider Note [DORIE Winslow] (18 @ 14:17)  ED ADULT Nurse Note [LEOBARDO Tirado] (18 @ 13:46)  ED ADULT Triage Note [JASON Carvalho] (18 @ 12:54)  Progress Notes - Care Coordination [C. Provider] (18 @ 12:38)  Progress Notes - Care Coordination [C. Provider] (18 @ 12:27)  Progress Note Adult-Internal Medicine Resident/Attending [THIEN Skinner] (18 @ 11:07)  Progress Notes - Care Coordination [C. Provider] (18 @ 15:12)  Care Coordination Assessment [C. Provider] (18 @ 15:06)  Progress Note Adult-Internal Medicine Resident/Attending [THIEN Skinner] (18 @ 10:59)  Progress Note Adult-Infectious Disease Attending [PRINCESS Chong] (18 @ 07:35)  Progress Notes - Care Coordination [C. Provider] (18 @ 17:04)  Progress Notes - Care Coordination [C. Provider] (18 @ 16:28)  Discharge Note Adult [MARK Serrano, THIEN Miner] (18 @ 14:50)  Progress Note Adult-Infectious Disease Attending [PRINCESS Chong] (18 @ 12:09)  Progress Note Adult-Internal Medicine Resident [MARK Serrano] (18 @ 09:37)  Progress Note Adult-Surgery Resident [PRINCESS Carballo S. Cohn] (18 @ 05:15)  Consult Note Adult-Burn Attending [ANDRADE Villagran] (18 @ 23:10)  H&P Adult [DOMENICO Tiffani] (18 @ 14:41)  Consult Note Adult-Infectious Disease Attending [PRINCESS Chong] (18 @ 14:37)  Provider Contact Note (Other) [RONN Ibarra] (18 @ 11:56)  Chart Note-Event Note Resident [ANDRADE Oneal] (18 @ 11:34)  Consult Note Adult-Surgery Resident/Attending [BRANDY Ramirez] (18 @ 10:17)  Patient Profile Adult [RONN Ibarra] (18 @ 10:04)  ED Provider Note [THIEN Wong] (18 @ 22:25)  ED ADULT Nurse Note [ANDRADE Moya] (18 @ 22:02)  ED ADULT Triage Note [DOMENICO Rodas] (18 @ 20:50)    Cxray:   infiltrate  atelectasis  pleural effusion    CAT Abdomen/Pelvis  non-contrast  with IV contrast     CAT head non-contrast:  ischemic microvascular disease  subdural hematoma  cerebral edema  brain compression        Culture - Blood (collected 19 @ 02:30)  Source: .Blood Blood  Preliminary Report (19 @ 23:01):    No growth to date.    Culture - Blood (collected 19 @ 02:30)  Source: .Blood Blood  Preliminary Report (19 @ 23:01):    No growth to date.          IMPRESSION  Severe Sepsis (temp>101F, temp<96.8F, pulse>90 beats/min , resp rate>20/min, wbc>12, wbc<4, >10% bands, decreased systolic bp, lactic acidosis, acute kidney injury, metabolic encephalopathy ) due to suspected Gram negative pneumonia, suspected aspiration pneumonia, urinary tract infection      SUGGESTIONs  Await blood cultures, urine culture, wound culture  Await susceptibilities of  Continue Rocephin & Zithromax  Switch to Cefepime  Repeat blood cultures  Repeat sodium, white blood cell count    Echocardiogram  [facility] 58yMalePatient is a 58y old  Male who presents with a chief complaint of Left LE sensory loss and Left eye vision loss (2019 12:54)      Patient has history of:  No Known Allergies    PMH - obesity     FSH - not relevant   ROS - not contributory       PHYSICAL EXAM  T(F): 98.2 (19 @ 23:19), Max: 98.2 (19 @ 23:19)  HR: 89 (19 @ 22:00) (82 - 90)  BP: 112/84 (19 @ 22:00) (112/84 - 142/65)  RR: 18 (19 @ 23:19) (18 - 22)  SpO2: 98% (19 @ 22:00) (96% - 98%)  Daily     Daily Weight in k (2019 07:00)    awake and alert   HEENT: normal, no nuchal rigidity  Cor: RSR Nl S1 S2  Lungs: Decreased breath sounds at bases  Abdomen: Nontender, Nl BS,   Ext: LE venous stasis     LAB & RADIOLOGIC RESULTS:                        12.8   7.21  )-----------( 215      ( 2019 07:20 )             42.2             141  |  101  |  14  ----------------------------<  173<H>  4.2   |  31  |  0.6<L>    Ca    7.9<L>      2019 07:20  Phos  4.2       Mg     1.9         TPro  6.0  /  Alb  3.5  /  TBili  0.6  /  DBili  x   /  AST  13  /  ALT  16  /  AlkPhos  114      LIVER FUNCTIONS - ( 2019 07:20 )  Alb: 3.5 g/dL / Pro: 6.0 g/dL / ALK PHOS: 114 U/L / ALT: 16 U/L / AST: 13 U/L / GGT: x             Progress Note Adult-Neurology Attending [DORIE Dye] (19 @ 12:54)  Consult Note Adult-Critical Care Attending [HEMAL Petit] (19 @ 10:12)  H&P Adult [BRANDY Perez] (19 @ 08:02)  Progress Note Adult-Internal Medicine Attending [BRANDY Wick] (19 @ 12:40)  Consult Note Adult-Neurology Attending [DORIE Dye] (19 @ 08:45)  Patient Profile Adult [MARK Ribera] (19 @ 07:08)  Outpatient Clinical Summary - Medical  Group [O. Provider] (19 @ 02:52)  Consult Note Adult-Critical Care PA/Attending [HEMAL Queen] (19 @ 01:21)  Stroke Code Note [MICHELLE Nation] (19 @ 20:52)  ED ADULT Triage Note [MARK Acosta] (19 @ 19:27)  Outpatient Clinical Summary - Medical  Group [O. Provider] (19 @ 19:22)  ED ADULT Nurse Note [BRANDY Parikh] (19 @ 01:45)  ED Provider Note [BRANDY Diaz] (19 @ 01:29)  ED ADULT Triage Note [MAIA Baires] (02-10-19 @ 22:06)  Outpatient Clinical Summary - Medical  Group [O. Provider] (02-10-19 @ 22:05)  ED Provider Note [KESHAWN Rios] (19 @ 23:55)  ED ADULT Nurse Note [BRANDY Castillo] (19 @ 22:20)  ED ADULT Triage Note [PRINCESS Middleton] (19 @ 21:22)  Outpatient Clinical Summary - Medical  Group [O. Provider] (19 @ 21:21)  Progress Notes - Care Coordination [C. Provider] (19 @ 13:57)  Progress Note Adult-Internal Medicine Attending [BRANDY Wick] (19 @ 11:24)  Chart Note-Event Note PA [BRANDY Mcmullen] (19 @ 10:29)  Progress Notes - Care Coordination [C. Provider] (19 @ 10:29)  Progress Note Adult-Internal Medicine Attending [BRANDY Wick] (19 @ 10:46)  Progress Notes - Care Coordination [C. Provider] (19 @ 09:44)  Discharge Note Adult [MICHELLE Vargas S. Patel] (19 @ 09:07)  Consult Note Adult-Podiatry Resident [KETAN Conn] (19 @ 16:54)  Consult Note Adult-Infectious Disease Attending [JASON Merino] (19 @ 06:30)  Patient Profile Adult [MARK Lombardo] (19 @ 17:47)  Outpatient Clinical Summary - Medical  Group [O. Provider] (19 @ 15:35)  H&P Adult [BRANDY Prather] (19 @ 15:19)  ED Provider Note [SHERRY Jasso] (19 @ 13:48)  ED ADULT Triage Note [BRANDY Mccall] (19 @ 11:35)  Outpatient Clinical Summary - Medical  Group [O. Provider] (19 @ 11:23)  ED Provider Note [ANDRADE Osborne] (19 @ 22:10)  ED ADULT Nurse Note [BRANDY Cho] (19 @ 21:41)  ED ADULT Triage Note [BRANDY Jackson] (19 @ 21:39)  Outpatient Clinical Summary - Medical  Group [O. Provider] (19 @ 21:20)          Culture - Blood (collected 19 @ 02:30)  Source: .Blood Blood  Preliminary Report (19 @ 23:01):    No growth to date.    Culture - Blood (collected 19 @ 02:30)  Source: .Blood Blood  Preliminary Report (19 @ 23:01):    No growth to date.

## 2019-02-14 NOTE — PHYSICAL THERAPY INITIAL EVALUATION ADULT - MANUAL MUSCLE TESTING RESULTS, REHAB EVAL
BUE/RLE ms strength grossly graded 3+ to 4-/5; LLE 2+ TO 3-/5 except for (L) ankle plantarflexors  0/5 and dorsiflexors 2- to 2/5

## 2019-02-14 NOTE — PROGRESS NOTE ADULT - ASSESSMENT
1)Left hemisensory loss/hemiparesis    R/O ischemic CVA     CT head NC negative for bleed     -->MRI/MRA brain prelim results are negative   -->Keep on Asp 81 and high intensity statin     2)Bilateral LE cellulitis ?? VS chronic ulcer stasis     No fever at the moment , no leukocytosis     ID eval appreciated: no need for antibiotics     3)DM II     Hold metformin     Start Lantus if FS>180

## 2019-02-14 NOTE — PHYSICAL THERAPY INITIAL EVALUATION ADULT - IMPAIRMENTS CONTRIBUTING TO GAIT DEVIATIONS, PT EVAL
decreased endurance/decreased strength/impaired balance/decreased sensation/impaired postural control

## 2019-02-14 NOTE — PROGRESS NOTE ADULT - SUBJECTIVE AND OBJECTIVE BOX
FABIAN CLEARY    Patient is a 58y old  Male who presents with a chief complaint of Left LE sensory loss and Left eye vision loss (14 Feb 2019 12:26)      Interval History/Overnight events:    Patient states that he can move his RLE now     T(C): 36.3 (02-14-19 @ 07:05), Max: 36.8 (02-13-19 @ 23:19)  HR: 77 (02-14-19 @ 07:14)  BP: 134/62 (02-14-19 @ 07:14)  RR: 18 (02-14-19 @ 07:14)  SpO2: 98% (02-13-19 @ 22:00)    PHYSICAL EXAM:    GENERAL: Comfortable , not in acute distress   HEENT: Anicteric sclera, EOMI  CHEST/LUNG: Clear to auscultation bilaterally  HEART: Regular rate and rhythm; No murmurs, rubs, or gallops  ABDOMEN: Soft, Nontender, Nondistended; Bowel sounds present  EXTREMITIES: No clubbing, cyanosis, non pitting bilateral LE edema       LABS:                          12.2   5.82  )-----------( 258      ( 14 Feb 2019 06:15 )             41.4     02-14    138  |  98  |  10  ----------------------------<  152<H>  3.8   |  31  |  0.6<L>    Ca    7.3<L>      14 Feb 2019 06:15              Culture - Blood (collected 12 Feb 2019 02:30)  Source: .Blood Blood  Preliminary Report (13 Feb 2019 23:01):    No growth to date.    Culture - Blood (collected 12 Feb 2019 02:30)  Source: .Blood Blood  Preliminary Report (13 Feb 2019 23:01):    No growth to date.            MEDICATIONS:    MEDICATIONS  (STANDING):  aspirin  chewable 81 milliGRAM(s) Oral daily  atorvastatin 80 milliGRAM(s) Oral at bedtime  chlorhexidine 4% Liquid 1 Application(s) Topical <User Schedule>  furosemide    Tablet 40 milliGRAM(s) Oral daily  LORazepam   Injectable 2 milliGRAM(s) IV Push once  pantoprazole    Tablet 40 milliGRAM(s) Oral before breakfast      MEDICATIONS  (PRN):  acetaminophen   Tablet .. 650 milliGRAM(s) Oral every 6 hours PRN Temp greater or equal to 38C (100.4F), Mild Pain (1 - 3)  ondansetron Injectable 4 milliGRAM(s) IV Push every 6 hours PRN Nausea and/or Vomiting

## 2019-02-14 NOTE — PROGRESS NOTE ADULT - SUBJECTIVE AND OBJECTIVE BOX
SUBJECTIVE:    Patient is a 58y old Male who presents with a chief complaint of Left LE sensory loss and Left eye vision loss (14 Feb 2019 06:00)    Currently admitted to medicine with the primary diagnosis of CVA (cerebral vascular accident)     Today is hospital day 2d. This morning he is resting comfortably in bed and reports no new issues or overnight events.     PAST MEDICAL & SURGICAL HISTORY  CVA (cerebral vascular accident)  HLD (hyperlipidemia)  Morbid obesity  Osteoarthritis  Diabetes  Asthma  H/O umbilical hernia repair    SOCIAL HISTORY:  Negative for smoking/alcohol/drug use.     ALLERGIES:  No Known Allergies    MEDICATIONS:  STANDING MEDICATIONS  aspirin  chewable 81 milliGRAM(s) Oral daily  atorvastatin 80 milliGRAM(s) Oral at bedtime  chlorhexidine 4% Liquid 1 Application(s) Topical <User Schedule>  furosemide    Tablet 40 milliGRAM(s) Oral daily  LORazepam   Injectable 2 milliGRAM(s) IV Push once  pantoprazole    Tablet 40 milliGRAM(s) Oral before breakfast    PRN MEDICATIONS  acetaminophen   Tablet .. 650 milliGRAM(s) Oral every 6 hours PRN  ondansetron Injectable 4 milliGRAM(s) IV Push every 6 hours PRN    VITALS:   T(F): 97.3  HR: 77  BP: 134/62  RR: 18  SpO2: 98%    LABS:                        12.2   5.82  )-----------( 258      ( 14 Feb 2019 06:15 )             41.4     02-14    138  |  98  |  10  ----------------------------<  152<H>  3.8   |  31  |  0.6<L>    Ca    7.3<L>      14 Feb 2019 06:15                Culture - Blood (collected 12 Feb 2019 02:30)  Source: .Blood Blood  Preliminary Report (13 Feb 2019 23:01):    No growth to date.    Culture - Blood (collected 12 Feb 2019 02:30)  Source: .Blood Blood  Preliminary Report (13 Feb 2019 23:01):    No growth to date.          RADIOLOGY:    PHYSICAL EXAM:  GEN: No acute distress  LUNGS: Clear to auscultation bilaterally   HEART: Regular  ABD: Soft, non-tender, non-distended.  EXT: NC/NC/NE/2+PP/VILLEGAS/Skin Intact.   NEURO: AAOX3    Intravenous access:   NG tube:   Lala Catheter:

## 2019-02-14 NOTE — PHYSICAL THERAPY INITIAL EVALUATION ADULT - GAIT DEVIATIONS NOTED, PT EVAL
decreased weight-shifting ability/decreased midna/stooped posture, dec heel strike/pushoff /stance on LLE, wide MAYNOR/decreased step length

## 2019-02-14 NOTE — PROGRESS NOTE ADULT - SUBJECTIVE AND OBJECTIVE BOX
Neurology Follow up note    Name  FABIAN CLEARY    HPI:  59 YO MAN WITH HX OF DM, MORBID OBESITY,  CVA IN THE PAST WHO PRESENTS WITH LLE WEAKNESS AND NUMBNESS AND COMPLETE VISUAL LOSS LEFT EYE. PATIENT REPORTS HE WAS SITTING AT HOME WHEN HE WENT TO GET UP BUT COULDNT STAND ON L LEG.  HE FELL BACKWARD. AT THE SAME TIME HE EXPERIENCED COMPLETE BLACKNESS IN THE LEFT EYE. STATES THAT HE HAD THIS BEFORE W/ HIS R EYE AFTER HIS MOTHER PASSED. (13 Feb 2019 08:02)      Interval History:    patient could not fully tolerate the mri  feels leg is back to baseline  still reports vision loss OS  Ophthalmology eval revealed a L macular abn (chronic ) and rec outpatient f/u      Vital Signs Last 24 Hrs  T(C): 36.3 (14 Feb 2019 07:05), Max: 36.8 (13 Feb 2019 23:19)  T(F): 97.3 (14 Feb 2019 07:05), Max: 98.2 (13 Feb 2019 23:19)  HR: 77 (14 Feb 2019 07:14) (77 - 90)  BP: 134/62 (14 Feb 2019 07:14) (112/84 - 151/74)  BP(mean): 89 (14 Feb 2019 07:14) (86 - 106)  RR: 18 (14 Feb 2019 07:14) (18 - 20)  SpO2: 98% (13 Feb 2019 22:00) (98% - 98%)    Neurological Exam:   Mental status: Awake, alert and oriented x3.  Recent and remote memory intact.  Naming, repetition and comprehension intact.  Attention/concentration intact.  No dysarthria, no aphasia.  Fund of knowledge appropriate.    Cranial nerves: Pupils equally round and reactive to light, WITH COMPLETE VISUAL LOSS OS +BTT BILATERALLY,  no nystagmus, extraocular muscles intact, V1 through V3 intact bilaterally and symmetric, face symmetric, hearing intact to finger rub, palate elevation symmetric, tongue was midline.  Motor: moves all symmetrically  SENS INTACT          Medications  acetaminophen   Tablet .. 650 milliGRAM(s) Oral every 6 hours PRN  aspirin  chewable 81 milliGRAM(s) Oral daily  atorvastatin 80 milliGRAM(s) Oral at bedtime  chlorhexidine 4% Liquid 1 Application(s) Topical <User Schedule>  furosemide    Tablet 40 milliGRAM(s) Oral daily  LORazepam   Injectable 2 milliGRAM(s) IV Push once  ondansetron Injectable 4 milliGRAM(s) IV Push every 6 hours PRN  pantoprazole    Tablet 40 milliGRAM(s) Oral before breakfast      Lab  02-14    138  |  98  |  10  ----------------------------<  152<H>  3.8   |  31  |  0.6<L>    Ca    7.3<L>      14 Feb 2019 06:15                            12.2   5.82  )-----------( 258      ( 14 Feb 2019 06:15 )             41.4               Radiology      Assessment: 59 yo man with transient lle weakness and left vision loss (chronic). complaints do not localize. limited mri does not show cva  may be psychogenic component. LLE weakness may be related to chronic cellulitis  carotid duplex with moderate stensois B ICA    Plan:  no further inpatient w/u from neuro standpoint  may be d/c home with outpatient neuro and ophtho f/u  cont asa and lipitor  please call with any questions

## 2019-02-14 NOTE — PHYSICAL THERAPY INITIAL EVALUATION ADULT - IMPAIRED TRANSFERS: SIT/STAND, REHAB EVAL
decreased sensation/impaired balance/decreased strength/decreased endurance/impaired postural control

## 2019-02-14 NOTE — PHYSICAL THERAPY INITIAL EVALUATION ADULT - GENERAL OBSERVATIONS, REHAB EVAL
13:30-13:50 Chart reviewed. Pt encountered sitting in chair,  may be seen by Physical Therapist as confirmed with Nurse. Patient denied pain at rest but c/o "weakness and numbness on (L) leg"; +tele

## 2019-02-15 ENCOUNTER — TRANSCRIPTION ENCOUNTER (OUTPATIENT)
Age: 59
End: 2019-02-15

## 2019-02-15 DIAGNOSIS — Z71.89 OTHER SPECIFIED COUNSELING: ICD-10-CM

## 2019-02-15 LAB
GLUCOSE BLDC GLUCOMTR-MCNC: 166 MG/DL — HIGH (ref 70–99)
GLUCOSE BLDC GLUCOMTR-MCNC: 175 MG/DL — HIGH (ref 70–99)
GLUCOSE BLDC GLUCOMTR-MCNC: 186 MG/DL — HIGH (ref 70–99)
GLUCOSE BLDC GLUCOMTR-MCNC: 198 MG/DL — HIGH (ref 70–99)

## 2019-02-15 RX ORDER — FUROSEMIDE 40 MG
1 TABLET ORAL
Qty: 0 | Refills: 0 | COMMUNITY
Start: 2019-02-15

## 2019-02-15 RX ORDER — ONDANSETRON 8 MG/1
4 TABLET, FILM COATED ORAL EVERY 6 HOURS
Qty: 0 | Refills: 0 | Status: DISCONTINUED | OUTPATIENT
Start: 2019-02-15 | End: 2019-02-18

## 2019-02-15 RX ORDER — ATORVASTATIN CALCIUM 80 MG/1
1 TABLET, FILM COATED ORAL
Qty: 0 | Refills: 0 | COMMUNITY

## 2019-02-15 RX ORDER — ATORVASTATIN CALCIUM 80 MG/1
1 TABLET, FILM COATED ORAL
Qty: 0 | Refills: 0 | COMMUNITY
Start: 2019-02-15

## 2019-02-15 RX ORDER — ASPIRIN/CALCIUM CARB/MAGNESIUM 324 MG
1 TABLET ORAL
Qty: 0 | Refills: 0 | COMMUNITY
Start: 2019-02-15

## 2019-02-15 RX ORDER — SIMETHICONE 80 MG/1
80 TABLET, CHEWABLE ORAL
Qty: 0 | Refills: 0 | Status: DISCONTINUED | OUTPATIENT
Start: 2019-02-15 | End: 2019-02-18

## 2019-02-15 RX ADMIN — ONDANSETRON 4 MILLIGRAM(S): 8 TABLET, FILM COATED ORAL at 20:12

## 2019-02-15 RX ADMIN — ONDANSETRON 4 MILLIGRAM(S): 8 TABLET, FILM COATED ORAL at 17:28

## 2019-02-15 RX ADMIN — SIMETHICONE 80 MILLIGRAM(S): 80 TABLET, CHEWABLE ORAL at 22:25

## 2019-02-15 RX ADMIN — ONDANSETRON 4 MILLIGRAM(S): 8 TABLET, FILM COATED ORAL at 01:23

## 2019-02-15 RX ADMIN — SIMETHICONE 80 MILLIGRAM(S): 80 TABLET, CHEWABLE ORAL at 03:04

## 2019-02-15 RX ADMIN — MAGNESIUM HYDROXIDE 30 MILLILITER(S): 400 TABLET, CHEWABLE ORAL at 17:28

## 2019-02-15 RX ADMIN — ATORVASTATIN CALCIUM 80 MILLIGRAM(S): 80 TABLET, FILM COATED ORAL at 22:25

## 2019-02-15 RX ADMIN — Medication 81 MILLIGRAM(S): at 12:36

## 2019-02-15 NOTE — DISCHARGE NOTE ADULT - PATIENT PORTAL LINK FT
You can access the [facility], offered by [facility], by registering with the following website: [email or web address]

## 2019-02-15 NOTE — DISCHARGE NOTE ADULT - HOSPITAL COURSE
as per PMD History of Present Illness:  Reason for Admission: Left LE sensory loss and Left eye vision loss	  History of Present Illness: 	  57 YO MAN WITH HX OF DM, MORBID OBESITY,  CVA IN THE PAST WHO PRESENTS WITH LLE WEAKNESS AND NUMBNESS AND COMPLETE VISUAL LOSS LEFT EYE. PATIENT REPORTS HE WAS SITTING AT HOME WHEN HE WENT TO GET UP BUT COULDNT STAND ON L LEG.  HE FELL BACKWARD. AT THE SAME TIME HE EXPERIENCED COMPLETE BLACKNESS IN THE LEFT EYE. STATES THAT HE HAD THIS BEFORE W/ HIS R EYE AFTER HIS MOTHER PASSED.           Allergies and Intolerances:        Allergies:  	No Known Allergies:     Home Medications:   * Patient Currently Takes Medications as of 12-Feb-2019 01:49 documented in Structured Notes  · 	betamethasone valerate 0.1% topical cream: 1 application topically once a day, Last Dose Taken:    · 	furosemide 40 mg oral tablet: 1 tab(s) orally once a day, Last Dose Taken:    · 	metFORMIN 500 mg oral tablet: 1 tab(s) orally 2 times a day, Last Dose Taken:    · 	aspirin 81 mg oral delayed release tablet: 1 tab(s) orally once a day, Last Dose Taken:    · 	atorvastatin 80 mg oral tablet: 1 tab(s) orally once a day, Last Dose Taken:      .    Patient History:   Past Medical History:  Asthma    CVA (cerebral vascular accident)    Diabetes    HLD (hyperlipidemia)    Morbid obesity    Osteoarthritis.    admitted to CCU    Mri of brain done negative neurology consult done --cleared for  surgery History of Present Illness:  Reason for Admission: Left LE sensory loss and Left eye vision loss	  History of Present Illness: 	  59 YO MAN WITH HX OF DM, MORBID OBESITY,  CVA IN THE PAST WHO PRESENTS WITH LLE WEAKNESS AND NUMBNESS AND COMPLETE VISUAL LOSS LEFT EYE. PATIENT REPORTS HE WAS SITTING AT HOME WHEN HE WENT TO GET UP BUT COULDNT STAND ON L LEG.  HE FELL BACKWARD. AT THE SAME TIME HE EXPERIENCED COMPLETE BLACKNESS IN THE LEFT EYE. STATES THAT HE HAD THIS BEFORE W/ HIS R EYE AFTER HIS MOTHER PASSED.     Hospital course --------------------------------------------------------------  perforated viscus   for EXP lap .       Allergies and Intolerances:        Allergies:  	No Known Allergies:     Home Medications:   * Patient Currently Takes Medications as of 12-Feb-2019 01:49 documented in Structured Notes  · 	betamethasone valerate 0.1% topical cream: 1 application topically once a day, Last Dose Taken:    · 	furosemide 40 mg oral tablet: 1 tab(s) orally once a day, Last Dose Taken:    · 	metFORMIN 500 mg oral tablet: 1 tab(s) orally 2 times a day, Last Dose Taken:    · 	aspirin 81 mg oral delayed release tablet: 1 tab(s) orally once a day, Last Dose Taken:    · 	atorvastatin 80 mg oral tablet: 1 tab(s) orally once a day, Last Dose Taken:      .    Patient History:   Past Medical History:  Asthma    CVA (cerebral vascular accident)    Diabetes    HLD (hyperlipidemia)    Morbid obesity    Osteoarthritis.    admitted to CCU    Mri of brain done negative neurology consult done --cleared for  surgery

## 2019-02-15 NOTE — DISCHARGE NOTE ADULT - MEDICATION SUMMARY - MEDICATIONS TO TAKE
I will START or STAY ON the medications listed below when I get home from the hospital:    aspirin 81 mg oral tablet, chewable  -- 1 tab(s) by mouth once a day  -- Indication: For anti platelet    metFORMIN 500 mg oral tablet  -- 1 tab(s) by mouth 2 times a day  -- Indication: For Type 2 DM    atorvastatin 80 mg oral tablet  -- 1 tab(s) by mouth once a day (at bedtime)  -- Indication: For hld    betamethasone valerate 0.1% topical cream  -- 1 application on skin once a day  -- Indication: For Steroid cream    furosemide 40 mg oral tablet  -- 1 tab(s) by mouth once a day  -- Indication: For diuretic

## 2019-02-15 NOTE — DISCHARGE NOTE ADULT - CARE PROVIDER_API CALL
Rey Wick)  Internal Medicine  [address] [place] [address]  Phone: [phone]  Fax: [phone]  Follow Up Time:

## 2019-02-15 NOTE — DISCHARGE NOTE ADULT - CARE PLAN
Principal Discharge DX:	Morbid obesity  Goal:	weight loss  Assessment and plan of treatment:	dietary restrictions, Rehab for ambulation  Secondary Diagnosis:	Diabetes  Assessment and plan of treatment:	ADA diet, control blood sugar

## 2019-02-16 LAB
GLUCOSE BLDC GLUCOMTR-MCNC: 174 MG/DL — HIGH (ref 70–99)
GLUCOSE BLDC GLUCOMTR-MCNC: 178 MG/DL — HIGH (ref 70–99)
GLUCOSE BLDC GLUCOMTR-MCNC: 185 MG/DL — HIGH (ref 70–99)
GLUCOSE BLDC GLUCOMTR-MCNC: 233 MG/DL — HIGH (ref 70–99)

## 2019-02-16 RX ADMIN — Medication 81 MILLIGRAM(S): at 12:57

## 2019-02-16 RX ADMIN — ATORVASTATIN CALCIUM 80 MILLIGRAM(S): 80 TABLET, FILM COATED ORAL at 21:29

## 2019-02-16 RX ADMIN — CHLORHEXIDINE GLUCONATE 1 APPLICATION(S): 213 SOLUTION TOPICAL at 08:35

## 2019-02-16 RX ADMIN — Medication 40 MILLIGRAM(S): at 05:52

## 2019-02-16 RX ADMIN — PANTOPRAZOLE SODIUM 40 MILLIGRAM(S): 20 TABLET, DELAYED RELEASE ORAL at 08:35

## 2019-02-16 NOTE — PROGRESS NOTE ADULT - SUBJECTIVE AND OBJECTIVE BOX
SUBJECTIVE:    Patient is a 58y old Male who presents with a chief complaint of Left LE sensory loss and Left eye vision loss (15 Feb 2019 10:49)    Currently admitted to medicine with the primary diagnosis of Morbid obesity     Today is hospital day 4d. This morning he is resting comfortably in bed and reports no new issues or overnight events.     PAST MEDICAL & SURGICAL HISTORY  CVA (cerebral vascular accident)  HLD (hyperlipidemia)  Morbid obesity  Osteoarthritis  Diabetes  Asthma  H/O umbilical hernia repair    SOCIAL HISTORY:  Negative for smoking/alcohol/drug use.     ALLERGIES:  No Known Allergies    MEDICATIONS:  STANDING MEDICATIONS  aspirin  chewable 81 milliGRAM(s) Oral daily  atorvastatin 80 milliGRAM(s) Oral at bedtime  chlorhexidine 4% Liquid 1 Application(s) Topical <User Schedule>  furosemide    Tablet 40 milliGRAM(s) Oral daily  LORazepam   Injectable 2 milliGRAM(s) IV Push once  pantoprazole    Tablet 40 milliGRAM(s) Oral before breakfast    PRN MEDICATIONS  acetaminophen   Tablet .. 650 milliGRAM(s) Oral every 6 hours PRN  magnesium hydroxide Suspension 30 milliLiter(s) Oral daily PRN  ondansetron Injectable 4 milliGRAM(s) IV Push every 6 hours PRN  ondansetron Injectable 4 milliGRAM(s) IV Push every 6 hours PRN  simethicone 80 milliGRAM(s) Chew four times a day PRN    VITALS:   T(F): 97.1  HR: 113  BP: 122/61  RR: 20  SpO2: --    LABS:                        RADIOLOGY:    PHYSICAL EXAM:  GEN: No acute distress  LUNGS: Clear to auscultation bilaterally   HEART: Regular  ABD: Soft, non-tender, non-distended.  EXT: NC/NC/NE/2+PP/VILLEGAS/Skin Intact.   NEURO: AAOX3    Intravenous access:   NG tube:   Lala Catheter:

## 2019-02-16 NOTE — CONSULT NOTE ADULT - REASON FOR ADMISSION
Left LE sensory loss and Left eye vision loss

## 2019-02-16 NOTE — CONSULT NOTE ADULT - CONSULT REQUESTED DATE/TIME
12-Feb-2019 01:21
12-Feb-2019 08:45
14-Feb-2019 06:00
16-Feb-2019 20:15
14-Feb-2019 18:37
13-Feb-2019 10:12

## 2019-02-16 NOTE — CONSULT NOTE ADULT - SUBJECTIVE AND OBJECTIVE BOX
ID  This is a 57 y/o S/W/M never , lived much of his lif tonia esparza mom who passed away about one year ago. Pt has two brother who are supportive of him. Pt is severely obese (390lbs)and resides at [facility]. He likes his residence and is eager to return there.     CC/ HPI  Pt reported to staff that he felt very depressed and lonely in the context fo his discharge being delayed. 59 YO MAN WITH HX OF DM, MORBID OBESITY,  CVA IN THE PAST WHO PRESENTS WITH LLE WEAKNESS AND NUMBNESS AND COMPLETE VISUAL LOSS LEFT EYE. PATIENT REPORTS HE WAS SITTING AT HOME WHEN HE WENT TO GET UP BUT COULDNT STAND ON L LEG.  HE FELL BACKWARD. AT THE SAME TIME HE EXPERIENCED COMPLETE BLACKNESS IN THE LEFT EYE. STATES THAT HE HAD THIS BEFORE W/ HIS R EYE AFTER HIS MOTHER PASSED.       PPHx  No prior psychiatric history. In the [facility] felt stressed or dpressed.    Allergies and Intolerances:        Allergies:  	No Known Allergies:     Home Medications:   * Patient Currently Takes Medications as of 12-Feb-2019 01:49 documented in Structured Notes  · 	betamethasone valerate 0.1% topical cream: 1 application topically once a day, Last Dose Taken:    · 	furosemide 40 mg oral tablet: 1 tab(s) orally once a day, Last Dose Taken:    · 	metFORMIN 500 mg oral tablet: 1 tab(s) orally 2 times a day, Last Dose Taken:    · 	aspirin 81 mg oral delayed release tablet: 1 tab(s) orally once a day, Last Dose Taken:    · 	atorvastatin 80 mg oral tablet: 1 tab(s) orally once a day, Last Dose Taken:      .    Patient  Past Medical History:  Asthma    CVA (cerebral vascular accident)    Diabetes    HLD (hyperlipidemia)    Morbid obesity    Osteoarthritis.    MSE  Pt seen, chart reviewed. .  Patient is alert, oriented x 3 calm, cooperative, compliant with interview Pt  is coherent relevant and goal directed. Speech is of normal rate and rhythm  Patient is in good behavioral control.  Pt presents no acute management problems. Pt is not depressed, manic or psychotic.   ***Pt denies and presents no evidence for suicidal or homicidal ideas plans or intentions.    ***Pt denies insomnia or inability to fall asleep.  Pt. reports normal appetite and regular bowel movements.   Pt is not acutely manic, psychotic ro depressed. pt is not  suicidal or homicidal. Insight and judgment are good .    Assessment and Recommendation:   Assessment  Dx. Adjustment disorder  Plan: Pt to be referred fro psychotherapy out patient follow up on discharge.

## 2019-02-17 LAB
CULTURE RESULTS: SIGNIFICANT CHANGE UP
CULTURE RESULTS: SIGNIFICANT CHANGE UP
GLUCOSE BLDC GLUCOMTR-MCNC: 174 MG/DL — HIGH (ref 70–99)
GLUCOSE BLDC GLUCOMTR-MCNC: 186 MG/DL — HIGH (ref 70–99)
GLUCOSE BLDC GLUCOMTR-MCNC: 203 MG/DL — HIGH (ref 70–99)
GLUCOSE BLDC GLUCOMTR-MCNC: 209 MG/DL — HIGH (ref 70–99)
SPECIMEN SOURCE: SIGNIFICANT CHANGE UP
SPECIMEN SOURCE: SIGNIFICANT CHANGE UP

## 2019-02-17 RX ORDER — SIMETHICONE 80 MG/1
80 TABLET, CHEWABLE ORAL
Qty: 0 | Refills: 0 | Status: DISCONTINUED | OUTPATIENT
Start: 2019-02-17 | End: 2019-02-18

## 2019-02-17 RX ADMIN — ATORVASTATIN CALCIUM 80 MILLIGRAM(S): 80 TABLET, FILM COATED ORAL at 22:06

## 2019-02-17 RX ADMIN — PANTOPRAZOLE SODIUM 40 MILLIGRAM(S): 20 TABLET, DELAYED RELEASE ORAL at 06:20

## 2019-02-17 RX ADMIN — Medication 40 MILLIGRAM(S): at 06:20

## 2019-02-17 RX ADMIN — Medication 81 MILLIGRAM(S): at 11:27

## 2019-02-17 RX ADMIN — CHLORHEXIDINE GLUCONATE 1 APPLICATION(S): 213 SOLUTION TOPICAL at 11:26

## 2019-02-17 NOTE — PROGRESS NOTE ADULT - SUBJECTIVE AND OBJECTIVE BOX
SUBJECTIVE:    Patient is a 58y old Male who presents with a chief complaint of Left LE sensory loss and Left eye vision loss (16 Feb 2019 22:15)    Currently admitted to medicine with the primary diagnosis of Morbid obesity     Today is hospital day 5d. This morning he is resting comfortably in bed and reports no new issues or overnight events.     PAST MEDICAL & SURGICAL HISTORY  CVA (cerebral vascular accident)  HLD (hyperlipidemia)  Morbid obesity  Osteoarthritis  Diabetes  Asthma  H/O umbilical hernia repair    SOCIAL HISTORY:  Negative for smoking/alcohol/drug use.     ALLERGIES:  No Known Allergies    MEDICATIONS:  STANDING MEDICATIONS  aspirin  chewable 81 milliGRAM(s) Oral daily  atorvastatin 80 milliGRAM(s) Oral at bedtime  chlorhexidine 4% Liquid 1 Application(s) Topical <User Schedule>  furosemide    Tablet 40 milliGRAM(s) Oral daily  LORazepam   Injectable 2 milliGRAM(s) IV Push once  pantoprazole    Tablet 40 milliGRAM(s) Oral before breakfast    PRN MEDICATIONS  acetaminophen   Tablet .. 650 milliGRAM(s) Oral every 6 hours PRN  magnesium hydroxide Suspension 30 milliLiter(s) Oral daily PRN  ondansetron Injectable 4 milliGRAM(s) IV Push every 6 hours PRN  ondansetron Injectable 4 milliGRAM(s) IV Push every 6 hours PRN  simethicone 80 milliGRAM(s) Chew four times a day PRN    VITALS:   T(F): 97.1  HR: 95  BP: 119/74  RR: 18  SpO2: --    LABS:                        RADIOLOGY:    PHYSICAL EXAM:  GEN: No acute distress  LUNGS: Clear to auscultation bilaterally   HEART: Regular  ABD: Soft, non-tender, non-distended.  EXT: NC/NC/NE/2+PP/VILLEGAS/Skin Intact.   NEURO: AAOX3    Intravenous access:   NG tube:   Lala Catheter:

## 2019-02-17 NOTE — CHART NOTE - NSCHARTNOTEFT_GEN_A_CORE
Notified by RN pt c/o abdominal pain. Pt seen and examined, chart reviewed. Pt c/o diffuse abdominal pain since this AM. Also reports intermittent vomiting (w/ ~20cc NBNB emesis noted at bedside, which pt states just occurred). Pt states he has been eating today and last BM consisted of diarrhea earlier this AM. Currently passing flatus. Denies any CP, SOB, or other associated sx.  VSS  CTAB  nl S1/S2, rrr, no m/r/g  obese, BS normoactive, distended, soft, no TTP  PE otherwise unremarkable  no new labs, imaging  A/P: 57yo M c/o abdominal pain. Low suspicion for acute process. Will obtain AXR and f/u appropriately. Cont PPI as well as mylanta, simethicone, zofran PRN. Cont serial abdominal exams and cont to monitor.

## 2019-02-18 VITALS — WEIGHT: 315 LBS

## 2019-02-18 LAB
GLUCOSE BLDC GLUCOMTR-MCNC: 186 MG/DL — HIGH (ref 70–99)
GLUCOSE BLDC GLUCOMTR-MCNC: 212 MG/DL — HIGH (ref 70–99)

## 2019-02-18 RX ADMIN — Medication 40 MILLIGRAM(S): at 05:45

## 2019-02-18 RX ADMIN — Medication 81 MILLIGRAM(S): at 11:02

## 2019-02-18 RX ADMIN — PANTOPRAZOLE SODIUM 40 MILLIGRAM(S): 20 TABLET, DELAYED RELEASE ORAL at 05:46

## 2019-02-18 NOTE — PROGRESS NOTE ADULT - REASON FOR ADMISSION
Left LE sensory loss and Left eye vision loss

## 2019-02-18 NOTE — PROGRESS NOTE ADULT - SUBJECTIVE AND OBJECTIVE BOX
SUBJECTIVE:    Patient is a 58y old Male who presents with a chief complaint of Left LE sensory loss and Left eye vision loss (17 Feb 2019 10:27)    Currently admitted to medicine with the primary diagnosis of Morbid obesity     Today is hospital day 6d. This morning he is resting comfortably in bed and reports no new issues or overnight events.     PAST MEDICAL & SURGICAL HISTORY  CVA (cerebral vascular accident)  HLD (hyperlipidemia)  Morbid obesity  Osteoarthritis  Diabetes  Asthma  H/O umbilical hernia repair    SOCIAL HISTORY:  Negative for smoking/alcohol/drug use.     ALLERGIES:  No Known Allergies    MEDICATIONS:  STANDING MEDICATIONS  aspirin  chewable 81 milliGRAM(s) Oral daily  atorvastatin 80 milliGRAM(s) Oral at bedtime  chlorhexidine 4% Liquid 1 Application(s) Topical <User Schedule>  furosemide    Tablet 40 milliGRAM(s) Oral daily  LORazepam   Injectable 2 milliGRAM(s) IV Push once  pantoprazole    Tablet 40 milliGRAM(s) Oral before breakfast    PRN MEDICATIONS  acetaminophen   Tablet .. 650 milliGRAM(s) Oral every 6 hours PRN  magnesium hydroxide Suspension 30 milliLiter(s) Oral daily PRN  ondansetron Injectable 4 milliGRAM(s) IV Push every 6 hours PRN  ondansetron Injectable 4 milliGRAM(s) IV Push every 6 hours PRN  simethicone 80 milliGRAM(s) Chew four times a day PRN  simethicone 80 milliGRAM(s) Chew four times a day PRN    VITALS:   T(F): 96.2  HR: 99  BP: 114/57  RR: 16  SpO2: --    LABS:                        RADIOLOGY:    PHYSICAL EXAM:  GEN: No acute distress  LUNGS: Clear to auscultation bilaterally   HEART: Regular  ABD: Soft, non-tender, non-distended.  EXT: NC/NC/NE/2+PP/VILLEGAS/Skin Intact.   NEURO: AAOX3    Intravenous access:   NG tube:   Lala Catheter:

## 2019-02-19 ENCOUNTER — INPATIENT (INPATIENT)
Facility: HOSPITAL | Age: 59
LOS: 0 days | End: 2019-02-20
Attending: SURGERY | Admitting: SURGERY
Payer: MEDICAID

## 2019-02-19 ENCOUNTER — RESULT REVIEW (OUTPATIENT)
Age: 59
End: 2019-02-19

## 2019-02-19 VITALS
TEMPERATURE: 97 F | RESPIRATION RATE: 20 BRPM | HEART RATE: 82 BPM | DIASTOLIC BLOOD PRESSURE: 66 MMHG | OXYGEN SATURATION: 98 % | SYSTOLIC BLOOD PRESSURE: 121 MMHG

## 2019-02-19 DIAGNOSIS — E66.2 MORBID (SEVERE) OBESITY WITH ALVEOLAR HYPOVENTILATION: ICD-10-CM

## 2019-02-19 DIAGNOSIS — E11.9 TYPE 2 DIABETES MELLITUS WITHOUT COMPLICATIONS: ICD-10-CM

## 2019-02-19 DIAGNOSIS — Z79.84 LONG TERM (CURRENT) USE OF ORAL HYPOGLYCEMIC DRUGS: ICD-10-CM

## 2019-02-19 DIAGNOSIS — Z98.890 OTHER SPECIFIED POSTPROCEDURAL STATES: Chronic | ICD-10-CM

## 2019-02-19 DIAGNOSIS — I50.9 HEART FAILURE, UNSPECIFIED: ICD-10-CM

## 2019-02-19 DIAGNOSIS — I11.0 HYPERTENSIVE HEART DISEASE WITH HEART FAILURE: ICD-10-CM

## 2019-02-19 DIAGNOSIS — E78.5 HYPERLIPIDEMIA, UNSPECIFIED: ICD-10-CM

## 2019-02-19 DIAGNOSIS — J45.909 UNSPECIFIED ASTHMA, UNCOMPLICATED: ICD-10-CM

## 2019-02-19 DIAGNOSIS — I69.954 HEMIPLEGIA AND HEMIPARESIS FOLLOWING UNSPECIFIED CEREBROVASCULAR DISEASE AFFECTING LEFT NON-DOMINANT SIDE: ICD-10-CM

## 2019-02-19 DIAGNOSIS — K35.20 ACUTE APPENDICITIS WITH GENERALIZED PERITONITIS, WITHOUT ABSCESS: ICD-10-CM

## 2019-02-19 DIAGNOSIS — M19.90 UNSPECIFIED OSTEOARTHRITIS, UNSPECIFIED SITE: ICD-10-CM

## 2019-02-19 DIAGNOSIS — K66.0 PERITONEAL ADHESIONS (POSTPROCEDURAL) (POSTINFECTION): ICD-10-CM

## 2019-02-19 DIAGNOSIS — K63.1 PERFORATION OF INTESTINE (NONTRAUMATIC): ICD-10-CM

## 2019-02-19 DIAGNOSIS — E66.01 MORBID (SEVERE) OBESITY DUE TO EXCESS CALORIES: ICD-10-CM

## 2019-02-19 DIAGNOSIS — R65.21 SEVERE SEPSIS WITH SEPTIC SHOCK: ICD-10-CM

## 2019-02-19 DIAGNOSIS — K91.89 OTHER POSTPROCEDURAL COMPLICATIONS AND DISORDERS OF DIGESTIVE SYSTEM: ICD-10-CM

## 2019-02-19 DIAGNOSIS — Z66 DO NOT RESUSCITATE: ICD-10-CM

## 2019-02-19 DIAGNOSIS — A41.9 SEPSIS, UNSPECIFIED ORGANISM: ICD-10-CM

## 2019-02-19 DIAGNOSIS — E87.2 ACIDOSIS: ICD-10-CM

## 2019-02-19 DIAGNOSIS — G89.18 OTHER ACUTE POSTPROCEDURAL PAIN: ICD-10-CM

## 2019-02-19 LAB
ALBUMIN SERPL ELPH-MCNC: 3.1 G/DL — LOW (ref 3.5–5.2)
ALP SERPL-CCNC: 121 U/L — HIGH (ref 30–115)
ALT FLD-CCNC: 15 U/L — SIGNIFICANT CHANGE UP (ref 0–41)
ANION GAP SERPL CALC-SCNC: 21 MMOL/L — HIGH (ref 7–14)
AST SERPL-CCNC: 26 U/L — SIGNIFICANT CHANGE UP (ref 0–41)
BASE EXCESS BLDV CALC-SCNC: -4.9 MMOL/L — LOW (ref -2–2)
BASOPHILS # BLD AUTO: 0 K/UL — SIGNIFICANT CHANGE UP (ref 0–0.2)
BASOPHILS # BLD AUTO: 0.05 K/UL — SIGNIFICANT CHANGE UP (ref 0–0.2)
BASOPHILS NFR BLD AUTO: 0 % — SIGNIFICANT CHANGE UP (ref 0–1)
BASOPHILS NFR BLD AUTO: 1.4 % — HIGH (ref 0–1)
BILIRUB SERPL-MCNC: 0.4 MG/DL — SIGNIFICANT CHANGE UP (ref 0.2–1.2)
BLD GP AB SCN SERPL QL: SIGNIFICANT CHANGE UP
BUN SERPL-MCNC: 36 MG/DL — HIGH (ref 10–20)
C DIFF BY PCR RESULT: NEGATIVE — SIGNIFICANT CHANGE UP
C DIFF TOX GENS STL QL NAA+PROBE: SIGNIFICANT CHANGE UP
CA-I SERPL-SCNC: 1.01 MMOL/L — LOW (ref 1.12–1.3)
CALCIUM SERPL-MCNC: 7.2 MG/DL — LOW (ref 8.5–10.1)
CHLORIDE SERPL-SCNC: 93 MMOL/L — LOW (ref 98–110)
CK SERPL-CCNC: 1039 U/L — HIGH (ref 0–225)
CO2 SERPL-SCNC: 19 MMOL/L — SIGNIFICANT CHANGE UP (ref 17–32)
CREAT SERPL-MCNC: 1.3 MG/DL — SIGNIFICANT CHANGE UP (ref 0.7–1.5)
D DIMER BLD IA.RAPID-MCNC: 2047 NG/ML DDU — HIGH (ref 0–230)
EOSINOPHIL # BLD AUTO: 0 K/UL — SIGNIFICANT CHANGE UP (ref 0–0.7)
EOSINOPHIL # BLD AUTO: 0.01 K/UL — SIGNIFICANT CHANGE UP (ref 0–0.7)
EOSINOPHIL NFR BLD AUTO: 0 % — SIGNIFICANT CHANGE UP (ref 0–8)
EOSINOPHIL NFR BLD AUTO: 0.3 % — SIGNIFICANT CHANGE UP (ref 0–8)
GAS PNL BLDA: SIGNIFICANT CHANGE UP
GAS PNL BLDA: SIGNIFICANT CHANGE UP
GAS PNL BLDV: 132 MMOL/L — LOW (ref 136–145)
GAS PNL BLDV: SIGNIFICANT CHANGE UP
GLUCOSE BLDC GLUCOMTR-MCNC: 174 MG/DL — HIGH (ref 70–99)
GLUCOSE SERPL-MCNC: 235 MG/DL — HIGH (ref 70–99)
HCO3 BLDV-SCNC: 21 MMOL/L — LOW (ref 22–29)
HCT VFR BLD CALC: 40 % — LOW (ref 42–52)
HCT VFR BLD CALC: 45.5 % — SIGNIFICANT CHANGE UP (ref 42–52)
HCT VFR BLDA CALC: 45.1 % — HIGH (ref 34–44)
HGB BLD CALC-MCNC: 14.7 G/DL — SIGNIFICANT CHANGE UP (ref 14–18)
HGB BLD-MCNC: 12.4 G/DL — LOW (ref 14–18)
HGB BLD-MCNC: 14.1 G/DL — SIGNIFICANT CHANGE UP (ref 14–18)
IMM GRANULOCYTES NFR BLD AUTO: 0.8 % — HIGH (ref 0.1–0.3)
LACTATE BLDV-MCNC: 7 MMOL/L — HIGH (ref 0.5–1.6)
LIDOCAIN IGE QN: 22 U/L — SIGNIFICANT CHANGE UP (ref 7–60)
LYMPHOCYTES # BLD AUTO: 0.61 K/UL — LOW (ref 1.2–3.4)
LYMPHOCYTES # BLD AUTO: 0.62 K/UL — LOW (ref 1.2–3.4)
LYMPHOCYTES # BLD AUTO: 17 % — LOW (ref 20.5–51.1)
LYMPHOCYTES # BLD AUTO: 3.8 % — LOW (ref 20.5–51.1)
MCHC RBC-ENTMCNC: 25.6 PG — LOW (ref 27–31)
MCHC RBC-ENTMCNC: 25.9 PG — LOW (ref 27–31)
MCHC RBC-ENTMCNC: 31 G/DL — LOW (ref 32–37)
MCHC RBC-ENTMCNC: 31 G/DL — LOW (ref 32–37)
MCV RBC AUTO: 82.6 FL — SIGNIFICANT CHANGE UP (ref 80–94)
MCV RBC AUTO: 83.5 FL — SIGNIFICANT CHANGE UP (ref 80–94)
MONOCYTES # BLD AUTO: 0.05 K/UL — LOW (ref 0.1–0.6)
MONOCYTES # BLD AUTO: 2.96 K/UL — HIGH (ref 0.1–0.6)
MONOCYTES NFR BLD AUTO: 1.4 % — LOW (ref 1.7–9.3)
MONOCYTES NFR BLD AUTO: 18.1 % — HIGH (ref 1.7–9.3)
NEUTROPHILS # BLD AUTO: 2.83 K/UL — SIGNIFICANT CHANGE UP (ref 1.4–6.5)
NEUTROPHILS # BLD AUTO: 8.86 K/UL — HIGH (ref 1.4–6.5)
NEUTROPHILS NFR BLD AUTO: 40.9 % — LOW (ref 42.2–75.2)
NEUTROPHILS NFR BLD AUTO: 79.1 % — HIGH (ref 42.2–75.2)
NRBC # BLD: 0 /100 WBCS — SIGNIFICANT CHANGE UP (ref 0–0)
NT-PROBNP SERPL-SCNC: 1285 PG/ML — HIGH (ref 0–300)
PCO2 BLDV: 40 MMHG — LOW (ref 41–51)
PH BLDV: 7.32 — SIGNIFICANT CHANGE UP (ref 7.26–7.43)
PLATELET # BLD AUTO: 377 K/UL — SIGNIFICANT CHANGE UP (ref 130–400)
PLATELET # BLD AUTO: 463 K/UL — HIGH (ref 130–400)
PO2 BLDV: 50 MMHG — HIGH (ref 20–40)
POTASSIUM BLDV-SCNC: 3.3 MMOL/L — SIGNIFICANT CHANGE UP (ref 3.3–5.6)
POTASSIUM SERPL-MCNC: 4.1 MMOL/L — SIGNIFICANT CHANGE UP (ref 3.5–5)
POTASSIUM SERPL-SCNC: 4.1 MMOL/L — SIGNIFICANT CHANGE UP (ref 3.5–5)
PROT SERPL-MCNC: 6.2 G/DL — SIGNIFICANT CHANGE UP (ref 6–8)
RBC # BLD: 4.79 M/UL — SIGNIFICANT CHANGE UP (ref 4.7–6.1)
RBC # BLD: 5.51 M/UL — SIGNIFICANT CHANGE UP (ref 4.7–6.1)
RBC # FLD: 15.5 % — HIGH (ref 11.5–14.5)
RBC # FLD: 15.6 % — HIGH (ref 11.5–14.5)
SAO2 % BLDV: 81 % — SIGNIFICANT CHANGE UP
SODIUM SERPL-SCNC: 133 MMOL/L — LOW (ref 135–146)
TROPONIN T SERPL-MCNC: <0.01 NG/ML — SIGNIFICANT CHANGE UP
TYPE + AB SCN PNL BLD: SIGNIFICANT CHANGE UP
WBC # BLD: 16.35 K/UL — HIGH (ref 4.8–10.8)
WBC # BLD: 3.58 K/UL — LOW (ref 4.8–10.8)
WBC # FLD AUTO: 16.35 K/UL — HIGH (ref 4.8–10.8)
WBC # FLD AUTO: 3.58 K/UL — LOW (ref 4.8–10.8)

## 2019-02-19 PROCEDURE — 99223 1ST HOSP IP/OBS HIGH 75: CPT | Mod: 57

## 2019-02-19 PROCEDURE — 44144 PARTIAL REMOVAL OF COLON: CPT

## 2019-02-19 PROCEDURE — 97607 NEG PRS WND THR NDME<=50SQCM: CPT

## 2019-02-19 RX ORDER — NOREPINEPHRINE BITARTRATE/D5W 8 MG/250ML
0.05 PLASTIC BAG, INJECTION (ML) INTRAVENOUS
Qty: 16 | Refills: 0 | Status: DISCONTINUED | OUTPATIENT
Start: 2019-02-19 | End: 2019-02-20

## 2019-02-19 RX ORDER — SODIUM CHLORIDE 9 MG/ML
1000 INJECTION, SOLUTION INTRAVENOUS
Qty: 0 | Refills: 0 | Status: DISCONTINUED | OUTPATIENT
Start: 2019-02-19 | End: 2019-02-20

## 2019-02-19 RX ORDER — PIPERACILLIN AND TAZOBACTAM 4; .5 G/20ML; G/20ML
3.38 INJECTION, POWDER, LYOPHILIZED, FOR SOLUTION INTRAVENOUS ONCE
Qty: 0 | Refills: 0 | Status: COMPLETED | OUTPATIENT
Start: 2019-02-19 | End: 2019-02-20

## 2019-02-19 RX ORDER — SODIUM CHLORIDE 9 MG/ML
1000 INJECTION, SOLUTION INTRAVENOUS ONCE
Qty: 0 | Refills: 0 | Status: COMPLETED | OUTPATIENT
Start: 2019-02-19 | End: 2019-02-19

## 2019-02-19 RX ORDER — ENOXAPARIN SODIUM 100 MG/ML
170 INJECTION SUBCUTANEOUS ONCE
Qty: 0 | Refills: 0 | Status: DISCONTINUED | OUTPATIENT
Start: 2019-02-19 | End: 2019-02-19

## 2019-02-19 RX ORDER — PIPERACILLIN AND TAZOBACTAM 4; .5 G/20ML; G/20ML
3.38 INJECTION, POWDER, LYOPHILIZED, FOR SOLUTION INTRAVENOUS EVERY 8 HOURS
Qty: 0 | Refills: 0 | Status: DISCONTINUED | OUTPATIENT
Start: 2019-02-19 | End: 2019-02-20

## 2019-02-19 RX ORDER — SODIUM CHLORIDE 9 MG/ML
2000 INJECTION, SOLUTION INTRAVENOUS ONCE
Qty: 0 | Refills: 0 | Status: COMPLETED | OUTPATIENT
Start: 2019-02-19 | End: 2019-02-19

## 2019-02-19 RX ORDER — VANCOMYCIN HCL 1 G
VIAL (EA) INTRAVENOUS
Qty: 0 | Refills: 0 | Status: DISCONTINUED | OUTPATIENT
Start: 2019-02-19 | End: 2019-02-20

## 2019-02-19 RX ORDER — METRONIDAZOLE 500 MG
500 TABLET ORAL EVERY 8 HOURS
Qty: 0 | Refills: 0 | Status: DISCONTINUED | OUTPATIENT
Start: 2019-02-19 | End: 2019-02-19

## 2019-02-19 RX ORDER — VANCOMYCIN HCL 1 G
1000 VIAL (EA) INTRAVENOUS EVERY 12 HOURS
Qty: 0 | Refills: 0 | Status: DISCONTINUED | OUTPATIENT
Start: 2019-02-20 | End: 2019-02-20

## 2019-02-19 RX ORDER — HEPARIN SODIUM 5000 [USP'U]/ML
7500 INJECTION INTRAVENOUS; SUBCUTANEOUS EVERY 8 HOURS
Qty: 0 | Refills: 0 | Status: DISCONTINUED | OUTPATIENT
Start: 2019-02-19 | End: 2019-02-20

## 2019-02-19 RX ORDER — DEXMEDETOMIDINE HYDROCHLORIDE IN 0.9% SODIUM CHLORIDE 4 UG/ML
0.01 INJECTION INTRAVENOUS
Qty: 200 | Refills: 0 | Status: DISCONTINUED | OUTPATIENT
Start: 2019-02-19 | End: 2019-02-20

## 2019-02-19 RX ORDER — DEXTROSE 50 % IN WATER 50 %
15 SYRINGE (ML) INTRAVENOUS ONCE
Qty: 0 | Refills: 0 | Status: DISCONTINUED | OUTPATIENT
Start: 2019-02-19 | End: 2019-02-20

## 2019-02-19 RX ORDER — HEPARIN SODIUM 5000 [USP'U]/ML
7500 INJECTION INTRAVENOUS; SUBCUTANEOUS EVERY 8 HOURS
Qty: 0 | Refills: 0 | Status: DISCONTINUED | OUTPATIENT
Start: 2019-02-19 | End: 2019-02-19

## 2019-02-19 RX ORDER — INSULIN LISPRO 100/ML
VIAL (ML) SUBCUTANEOUS EVERY 4 HOURS
Qty: 0 | Refills: 0 | Status: DISCONTINUED | OUTPATIENT
Start: 2019-02-19 | End: 2019-02-20

## 2019-02-19 RX ORDER — FUROSEMIDE 40 MG
40 TABLET ORAL DAILY
Qty: 0 | Refills: 0 | Status: DISCONTINUED | OUTPATIENT
Start: 2019-02-19 | End: 2019-02-19

## 2019-02-19 RX ORDER — DEXTROSE 50 % IN WATER 50 %
25 SYRINGE (ML) INTRAVENOUS ONCE
Qty: 0 | Refills: 0 | Status: DISCONTINUED | OUTPATIENT
Start: 2019-02-19 | End: 2019-02-20

## 2019-02-19 RX ORDER — SODIUM CHLORIDE 9 MG/ML
1000 INJECTION, SOLUTION INTRAVENOUS ONCE
Qty: 0 | Refills: 0 | Status: COMPLETED | OUTPATIENT
Start: 2019-02-20 | End: 2019-02-20

## 2019-02-19 RX ORDER — DEXTROSE 50 % IN WATER 50 %
12.5 SYRINGE (ML) INTRAVENOUS ONCE
Qty: 0 | Refills: 0 | Status: DISCONTINUED | OUTPATIENT
Start: 2019-02-19 | End: 2019-02-20

## 2019-02-19 RX ORDER — VASOPRESSIN 20 [USP'U]/ML
0.04 INJECTION INTRAVENOUS
Qty: 100 | Refills: 0 | Status: DISCONTINUED | OUTPATIENT
Start: 2019-02-19 | End: 2019-02-20

## 2019-02-19 RX ORDER — FENTANYL CITRATE 50 UG/ML
50 INJECTION INTRAVENOUS
Qty: 0 | Refills: 0 | Status: DISCONTINUED | OUTPATIENT
Start: 2019-02-19 | End: 2019-02-20

## 2019-02-19 RX ORDER — ATORVASTATIN CALCIUM 80 MG/1
80 TABLET, FILM COATED ORAL AT BEDTIME
Qty: 0 | Refills: 0 | Status: DISCONTINUED | OUTPATIENT
Start: 2019-02-19 | End: 2019-02-19

## 2019-02-19 RX ORDER — METRONIDAZOLE 500 MG
500 TABLET ORAL ONCE
Qty: 0 | Refills: 0 | Status: COMPLETED | OUTPATIENT
Start: 2019-02-19 | End: 2019-02-19

## 2019-02-19 RX ORDER — CHLORHEXIDINE GLUCONATE 213 G/1000ML
1 SOLUTION TOPICAL EVERY 12 HOURS
Qty: 0 | Refills: 0 | Status: DISCONTINUED | OUTPATIENT
Start: 2019-02-19 | End: 2019-02-20

## 2019-02-19 RX ORDER — VANCOMYCIN HCL 1 G
1000 VIAL (EA) INTRAVENOUS ONCE
Qty: 0 | Refills: 0 | Status: COMPLETED | OUTPATIENT
Start: 2019-02-19 | End: 2019-02-19

## 2019-02-19 RX ORDER — CHLORHEXIDINE GLUCONATE 213 G/1000ML
15 SOLUTION TOPICAL EVERY 12 HOURS
Qty: 0 | Refills: 0 | Status: DISCONTINUED | OUTPATIENT
Start: 2019-02-19 | End: 2019-02-20

## 2019-02-19 RX ORDER — CEFEPIME 1 G/1
2000 INJECTION, POWDER, FOR SOLUTION INTRAMUSCULAR; INTRAVENOUS EVERY 12 HOURS
Qty: 0 | Refills: 0 | Status: DISCONTINUED | OUTPATIENT
Start: 2019-02-19 | End: 2019-02-19

## 2019-02-19 RX ORDER — METRONIDAZOLE 500 MG
TABLET ORAL
Qty: 0 | Refills: 0 | Status: DISCONTINUED | OUTPATIENT
Start: 2019-02-19 | End: 2019-02-19

## 2019-02-19 RX ORDER — HEPARIN SODIUM 5000 [USP'U]/ML
5000 INJECTION INTRAVENOUS; SUBCUTANEOUS EVERY 8 HOURS
Qty: 0 | Refills: 0 | Status: DISCONTINUED | OUTPATIENT
Start: 2019-02-19 | End: 2019-02-19

## 2019-02-19 RX ORDER — GLUCAGON INJECTION, SOLUTION 0.5 MG/.1ML
1 INJECTION, SOLUTION SUBCUTANEOUS ONCE
Qty: 0 | Refills: 0 | Status: DISCONTINUED | OUTPATIENT
Start: 2019-02-19 | End: 2019-02-20

## 2019-02-19 RX ORDER — PANTOPRAZOLE SODIUM 20 MG/1
40 TABLET, DELAYED RELEASE ORAL DAILY
Qty: 0 | Refills: 0 | Status: DISCONTINUED | OUTPATIENT
Start: 2019-02-19 | End: 2019-02-20

## 2019-02-19 RX ADMIN — Medication 8.04 MICROGRAM(S)/KG/MIN: at 22:23

## 2019-02-19 RX ADMIN — SODIUM CHLORIDE 2000 MILLILITER(S): 9 INJECTION, SOLUTION INTRAVENOUS at 23:35

## 2019-02-19 RX ADMIN — Medication 500 MILLIGRAM(S): at 17:16

## 2019-02-19 RX ADMIN — SODIUM CHLORIDE 2000 MILLILITER(S): 9 INJECTION, SOLUTION INTRAVENOUS at 18:43

## 2019-02-19 RX ADMIN — Medication 250 MILLIGRAM(S): at 23:13

## 2019-02-19 RX ADMIN — DEXMEDETOMIDINE HYDROCHLORIDE IN 0.9% SODIUM CHLORIDE 0.43 MICROGRAM(S)/KG/HR: 4 INJECTION INTRAVENOUS at 23:13

## 2019-02-19 RX ADMIN — VASOPRESSIN 2.4 UNIT(S)/MIN: 20 INJECTION INTRAVENOUS at 23:14

## 2019-02-19 RX ADMIN — SODIUM CHLORIDE 200 MILLILITER(S): 9 INJECTION, SOLUTION INTRAVENOUS at 22:23

## 2019-02-19 RX ADMIN — Medication 100 MILLIGRAM(S): at 16:44

## 2019-02-19 RX ADMIN — SODIUM CHLORIDE 2000 MILLILITER(S): 9 INJECTION, SOLUTION INTRAVENOUS at 22:30

## 2019-02-19 RX ADMIN — SODIUM CHLORIDE 2000 MILLILITER(S): 9 INJECTION, SOLUTION INTRAVENOUS at 16:24

## 2019-02-19 NOTE — ED PROVIDER NOTE - NS ED ROS FT
CONSTITUTIONAL: tachypneic tachycardic   HEAD: NCAT  EYES: PERRL; EOMI;   ENT: Normal pharynx; mucous membranes pink/moist, no erythema.  NECK: Supple;   CARD: tachycardic nl S1/S2; no M/R/G.   RESP: tachypneic no wheezing.   ABD: distended + rlq ttp  MSK/EXT: No gross deformities;  SKIN: purple and mottled   NEURO: AAOx3,  PSYCH: Memory Intact, Normal Affect

## 2019-02-19 NOTE — PRE-OP CHECKLIST - SELECT TESTS ORDERED
POCT Blood Glucose/BMP/CBC/CMP/PT/PTT/EKG/Type and Screen/CXR/Results in MD note/INR/Hepatic Function

## 2019-02-19 NOTE — ED PROVIDER NOTE - ATTENDING CONTRIBUTION TO CARE
59yo man h/o HTN, DM, HLD, asthma, morbid obesity, recent admission to the [facility] for stroke like sx, discharged to [facility]'s residence where he lives (assisted living) last night feeling ok presents today for sudden onset watery diarrhea today immediately after eating lunch. No diarrhea while in hospital, pt denies nausea, vomiting and abdominal pain though he notes mild cramp prior to watery, yellow, foul smelling stool which has been almost constant since it began. Pt initially had VS WNL, however in the ED he developed acute onset of tachycardia in the 130s, tachypnea in the 30s, difficult to get pulse ox but inconsistent at low 90s on O2. Denies chest pain, but obviously diaphoretic, pale/dusky lips with minimal exertion. Lungs no wheezing, decreased BS at bases likely due to body habitus, abd morbid obese/edema, NT but actively stooling on exam, gross edema with chronic skin changes to b/l LE, mottled skin over the back and abdomen as well as b/l LE. Concerned for cdiff with diarrhea, but also concerned for PE given sudden onset new SOB/tachycardia/tachypnea. EKG with new RBBB, RAD. IV line placed, labs sent, plan is for Flagyl in consideration of cdiff, doubt pt can tolerate CT so may require empiric anticoagulation. Attempted bedside echo but due to body habitus images limited; will upgrade to crit for further management.

## 2019-02-19 NOTE — ED PROVIDER NOTE - CLINICAL SUMMARY MEDICAL DECISION MAKING FREE TEXT BOX
Care: 57yo man h/o HTN, DM, HLD, asthma, morbid obesity here for diffuse abdo pain and diarrhea. in ed, pt became short of breath, diaphoretic and tachycardic and upgraded to crit. VS improved w/ fluids and hypoxemic corrected on 5L NC. Broadspectrum abx ordered c/f intra-abdo source. CXR showing large amount of free air. surgery consulted and took pt to OR for perf viscous.

## 2019-02-19 NOTE — BRIEF OPERATIVE NOTE - PROCEDURE
<<-----Click on this checkbox to enter Procedure Temporary closure of abdominal cavity  02/19/2019    Active  APAL1  Extended right hemicolectomy  02/19/2019    Active  APAL1  Exploratory laparotomy  02/19/2019    Active  APAL1

## 2019-02-19 NOTE — ED PROVIDER NOTE - PHYSICAL EXAMINATION
CONSTITUTIONAL: tachypneic   HEAD: NCAT  EYES: PERRL; EOMI;   ENT: Normal pharynx; mucous membranes pink/moist, no erythema.  NECK: Supple;   CARD: tachycardic nl S1/S2; no M/R/G.   RESP: tachypneic no wheezing.   ABD: distended + rlq ttp  MSK/EXT: No gross deformities;  SKIN: purple and mottled   NEURO: AAOx3,  PSYCH: Memory Intact, Normal Affect

## 2019-02-19 NOTE — ED ADULT NURSE NOTE - NSIMPLEMENTINTERV_GEN_ALL_ED
Implemented All Fall with Harm Risk Interventions:  [place] to call system. Call bell, personal items and telephone within reach. Instruct patient to call for assistance. Room bathroom lighting operational. Non-slip footwear when patient is off stretcher. Physically safe environment: no spills, clutter or unnecessary equipment. Stretcher in lowest position, wheels locked, appropriate side rails in place. Provide visual cue, wrist band, yellow gown, etc. Monitor gait and stability. Monitor for mental status changes and reorient to person, place, and time. Review medications for side effects contributing to fall risk. Reinforce activity limits and safety measures with patient and family. Provide visual clues: red socks.

## 2019-02-19 NOTE — CONSULT NOTE ADULT - SUBJECTIVE AND OBJECTIVE BOX
SICU Consultation Note  =====================================================  HPI:   58y Male PMH of asthma, CHF, DM, HLD, morbid obesity BMI 54, PAD s/p attempted angio for RLE ulcer on 7/2018, and recent hospitalization for LLE weakness/L vision loss, was d/c on 2/18 after (-) MRI head, presents this admission with acute onset generalized abdominal pain and multiple episodes of watery diarrhea for 1 day. Abdominal xray on 2/17 demonstrated diffuse colonic dilatation measuring up to 17 cm and dilated small bowel loops measuring up to 4.9 cm. AXR this admission with free air under right abdelrahman-diaphragm. Diffusely tender, distended abdomen on exam. Taken emergently to OR for Exlap. S/P extended R hemicolectomy for perforated cecum with feculent peritonitis, ends dropped into abdomen, abdomen left open with Abthera, plan for return for second look Thurs. AM.     OR time:  2h    EBL: 100         IV Fluids: 4500      Blood Products: x  UOP: NR      PAST MEDICAL & SURGICAL HISTORY:  CVA (cerebral vascular accident)  HLD (hyperlipidemia)  Morbid obesity  Osteoarthritis  Diabetes  Asthma  H/O umbilical hernia repair    Home Meds: Home Medications:  aspirin 81 mg oral tablet, chewable: 1 tab(s) orally once a day (15 Feb 2019 10:46)  atorvastatin 80 mg oral tablet: 1 tab(s) orally once a day (at bedtime) (15 Feb 2019 10:46)  furosemide 40 mg oral tablet: 1 tab(s) orally once a day (15 Feb 2019 10:46)  metFORMIN 500 mg oral tablet: 1 tab(s) orally 2 times a day (12 Feb 2019 01:49)    Allergies: Allergies  No Known Allergies  Intolerances    Soc:  Denies tobacco/EtOH  Advanced Directives: Presumed Full Code     ROS:    REVIEW OF SYSTEMS  [ ] A ten-point review of systems was otherwise negative except as noted.  [x] Due to altered mental status/intubation, subjective information were not able to be obtained from the patient. History was obtained, to the extent possible, from review of the chart and collateral sources of information.      CURRENT MEDICATIONS:   --------------------------------------------------------------------------------------  Gastrointestinal Medications  lactated ringers Bolus 1000 milliLiter(s) IV Bolus once  lactated ringers Bolus 1000 milliLiter(s) IV Bolus once    Hematologic/Oncologic Medications  heparin  Injectable 5000 Unit(s) SubCutaneous every 8 hours  heparin  Injectable 7500 Unit(s) SubCutaneous every 8 hours    Antimicrobial/Immunologic Medications  piperacillin/tazobactam IVPB. 3.375 Gram(s) IV Intermittent once  piperacillin/tazobactam IVPB. 3.375 Gram(s) IV Intermittent every 8 hours  vancomycin  IVPB 1000 milliGRAM(s) IV Intermittent once  vancomycin  IVPB      --------------------------------------------------------------------------------------  VITAL SIGNS, INS/OUTS (last 24 hours):  --------------------------------------------------------------------------------------  ICU Vital Signs Last 24 Hrs  T(C): 36.7 (19 Feb 2019 19:46), Max: 36.7 (19 Feb 2019 19:46)  T(F): 98.1 (19 Feb 2019 19:46), Max: 98.1 (19 Feb 2019 19:46)  HR: 104 (19 Feb 2019 22:24) (82 - 122)  BP: 130/62 (19 Feb 2019 19:46) (121/66 - 134/91)  RR: 24 (19 Feb 2019 19:46) (20 - 24)  SpO2: 95% (19 Feb 2019 22:24) (93% - 98%)  --------------------------------------------------------------------------------------  EXAM:  General/Neuro  RASS: -2  Exam: NAD, sedated + intubated, BL UE restraints    Respiratory  Exam: Lungs clear to auscultation, Normal expansion, intubated on Vent  Mechanical Ventilation: Mode: AC/ CMV (Assist Control/ Continuous Mandatory Ventilation)  RR (machine): 20  TV (machine): 500  FiO2: 100  PEEP: 5  ITime: 1  MAP: 11  PIP: 26    Cardiovascular  Exam: S1, S2.  Regular rate and rhythm.  Peripheral edema+  Cardiac Rhythm: Normal Sinus Rhythm    GI  Exam: Abdomen soft, Non-tender, Non-distended. BS.   Karen vac in place  Wound:   midline laparotomy, wound edges clean, no drains    Tubes/Lines/Drains  ***  [x] Peripheral IV  [x] Central Venous Line     	[x] R	[] L	[x] IJ	[] Fem	[] SC        Type:	    Date Placed: 2/19/19  [x] Arterial Line		[] R	[x] L	[] Fem	[x] Rad	[] Ax	Date Placed: 2/19/19  [x] Urinary Catheter		Date Placed: 2/19/19    Extremities  Exam: Extremities warm, pink, well-perfused.      Derm:  Exam: Good skin turgor, no skin breakdown.      :   Exam: Lala catheter in place. Making good urine    LABS  --------------------------------------------------------------------------------------  Labs:  CAPILLARY BLOOD GLUCOSE  POCT Blood Glucose.: 174 mg/dL (19 Feb 2019 22:36)  POCT Blood Glucose.: 223 mg/dL (19 Feb 2019 15:51)                        14.1   3.58  )-----------( 463      ( 19 Feb 2019 15:16 )             45.5       Auto Neutrophil %: 79.1 % (02-19-19 @ 15:16)  Auto Immature Granulocyte %: 0.8 % (02-19-19 @ 15:16)    02-19  133<L>  |  93<L>  |  36<H>  ----------------------------<  235<H>  4.1   |  19  |  1.3    Calcium, Total Serum: 7.2 mg/dL (02-19-19 @ 15:16)    LFTs:          6.2  | 0.4  | 26       ------------------[121     ( 19 Feb 2019 15:16 )  3.1  | x    | 15          Lipase:22     Amylase:x        Blood Gas Arterial, Lactate: 4.2 mmoL/L (02-19-19 @ 21:49)  Blood Gas Arterial, Lactate: 7.7 mmoL/L (02-19-19 @ 17:22)  Blood Gas Venous - Lactate: 7.0 mmoL/L (02-19-19 @ 15:45)    ABG - ( 19 Feb 2019 21:49 )  pH: 7.16  /  pCO2: 52    /  pO2: 98    / HCO3: 19    / Base Excess: -9.9  /  SaO2: 94      ABG - ( 19 Feb 2019 17:22 )  pH: 7.35  /  pCO2: 31    /  pO2: 157   / HCO3: 17    / Base Excess: -7.5  /  SaO2: 99        CARDIAC MARKERS ( 19 Feb 2019 15:16 )  x     / <0.01 ng/mL / x     / x     / x      Serum Pro-Brain Natriuretic Peptide: 1285 pg/mL (02-19-19 @ 15:16)  Serum Pro-Brain Natriuretic Peptide: 231 pg/mL (02-10-19 @ 23:50)  --------------------------------------------------------------------------------------  IMAGING RESULTS  < from: Xray Abdomen 2 Views (02.17.19 @ 23:00) >  INTERPRETATION:  Clinical History / Reason for exam: Abdominal pain.  8 views of the abdomen.  Comparison: None available.  FINDINGS/  IMPRESSION:  Diffuse marked colonic dilatation measuring up to 17 cm.  Dilated small bowel loops measuring up to 4.9 cm.  CT abdomen and pelvis is recommended for further evaluation.  < end of copied text >  --------------------------------------------------------------------------------------

## 2019-02-19 NOTE — ED PROVIDER NOTE - OBJECTIVE STATEMENT
58 year old male with a pmh of dm htn hla cva w/ no residual was discharged from the hospital yesterday 58 year old male with a pmh of dm htn hla cva w/ no residual was discharged from the hospital yesterday came in c/o abdominal pain and multiple episodes of watery diarrhea that started while patient was eating lunch.

## 2019-02-19 NOTE — H&P ADULT - NSHPLABSRESULTS_GEN_ALL_CORE
Labs:  CAPILLARY BLOOD GLUCOSE      POCT Blood Glucose.: 223 mg/dL (19 Feb 2019 15:51)                          14.1   3.58  )-----------( 463      ( 19 Feb 2019 15:16 )             45.5       Auto Neutrophil %: 79.1 % (02-19-19 @ 15:16)  Auto Immature Granulocyte %: 0.8 % (02-19-19 @ 15:16)    02-19    133<L>  |  93<L>  |  36<H>  ----------------------------<  235<H>  4.1   |  19  |  1.3      Calcium, Total Serum: 7.2 mg/dL (02-19-19 @ 15:16)      LFTs:             6.2  | 0.4  | 26       ------------------[121     ( 19 Feb 2019 15:16 )  3.1  | x    | 15          Lipase:22     Amylase:x         Blood Gas Arterial, Lactate: 7.7 mmoL/L (02-19-19 @ 17:22)  Blood Gas Venous - Lactate: 7.0 mmoL/L (02-19-19 @ 15:45)    ABG - ( 19 Feb 2019 17:22 )  pH: 7.35  /  pCO2: 31    /  pO2: 157   / HCO3: 17    / Base Excess: -7.5  /  SaO2: 99                Coags:    CARDIAC MARKERS ( 19 Feb 2019 15:16 )  x     / <0.01 ng/mL / x     / x     / x              < from: Xray Abdomen 2 Views (02.17.19 @ 23:00) >    FINDINGS/  IMPRESSION:    Diffuse marked colonic dilatation measuring up to 17 cm.    Dilated small bowel loops measuring up to 4.9 cm.    CT abdomen and pelvis is recommended for further evaluation.    < end of copied text >

## 2019-02-19 NOTE — ED PROVIDER NOTE - PROGRESS NOTE DETAILS
Patient seen and examined. Patient began having watery stools unable to hold his bowels. Patient became very tachypneic and tachycardic. Pt was upgraded to crit in a recliner chair, looks more comfortable, HR now in the low 100s, O2 sat 96% on NC. Still tachypneic, speaks in short sentences. Mottling of skin has improved and pt maintains a normal MS. Labs with lactate of 7, ph 7.32. CXR with ??free air under diaphragm (which would preclude anticoagulation); pt minimally tender diffusely on exam, does not appear peritoneal but exam is admittedly limited by body habitus. Endorsed pt to Dr Arnold, plan is to try for CTA chest/abd/pelvis, continue hydration, abx, ICU eval. Xray reviewed. Spoke with Dr. Hughes from surgery for concern for perforation called CT, they will discuss protocol for CTA AP w/ rads then send for patient.

## 2019-02-19 NOTE — BRIEF OPERATIVE NOTE - OPERATION/FINDINGS
Perforated cecum, feculent peritonitis, small bowel adherent to mesh, extended right hemicolectomy performed, ends of bowel dropped, abthera placed

## 2019-02-19 NOTE — H&P ADULT - HISTORY OF PRESENT ILLNESS
57 yo M presents to the hospital after being d/c on 2/18 after being admitted for [facility] yesterday came in c/o abdominal pain and multiple episodes of watery diarrhea that started while patient was eating lunch. Xray done on 2/17 showed Diffuse marked colonic dilatation measuring up to 17 cm.Dilated small bowel loops measuring up to 4.9 cm. CT abdomen and pelvis is recommended for further evaluation.

## 2019-02-19 NOTE — CONSULT NOTE ADULT - CONSULT REASON
Cecal perforation, s/p Exlap, extended R hemicolectomy, washout of abdomen for feculent peritonitis, ends dropped and abthera device placed

## 2019-02-19 NOTE — CONSULT NOTE ADULT - ASSESSMENT
ASSESSMENT:  This is a 58y Male with perforated cecum, taken emergently for exploratory laparotomy, extended R hemicolectomy, washout of abdomen for feculent peritonitis, ends dropped and abthera device placed, plan for return to OR for second look procedure in 24-48h    PLAN:  NEURO: Prior CVA with L sided weakness, acute post op pain, Monitor for changes in mental status  Pain regimen: Fentanyl PRN 50q3h  Sedation: Precedex gtt, titrate to RASS 0 - -1    PULM: Obesity related hypoventilation (pCO2 52), combined respiratory/metabolic acidosis (PH 7.16, HCO3 19, BE -9.9) remains intubated post-op for return to OR for second look operation, wean vent settings as tolerated, pulse oxymetry, Incentive spirometer once extubated, maintain Sat >90%, Post OP ABG ordered  Vent Settings: AC/ CMV, RR (machine): 20, TV (machine): 500, FiO2: 100, PEEP: 5  Significant imaging: AM CXR    CARDIO: h/o CHF on Lasix (held) and HLD on atorvastatin (held while NPO), Monitor for HD instability, maintain normotensive, MAP >65mmHg, pressor support Levo/Vaso, wean as tolerated, resuscitate with PRN boluses  3x CARDIAC MARKERS ordered:  1st set:  x     / <0.01 ng/mL / x     / x     / x      Echo: [facility]: 19, EF >55%, Mod LVH, normal LV function, no regional wall motion abdnormalities  EK Lead ECG:   Ventricular Rate 127 BPM  Atrial Rate 127 BPM  P-R Interval 120 ms  QRS Duration 124 ms  Q-T Interval 360 ms  QTC Calculation(Bezet) 523 ms  R Axis 123 degrees  T Axis 0 degrees  Diagnosis Line Sinus tachycardia  Right bundle branchblock  Anteroseptal infarct , age undetermined  Abnormal ECG    GI: Morbid obesity BMI 54, Large bowel perf @cecum, s/p extended R hemicolectomy, Strict NPO, NGT to LCS, Abdomen open with abthera in place, GI ppx with PPI  Significant imaging: AXR post op    RENAL/: ELAINE, BL BUN/CR 10/0.6, Lala in place, monitor UOP, Strict I+O, correct electrolytes, IVF: LR @200 weight appropriate, 4L LR boluses ordered. BUN/Cr Trend: Blood Urea Nitrogen, Serum: 36 (02-19 @ 15:16) / Creatinine, Serum: 1.3 ( @ 15:16)    HEME: No dx, Monitor H/H, DVT ppx/AC: heparin  Injectable 7500 Unit(s) SubCutaneous every 8 hours    ID: No dx, afebrile, +leukopenia likely reactive from sepsis (WBC 3.58). Trend LA on ABG  Serial LA: 4.2 ( @ 21:49) <- 7.7 ( @ 17:22) <- 7.0 ( @ 15:45)  Antibiotics:   piperacillin/tazobactam IVPB. 3.375 Gram(s) IV Intermittent every 8 hours  vancomycin  IVPB 1000 milliGRAM(s) IV Intermittent once  Microbiology: no Cx sent    Endocrine: DM holding home metformin Hemoglobin A1C 7.8, Mean Plasma Glucose: 177 mg/dL (19), Monitor FSG, ISS ordered.  CAPILLARY BLOOD GLUCOSE POCT Blood Glucose.: 174 mg/dL (2019 22:36), POCT Blood Glucose.: 223 mg/dL (2019 15:51)    Lines/Drains: Monitor O/P  PIV  CVC:     [ x ] IJ     [ ] SC     [ ] Fem      [ x ] R     [ ] L     Date Placed: 19  A line:     [ x ] Radial     [ ] Fem     [ ] R     [ x ] L      Date Placed: 19  Lala:       Date Placed: 19    Disposition: Upgrade to SICU for resuscitation, HD monitoring, Vent care, planned return to OR, open abdomen    --------------------------------------------------------------------------------------    Critical Care Diagnoses: Perforated viscus, feculent peritonitis

## 2019-02-19 NOTE — CHART NOTE - NSCHARTNOTEFT_GEN_A_CORE
PACU ANESTHESIA ADMISSION NOTE      Procedure: Exploratory laparotomy  Extended right hemicolectomy  Temporary closure of abdominal cavity    Post op diagnosis:  Perforation of cecum      __x_  Intubated  TV:__500____       Rate: 16___      FiO2: _100__    ____  Patent Airway    ____  Full return of protective reflexes    ____  Full recovery from anesthesia / back to baseline     Vitals:   T:   98        R:      16            BP:      88/52             Sat:    97%               P:  105      Mental Status:  ____ Awake   _____ Alert   _____ Drowsy   _x__ Sedated    Nausea/Vomiting:  _x_ NO  ______Yes,   See Post - Op Orders          Pain Scale (0-10):  _____    Treatment: ____ None   x_ See Post - Op/PCA Orders    Post - Operative Fluids:   ____ Oral   ___x See Post - Op Orders    Plan: Discharge:   ____Home       _____Floor     _x__Critical Care    _____  Other:_________________    Comments: No anesthesia issues or complications noted intraoperatively.  Patient stable upon arrival to PACU. Report given to RN and ICU team. Patient to be transferred to ICU.

## 2019-02-20 LAB
ANION GAP SERPL CALC-SCNC: 21 MMOL/L — HIGH (ref 7–14)
ANION GAP SERPL CALC-SCNC: 22 MMOL/L — HIGH (ref 7–14)
ANION GAP SERPL CALC-SCNC: 23 MMOL/L — HIGH (ref 7–14)
ANION GAP SERPL CALC-SCNC: 31 MMOL/L — HIGH (ref 7–14)
APTT BLD: 33.2 SEC — SIGNIFICANT CHANGE UP (ref 27–39.2)
APTT BLD: 33.8 SEC — SIGNIFICANT CHANGE UP (ref 27–39.2)
APTT BLD: 54 SEC — HIGH (ref 27–39.2)
BASE EXCESS BLDA CALC-SCNC: -11.1 MMOL/L — LOW (ref -2–2)
BASE EXCESS BLDA CALC-SCNC: -9.9 MMOL/L — LOW (ref -2–2)
BUN SERPL-MCNC: 35 MG/DL — HIGH (ref 10–20)
BUN SERPL-MCNC: 36 MG/DL — HIGH (ref 10–20)
CALCIUM SERPL-MCNC: 5.9 MG/DL — CRITICAL LOW (ref 8.5–10.1)
CALCIUM SERPL-MCNC: 6 MG/DL — LOW (ref 8.5–10.1)
CALCIUM SERPL-MCNC: 6 MG/DL — LOW (ref 8.5–10.1)
CALCIUM SERPL-MCNC: 6.2 MG/DL — LOW (ref 8.5–10.1)
CHLORIDE SERPL-SCNC: 101 MMOL/L — SIGNIFICANT CHANGE UP (ref 98–110)
CHLORIDE SERPL-SCNC: 96 MMOL/L — LOW (ref 98–110)
CHLORIDE SERPL-SCNC: 98 MMOL/L — SIGNIFICANT CHANGE UP (ref 98–110)
CHLORIDE SERPL-SCNC: 99 MMOL/L — SIGNIFICANT CHANGE UP (ref 98–110)
CK MB CFR SERPL CALC: 10.3 NG/ML — HIGH (ref 0.6–6.3)
CK MB CFR SERPL CALC: 15.6 NG/ML — HIGH (ref 0.6–6.3)
CK MB CFR SERPL CALC: 16.1 NG/ML — HIGH (ref 0.6–6.3)
CK SERPL-CCNC: 1229 U/L — HIGH (ref 0–225)
CK SERPL-CCNC: 1267 U/L — HIGH (ref 0–225)
CO2 SERPL-SCNC: 13 MMOL/L — LOW (ref 17–32)
CO2 SERPL-SCNC: 15 MMOL/L — LOW (ref 17–32)
CO2 SERPL-SCNC: 17 MMOL/L — SIGNIFICANT CHANGE UP (ref 17–32)
CO2 SERPL-SCNC: 8 MMOL/L — CRITICAL LOW (ref 17–32)
CREAT SERPL-MCNC: 1.9 MG/DL — HIGH (ref 0.7–1.5)
CREAT SERPL-MCNC: 2.1 MG/DL — HIGH (ref 0.7–1.5)
CREAT SERPL-MCNC: 2.1 MG/DL — HIGH (ref 0.7–1.5)
CREAT SERPL-MCNC: 3.1 MG/DL — HIGH (ref 0.7–1.5)
GAS PNL BLDA: SIGNIFICANT CHANGE UP
GLUCOSE BLDC GLUCOMTR-MCNC: 147 MG/DL — HIGH (ref 70–99)
GLUCOSE BLDC GLUCOMTR-MCNC: 170 MG/DL — HIGH (ref 70–99)
GLUCOSE SERPL-MCNC: 170 MG/DL — HIGH (ref 70–99)
GLUCOSE SERPL-MCNC: 179 MG/DL — HIGH (ref 70–99)
GLUCOSE SERPL-MCNC: 183 MG/DL — HIGH (ref 70–99)
GLUCOSE SERPL-MCNC: 27 MG/DL — CRITICAL LOW (ref 70–99)
HCO3 BLDA-SCNC: 18 MMOL/L — LOW (ref 23–27)
HCO3 BLDA-SCNC: 19 MMOL/L — LOW (ref 21–29)
HCT VFR BLD CALC: 38.2 % — LOW (ref 42–52)
HCT VFR BLD CALC: 42.5 % — SIGNIFICANT CHANGE UP (ref 42–52)
HGB BLD-MCNC: 11 G/DL — LOW (ref 14–18)
HGB BLD-MCNC: 13 G/DL — LOW (ref 14–18)
HOROWITZ INDEX BLDA+IHG-RTO: 100 — SIGNIFICANT CHANGE UP
HOROWITZ INDEX BLDA+IHG-RTO: 100 — SIGNIFICANT CHANGE UP
INR BLD: 1.53 RATIO — HIGH (ref 0.65–1.3)
INR BLD: 2.3 RATIO — HIGH (ref 0.65–1.3)
MAGNESIUM SERPL-MCNC: 1.9 MG/DL — SIGNIFICANT CHANGE UP (ref 1.8–2.4)
MAGNESIUM SERPL-MCNC: 2.2 MG/DL — SIGNIFICANT CHANGE UP (ref 1.8–2.4)
MAGNESIUM SERPL-MCNC: 2.7 MG/DL — HIGH (ref 1.8–2.4)
MCHC RBC-ENTMCNC: 25.5 PG — LOW (ref 27–31)
MCHC RBC-ENTMCNC: 26 PG — LOW (ref 27–31)
MCHC RBC-ENTMCNC: 28.8 G/DL — LOW (ref 32–37)
MCHC RBC-ENTMCNC: 30.6 G/DL — LOW (ref 32–37)
MCV RBC AUTO: 83.3 FL — SIGNIFICANT CHANGE UP (ref 80–94)
MCV RBC AUTO: 90.3 FL — SIGNIFICANT CHANGE UP (ref 80–94)
NRBC # BLD: 0 /100 WBCS — SIGNIFICANT CHANGE UP (ref 0–0)
NRBC # BLD: 0 /100 WBCS — SIGNIFICANT CHANGE UP (ref 0–0)
NT-PROBNP SERPL-SCNC: 2374 PG/ML — HIGH (ref 0–300)
PCO2 BLDA: 45 MMHG — HIGH (ref 38–42)
PCO2 BLDA: 60 MMHG — HIGH (ref 38–42)
PH BLDA: 7.11 — CRITICAL LOW (ref 7.38–7.42)
PH BLDA: 7.21 — LOW (ref 7.38–7.42)
PHOSPHATE SERPL-MCNC: 11.7 MG/DL — HIGH (ref 2.1–4.9)
PHOSPHATE SERPL-MCNC: 3.6 MG/DL — SIGNIFICANT CHANGE UP (ref 2.1–4.9)
PHOSPHATE SERPL-MCNC: 5 MG/DL — HIGH (ref 2.1–4.9)
PLATELET # BLD AUTO: 206 K/UL — SIGNIFICANT CHANGE UP (ref 130–400)
PLATELET # BLD AUTO: 370 K/UL — SIGNIFICANT CHANGE UP (ref 130–400)
PO2 BLDA: 134 MMHG — HIGH (ref 78–95)
PO2 BLDA: 87 MMHG — SIGNIFICANT CHANGE UP (ref 78–95)
POTASSIUM SERPL-MCNC: 3 MMOL/L — LOW (ref 3.5–5)
POTASSIUM SERPL-MCNC: 3.1 MMOL/L — LOW (ref 3.5–5)
POTASSIUM SERPL-MCNC: 3.6 MMOL/L — SIGNIFICANT CHANGE UP (ref 3.5–5)
POTASSIUM SERPL-MCNC: 6.5 MMOL/L — CRITICAL HIGH (ref 3.5–5)
POTASSIUM SERPL-SCNC: 3 MMOL/L — LOW (ref 3.5–5)
POTASSIUM SERPL-SCNC: 3.1 MMOL/L — LOW (ref 3.5–5)
POTASSIUM SERPL-SCNC: 3.6 MMOL/L — SIGNIFICANT CHANGE UP (ref 3.5–5)
POTASSIUM SERPL-SCNC: 6.5 MMOL/L — CRITICAL HIGH (ref 3.5–5)
PROTHROM AB SERPL-ACNC: 17.5 SEC — HIGH (ref 9.95–12.87)
PROTHROM AB SERPL-ACNC: 26.2 SEC — HIGH (ref 9.95–12.87)
RBC # BLD: 4.23 M/UL — LOW (ref 4.7–6.1)
RBC # BLD: 5.1 M/UL — SIGNIFICANT CHANGE UP (ref 4.7–6.1)
RBC # FLD: 15.6 % — HIGH (ref 11.5–14.5)
RBC # FLD: 15.7 % — HIGH (ref 11.5–14.5)
SAO2 % BLDA: 92 % — SIGNIFICANT CHANGE UP (ref 92–96)
SAO2 % BLDA: 96 % — SIGNIFICANT CHANGE UP (ref 94–98)
SODIUM SERPL-SCNC: 132 MMOL/L — LOW (ref 135–146)
SODIUM SERPL-SCNC: 136 MMOL/L — SIGNIFICANT CHANGE UP (ref 135–146)
SODIUM SERPL-SCNC: 136 MMOL/L — SIGNIFICANT CHANGE UP (ref 135–146)
SODIUM SERPL-SCNC: 140 MMOL/L — SIGNIFICANT CHANGE UP (ref 135–146)
TROPONIN T SERPL-MCNC: 0.01 NG/ML — SIGNIFICANT CHANGE UP
TROPONIN T SERPL-MCNC: 0.02 NG/ML — HIGH
TROPONIN T SERPL-MCNC: 0.05 NG/ML — CRITICAL HIGH
WBC # BLD: 26.29 K/UL — HIGH (ref 4.8–10.8)
WBC # BLD: 26.92 K/UL — HIGH (ref 4.8–10.8)
WBC # FLD AUTO: 26.29 K/UL — HIGH (ref 4.8–10.8)
WBC # FLD AUTO: 26.92 K/UL — HIGH (ref 4.8–10.8)

## 2019-02-20 RX ORDER — SODIUM CHLORIDE 9 MG/ML
250 INJECTION INTRAMUSCULAR; INTRAVENOUS; SUBCUTANEOUS ONCE
Qty: 0 | Refills: 0 | Status: COMPLETED | OUTPATIENT
Start: 2019-02-20 | End: 2019-02-20

## 2019-02-20 RX ORDER — MIDAZOLAM HYDROCHLORIDE 1 MG/ML
2 INJECTION, SOLUTION INTRAMUSCULAR; INTRAVENOUS ONCE
Qty: 0 | Refills: 0 | Status: DISCONTINUED | OUTPATIENT
Start: 2019-02-20 | End: 2019-02-20

## 2019-02-20 RX ORDER — PHENYLEPHRINE HYDROCHLORIDE 10 MG/ML
0.1 INJECTION INTRAVENOUS
Qty: 160 | Refills: 0 | Status: DISCONTINUED | OUTPATIENT
Start: 2019-02-20 | End: 2019-02-20

## 2019-02-20 RX ORDER — FENTANYL CITRATE 50 UG/ML
50 INJECTION INTRAVENOUS
Qty: 0 | Refills: 0 | Status: DISCONTINUED | OUTPATIENT
Start: 2019-02-20 | End: 2019-02-20

## 2019-02-20 RX ORDER — MAGNESIUM SULFATE 500 MG/ML
2 VIAL (ML) INJECTION ONCE
Qty: 0 | Refills: 0 | Status: COMPLETED | OUTPATIENT
Start: 2019-02-20 | End: 2019-02-20

## 2019-02-20 RX ORDER — PHENYLEPHRINE HYDROCHLORIDE 10 MG/ML
0.1 INJECTION INTRAVENOUS
Qty: 40 | Refills: 0 | Status: DISCONTINUED | OUTPATIENT
Start: 2019-02-20 | End: 2019-02-20

## 2019-02-20 RX ORDER — POTASSIUM CHLORIDE 20 MEQ
20 PACKET (EA) ORAL
Qty: 0 | Refills: 0 | Status: COMPLETED | OUTPATIENT
Start: 2019-02-20 | End: 2019-02-20

## 2019-02-20 RX ORDER — SODIUM CHLORIDE 9 MG/ML
1000 INJECTION, SOLUTION INTRAVENOUS ONCE
Qty: 0 | Refills: 0 | Status: COMPLETED | OUTPATIENT
Start: 2019-02-20 | End: 2019-02-20

## 2019-02-20 RX ORDER — MIDAZOLAM HYDROCHLORIDE 1 MG/ML
0.02 INJECTION, SOLUTION INTRAMUSCULAR; INTRAVENOUS
Qty: 100 | Refills: 0 | Status: DISCONTINUED | OUTPATIENT
Start: 2019-02-20 | End: 2019-02-20

## 2019-02-20 RX ORDER — INSULIN HUMAN 100 [IU]/ML
INJECTION, SOLUTION SUBCUTANEOUS EVERY 4 HOURS
Qty: 0 | Refills: 0 | Status: DISCONTINUED | OUTPATIENT
Start: 2019-02-20 | End: 2019-02-20

## 2019-02-20 RX ORDER — SODIUM CHLORIDE 9 MG/ML
1000 INJECTION, SOLUTION INTRAVENOUS
Qty: 0 | Refills: 0 | Status: DISCONTINUED | OUTPATIENT
Start: 2019-02-20 | End: 2019-02-20

## 2019-02-20 RX ORDER — FENTANYL CITRATE 50 UG/ML
0.5 INJECTION INTRAVENOUS
Qty: 5000 | Refills: 0 | Status: DISCONTINUED | OUTPATIENT
Start: 2019-02-20 | End: 2019-02-20

## 2019-02-20 RX ADMIN — Medication 100 MILLIEQUIVALENT(S): at 04:06

## 2019-02-20 RX ADMIN — SODIUM CHLORIDE 3000 MILLILITER(S): 9 INJECTION INTRAMUSCULAR; INTRAVENOUS; SUBCUTANEOUS at 03:40

## 2019-02-20 RX ADMIN — CHLORHEXIDINE GLUCONATE 15 MILLILITER(S): 213 SOLUTION TOPICAL at 05:00

## 2019-02-20 RX ADMIN — SODIUM CHLORIDE 2000 MILLILITER(S): 9 INJECTION, SOLUTION INTRAVENOUS at 03:45

## 2019-02-20 RX ADMIN — FENTANYL CITRATE 50 MICROGRAM(S): 50 INJECTION INTRAVENOUS at 01:40

## 2019-02-20 RX ADMIN — FENTANYL CITRATE 50 MICROGRAM(S): 50 INJECTION INTRAVENOUS at 06:35

## 2019-02-20 RX ADMIN — SODIUM CHLORIDE 2000 MILLILITER(S): 9 INJECTION, SOLUTION INTRAVENOUS at 02:00

## 2019-02-20 RX ADMIN — SODIUM CHLORIDE 2000 MILLILITER(S): 9 INJECTION, SOLUTION INTRAVENOUS at 01:30

## 2019-02-20 RX ADMIN — Medication 50 GRAM(S): at 03:09

## 2019-02-20 RX ADMIN — DEXMEDETOMIDINE HYDROCHLORIDE IN 0.9% SODIUM CHLORIDE 0.43 MICROGRAM(S)/KG/HR: 4 INJECTION INTRAVENOUS at 07:00

## 2019-02-20 RX ADMIN — Medication 250 MILLIGRAM(S): at 05:01

## 2019-02-20 RX ADMIN — FENTANYL CITRATE 50 MICROGRAM(S): 50 INJECTION INTRAVENOUS at 04:55

## 2019-02-20 RX ADMIN — Medication 100 MILLIEQUIVALENT(S): at 05:30

## 2019-02-20 RX ADMIN — HEPARIN SODIUM 5000 UNIT(S): 5000 INJECTION INTRAVENOUS; SUBCUTANEOUS at 05:01

## 2019-02-20 RX ADMIN — Medication 8.04 MICROGRAM(S)/KG/MIN: at 07:00

## 2019-02-20 RX ADMIN — PIPERACILLIN AND TAZOBACTAM 200 GRAM(S): 4; .5 INJECTION, POWDER, LYOPHILIZED, FOR SOLUTION INTRAVENOUS at 01:32

## 2019-02-20 RX ADMIN — Medication 100 MILLIEQUIVALENT(S): at 03:09

## 2019-02-20 RX ADMIN — CHLORHEXIDINE GLUCONATE 1 APPLICATION(S): 213 SOLUTION TOPICAL at 05:01

## 2019-02-20 RX ADMIN — FENTANYL CITRATE 50 MICROGRAM(S): 50 INJECTION INTRAVENOUS at 01:55

## 2019-02-20 RX ADMIN — MIDAZOLAM HYDROCHLORIDE 3.43 MG/KG/HR: 1 INJECTION, SOLUTION INTRAMUSCULAR; INTRAVENOUS at 07:00

## 2019-02-20 RX ADMIN — VASOPRESSIN 2.4 UNIT(S)/MIN: 20 INJECTION INTRAVENOUS at 07:00

## 2019-02-20 RX ADMIN — MIDAZOLAM HYDROCHLORIDE 2 MILLIGRAM(S): 1 INJECTION, SOLUTION INTRAMUSCULAR; INTRAVENOUS at 05:45

## 2019-02-20 RX ADMIN — FENTANYL CITRATE 50 MICROGRAM(S): 50 INJECTION INTRAVENOUS at 04:40

## 2019-02-20 RX ADMIN — Medication 2: at 02:10

## 2019-02-20 RX ADMIN — SODIUM CHLORIDE 200 MILLILITER(S): 9 INJECTION, SOLUTION INTRAVENOUS at 07:00

## 2019-02-20 RX ADMIN — MIDAZOLAM HYDROCHLORIDE 2 MILLIGRAM(S): 1 INJECTION, SOLUTION INTRAMUSCULAR; INTRAVENOUS at 06:01

## 2019-02-20 RX ADMIN — MIDAZOLAM HYDROCHLORIDE 2 MILLIGRAM(S): 1 INJECTION, SOLUTION INTRAMUSCULAR; INTRAVENOUS at 05:15

## 2019-02-20 RX ADMIN — FENTANYL CITRATE 50 MICROGRAM(S): 50 INJECTION INTRAVENOUS at 06:20

## 2019-02-20 NOTE — DISCHARGE NOTE FOR THE EXPIRED PATIENT - HOSPITAL COURSE
58 year old Male, patient Dilan James presented to ED with abdominal pain. Found to have pneumoperitoneum, taken to OR emergently as level I case for exploratory laparotomy. Intra op pt, found to have antonio feculent peritonitis,  hemodynamically unstable and started on multiple pressors. Patient was transferred to ICU  intubated and on multiple pressors. Pt continued on pressors and multiple bolused were given for resuscitation, hypotension and low urine output with associated  tachycardia. Potassium and magnesium were repleted. Continued supportive care for septic shock secondary to bowel perforation was provided. Despite all efforts, patient coded twice. First code at 8am, lasted less than 2 minuits before ROSC and bicard and calcium given. Approximately 10mins later pt jessica down to asystole and code was recommenced. Second code lasted about 5mins epi, bicarb and calcium given. Pt brother, proxy, was notified pt of patients critical condition. As per family, it was decided to continue care unless patient codes again. Continued max support, maxed on pressors pt was hypotensive despite all interventions including vaso, levo, mark, epi. Proxy, Junior James, was made aware of patient critical condition and agreed to DNR and no escalation of care. Shortly, after patient . 58 year old Male, patient Dilan James presented to ED with abdominal pain. Found to have pneumoperitoneum, taken to OR emergently as level I case for exploratory laparotomy. Intra op pt, found to have antonio feculent peritonitis,  hemodynamically unstable and started on multiple pressors. Patient was transferred to ICU  intubated and on multiple pressors. Pt continued on pressors and multiple bolused were given for resuscitation, hypotension and low urine output with associated  tachycardia. Potassium and magnesium were repleted. Continued supportive care for septic shock secondary to bowel perforation was provided. Despite all efforts, patient coded twice. First code at 8am, lasted less than 2 minutes before ROSC and eip bicarb and calcium were given. Approximately 10mins later pt jessica down to asystole and code was recommenced. Second code lasted about 5mins epi, bicarb and calcium given. Pt brother, proxy, was notified pt of patients critical condition. As per family, it was decided to continue care unless patient codes again. Continued max support, maxed on pressors pt was hypotensive despite all interventions including vaso, levo, mark, epi. Proxy, Junior James, was made aware of patient critical condition and agreed to DNR and no escalation of care. Shortly, after patient . 58 year old Male, patient Dilan James presented to ED with free air and acute abdomen. He was taken to OR emergently as level I case for exploratory laparotomy. Intra op pt, found to have antonio feculent peritonitis, and a cecal perforation.  A Right hemicolectomy was performed however he was hemodynamically unstable and started on multiple pressors so his abdomen was left over. Patient was transferred to ICU intubated and on multiple pressors. aggressive resuscitation was performed but he went into multiorgan failure from sepsis.  Despite all efforts, patient coded twice. First code at 8am, lasted less than 2 minutes before ROSC, bicarb and calcium were given. Approximately 10mins later pt jessica down to asystole and code was recommenced. Second code lasted about 5mins epi x 2, bicarb x 2 and calcium given. Pt brother, proxy, was notified pt of patients critical condition. As per family, it was decided to continue care unless patient codes again. Continued max support, maxed on pressors pt was hypotensive despite all interventions including vaso, levo, mark, epi. Proxy, Junior James, was made aware of patient critical condition and he decided to withdrawal care.   Shortly, after patient  at 935 am.

## 2019-02-20 NOTE — PROCEDURE NOTE - NSPOSTCAREGUIDE_GEN_A_CORE
Care for catheter as per unit/ICU protocols/Instructed patient/caregiver regarding signs and symptoms of infection/Verbal/written post procedure instructions were given to patient/caregiver/Instructed patient/caregiver to follow-up with primary care physician/Keep the cast/splint/dressing clean and dry

## 2019-02-20 NOTE — PROGRESS NOTE ADULT - ATTENDING COMMENTS
s/p exp lap for perforated cecum and fecal peritonitis   Septic shock on 2 pressors   intubated and ventilated   extensive medical history   grave prognosis   will d/w family and continue support for now

## 2019-02-20 NOTE — PROGRESS NOTE ADULT - ASSESSMENT
Assessment:  58y Male patient admitted s/p Ex-lap, extended right hemicolectomy, temporary closure of abdominal cavity, with the above physical exam, labs, and imaging findings.    Plan:  -Adequate resuscitation  -Possible extubation in AM  -Pain control as needed  -Hemodynamic monitoring as per routine  -Encourage ambulation and incentive spirometer use (10x/hr when awake)  -GI and DVT prophylaxis  -Check and replete CBC and BMP q daily  -Strict input and output monitoring  -Continue current management  -Monitor urine output  -Weaning pressors  -OR tomorrow for abdominal washout      Date/Time: 02-20-19 @ 02:01

## 2019-02-20 NOTE — CHART NOTE - NSCHARTNOTEFT_GEN_A_CORE
patient was maxed on pressors and systolic was never above 60 despite epinephrine boluses.  WE discussed with proxy Junior James about prognosis.  He agreed to DNR and no escalation of care.  He is aware that the patient is critical.

## 2019-02-20 NOTE — PROGRESS NOTE ADULT - SUBJECTIVE AND OBJECTIVE BOX
Progress Note: Trauma Surgery  Patient: FABIAN CLEARY , 58y (1960)Male   MRN: [id number]  Location: [facility] 109 A  Visit: 02-19-19 Inpatient  Date: 02-20-19 @ 02:01  Hospital Day: 2  Post-op Day: 1    Procedure/Injury: s/p Ex-lap, extended right hemicolectomy, temporary closure of abdominal cavity  Events over 24h: Pt is intubated and sedated with settings of 100/5. No acute event overnight. No new complaint. Pt is vitally not stable, tachy to 120s. On diet, tolerating well. No nausea, vomiting. Voiding inadequately. -Flatus, -Bowel movement.    Vitals: T(F): 100.1 (02-20-19 @ 00:00), Max: 100.1 (02-20-19 @ 00:00)  HR: 124 (02-20-19 @ 01:00)  BP: 130/62 (02-19-19 @ 19:46) (121/66 - 134/91)  RR: 28 (02-20-19 @ 01:00)  SpO2: 95% (02-20-19 @ 01:00)  RR (machine): 22, TV (machine): 500, FiO2: 100, PEEP: 5, PIP: 32    Diet: Diet, NPO (02-19-19 @ 22:32)    IV Fluid: dextrose 5%. 1000 milliLiter(s) (50 mL/Hr) IV Continuous <Continuous>  lactated ringers Bolus 1000 milliLiter(s) IV Bolus once  lactated ringers. 1000 milliLiter(s) (200 mL/Hr) IV Continuous <Continuous>      In:   02-19-19 @ 07:01  -  02-20-19 @ 02:01  --------------------------------------------------------  IN: 4284 mL      Out:   02-19-19 @ 07:01  -  02-20-19 @ 02:01  --------------------------------------------------------  OUT:    Indwelling Catheter - Urethral: 5 mL    VAC (Vacuum Assisted Closure) System: 800 mL  Total OUT: 805 mL        Net:   02-19-19 @ 07:01  -  02-20-19 @ 02:01  --------------------------------------------------------  NET: 3479 mL        Physical Examination:  General Appearance: NAD, intubated and sedated  HEENT: NCAT, WNL  Heart: S1 and S2.  Lungs: Clear to auscultation BL   Abdomen: Positive bowel sounds. Soft, nondistended, Obese.  Incisions/Wounds: Dressings in place, clean, dry and intact, no signs of infection/active bleeding/drainage    Medications: [Standing]  chlorhexidine 0.12% Liquid 15 milliLiter(s) Oral Mucosa every 12 hours  chlorhexidine 4% Liquid 1 Application(s) Topical every 12 hours  dexmedetomidine Infusion 0.01 MICROgram(s)/kG/Hr (0.429 mL/Hr) IV Continuous <Continuous>  dextrose 5%. 1000 milliLiter(s) (50 mL/Hr) IV Continuous <Continuous>  dextrose 50% Injectable 12.5 Gram(s) IV Push once  dextrose 50% Injectable 25 Gram(s) IV Push once  heparin  Injectable 7500 Unit(s) SubCutaneous every 8 hours  insulin lispro (HumaLOG) corrective regimen sliding scale   SubCutaneous every 4 hours  lactated ringers Bolus 1000 milliLiter(s) IV Bolus once  lactated ringers. 1000 milliLiter(s) (200 mL/Hr) IV Continuous <Continuous>  norepinephrine Infusion 0.05 MICROgram(s)/kG/Min (8.039 mL/Hr) IV Continuous <Continuous>  pantoprazole  Injectable 40 milliGRAM(s) IV Push daily  piperacillin/tazobactam IVPB. 3.375 Gram(s) IV Intermittent every 8 hours  vancomycin  IVPB 1000 milliGRAM(s) IV Intermittent every 12 hours  vancomycin  IVPB      vasopressin Infusion 0.04 Unit(s)/Min (2.4 mL/Hr) IV Continuous <Continuous>    Medications:[PRN]  dextrose 40% Gel 15 Gram(s) Oral once PRN  fentaNYL    Injectable 50 MICROGram(s) IV Push every 3 hours PRN  glucagon  Injectable 1 milliGRAM(s) IntraMuscular once PRN    Labs:                        12.4   16.35 )-----------( 377      ( 19 Feb 2019 22:32 )             40.0   02-19    132<L>  |  96<L>  |  35<H>  ----------------------------<  179<H>  3.1<L>   |  13<L>  |  1.9<H>    Ca    6.0<L>      19 Feb 2019 22:32  Phos  3.6     02-19  Mg     1.9     02-19    TPro  6.2  /  Alb  3.1<L>  /  TBili  0.4  /  DBili  x   /  AST  26  /  ALT  15  /  AlkPhos  121<H>  02-19  LIVER FUNCTIONS - ( 19 Feb 2019 15:16 )  Alb: 3.1 g/dL / Pro: 6.2 g/dL / ALK PHOS: 121 U/L / ALT: 15 U/L / AST: 26 U/L / GGT: x         PTT - ( 19 Feb 2019 22:32 )  PTT:33.8 secABG - ( 19 Feb 2019 23:24 )  pH: 7.11  /  pCO2: 60    /  pO2: 87    / HCO3: 19    / Base Excess: -11.1 /  SaO2: 92              CARDIAC MARKERS ( 19 Feb 2019 22:32 )  x     / 0.01 ng/mL / 1039 U/L / x     / 16.1 ng/mL  CARDIAC MARKERS ( 19 Feb 2019 15:16 )  x     / <0.01 ng/mL / x     / x     / x          Micro/Urine:    Imaging:  None/24h

## 2019-02-20 NOTE — PROGRESS NOTE ADULT - SUBJECTIVE AND OBJECTIVE BOX
[facility]  [id number]  58y Male    Indication for ICU admission: s/p Cecal perforation, s/p Exlap, extended R hemicolectomy, washout of abdomen for feculent peritonitis, ends dropped and abthera device placed  Admit Date:19  ICU Date:  OR Date:    No Known Allergies    PAST MEDICAL & SURGICAL HISTORY:  CVA (cerebral vascular accident)  HLD (hyperlipidemia)  Morbid obesity  Osteoarthritis  Diabetes  Asthma  H/O umbilical hernia repair    Home Medications:  aspirin 81 mg oral tablet, chewable: 1 tab(s) orally once a day (15 Feb 2019 10:46)  atorvastatin 80 mg oral tablet: 1 tab(s) orally once a day (at bedtime) (15 Feb 2019 10:46)  furosemide 40 mg oral tablet: 1 tab(s) orally once a day (15 Feb 2019 10:46)  metFORMIN 500 mg oral tablet: 1 tab(s) orally 2 times a day (2019 01:49)    24H Events:  Surgery information  OR time:  2h    EBL: 100         IV Fluids: 4500      Blood Products: x  UOP: NR  NEURO: Prior CVA with L sided weakness, acute post op pain, Monitor for changes in mental status  Pain regimen: Fentanyl PRN 50q3h  Sedation: Precedex gtt, titrate to RASS 0 - -1  POD1    Overnight: tachy 100-110, EKG Sinus tachy, improved with pain control  K+ 3.1, Hemolyzed, re-sent 3.0, 3x 20mEq KCl given, Mg 1.9 received 2g MgSO4  CE: 1st (-), 2nd: (-)  CHF: BNP 2300, Echo ordered, Lasix held  Bolused 4L, NICOM: -1.4%, additional Bolus: 1L 2/2 hypotension, low UOP    NEURO: Prior CVA with L sided weakness, acute post op pain, follows commands. Pain regimen: Fentanyl PRN 50q3h. Sedation: Precedex gtt, titrate to RASS 0 - -1    PULM: Obesity related hypoventilation (pCO2 52), combined respiratory/metabolic acidosis (PH 7.16, HCO3 19, BE -9.9) remains intubated post-op for return to OR for second look operation Thurs.   Vent Settings: AC/ CMV, RR (machine): 20, TV (machine): 500, FiO2: 100, PEEP: 5  ABG - ( 2019 03:05 )  pH, Arterial: 7.21  pH, Blood: x     /  pCO2: 45    /  pO2: 134   / HCO3: 18    / Base Excess: -9.9  /  SaO2: 96      Significant imaging: Post-op CXR: ETT and TLC in correct positions, AM CXR    CARDIO: h/o CHF on Lasix (held) and HLD on atorvastatin (held while NPO), Hypotensive on pressor support Levo/Vaso, PRN boluses as above  3x CARDIAC MARKERS ordered:  CARDIAC MARKERS ( 2019 22:32 )  x     / 0.01 ng/mL / 1039 U/L / x     / 16.1 ng/mL  CARDIAC MARKERS ( 2019 15:16 )  x     / <0.01 ng/mL / x     / x     / x      Echo: [facility]: [facility], EF >55%, Mod LVH, normal LV function, no regional wall motion abdnormalities  EK Lead ECG:   Ventricular Rate 127 BPM  Atrial Rate 127 BPM  P-R Interval 120 ms  QRS Duration 124 ms  Q-T Interval 360 ms  QTC Calculation(Bezet) 523 ms  R Axis 123 degrees  T Axis 0 degrees  Diagnosis Line Sinus tachycardia  Right bundle branchblock  Anteroseptal infarct , age undetermined  Abnormal ECG    GI: Morbid obesity BMI 54, Large bowel perf @cecum, s/p extended R hemicolectomy, Strict NPO, NGT to LCS, Abdomen open with abthera in place, GI ppx with PPI  Significant imaging: AXR post op: NGT in correct position    RENAL/: ELAINE, BL BUN/CR 10/0.6, Lala in place, UOP 5-10/h, IVF: LR @200 weight appropriate, 5L LR boluses ordered. BUN/Cr Trend: Blood Urea Nitrogen, Serum: 36 ( @ 15:16) / Creatinine, Serum: 1.3 ( @ 15:16)    HEME: No dx, H/H stable, DVT ppx/AC: heparin  Injectable 7500 Unit(s) SubCutaneous every 8 hours    ID: No dx, afebrile, +leukopenia likely reactive from sepsis (WBC 3.58). Trend LA on ABG  Serial LA: 4.2 ( @ 21:49) <- 7.7 ( @ 17:22) <- 7.0 ( @ 15:45)  Antibiotics:   piperacillin/tazobactam IVPB. 3.375 Gram(s) IV Intermittent every 8 hours  vancomycin  IVPB 1000 milliGRAM(s) IV Intermittent once  Microbiology: no Cx sent  Vanco Trough 19, 5AM.    Endocrine: DM holding home metformin Hemoglobin A1C 7.8, Mean Plasma Glucose: 177 mg/dL (19), Monitor FSG, ISS ordered.  CAPILLARY BLOOD GLUCOSE POCT Blood Glucose.: 174 mg/dL (2019 22:36), POCT Blood Glucose.: 223 mg/dL (2019 15:51)    Lines/Drains: Monitor O/P  PIV  CVC:     [ x ] IJ     [ ] SC     [ ] Fem      [ x ] R     [ ] L     Date Placed: 19  A line:     [ x ] Radial     [ ] Fem     [ ] R     [ x ] L      Date Placed: 19  Lala:       Date Placed: 19    Disposition: Upgrade to SICU for resuscitation, HD monitoring, Vent care, planned return to OR, open abdomen    DVT PTX: HSQ  GI PTX: PPI    ***Tubes/Lines/Drains  ***  Peripheral IV  Central Venous Line     	Date 19  Arterial Line		                Date 19  Urinary Catheter		Indication: Strict I&O    Date Placed: 19      REVIEW OF SYSTEMS  [ ] A ten-point review of systems was otherwise negative except as noted.  [X ] Due to altered mental status/intubation, subjective information were not able to be obtained from the patient. History was obtained, to the extent possible, from review of the chart and collateral sources of information.

## 2019-02-20 NOTE — PROCEDURE NOTE - NSPROCDETAILS_GEN_ALL_CORE
connected to a pressurized flush line/ultrasound guidance/positive blood return obtained via catheter/Seldinger technique/all materials/supplies accounted for at end of procedure/location identified, draped/prepped, sterile technique used, needle inserted/introduced/hemostasis with direct pressure, dressing applied

## 2019-02-20 NOTE — CHART NOTE - NSCHARTNOTEFT_GEN_A_CORE
Patient coded twice this morning.  The first code lasted less then 2 minutes before ROSC, with bicarb and calcium given.  approximately 10 min later he jessica down to asystole and code was recommenced.  Code lasted for approximately 5 min, with 2 epi, 1 bicarb, 1 calcium.  I spoke with the proxy, his other brother is currently driving in.  At this point if he has another code the family would like to discuss comfort care.  However at this time they have agreed to continue aggressive care.  All sedation has been stopped, he is on vaso, levo and phenylephrine.  We have had multiple attempts at a lines with no success.  We will continue pressors and add a bicarb drip.  He is in multiorgan failure at this time.

## 2019-02-21 DIAGNOSIS — I87.8 OTHER SPECIFIED DISORDERS OF VEINS: ICD-10-CM

## 2019-02-21 DIAGNOSIS — Z86.73 PERSONAL HISTORY OF TRANSIENT ISCHEMIC ATTACK (TIA), AND CEREBRAL INFARCTION WITHOUT RESIDUAL DEFICITS: ICD-10-CM

## 2019-02-21 DIAGNOSIS — I25.10 ATHEROSCLEROTIC HEART DISEASE OF NATIVE CORONARY ARTERY WITHOUT ANGINA PECTORIS: ICD-10-CM

## 2019-02-21 DIAGNOSIS — R50.9 FEVER, UNSPECIFIED: ICD-10-CM

## 2019-02-21 DIAGNOSIS — I63.9 CEREBRAL INFARCTION, UNSPECIFIED: ICD-10-CM

## 2019-02-21 DIAGNOSIS — H54.62 UNQUALIFIED VISUAL LOSS, LEFT EYE, NORMAL VISION RIGHT EYE: ICD-10-CM

## 2019-02-21 DIAGNOSIS — Z87.891 PERSONAL HISTORY OF NICOTINE DEPENDENCE: ICD-10-CM

## 2019-02-21 DIAGNOSIS — L03.116 CELLULITIS OF LEFT LOWER LIMB: ICD-10-CM

## 2019-02-21 DIAGNOSIS — J45.909 UNSPECIFIED ASTHMA, UNCOMPLICATED: ICD-10-CM

## 2019-02-21 DIAGNOSIS — E78.5 HYPERLIPIDEMIA, UNSPECIFIED: ICD-10-CM

## 2019-02-21 DIAGNOSIS — E66.01 MORBID (SEVERE) OBESITY DUE TO EXCESS CALORIES: ICD-10-CM

## 2019-02-21 DIAGNOSIS — F32.9 MAJOR DEPRESSIVE DISORDER, SINGLE EPISODE, UNSPECIFIED: ICD-10-CM

## 2019-02-21 DIAGNOSIS — L03.115 CELLULITIS OF RIGHT LOWER LIMB: ICD-10-CM

## 2019-02-21 DIAGNOSIS — I10 ESSENTIAL (PRIMARY) HYPERTENSION: ICD-10-CM

## 2019-02-21 DIAGNOSIS — E11.42 TYPE 2 DIABETES MELLITUS WITH DIABETIC POLYNEUROPATHY: ICD-10-CM

## 2019-02-21 DIAGNOSIS — Z79.82 LONG TERM (CURRENT) USE OF ASPIRIN: ICD-10-CM

## 2019-02-21 DIAGNOSIS — M19.90 UNSPECIFIED OSTEOARTHRITIS, UNSPECIFIED SITE: ICD-10-CM

## 2019-02-21 DIAGNOSIS — Z82.49 FAMILY HISTORY OF ISCHEMIC HEART DISEASE AND OTHER DISEASES OF THE CIRCULATORY SYSTEM: ICD-10-CM

## 2019-02-21 DIAGNOSIS — I87.2 VENOUS INSUFFICIENCY (CHRONIC) (PERIPHERAL): ICD-10-CM

## 2019-02-21 DIAGNOSIS — R44.9 UNSPECIFIED SYMPTOMS AND SIGNS INVOLVING GENERAL SENSATIONS AND PERCEPTIONS: ICD-10-CM

## 2019-02-21 DIAGNOSIS — Z79.84 LONG TERM (CURRENT) USE OF ORAL HYPOGLYCEMIC DRUGS: ICD-10-CM

## 2019-02-22 LAB — SURGICAL PATHOLOGY STUDY: SIGNIFICANT CHANGE UP

## 2019-04-23 NOTE — DISCHARGE NOTE ADULT - NS AS DC FOLLOWUP STROKE INST
-- Message is from the [facility]--    Patient is requesting a medication refill - medication is on active medication list quantity 102 to not decrease    Patient is currently OUT of the requested medication.    Was Medication Pended?  Yes.    Rx Name and Dose:  HYDROcodone-acetaminophen (NORCO)  MG per tablet    Duration: 30 days    Pharmacy  [facility] [address] [place] [address] [address]    Patient confirmed the above pharmacy as correct?  Yes    Caller Information       Type Contact Phone    04/23/2019 01:17 PM Phone (Incoming) REX RENO (Emergency Contact) [phone] (H)          Alternative phone number: na    Turnaround time given to caller:   \"This message will be sent to [state Provider's name]. The clinical team will fulfill your request as soon as they review your message.\"  
Influenza vaccination (VIS Pub Date: August 7, 2015)/Smoking Cessation

## 2020-07-30 NOTE — ED PROVIDER NOTE - NSCAREINITIATED _GEN_ER
How Severe Is Your Skin Lesion?: mild Has Your Skin Lesion Been Treated?: not been treated Is This A New Presentation, Or A Follow-Up?: Skin Lesion Teena Martinez(Resident)

## 2020-10-07 NOTE — H&P ADULT - I WAS PHYSICALLY PRESENT FOR THE KEY PORTIONS OF THE EVALUATION AND MANAGEMENT (E/M) SERVICE PROVIDED.  I AGREE WITH THE ABOVE HISTORY, PHYSICAL, AND PLAN WHICH I HAVE REVIEWED AND EDITED WHERE APPROPRIATE
Statement Selected Complex Repair Preamble Text (Leave Blank If You Do Not Want): Extensive wide undermining was performed.

## 2021-02-26 NOTE — PATIENT PROFILE ADULT - BRADEN SCORE
Detail Level: Detailed Quality 110: Preventive Care And Screening: Influenza Immunization: Influenza Immunization Administered during Influenza season 13

## 2021-03-01 NOTE — ED ADULT NURSE NOTE - NS ED NURSE RECORD ANOTHER HT AND WT
Patient: Louie Greco    Procedure(s):  CYSTOURETEROSCOPY,  URETERAL STENT INSERTION, RIGHT RETROGRADE  Cystoscopy, retrogrades, insert stent ureter(s), combined  Cystoscopy, litholapaxy, combined    Diagnosis:Ureteral stone [N20.1]  Diagnosis Additional Information: No value filed.    Anesthesia Type:  General    Note:  Disposition: Outpatient   Postop Pain Control: Uneventful            Sign Out: Well controlled pain   PONV: No   Neuro/Psych: Uneventful            Sign Out: Acceptable/Baseline neuro status   Airway/Respiratory: Uneventful            Sign Out: Acceptable/Baseline resp. status   CV/Hemodynamics: Uneventful            Sign Out: Acceptable CV status   Other NRE: NONE   DID A NON-ROUTINE EVENT OCCUR? No         Last vitals:  Vitals:    03/01/21 1340 03/01/21 1345 03/01/21 1404   BP: 133/81  127/81   Pulse: 77  87   Resp: 10  10   Temp:   35.3  C (95.5  F)   SpO2: 99% 99% 96%       Last vitals prior to Anesthesia Care Transfer:  CRNA VITALS  3/1/2021 1212 - 3/1/2021 1312      3/1/2021             Pulse:  77    SpO2:  97 %    Resp Rate (set):  10          Electronically Signed By: Fausto Ivey MD  March 1, 2021  5:12 PM  
No

## 2021-04-30 NOTE — PATIENT PROFILE ADULT. - TEACHING/LEARNING LEARNING PREFERENCES
Discussed with the patient physical activity sleep hygiene proper nutrition, immunizations, he just got his first dose of the Covid vaccine, therefore until he is done with the full series of COVID-19 he will not return to get his tetanus vaccine.   written material/verbal instruction

## 2021-07-15 NOTE — ED ADULT NURSE NOTE - PAIN: PRESENCE, MLM
denies pain/discomfort
unable to tolerate CPAP machine; pt followed up with a dentist who prescribed a nightguard with improvement

## 2021-10-21 NOTE — ED ADULT NURSE NOTE - CHIEF COMPLAINT
The patient is a 57y Male complaining of see chief complaint quote.
Class II - visualization of the soft palate, fauces, and uvula

## 2022-01-01 NOTE — ED ADULT NURSE NOTE - SEVERITY
Mother calling - says child was in yesterday and lab work was ordered to check bilirubin.  Says she saw the message from Dr. Valdez to continue biliblanket and to follow up with Dr. Gerardo or a partner today.    Mother says child has a 2:00 pm lab appointment scheduled for today.  Is calling to confirm she should come at 2:00 pm for this appointment.  Is asking if patient should be scheduled to see a provider today as well.    Message to PCP/care team.  Please advise.    Arminda Samuel RN  Triage Nurse Advisor    Reason for Disposition    Caller requesting results for important or urgent lab test (such as blood work in sick child or bilirubin in )    Protocols used: PCP CALL - NO TRIAGE-P-       PAIN SCALE 10 OF 10.

## 2022-03-02 NOTE — DISCHARGE NOTE ADULT - HAS THE PATIENT RECEIVED THE INFLUENZA VACCINE THIS SEASON?
OFFICE VISIT    ASSESSMENT & PLAN    Iesha Wu is a 61 year old female who presents for:  1. Annual physical exam  - CBC WITH DIFFERENTIAL; Future  - COMPREHENSIVE METABOLIC PANEL; Future  - THYROID STIMULATING HORMONE REFLEX; Future  - LIPID PANEL WITH REFLEX; Future    2. Chronic bilateral low back pain without sciatica - patient requesting referral  - SERVICE TO NEUROLOGICAL SURGERY    3. Need for hepatitis C screening test  - HEPATITIS C ANTIBODY WITH REFLEX; Future    4. Encounter for screening mammogram for malignant neoplasm of breast  - MAMMO SCREENING BILATERAL; Future    5. Need for vaccination  - TETANUS DIPHTHERIA ACELLULAR PERTUSSIS VACC, 10+ YRS (BOOSTRIX)    6. Screen for colon cancer  - SERVICE TO GASTROENTEROLOGY    Health Maintenance Summary     COVID-19 Vaccine (1)  Overdue - never done    Pneumococcal Vaccine 0-64 (1 of 2 - PPSV23)  Overdue - never done    Cervical Cancer Screen 30-64 - (PAP/HPV Co-Testing - Every 5 Years)  Overdue - never done    Colorectal Cancer Screen- (Colonoscopy - Every 10 Years)  Overdue - never done    Shingles Vaccine (1 of 2)  Overdue - never done    Hepatitis C Screening (Once)  Ordered on 3/2/2022    Breast Cancer Screening (Every 2 Years)  Ordered on 3/2/2022    Influenza Vaccine (1)  Overdue - never done    Depression Screening (Yearly)  Next due on 3/2/2023    DTaP/Tdap/Td Vaccine (2 - Td or Tdap)  Next due on 3/2/2032    Hepatitis B Vaccine   Aged Out    Meningococcal Vaccine   Aged Out    HPV Vaccine   Aged Out        Follow-up: Return in about 1 year (around 3/2/2023) for CPX. Seek additional medical care if worsening or not improving. Red flags discussed.    Instructed patient on correct medication dosing, usage and adverse effects (if applicable).  All questions and concerns discussed. Patient / representative agreeable to plan.    ANNUAL PHYSICAL EXAM    HISTORY    Iesha Wu is a 61 year old female who presents for:    # Physical  exam    PHYSICAL EXAM    Vital Signs:    Visit Vitals  /60   Pulse 82   Temp 98 °F (36.7 °C) (Temporal)   Ht 5' 10.5\" (1.791 m)   Wt 68.2 kg (150 lb 6.4 oz)   SpO2 97%   BMI 21.28 kg/m²     Constitutional:  No acute distress. Well-developed.  Skin:  Warm. Dry.   Head: Normocephalic, atraumatic    Eyes:  Normal conjunctivae. Pupils equal, round, reactive to light and accommodation. Extraocular muscles intact.   Ears, nose, mouth and throat:  Oral mucosa moist. No pharyngeal erythema or tonsillar exudate. Normal external ears and auditory canals. Tympanic membranes gray, translucent bilaterally. Normal external nose. Normal nasal turbinates.   Neck:  Supple. Nontender to palpation.  Lymphatic:  No cervical or supraclavicular lymphadenopathy.  Respiratory:  Lungs are clear to auscultation bilaterally. Respirations are nonlabored. Breath sounds are equal. No wheezing, rales, or rhonchi.   Cardiovascular:  Regular rate and rhythm. No murmurs. No edema. 2+ dorsalis pedis pulses.  Gastrointestinal:  Soft. Nontender. Nondistended. Normal bowel sounds.  Musculoskeletal:  Normal gait. Calves nontender to palpation. Moves extremities spontaneously.  Neurologic:  No focal neurological deficits observed. Normal speech observed. Cranial nerves II-XII grossly intact. 2+ patellar reflexes.  Psychiatric:  Alert and awake. Normal mood and affect. Responds appropriately to questions and commands.    Current Outpatient Medications   Medication Sig Dispense Refill   • Multiple Vitamin (MULTIVITAMIN ADULT PO)      • aspirin-acetaminophen-caffeine (EXCEDRIN MIGRAINE) 250-250-65 MG per tablet Take 1 tablet by mouth every 6 hours as needed for Pain.       No current facility-administered medications for this visit.     ALLERGIES:   Allergen Reactions   • Codeine Other (See Comments)     Past Medical History:   Diagnosis Date   • Allergy    • Anxiety    • Diverticulosis      Past Surgical History:   Procedure Laterality Date   • Tubal  ligation       Social History     Socioeconomic History   • Marital status:      Spouse name: Not on file   • Number of children: Not on file   • Years of education: Not on file   • Highest education level: Not on file   Occupational History   • Not on file   Tobacco Use   • Smoking status: Current Every Day Smoker     Packs/day: 0.50     Years: 45.00     Pack years: 22.50     Types: Cigarettes     Start date:    • Smokeless tobacco: Never Used   Vaping Use   • Vaping Use: never used   Substance and Sexual Activity   • Alcohol use: Not Currently   • Drug use: Not Currently   • Sexual activity: Not on file   Other Topics Concern   • Not on file   Social History Narrative   • Not on file     Social Determinants of Health     Financial Resource Strain: Not on file   Food Insecurity: Not on file   Transportation Needs: Not on file   Physical Activity: Not on file   Stress: Not on file   Social Connections: Not on file   Intimate Partner Violence: Not on file     Family History   Problem Relation Age of Onset   • Hypertension Mother    • Diabetes Father      Family Status   Relation Name Status   • Mo     • Fa             Hermelindo Jerez MD, MA  Family Medicine with Obstetrics  [address] [address] [place] [address]  Telephone: [phone]           Fax: [phone]    yes...

## 2022-03-17 NOTE — PATIENT PROFILE ADULT - FUNCTIONAL SCREEN CURRENT LEVEL: DRESSING, MLM
[facility] MEDICINE PROGRESS NOTE    Patient: Álvaro Johnson Today's Date: 3/17/2022   YOB: 1919 Admission Date: 3/16/2022  9:28 AM   MRN: [id number] Inpatient LOS: 1 day(s)   Room:  154/01 Hospital Day:  Hospital Day: 2       History and Subjective complaints       Interval history and Overnight events:      Patient seen and examined by me in follow up. No acute events overnight. Denies pain. Noted to be coughing with food. Plan for swallow evaluation.    Hospital Course  Patients interval history reviewed/EHR notes reviewed.   Álvaro Johnson is a 89 year old male who presented on 3/16/2022 with complaints of Altered Mental Status         Reviewed Pertinent Histories: Medical History, Surgical History, Social History, Family History,     ROS: Pertinent systems negative except as above.    Medications: Reviewed     Scheduled Medications:    amLODIPine, 2.5 mg, Nightly  aspirin, 81 mg, Daily  fluticasone, 2 spray, Daily  sertraline, 12.5 mg, Daily  Potassium Standard Replacement Protocol, , See Admin Instructions  Magnesium Standard Replacement Protocol, , See Admin Instructions  cefTRIAXone, 2,000 mg, Daily  sodium chloride (PF), 2 mL, 2 times per day  heparin (porcine), 5,000 Units, 3 times per day  atorvastatin, 10 mg, Nightly  levothyroxine, 137 mcg, QAM AC  mirtazapine, 15 mg, Nightly      Continuous Infusions:    • sodium chloride 0.9% infusion 75 mL/hr at 03/17/22 1318     PRN Medications:  albuterol, sodium chloride, sodium chloride, ondansetron, acetaminophen, polyethylene glycol, sodium chloride      Physical Examination       Vital 24 Hour Range Most Recent Value   Temperature Temp  Min: 97.6 °F (36.4 °C)  Max: 98.4 °F (36.9 °C) 98.1 °F (36.7 °C)   Pulse Pulse  Min: 56  Max: 80 69   Respiratory Resp  Min: 18  Max: 18 18   Blood Pressure BP  Min: 124/64  Max: 158/64 135/65   Pulse Oximetry SpO2  Min: 92 %  Max: 99 % 98 %   Arterial BP No data recorded     O2 No data  recorded       Intake and Output:      Intake/Output Summary (Last 24 hours) at 3/17/2022 1419  Last data filed at 3/17/2022 0630  Gross per 24 hour   Intake 902 ml   Output --   Net 902 ml       Last Stool Occurrence:  1 (03/17/22 0450)    Vital Most Recent Value First Value   Weight 58.1 kg (128 lb 1.4 oz) Weight: 59.9 kg (132 lb 0.9 oz)   Height 5' 11\" (180.3 cm) Height: 5' 11\" (180.3 cm)   BMI 17.86 N/A     General: Looks well well developed, well nourished  CV: regular rate and rhythm  Resp: clear to auscultation bilaterally  Abd: soft and nontender  Ext: edema absent   Skin: no rashes, lesions, or ulcers noted  Neuro: no focal deficits noted  Psych: normal mood and affect    Test Results     Labs: The Laboratory values listed below have been reviewed and pertinent findings discussed in the Assessment and Plan.    Laboratory values:   Recent Labs   Lab 03/17/22  0441 03/16/22  0945   WBC 7.7 13.0*   HGB 9.0* 8.8*   HCT 27.7* 27.2*    236       Recent Labs   Lab 03/17/22  0441 03/16/22  0944   SODIUM 141 139   POTASSIUM 3.6 4.0   CHLORIDE 107 105   CO2 26 26   CALCIUM 9.1 9.0   GLUCOSE 97 99   BUN 35* 42*   CREATININE 1.31* 1.59*          Radiology: Imaging studies have been reviewed and pertinent findings discussed in the Assessment and Plan.    Results for orders placed or performed during the hospital encounter of 03/16/22 (from the past 48 hour(s))   XR CHEST AP OR PA - PORTABLE    Impression    IMPRESSION: No acute chest finding      CT HEAD WO CONTRAST    Impression    IMPRESSION:  1. No acute intracranial hemorrhage, extraaxial fluid collection, shift of  the midline, or other mass effect.  2. No CT evidence for evolving infarct in a major intracranial vascular  territory, although MRI is more sensitive for detection of acute ischemia.  3. Age-appropriate volume loss with mild periventricular chronic small  vessel ischemic disease.  4. Right maxillary sinusitis       Tubes, Devices, Monitoring      Telemetry: On      Hwang: No    Assessment and Plan     89 year old male with past medical history of celiac disease, CKD stage 3, coronary disease, hypertension, hyperlipidemia, hypothyroidism presenting with altered mental status.      Altered mental status secondary to toxic metabolic encephalopathy  Acute urinary tract infection  Acute on chronic renal failure  Dehydration  Reduced oral intake  Coronary artery disease  Hypertension  Hyperlipidemia  Hypothyroidism     PLAN  Admit for observation  CXR without infection  CT head without acute intracranial findings  UA with infection  Lactic is normal  IV Rocephin  Urine cultures obtained growing gram negative bacilli  Provide intravenous fluid hydration  Monitor on telemetry  Check and replete electrolytes  Follow routine labs  Hemodynamics stable  Resume home medication regimen     Pt failed swallow evaluation. NPO.  Palliative consult    Consults:    IP CONSULT TO PALLIATIVE CARE    Diet:  2 Times/day W Lunch & Dinner; Ensure Enlive/standard Oral Supplement Oral Nutrition Supplement  Npo Diet With Exceptions; Medications  Therapy Orders:    PT and OT Orders Placed this Encounter   Procedures   • Occupational Therapy   • Occupational Therapy   • Physical Therapy   • Physical Therapy       Smoking status- non smoker    Nutrition status- probable moderate protein calorie malnutrition  Body mass index is 17.86 kg/m². - underweight BMI<18.5  DVT Prophylaxis - Heparin 5000 units sub q tid  Limited English proficiency with :  No         Advanced Directives     Code Status: Do Not Resuscitate           Discharge Plan     The patients treatment plans were discussed with pt and RN     Recommendations for Discharge   SW     PT     OT     SLP Per OT/PT rec     Anticipated discharge destination: TBD         Barriers to Discharge: Patient is not medically ready and needs to remain in the hospital today due to pending palliative consultatoin          Pradeep BEAULIEU  MD Phillip  Hospitalist  3/17/2022  2:19 PM    (Contact by secure chat)   0 = independent

## 2022-06-20 NOTE — ED ADULT TRIAGE NOTE - HEART RATE (BEATS/MIN)
Impression: Type 2 diabetes mellitus without complications Plan: No Non-Proliferative Diabetic Retinopathy, no Diabetic Macular Edema and no Neovascularization of the iris, disc, or elsewhere. Discussed ocular and systemic benefits of blood sugar control. Send notes to PCP. Check annually. 90

## 2022-07-01 NOTE — PROGRESS NOTE ADULT - ASSESSMENT
Procedure Name: Sacroiliac Injection, Right performed by Robert Swartz MD on 6/22/2022.    Date of procedure: 6/22/22    Pain Diary:   Prior to procedure: 8    Current: 6      Pain relief %: 25%    Per pt, some relief not much at this point.     Next pain management appointment: None, follow up appt scheduled      
58 yo M w/ PMH of DM, morbid obesity, chronic LE venous stasis ulcers presented to ED with inability to move LLE.    #) Inability to move LLE r/o CVA  - CT head negative in ED  - Neuro Rx stroke unit  - c/w ASA 81 + high intensity statin for now  - MRI brain and MRA H+N pending  - Echo with Bubble study  - Venous Duplex Pending  - Lipid profile done July 2018 LDL 83, HDL 32, T cholesterol 140  - HbA1C 7.4  - F/U Folate, TSH, and B12    #) LE venous stasis  - no signs of active infection (no pus, fever, WBC)  - Vascular consult Dr Herrera, recommended no Vascular intervention at this time    #) DM / morbid obesity - DASH/carb consistent diet, monitor fs, insulin PRN    DVT ppx: Lovenox subQ  Activity: OOBTC  Diet: DASH / carb consistent  Code status: FULL  Dispo: from [facility]
56 yo M w/ PMH of DM, morbid obesity, chronic LE venous stasis ulcers presented to ED with inability to move LLE.    #) Inability to move LLE r/o CVA  - CT head negative in ED  - Neuro Rx stroke unit  - c/w ASA 81 + high intensity statin for now  - MRI brain and MRA H+N Negative  - Echo shows EF 60-65%  - Venous Duplex Neg  - Lipid profile done July 2018 LDL 83, HDL 32, T cholesterol 140  - HbA1C 7.4  - F/U Folate, TSH, and B12  - F/U neurology consult  - F/U PT/Rehab    #) LE venous stasis with ulceration  - no signs of active infection (no pus, fever, WBC)  - Vascular consult Dr Herrera, recommended no Vascular intervention at this time  - F/U podiatry consult    #) DM / morbid obesity - DASH/carb consistent diet, monitor fs, insulin PRN    DVT ppx: Lovenox subQ  Activity: OOBTC  Diet: DASH / carb consistent  Code status: FULL  Dispo: from [facility]

## 2022-10-04 NOTE — PROGRESS NOTE ADULT - SUBJECTIVE AND OBJECTIVE BOX
[address] ENT  [facility] [place] [address]  Phone: [phone]  Fax: [phone]    Catherine Reyes MD    October 4, 2022     Chi Han MD  [address] [facility] [address] [address]    Patient: Malgorzata Proctor   MR Number: [id number]   YOB: 1954   Date of Visit: 10/4/2022       Dear Chi Han: Thank you for referring Malgorzata Proctor to me for evaluation/treatment. Below are the relevant portions of my assessment and plan of care. Diagnosis Orders   1. Neck pain              No Otolaryngological disease. Recommend dentist/oral surgery for tmj arthralgia. Recommend physical therapy for musculoskeletal pain. Follow up as needed. If you have questions, please do not hesitate to call me. I look forward to following [employer] along with you.     Sincerely,      Catherine Reyes MD
SUBJECTIVE:    Patient is a 58y old Male who presents with a chief complaint of cellulitis lower leg/chronic venous stasis (19 Jan 2019 09:07)    Currently admitted to medicine with the primary diagnosis of Cellulitis of leg, right     Today is hospital day 3d. This morning he is resting comfortably in bed and reports no new issues or overnight events.     PAST MEDICAL & SURGICAL HISTORY  CVA (cerebral vascular accident)  HLD (hyperlipidemia)  Morbid obesity  Osteoarthritis  Diabetes  Asthma  H/O umbilical hernia repair    SOCIAL HISTORY:  Negative for smoking/alcohol/drug use.     ALLERGIES:  No Known Allergies    MEDICATIONS:  STANDING MEDICATIONS  ALBUTerol/ipratropium for Nebulization 3 milliLiter(s) Nebulizer once  atorvastatin 40 milliGRAM(s) Oral at bedtime  betamethasone valerate 0.1% Cream 1 Application(s) Topical daily  furosemide    Tablet 40 milliGRAM(s) Oral daily  heparin  Injectable 5000 Unit(s) SubCutaneous every 8 hours  metFORMIN 500 milliGRAM(s) Oral two times a day  multivitamin 1 Tablet(s) Oral daily    PRN MEDICATIONS    VITALS:   T(F): 97.1  HR: 86  BP: 113/62  RR: 16  SpO2: 93%    LABS:                        RADIOLOGY:    PHYSICAL EXAM:  GEN: No acute distress  LUNGS: Clear to auscultation bilaterally   HEART: Regular  ABD: Soft, non-tender, non-distended.s   EXT: edema venous staisi  NEURO: AAOX3    Intravenous access:   NG tube:   Lala Catheter:
SUBJECTIVE:    Patient is a 58y old Male who presents with a chief complaint of cellulitis lower leg/chronic venous stasis (20 Jan 2019 10:46)    Currently admitted to medicine with the primary diagnosis of Venous stasis ulcers of both lower extremities     Today is hospital day 4d. This morning he is resting comfortably in bed and reports no new issues or overnight events.     PAST MEDICAL & SURGICAL HISTORY  CVA (cerebral vascular accident)  HLD (hyperlipidemia)  Morbid obesity  Osteoarthritis  Diabetes  Asthma  H/O umbilical hernia repair    SOCIAL HISTORY:  Negative for smoking/alcohol/drug use.     ALLERGIES:  No Known Allergies    MEDICATIONS:  STANDING MEDICATIONS  ALBUTerol/ipratropium for Nebulization 3 milliLiter(s) Nebulizer once  atorvastatin 40 milliGRAM(s) Oral at bedtime  betamethasone valerate 0.1% Cream 1 Application(s) Topical daily  furosemide    Tablet 40 milliGRAM(s) Oral daily  heparin  Injectable 5000 Unit(s) SubCutaneous every 8 hours  metFORMIN 500 milliGRAM(s) Oral two times a day  multivitamin 1 Tablet(s) Oral daily    PRN MEDICATIONS    VITALS:   T(F): 97.2  HR: 79  BP: 110/50  RR: 16  SpO2: --    LABS:                        RADIOLOGY:    PHYSICAL EXAM:  GEN: No acute distress  LUNGS: Clear to auscultation bilaterally   HEART: Regular  ABD: Soft, non-tender, non-distended.  EXT: red ness and edema  NEURO: AAOX3    Intravenous access:   NG tube:   Lala Catheter:

## 2022-10-27 NOTE — PATIENT PROFILE ADULT - NSPROMUTANXFEARADDRESSFT_GEN_A_NUR
Laurie Enriquez is a [de-identified] y.o. female here for   Chief Complaint   Patient presents with    Thyroid Problem       1. Have you been to the ER, urgent care clinic since your last visit? Hospitalized since your last visit? -no    2. Have you seen or consulted any other health care providers outside of the [address] [place] [address] since your last visit?   Include any pap smears or colon screening.-Ortho no

## 2022-12-08 NOTE — PHYSICAL THERAPY INITIAL EVALUATION ADULT - DIAGNOSIS, PT EVAL
No new care gaps identified.  [facility] Embedded Care Gaps. Reference number: [id number]. 12/08/2022   10:02:57 AM CST  
Gait Disorder

## 2023-04-29 NOTE — ED ADULT NURSE NOTE - PMH
Satisfactory
Asthma    CVA (cerebral vascular accident)    Diabetes    HLD (hyperlipidemia)    Morbid obesity    Osteoarthritis

## 2023-05-25 NOTE — CONSULT NOTE ADULT - ASSESSMENT
56 yo M w/ h/o Morbid obesity, Osteoarthritis, Diabetes, Asthma, H/O umbilical hernia repair admitted for b/l LE venostasis/ulcers currently with acute L monocular vision loss improving likely CRAO.  doubt stroke.      Plan:  - ophthalmology eval  - f/u Carotid duplex- f/o ICAS  - continue ASA 81, Lipitor 40  - if CD (-), no further inpt neurologic w/u
1.  h/o vision change OS        - No noted evidence of CRAO OS.  does have          a  paramacular curvilinear area of atrophy OS         and noted constriction on Confrontation visual field OS.         Does have vascular tortuosity OU  	  	  2. Type 2 DM with No Diabetic Retinopathy OU    Plan   1. Patient would benefit from a retinal exam with a fluorescein angiogram.  Can be performed as an outpatient  Pt has appointment on 7/20/2018 @ 10:30am with  Dr. Juan Ray, [address] [place] [address], [address]  [phone]
IMPRESSION: Rehab of gait dysfunction      PRECAUTIONS: [  ] Cardiac  [  ] Respiratory  [  ] Seizures [  ] Contact Isolation  [  ] Droplet Isolation  [  ] Other    Weight Bearing Status:     RECOMMENDATION:    Out of Bed to Chair     DVT/Decubiti Prophylaxis    REHAB PLAN:     [ x  ] Bedside P/T 3-5 times a week   [   ]   Bedside O/T  2-3 times a week             [   ] No Rehab Therapy Indicated                   [   ]  Speech Therapy   Conditioning/ROM                                    ADL  Bed Mobility                                               Conditioning/ROM  Transfers                                                     Bed Mobility  Sitting /Standing Balance                         Transfers                                        Gait Training                                               Sitting/Standing Balance  Stair Training [   ]Applicable                    Home equipment Eval                                                                        Splinting  [   ] Only      GOALS:   ADL   [   ]   Independent                    Transfers  [ x  ] Independent                          Ambulation  [ x  ] Independent     [  x  ] With device                            [   ]  CG                                                         [   ]  CG                                                                  [   ] CG                            [    ] Min A                                                   [   ] Min A                                                              [   ] Min  A          DISCHARGE PLAN:   [   ]  Good candidate for Intensive Rehabilitation/Hospital based-4A [facility]                                             Will tolerate 3hrs Intensive Rehab Daily                                       [  x  ]  Short Term Rehab in Skilled Nursing Facility                          vs             [  x  ]  Home with Outpatient or VN services                                         [    ]  Possible Candidate for Intensive Hospital based Rehab
venous stasis ulcers legs--> no maggots seens    rec: soap and water bid, d/c dakins solution--> ABD pads and keflex and ace wrap dressing changes bid, abx    no surgery needed
56 yo M with h/o DM, Morbid Obesity, Chronic LE venous stasis ulcers, Asthma p/w B/L LE erythema and swelling    # LE purulent cellulitis  RECOMMENDATIONS:  - c/w Unasyn IV - 3g q6h infused over 30 minutes.
mammogram

## 2023-06-27 NOTE — PROGRESS NOTE ADULT - ASSESSMENT
57M with PMH of DM, morbid obesity, OA, and asthma who presented to ER with worsening erythema and drainage from B/L LE Venous Stasis Ulcers.      1) Bilateral Venous Stasis Ulcers  -Erythematous, clear drainage, no purulent discharge   -Augmentin 875mg PO q12h x 7 days, PO Lasix, pain control with Percocet   -ID recs: repeat blood cx, Augmentin PO x 7 days, wound care, leg elevation, CHD 4% wash daily and PRN   -Burn recs: no maggots, soap and water BID, D/C Dakin's solution, ABD pads and ace wrap dressing changes BID, no surgery needed   -Flu repeat blood cx     2) DIABETES MELLITIS  -A1C 8.0, monitor finger sticks   -Lantus 20 qhs, Lispro 10 units qac  -CC diet     3)ASTHMA:  -Albuterol PRN     DVT ppx: Lovenox   DISPO: From home, ambulates using walker sometimes, unable to care for himself alone, would like placement in [facility]   FULL CODE 57M with PMH of DM, morbid obesity, OA, and asthma who presented to ER with worsening erythema and drainage from B/L LE Venous Stasis Ulcers.      1) Bilateral Venous Stasis Ulcers  -Erythematous, clear drainage, no purulent discharge   -Augmentin 875mg PO q12h x 7 days, PO Lasix, pain control with Percocet   -ID recs: repeat blood cx, Augmentin PO x 7 days, wound care, leg elevation, CHD 4% wash daily and PRN   -Burn recs: no maggots, soap and water BID, D/C Dakin's solution, ABD pads and ace wrap dressing changes BID, no surgery needed   -f/u repeat blood cx results   -Physiatry recs: STR in SNF vs. home with VNS     2) DIABETES MELLITIS  -A1C 8.0, monitor finger sticks   -Lantus 20 qhs, Lispro 10 units qac  -CC diet     3)ASTHMA:  -Albuterol PRN     DVT ppx: Lovenox   DISPO: From home, ambulates using walker sometimes, unable to care for himself alone, would like placement in [facility]   FULL CODE no

## 2023-09-30 NOTE — ED ADULT NURSE NOTE - NSFALLRSKHMRSKTYOTFT_ED_ALL_ED
Patient/Caregiver provided printed discharge information. patient with infection and dressing on both legs.

## 2023-12-20 NOTE — ED ADULT TRIAGE NOTE - NS ED TRIAGE AVPU SCALE
[facility]ist Internal Medicine Progress Note      Subjective     Overall feeling better  No cp/sob  No fevers/chills  No n/v on my exam  Seems to be tolerating diet well     Objective     I/O's    Intake/Output Summary (Last 24 hours) at 12/20/2023 0133  Last data filed at 12/20/2023 0121  Gross per 24 hour   Intake 2826.93 ml   Output 2230 ml   Net 596.93 ml       Last Recorded Vitals  Blood pressure 110/69, pulse 64, temperature 98.2 °F (36.8 °C), temperature source Oral, resp. rate 16, SpO2 97 %.  There is no height or weight on file to calculate BMI.    Physical Exam  Vitals and nursing note reviewed.   Constitutional:       Appearance: Normal appearance.   HENT:      Head: Normocephalic and atraumatic.      Right Ear: External ear normal.      Left Ear: External ear normal.      Nose: Nose normal.      Mouth/Throat:      Pharynx: Oropharynx is clear.      Neck: Normal range of motion and neck supple.   Eyes:      Conjunctiva/sclera: Conjunctivae normal.      Pupils: Pupils are equal, round, and reactive to light.   Cardiovascular:      Rate and Rhythm: Normal rate and regular rhythm.      Heart sounds: Normal heart sounds. No murmur heard.  Pulmonary:      Breath sounds: Normal breath sounds. No wheezing or rales.   Abdominal:      General: Bowel sounds are normal.      Palpations: Abdomen is soft.      Tenderness: There is abdominal tenderness (very minimal RUQ). There is no guarding or rebound.   Musculoskeletal:         General: No tenderness. Normal range of motion.   Skin:     General: Skin is warm.   Neurological:      General: No focal deficit present.      Mental Status: He is alert and oriented to person, place, and time.      Cranial Nerves: No cranial nerve deficit.   Psychiatric:         Mood and Affect: Mood normal.         Current Facility-Administered Medications   Medication    benzonatate (TESSALON PERLES) capsule 100 mg    enoxaparin (LOVENOX) injection 40 mg    acetaminophen (TYLENOL)  tablet 650 mg    sodium chloride 0.9 % flush bag 25 mL    sodium chloride 0.9 % injection 2 mL    Potassium Standard Replacement Protocol (Levels 3.5 and lower)    Potassium Replacement (Levels 3.6 - 4)    Magnesium Standard Replacement Protocol    sodium chloride 0.9 % flush bag 25 mL    ondansetron (ZOFRAN) injection 4 mg    morphine injection 2 mg    cefTRIAXone (ROCEPHIN) syringe 2,000 mg    metroNIDAZOLE (FLAGYL) premix IVPB 500 mg    pantoprazole (PROTONIX INJECT) injection 40 mg        Medications Prior to Admission   Medication Sig Dispense Refill    Phenyleph-Doxylamine-DM-APAP (VICKS DAYQUIL/NYQUIL CLD & FLU PO) Take 2 capsules by mouth every 6 hours as needed.      acetaminophen (TYLENOL) 500 MG tablet Take 500 mg by mouth every 6 hours as needed for Pain.          Labs     Recent Results (from the past 24 hour(s))   Comprehensive Metabolic Panel    Collection Time: 12/19/23  5:39 AM   Result Value Ref Range    Fasting Status      Sodium 137 135 - 145 mmol/L    Potassium 3.7 3.4 - 5.1 mmol/L    Chloride 107 97 - 110 mmol/L    Carbon Dioxide 29 21 - 32 mmol/L    Anion Gap 5 (L) 7 - 19 mmol/L    Glucose 99 70 - 99 mg/dL    BUN 8 6 - 20 mg/dL    Creatinine 0.89 0.67 - 1.17 mg/dL    Glomerular Filtration Rate >90 >=60    BUN/Cr 9 7 - 25    Calcium 9.4 8.4 - 10.2 mg/dL    Bilirubin, Total 1.0 0.2 - 1.0 mg/dL    GOT/AST 12 <=37 Units/L    GPT/ALT 32 <64 Units/L    Alkaline Phosphatase 87 45 - 117 Units/L    Albumin 3.5 (L) 3.6 - 5.1 g/dL    Protein, Total 6.7 6.4 - 8.2 g/dL    Globulin 3.2 2.0 - 4.0 g/dL    A/G Ratio 1.1 1.0 - 2.4   CBC with Automated Differential (performable only)    Collection Time: 12/19/23  5:39 AM   Result Value Ref Range    WBC 6.7 4.2 - 11.0 K/mcL    RBC 4.19 (L) 4.50 - 5.90 mil/mcL    HGB 13.0 13.0 - 17.0 g/dL    HCT 38.4 (L) 39.0 - 51.0 %    MCV 91.6 78.0 - 100.0 fl    MCH 31.0 26.0 - 34.0 pg    MCHC 33.9 32.0 - 36.5 g/dL    RDW-CV 11.7 11.0 - 15.0 %    RDW-SD 39.3 39.0 - 50.0 fL      140 - 450 K/mcL    NRBC 0 <=0 /100 WBC    Neutrophil, Percent 60 %    Lymphocytes, Percent 28 %    Mono, Percent 10 %    Eosinophils, Percent 1 %    Basophils, Percent 0 %    Immature Granulocytes 1 %    Absolute Neutrophils 4.1 1.8 - 7.7 K/mcL    Absolute Lymphocytes 1.9 1.0 - 4.8 K/mcL    Absolute Monocytes 0.6 0.3 - 0.9 K/mcL    Absolute Eosinophils  0.1 0.0 - 0.5 K/mcL    Absolute Basophils 0.0 0.0 - 0.3 K/mcL    Absolute Immature Granulocytes 0.0 0.0 - 0.2 K/mcL       Imaging    US LIVER GALLBLADDER PANCREAS (RUQ)   Final Result   Minimal echogenic nonshadowing material within the gallbladder lumen   raising concern for sludge. The gallbladder wall is borderline thickened.   No para cholecystic fluid. The ultrasound technologist reported a negative   sonographic Mccallum sign.      Nonvisualization of the common bile duct.            Electronically Signed by: SHAILA REMY M.D.    Signed on: 12/18/2023 5:19 AM    Workstation ID: [id number]      CT ABDOMEN PELVIS W CONTRAST   Final Result   Para cholecystic fluid concerning for acute cholecystitis. There is some   fluid surrounding the duodenum and pancreatic head. Duodenal inflammation   or pancreatitis is less likely, but not excluded. Ultrasound could be used   for further evaluation if clinically indicated.      Hepatic steatosis.         Electronically Signed by: SHAILA REMY M.D.    Signed on: 12/18/2023 4:00 AM    Workstation ID: [id number]            Assessment/Plan      #Sepsis; concern for cholecystitis   #RUQ abdominal pain   #Leukocytosis 2/2 above  #Lactic acidosis 2/2 above  -CT abdomen with Fluid infiltration surrounding the gallbladder, duodenum and pancreatic head. Findings raise concern for cholecystitis. Duodenal inflammation/duodenal ulcer disease is another consideration. Pancreatitis is considered less likely.  -RUQ US- Minimal echogenic nonshadowing material within the gallbladder lumen raising concern for sludge.  The gallbladder wall is borderline thickened.No para cholecystic fluid. The ultrasound technologist reported a negative sonographic Mccallum sign. Nonvisualization of the common bile duct.  -General surgery consulted; appreciate recs  Advancing diet as tolerated per sx recs  -Continue Ceftriaxone and Flagyl   -Follow fever curve   If tolerating diet will stop ivf's      #Hx of FIGUEROA  -LFTs stable     #HLD  -Managed with diet, no meds    If doing well and tolerating diet, possible discharge home tomorrow with otpatient sx f/u       DVT Prophylaxis  lovenox    Primary Care Physician  Og Brown MD Gennadiy Elikman, MD  12/20/2023 1:33 AM          Alert-The patient is alert, awake and responds to voice. The patient is oriented to time, place, and person. The triage nurse is able to obtain subjective information.

## 2024-01-28 NOTE — ED ADULT NURSE NOTE - PAIN RATING/NUMBER SCALE (0-10): REST
Ben was seen and treated in our emergency department on 1/28/2024.  He may return to school on 01/30/2024.      If you have any questions or concerns, please don't hesitate to call.      Veronique Buchanan PA-C 7

## 2024-03-21 NOTE — H&P ADULT - ASSESSMENT
Statement Selected 56 yo M w/ PMH of DM, morbid obesity, chronic LE venous stasis ulcers presented to ED with inability to move LLE.    #) Inability to move LLE  - CT head negative in ED  - Neuro Rx stroke unit  - c/w  + high intensity statin for now  - will hold off on MRI until further Neuro Rx's    #) LE venous stasis  - no signs of active infection (no pus, fever, WBC)  - Vascular consult Dr Herrera    #) DM / morbid obesity - DASH/carb consistent diet, monitor fs, insulin PRN    DVT ppx: Lovenox subQ  Activity: OOBTC  Diet: DASH / carb consistent  Code status: FULL  Dispo: from [facility]

## 2024-05-11 NOTE — ED PROVIDER NOTE - PRINCIPAL DIAGNOSIS
Shortness of breath
FAMILY HISTORY:  Father  Still living? No  FH: cirrhosis, Age at diagnosis: Age Unknown    Mother  Still living? Yes, Estimated age: 71-80  FH: stroke, Age at diagnosis: Age Unknown

## 2024-07-15 NOTE — ED ADULT TRIAGE NOTE - BP NONINVASIVE SYSTOLIC (MM HG)
Aklief counseling:  Patient advised to apply a pea-sized amount only at bedtime and wait 30 minutes after washing their face before applying.  If too drying, patient may add a non-comedogenic moisturizer.  The most commonly reported side effects including irritation, redness, scaling, dryness, stinging, burning, itching, and increased risk of sunburn.  The patient verbalized understanding of the proper use and possible adverse effects of retinoids.  All of the patient's questions and concerns were addressed. Minocycline Pregnancy And Lactation Text: This medication is Pregnancy Category D and not consider safe during pregnancy. It is also excreted in breast milk. Dapsone Counseling: I discussed with the patient the risks of dapsone including but not limited to hemolytic anemia, agranulocytosis, rashes, methemoglobinemia, kidney failure, peripheral neuropathy, headaches, GI upset, and liver toxicity.  Patients who start dapsone require monitoring including baseline LFTs and weekly CBCs for the first month, then every month thereafter.  The patient verbalized understanding of the proper use and possible adverse effects of dapsone.  All of the patient's questions and concerns were addressed. Tazorac Pregnancy And Lactation Text: This medication is not safe during pregnancy. It is unknown if this medication is excreted in breast milk. Doxycycline Counseling:  Patient counseled regarding possible photosensitivity and increased risk for sunburn.  Patient instructed to avoid sunlight, if possible.  When exposed to sunlight, patients should wear protective clothing, sunglasses, and sunscreen.  The patient was instructed to call the office immediately if the following severe adverse effects occur:  hearing changes, easy bruising/bleeding, severe headache, or vision changes.  The patient verbalized understanding of the proper use and possible adverse effects of doxycycline.  All of the patient's questions and concerns were addressed. Topical Clindamycin Pregnancy And Lactation Text: This medication is Pregnancy Category B and is considered safe during pregnancy. It is unknown if it is excreted in breast milk. Azelaic Acid Counseling: Patient counseled that medicine may cause skin irritation and to avoid applying near the eyes.  In the event of skin irritation, the patient was advised to reduce the amount of the drug applied or use it less frequently.   The patient verbalized understanding of the proper use and possible adverse effects of azelaic acid.  All of the patient's questions and concerns were addressed. Erythromycin Counseling:  I discussed with the patient the risks of erythromycin including but not limited to GI upset, allergic reaction, drug rash, diarrhea, increase in liver enzymes, and yeast infections. Isotretinoin Pregnancy And Lactation Text: This medication is Pregnancy Category X and is considered extremely dangerous during pregnancy. It is unknown if it is excreted in breast milk. Topical Sulfur Applications Counseling: Topical Sulfur Counseling: Patient counseled that this medication may cause skin irritation or allergic reactions.  In the event of skin irritation, the patient was advised to reduce the amount of the drug applied or use it less frequently.   The patient verbalized understanding of the proper use and possible adverse effects of topical sulfur application.  All of the patient's questions and concerns were addressed. Bactrim Counseling:  I discussed with the patient the risks of sulfa antibiotics including but not limited to GI upset, allergic reaction, drug rash, diarrhea, dizziness, photosensitivity, and yeast infections.  Rarely, more serious reactions can occur including but not limited to aplastic anemia, agranulocytosis, methemoglobinemia, blood dyscrasias, liver or kidney failure, lung infiltrates or desquamative/blistering drug rashes. Benzoyl Peroxide Pregnancy And Lactation Text: This medication is Pregnancy Category C. It is unknown if benzoyl peroxide is excreted in breast milk. Spironolactone Counseling: Patient advised regarding risks of diarrhea, abdominal pain, hyperkalemia, birth defects (for female patients), liver toxicity and renal toxicity. The patient may need blood work to monitor liver and kidney function and potassium levels while on therapy. The patient verbalized understanding of the proper use and possible adverse effects of spironolactone.  All of the patient's questions and concerns were addressed. Winlevi Counseling:  I discussed with the patient the risks of topical clascoterone including but not limited to erythema, scaling, itching, and stinging. Patient voiced their understanding. Birth Control Pills Counseling: Birth Control Pill Counseling: I discussed with the patient the potential side effects of OCPs including but not limited to increased risk of stroke, heart attack, thrombophlebitis, deep venous thrombosis, hepatic adenomas, breast changes, GI upset, headaches, and depression.  The patient verbalized understanding of the proper use and possible adverse effects of OCPs. All of the patient's questions and concerns were addressed. 144 Topical Retinoid Pregnancy And Lactation Text: This medication is Pregnancy Category C. It is unknown if this medication is excreted in breast milk. Tetracycline Counseling: Patient counseled regarding possible photosensitivity and increased risk for sunburn.  Patient instructed to avoid sunlight, if possible.  When exposed to sunlight, patients should wear protective clothing, sunglasses, and sunscreen.  The patient was instructed to call the office immediately if the following severe adverse effects occur:  hearing changes, easy bruising/bleeding, severe headache, or vision changes.  The patient verbalized understanding of the proper use and possible adverse effects of tetracycline.  All of the patient's questions and concerns were addressed. Patient understands to avoid pregnancy while on therapy due to potential birth defects. High Dose Vitamin A Pregnancy And Lactation Text: High dose vitamin A therapy is contraindicated during pregnancy and breast feeding. Aklief Pregnancy And Lactation Text: It is unknown if this medication is safe to use during pregnancy.  It is unknown if this medication is excreted in breast milk.  Breastfeeding women should use the topical cream on the smallest area of the skin for the shortest time needed while breastfeeding.  Do not apply to nipple and areola. Detail Level: Detailed Tazorac Counseling:  Patient advised that medication is irritating and drying.  Patient may need to apply sparingly and wash off after an hour before eventually leaving it on overnight.  The patient verbalized understanding of the proper use and possible adverse effects of tazorac.  All of the patient's questions and concerns were addressed. Dapsone Pregnancy And Lactation Text: This medication is Pregnancy Category C and is not considered safe during pregnancy or breast feeding. Minocycline Counseling: Patient advised regarding possible photosensitivity and discoloration of the teeth, skin, lips, tongue and gums.  Patient instructed to avoid sunlight, if possible.  When exposed to sunlight, patients should wear protective clothing, sunglasses, and sunscreen.  The patient was instructed to call the office immediately if the following severe adverse effects occur:  hearing changes, easy bruising/bleeding, severe headache, or vision changes.  The patient verbalized understanding of the proper use and possible adverse effects of minocycline.  All of the patient's questions and concerns were addressed. Topical Clindamycin Counseling: Patient counseled that this medication may cause skin irritation or allergic reactions.  In the event of skin irritation, the patient was advised to reduce the amount of the drug applied or use it less frequently.   The patient verbalized understanding of the proper use and possible adverse effects of clindamycin.  All of the patient's questions and concerns were addressed. Azithromycin Counseling:  I discussed with the patient the risks of azithromycin including but not limited to GI upset, allergic reaction, drug rash, diarrhea, and yeast infections. Doxycycline Pregnancy And Lactation Text: This medication is Pregnancy Category D and not consider safe during pregnancy. It is also excreted in breast milk but is considered safe for shorter treatment courses. Sarecycline Counseling: Patient advised regarding possible photosensitivity and discoloration of the teeth, skin, lips, tongue and gums.  Patient instructed to avoid sunlight, if possible.  When exposed to sunlight, patients should wear protective clothing, sunglasses, and sunscreen.  The patient was instructed to call the office immediately if the following severe adverse effects occur:  hearing changes, easy bruising/bleeding, severe headache, or vision changes.  The patient verbalized understanding of the proper use and possible adverse effects of sarecycline.  All of the patient's questions and concerns were addressed. Erythromycin Pregnancy And Lactation Text: This medication is Pregnancy Category B and is considered safe during pregnancy. It is also excreted in breast milk. Azelaic Acid Pregnancy And Lactation Text: This medication is considered safe during pregnancy and breast feeding. Spironolactone Pregnancy And Lactation Text: This medication can cause feminization of the male fetus and should be avoided during pregnancy. The active metabolite is also found in breast milk. Azithromycin Pregnancy And Lactation Text: This medication is considered safe during pregnancy and is also secreted in breast milk. Topical Sulfur Applications Pregnancy And Lactation Text: This medication is Pregnancy Category C and has an unknown safety profile during pregnancy. It is unknown if this topical medication is excreted in breast milk. Benzoyl Peroxide Counseling: Patient counseled that medicine may cause skin irritation and bleach clothing.  In the event of skin irritation, the patient was advised to reduce the amount of the drug applied or use it less frequently.   The patient verbalized understanding of the proper use and possible adverse effects of benzoyl peroxide.  All of the patient's questions and concerns were addressed. Topical Retinoid counseling:  Patient advised to apply a pea-sized amount only at bedtime and wait 30 minutes after washing their face before applying.  If too drying, patient may add a non-comedogenic moisturizer. The patient verbalized understanding of the proper use and possible adverse effects of retinoids.  All of the patient's questions and concerns were addressed. Use Enhanced Medication Counseling?: No Bactrim Pregnancy And Lactation Text: This medication is Pregnancy Category D and is known to cause fetal risk.  It is also excreted in breast milk. Isotretinoin Counseling: Patient should get monthly blood tests, not donate blood, not drive at night if vision affected, not share medication, and not undergo elective surgery for 6 months after tx completed. Side effects reviewed, pt to contact office should one occur. Winlevi Pregnancy And Lactation Text: This medication is considered safe during pregnancy and breastfeeding. Birth Control Pills Pregnancy And Lactation Text: This medication should be avoided if pregnant and for the first 30 days post-partum. High Dose Vitamin A Counseling: Side effects reviewed, pt to contact office should one occur.

## 2024-11-08 NOTE — PRE-OP CHECKLIST - BP NONINVASIVE SYSTOLIC (MM HG)
Spoke with pharmacist and gave message below. Advised they still need a ration to ingredients and full quantity to give to patient. Please advise    115

## 2025-03-06 NOTE — ED ADULT TRIAGE NOTE - SOURCE OF INFORMATION
I have reviewed the notes, assessments, and/or procedures performed by Moise Flores, I concur with her/his documentation of Yon Ochoa.     Patient

## 2025-05-23 NOTE — PATIENT PROFILE ADULT. - PRO ARRIVE FROM
[facility] living [facility]
Other

## 2025-05-25 NOTE — ED ADULT NURSE NOTE - CAS EDN DISCHARGE ASSESSMENT
Bed: 04  Expected date:   Expected time:   Means of arrival:   Comments:  HWFD: unresponsive from [facility]    Alert and oriented to person, place and time